# Patient Record
Sex: FEMALE | Race: WHITE | NOT HISPANIC OR LATINO | Employment: OTHER | URBAN - METROPOLITAN AREA
[De-identification: names, ages, dates, MRNs, and addresses within clinical notes are randomized per-mention and may not be internally consistent; named-entity substitution may affect disease eponyms.]

---

## 2023-09-28 ENCOUNTER — MEDICAL CORRESPONDENCE (OUTPATIENT)
Dept: HEALTH INFORMATION MANAGEMENT | Facility: CLINIC | Age: 78
End: 2023-09-28
Payer: COMMERCIAL

## 2023-10-02 RX ORDER — KETOCONAZOLE 20 MG/ML
SHAMPOO TOPICAL
COMMUNITY
Start: 2022-07-11

## 2023-10-02 RX ORDER — GABAPENTIN 300 MG/1
600 CAPSULE ORAL
COMMUNITY
Start: 2023-08-24 | End: 2023-10-12

## 2023-10-02 RX ORDER — LOSARTAN POTASSIUM 50 MG/1
1 TABLET ORAL DAILY
Status: ON HOLD | COMMUNITY
Start: 2023-08-24 | End: 2023-10-17

## 2023-10-02 RX ORDER — ATORVASTATIN CALCIUM 40 MG/1
1 TABLET, FILM COATED ORAL DAILY
COMMUNITY
Start: 2023-01-31

## 2023-10-05 ENCOUNTER — TRANSFERRED RECORDS (OUTPATIENT)
Dept: MULTI SPECIALTY CLINIC | Facility: CLINIC | Age: 78
End: 2023-10-05

## 2023-10-05 LAB
CREATININE (EXTERNAL): 1.36 MG/DL (ref 0.5–0.9)
GFR ESTIMATED (EXTERNAL): 40 ML/MIN/1.73M2
GLUCOSE (EXTERNAL): 128 MG/DL (ref 70–99)
HBA1C MFR BLD: 7.8 %
INR (EXTERNAL): 1.4
POTASSIUM (EXTERNAL): 4.3 MMOL/L (ref 3.5–5.1)

## 2023-10-12 RX ORDER — TIMOLOL MALEATE 5 MG/ML
1 SOLUTION/ DROPS OPHTHALMIC 2 TIMES DAILY
COMMUNITY

## 2023-10-12 RX ORDER — METOPROLOL SUCCINATE 100 MG/1
100 TABLET, EXTENDED RELEASE ORAL 2 TIMES DAILY
Status: ON HOLD | COMMUNITY
End: 2023-10-17

## 2023-10-12 RX ORDER — GABAPENTIN 300 MG/1
600 CAPSULE ORAL 2 TIMES DAILY PRN
COMMUNITY
End: 2023-10-18

## 2023-10-12 NOTE — PHARMACY-ADMISSION MEDICATION HISTORY
Med rec completed by pre-admitting RNBEBETO.    This writer phoned pt at home to confirm lantus dosing, novolog dosing, gabapentin dosing, apixaban frequency and metoprolol.  Called [facility] [place] to confirm timolol eye drop formulation and metoprolol formulation/strength.    Removed norco prn entry from 2015, docusate 100mg daily and flonase-pt reports not taking these.     Prior to Admission medications    Medication Sig Last Dose Taking? Auth Provider Long Term End Date   apixaban ANTICOAGULANT (ELIQUIS) 5 MG tablet Take 5 mg by mouth 2 times daily 10/8/2023 Yes Reported, Patient     atorvastatin (LIPITOR) 40 MG tablet Take 1 tablet by mouth daily  Yes Reported, Patient Yes    gabapentin (NEURONTIN) 300 MG capsule Take 600 mg by mouth 2 times daily as needed  Yes Unknown, Entered By History No    HYDROCHLOROTHIAZIDE PO Take 25 mg by mouth daily  Yes Reported, Patient Yes    insulin aspart (NOVOLOG PEN) 100 UNIT/ML pen Inject 3 Units Subcutaneous daily (with breakfast)  Yes Unknown, Entered By History No    insulin aspart (NOVOLOG PEN) 100 UNIT/ML pen Inject 3 Units Subcutaneous daily (with lunch)  Yes Unknown, Entered By History No    insulin aspart (NOVOLOG PEN) 100 UNIT/ML pen Inject 5 Units Subcutaneous daily (with dinner)  Yes Unknown, Entered By History No    insulin glargine (LANTUS) 100 UNIT/ML vial Inject 17-20 Units Subcutaneous at bedtime  Yes Reported, Patient Yes    ketoconazole (NIZORAL) 2 % external shampoo Shampoo the hair thoroughly each as need.  Yes Reported, Patient     LEVOTHYROXINE SODIUM PO Take 88 mcg by mouth daily  Yes Reported, Patient Yes    losartan (COZAAR) 50 MG tablet Take 1 tablet by mouth daily  Yes Reported, Patient Yes    metoprolol succinate ER (TOPROL XL) 100 MG 24 hr tablet Take 100 mg by mouth 2 times daily  Yes Unknown, Entered By History Yes    Multiple Vitamins-Calcium (ONE-A-DAY WOMENS FORMULA PO) Take 1 tablet by mouth daily  Yes Reported, Patient      OMEPRAZOLE PO Take 40 mg by mouth daily  Yes Reported, Patient     timolol maleate (TIMOPTIC) 0.5 % ophthalmic solution Place 1 drop into both eyes 2 times daily  Yes Unknown, Entered By History     brimonidine (ALPHAGAN-P) 0.15 % ophthalmic solution Place 1 drop Into the left eye 2 times daily   Unknown, Entered By History     latanoprost (XALATAN) 0.005 % ophthalmic solution Place 1 drop into the right eye At Bedtime   Reported, Patient Yes    prednisoLONE acetate (PRED FORTE) 1 % ophthalmic suspension Place 1 drop Into the left eye daily   Reported, Patient

## 2023-10-13 ENCOUNTER — APPOINTMENT (OUTPATIENT)
Dept: GENERAL RADIOLOGY | Facility: CLINIC | Age: 78
DRG: 460 | End: 2023-10-13
Attending: ORTHOPAEDIC SURGERY
Payer: COMMERCIAL

## 2023-10-13 ENCOUNTER — ANESTHESIA (OUTPATIENT)
Dept: SURGERY | Facility: CLINIC | Age: 78
DRG: 460 | End: 2023-10-13
Payer: COMMERCIAL

## 2023-10-13 ENCOUNTER — ANESTHESIA EVENT (OUTPATIENT)
Dept: SURGERY | Facility: CLINIC | Age: 78
DRG: 460 | End: 2023-10-13
Payer: COMMERCIAL

## 2023-10-13 ENCOUNTER — HOSPITAL ENCOUNTER (INPATIENT)
Facility: CLINIC | Age: 78
LOS: 4 days | Discharge: NURSING, REHAB OR LONG-TERM CARE | DRG: 460 | End: 2023-10-17
Attending: ORTHOPAEDIC SURGERY | Admitting: ORTHOPAEDIC SURGERY
Payer: COMMERCIAL

## 2023-10-13 DIAGNOSIS — Z98.890 H/O SPINAL SURGERY: Primary | ICD-10-CM

## 2023-10-13 DIAGNOSIS — I10 BENIGN ESSENTIAL HYPERTENSION: ICD-10-CM

## 2023-10-13 LAB
ABO/RH(D): NORMAL
ANTIBODY SCREEN: NEGATIVE
GLUCOSE BLDC GLUCOMTR-MCNC: 110 MG/DL (ref 70–99)
GLUCOSE BLDC GLUCOMTR-MCNC: 161 MG/DL (ref 70–99)
GLUCOSE BLDC GLUCOMTR-MCNC: 194 MG/DL (ref 70–99)
GLUCOSE BLDC GLUCOMTR-MCNC: 236 MG/DL (ref 70–99)
HGB BLD-MCNC: 12.5 G/DL (ref 11.7–15.7)
SPECIMEN EXPIRATION DATE: NORMAL

## 2023-10-13 PROCEDURE — 120N000001 HC R&B MED SURG/OB

## 2023-10-13 PROCEDURE — 250N000011 HC RX IP 250 OP 636: Performed by: ANESTHESIOLOGY

## 2023-10-13 PROCEDURE — 999N000141 HC STATISTIC PRE-PROCEDURE NURSING ASSESSMENT: Performed by: ORTHOPAEDIC SURGERY

## 2023-10-13 PROCEDURE — 00NW0ZZ RELEASE CERVICAL SPINAL CORD, OPEN APPROACH: ICD-10-PCS | Performed by: ORTHOPAEDIC SURGERY

## 2023-10-13 PROCEDURE — 710N000009 HC RECOVERY PHASE 1, LEVEL 1, PER MIN: Performed by: ORTHOPAEDIC SURGERY

## 2023-10-13 PROCEDURE — 272N000002 HC OR SUPPLY OTHER OPNP: Performed by: ORTHOPAEDIC SURGERY

## 2023-10-13 PROCEDURE — 0RG40J1 FUSION OF CERVICOTHORACIC VERTEBRAL JOINT WITH SYNTHETIC SUBSTITUTE, POSTERIOR APPROACH, POSTERIOR COLUMN, OPEN APPROACH: ICD-10-PCS | Performed by: ORTHOPAEDIC SURGERY

## 2023-10-13 PROCEDURE — 250N000009 HC RX 250: Performed by: NURSE ANESTHETIST, CERTIFIED REGISTERED

## 2023-10-13 PROCEDURE — C1713 ANCHOR/SCREW BN/BN,TIS/BN: HCPCS | Performed by: ORTHOPAEDIC SURGERY

## 2023-10-13 PROCEDURE — 250N000013 HC RX MED GY IP 250 OP 250 PS 637: Performed by: NURSE PRACTITIONER

## 2023-10-13 PROCEDURE — 250N000005 HC OR RX SURGIFLO HEMOSTATIC MATRIX 10ML 199102S OPNP: Performed by: ORTHOPAEDIC SURGERY

## 2023-10-13 PROCEDURE — 250N000012 HC RX MED GY IP 250 OP 636 PS 637: Performed by: PHYSICIAN ASSISTANT

## 2023-10-13 PROCEDURE — 370N000017 HC ANESTHESIA TECHNICAL FEE, PER MIN: Performed by: ORTHOPAEDIC SURGERY

## 2023-10-13 PROCEDURE — 0RG20J1 FUSION OF 2 OR MORE CERVICAL VERTEBRAL JOINTS WITH SYNTHETIC SUBSTITUTE, POSTERIOR APPROACH, POSTERIOR COLUMN, OPEN APPROACH: ICD-10-PCS | Performed by: ORTHOPAEDIC SURGERY

## 2023-10-13 PROCEDURE — G0509 CRIT CARE TELEHEA CONSULT 50: HCPCS | Mod: VID | Performed by: NURSE PRACTITIONER

## 2023-10-13 PROCEDURE — 250N000011 HC RX IP 250 OP 636: Performed by: ORTHOPAEDIC SURGERY

## 2023-10-13 PROCEDURE — 999N000179 XR SURGERY CARM FLUORO LESS THAN 5 MIN W STILLS: Mod: TC

## 2023-10-13 PROCEDURE — 86850 RBC ANTIBODY SCREEN: CPT | Performed by: ORTHOPAEDIC SURGERY

## 2023-10-13 PROCEDURE — 00NX0ZZ RELEASE THORACIC SPINAL CORD, OPEN APPROACH: ICD-10-PCS | Performed by: ORTHOPAEDIC SURGERY

## 2023-10-13 PROCEDURE — 258N000003 HC RX IP 258 OP 636: Performed by: NURSE ANESTHETIST, CERTIFIED REGISTERED

## 2023-10-13 PROCEDURE — 258N000003 HC RX IP 258 OP 636: Performed by: ORTHOPAEDIC SURGERY

## 2023-10-13 PROCEDURE — 258N000003 HC RX IP 258 OP 636: Performed by: ANESTHESIOLOGY

## 2023-10-13 PROCEDURE — 250N000011 HC RX IP 250 OP 636: Performed by: NURSE ANESTHETIST, CERTIFIED REGISTERED

## 2023-10-13 PROCEDURE — 250N000013 HC RX MED GY IP 250 OP 250 PS 637: Performed by: PHYSICIAN ASSISTANT

## 2023-10-13 PROCEDURE — 250N000025 HC SEVOFLURANE, PER MIN: Performed by: ORTHOPAEDIC SURGERY

## 2023-10-13 PROCEDURE — 250N000011 HC RX IP 250 OP 636: Mod: JZ

## 2023-10-13 PROCEDURE — 272N000001 HC OR GENERAL SUPPLY STERILE: Performed by: ORTHOPAEDIC SURGERY

## 2023-10-13 PROCEDURE — 85018 HEMOGLOBIN: CPT | Performed by: ORTHOPAEDIC SURGERY

## 2023-10-13 PROCEDURE — 250N000009 HC RX 250: Performed by: ORTHOPAEDIC SURGERY

## 2023-10-13 PROCEDURE — 250N000013 HC RX MED GY IP 250 OP 250 PS 637

## 2023-10-13 PROCEDURE — 0RG60J1 FUSION OF THORACIC VERTEBRAL JOINT WITH SYNTHETIC SUBSTITUTE, POSTERIOR APPROACH, POSTERIOR COLUMN, OPEN APPROACH: ICD-10-PCS | Performed by: ORTHOPAEDIC SURGERY

## 2023-10-13 PROCEDURE — 36415 COLL VENOUS BLD VENIPUNCTURE: CPT | Performed by: ORTHOPAEDIC SURGERY

## 2023-10-13 PROCEDURE — 86901 BLOOD TYPING SEROLOGIC RH(D): CPT | Performed by: ORTHOPAEDIC SURGERY

## 2023-10-13 PROCEDURE — 250N000011 HC RX IP 250 OP 636: Mod: JZ | Performed by: NURSE ANESTHETIST, CERTIFIED REGISTERED

## 2023-10-13 PROCEDURE — 360N000086 HC SURGERY LEVEL 6 W/ FLUORO, PER MIN: Performed by: ORTHOPAEDIC SURGERY

## 2023-10-13 PROCEDURE — 272N000282 HC OR IOM SUPPLIES OPNP: Performed by: ORTHOPAEDIC SURGERY

## 2023-10-13 PROCEDURE — 8E0WXBZ COMPUTER ASSISTED PROCEDURE OF TRUNK REGION: ICD-10-PCS | Performed by: ORTHOPAEDIC SURGERY

## 2023-10-13 PROCEDURE — 258N000003 HC RX IP 258 OP 636

## 2023-10-13 DEVICE — IMPLANTABLE DEVICE: Type: IMPLANTABLE DEVICE | Site: POSTERIOR CERVICAL | Status: FUNCTIONAL

## 2023-10-13 RX ORDER — OXYCODONE HYDROCHLORIDE 5 MG/1
5 TABLET ORAL
Status: DISCONTINUED | OUTPATIENT
Start: 2023-10-13 | End: 2023-10-17 | Stop reason: HOSPADM

## 2023-10-13 RX ORDER — DEXAMETHASONE SODIUM PHOSPHATE 4 MG/ML
INJECTION, SOLUTION INTRA-ARTICULAR; INTRALESIONAL; INTRAMUSCULAR; INTRAVENOUS; SOFT TISSUE PRN
Status: DISCONTINUED | OUTPATIENT
Start: 2023-10-13 | End: 2023-10-13

## 2023-10-13 RX ORDER — BUPIVACAINE HYDROCHLORIDE AND EPINEPHRINE 2.5; 5 MG/ML; UG/ML
INJECTION, SOLUTION EPIDURAL; INFILTRATION; INTRACAUDAL; PERINEURAL PRN
Status: DISCONTINUED | OUTPATIENT
Start: 2023-10-13 | End: 2023-10-13 | Stop reason: HOSPADM

## 2023-10-13 RX ORDER — HYDROCHLOROTHIAZIDE 25 MG/1
25 TABLET ORAL DAILY
Status: DISCONTINUED | OUTPATIENT
Start: 2023-10-14 | End: 2023-10-17 | Stop reason: HOSPADM

## 2023-10-13 RX ORDER — PANTOPRAZOLE SODIUM 40 MG/1
40 TABLET, DELAYED RELEASE ORAL DAILY
Status: DISCONTINUED | OUTPATIENT
Start: 2023-10-14 | End: 2023-10-17 | Stop reason: HOSPADM

## 2023-10-13 RX ORDER — LIDOCAINE 40 MG/G
CREAM TOPICAL
Status: DISCONTINUED | OUTPATIENT
Start: 2023-10-13 | End: 2023-10-13 | Stop reason: HOSPADM

## 2023-10-13 RX ORDER — METOPROLOL SUCCINATE 50 MG/1
100 TABLET, EXTENDED RELEASE ORAL 2 TIMES DAILY
Status: DISCONTINUED | OUTPATIENT
Start: 2023-10-13 | End: 2023-10-17 | Stop reason: HOSPADM

## 2023-10-13 RX ORDER — LEVOTHYROXINE SODIUM 88 UG/1
88 TABLET ORAL DAILY
Status: DISCONTINUED | OUTPATIENT
Start: 2023-10-14 | End: 2023-10-17 | Stop reason: HOSPADM

## 2023-10-13 RX ORDER — GABAPENTIN 300 MG/1
300 CAPSULE ORAL 3 TIMES DAILY
Status: DISCONTINUED | OUTPATIENT
Start: 2023-10-13 | End: 2023-10-17 | Stop reason: HOSPADM

## 2023-10-13 RX ORDER — VANCOMYCIN HYDROCHLORIDE 1 G/20ML
INJECTION, POWDER, LYOPHILIZED, FOR SOLUTION INTRAVENOUS PRN
Status: DISCONTINUED | OUTPATIENT
Start: 2023-10-13 | End: 2023-10-13 | Stop reason: HOSPADM

## 2023-10-13 RX ORDER — KETOROLAC TROMETHAMINE 15 MG/ML
15 INJECTION, SOLUTION INTRAMUSCULAR; INTRAVENOUS
Status: DISCONTINUED | OUTPATIENT
Start: 2023-10-13 | End: 2023-10-13 | Stop reason: HOSPADM

## 2023-10-13 RX ORDER — ONDANSETRON 2 MG/ML
4 INJECTION INTRAMUSCULAR; INTRAVENOUS EVERY 30 MIN PRN
Status: DISCONTINUED | OUTPATIENT
Start: 2023-10-13 | End: 2023-10-13 | Stop reason: HOSPADM

## 2023-10-13 RX ORDER — HYDROMORPHONE HCL IN WATER/PF 6 MG/30 ML
0.4 PATIENT CONTROLLED ANALGESIA SYRINGE INTRAVENOUS
Status: DISCONTINUED | OUTPATIENT
Start: 2023-10-13 | End: 2023-10-17 | Stop reason: HOSPADM

## 2023-10-13 RX ORDER — LIDOCAINE 40 MG/G
CREAM TOPICAL
Status: DISCONTINUED | OUTPATIENT
Start: 2023-10-13 | End: 2023-10-17 | Stop reason: HOSPADM

## 2023-10-13 RX ORDER — ACETAMINOPHEN 325 MG/1
975 TABLET ORAL ONCE
Status: DISCONTINUED | OUTPATIENT
Start: 2023-10-13 | End: 2023-10-13 | Stop reason: HOSPADM

## 2023-10-13 RX ORDER — NALOXONE HYDROCHLORIDE 0.4 MG/ML
0.2 INJECTION, SOLUTION INTRAMUSCULAR; INTRAVENOUS; SUBCUTANEOUS
Status: DISCONTINUED | OUTPATIENT
Start: 2023-10-13 | End: 2023-10-17 | Stop reason: HOSPADM

## 2023-10-13 RX ORDER — NALOXONE HYDROCHLORIDE 0.4 MG/ML
0.4 INJECTION, SOLUTION INTRAMUSCULAR; INTRAVENOUS; SUBCUTANEOUS
Status: DISCONTINUED | OUTPATIENT
Start: 2023-10-13 | End: 2023-10-17 | Stop reason: HOSPADM

## 2023-10-13 RX ORDER — HYDROMORPHONE HCL IN WATER/PF 6 MG/30 ML
0.2 PATIENT CONTROLLED ANALGESIA SYRINGE INTRAVENOUS EVERY 5 MIN PRN
Status: DISCONTINUED | OUTPATIENT
Start: 2023-10-13 | End: 2023-10-13 | Stop reason: HOSPADM

## 2023-10-13 RX ORDER — OXYCODONE HYDROCHLORIDE 10 MG/1
10 TABLET ORAL EVERY 4 HOURS PRN
Status: DISCONTINUED | OUTPATIENT
Start: 2023-10-13 | End: 2023-10-13

## 2023-10-13 RX ORDER — GINSENG 100 MG
CAPSULE ORAL PRN
Status: DISCONTINUED | OUTPATIENT
Start: 2023-10-13 | End: 2023-10-13 | Stop reason: HOSPADM

## 2023-10-13 RX ORDER — PROPOFOL 10 MG/ML
INJECTION, EMULSION INTRAVENOUS PRN
Status: DISCONTINUED | OUTPATIENT
Start: 2023-10-13 | End: 2023-10-13

## 2023-10-13 RX ORDER — SODIUM CHLORIDE, SODIUM LACTATE, POTASSIUM CHLORIDE, CALCIUM CHLORIDE 600; 310; 30; 20 MG/100ML; MG/100ML; MG/100ML; MG/100ML
INJECTION, SOLUTION INTRAVENOUS CONTINUOUS
Status: DISCONTINUED | OUTPATIENT
Start: 2023-10-13 | End: 2023-10-13 | Stop reason: HOSPADM

## 2023-10-13 RX ORDER — SODIUM CHLORIDE 9 MG/ML
INJECTION, SOLUTION INTRAVENOUS CONTINUOUS
Status: DISCONTINUED | OUTPATIENT
Start: 2023-10-13 | End: 2023-10-17 | Stop reason: HOSPADM

## 2023-10-13 RX ORDER — TIMOLOL MALEATE 5 MG/ML
1 SOLUTION/ DROPS OPHTHALMIC 2 TIMES DAILY
Status: DISCONTINUED | OUTPATIENT
Start: 2023-10-13 | End: 2023-10-13

## 2023-10-13 RX ORDER — BISACODYL 10 MG
10 SUPPOSITORY, RECTAL RECTAL DAILY PRN
Status: DISCONTINUED | OUTPATIENT
Start: 2023-10-13 | End: 2023-10-17 | Stop reason: HOSPADM

## 2023-10-13 RX ORDER — CEFAZOLIN SODIUM/WATER 2 G/20 ML
2 SYRINGE (ML) INTRAVENOUS SEE ADMIN INSTRUCTIONS
Status: DISCONTINUED | OUTPATIENT
Start: 2023-10-13 | End: 2023-10-13 | Stop reason: HOSPADM

## 2023-10-13 RX ORDER — ATORVASTATIN CALCIUM 40 MG/1
40 TABLET, FILM COATED ORAL EVERY EVENING
Status: DISCONTINUED | OUTPATIENT
Start: 2023-10-13 | End: 2023-10-17 | Stop reason: HOSPADM

## 2023-10-13 RX ORDER — CEFAZOLIN SODIUM 1 G/3ML
1 INJECTION, POWDER, FOR SOLUTION INTRAMUSCULAR; INTRAVENOUS EVERY 8 HOURS
Qty: 15 ML | Refills: 0 | Status: COMPLETED | OUTPATIENT
Start: 2023-10-13 | End: 2023-10-14

## 2023-10-13 RX ORDER — TRANEXAMIC ACID 10 MG/ML
5 INJECTION, SOLUTION INTRAVENOUS CONTINUOUS
Status: DISCONTINUED | OUTPATIENT
Start: 2023-10-13 | End: 2023-10-13 | Stop reason: HOSPADM

## 2023-10-13 RX ORDER — ACETAMINOPHEN 325 MG/1
975 TABLET ORAL EVERY 8 HOURS
Status: COMPLETED | OUTPATIENT
Start: 2023-10-13 | End: 2023-10-16

## 2023-10-13 RX ORDER — PROCHLORPERAZINE MALEATE 5 MG
5 TABLET ORAL EVERY 6 HOURS PRN
Status: DISCONTINUED | OUTPATIENT
Start: 2023-10-13 | End: 2023-10-17 | Stop reason: HOSPADM

## 2023-10-13 RX ORDER — ACETAMINOPHEN 325 MG/1
650 TABLET ORAL EVERY 4 HOURS PRN
Status: DISCONTINUED | OUTPATIENT
Start: 2023-10-16 | End: 2023-10-17 | Stop reason: HOSPADM

## 2023-10-13 RX ORDER — BRIMONIDINE TARTRATE 2 MG/ML
1 SOLUTION/ DROPS OPHTHALMIC 2 TIMES DAILY
Status: DISCONTINUED | OUTPATIENT
Start: 2023-10-13 | End: 2023-10-17 | Stop reason: HOSPADM

## 2023-10-13 RX ORDER — PROPOFOL 10 MG/ML
INJECTION, EMULSION INTRAVENOUS CONTINUOUS PRN
Status: DISCONTINUED | OUTPATIENT
Start: 2023-10-13 | End: 2023-10-13

## 2023-10-13 RX ORDER — FENTANYL CITRATE 50 UG/ML
25 INJECTION, SOLUTION INTRAMUSCULAR; INTRAVENOUS EVERY 5 MIN PRN
Status: DISCONTINUED | OUTPATIENT
Start: 2023-10-13 | End: 2023-10-13 | Stop reason: HOSPADM

## 2023-10-13 RX ORDER — PREDNISOLONE ACETATE 10 MG/ML
1 SUSPENSION/ DROPS OPHTHALMIC DAILY
Status: DISCONTINUED | OUTPATIENT
Start: 2023-10-14 | End: 2023-10-17 | Stop reason: HOSPADM

## 2023-10-13 RX ORDER — PREDNISOLONE ACETATE 10 MG/ML
1 SUSPENSION/ DROPS OPHTHALMIC DAILY
Status: DISCONTINUED | OUTPATIENT
Start: 2023-10-14 | End: 2023-10-13

## 2023-10-13 RX ORDER — ONDANSETRON 4 MG/1
4 TABLET, ORALLY DISINTEGRATING ORAL EVERY 6 HOURS PRN
Status: DISCONTINUED | OUTPATIENT
Start: 2023-10-13 | End: 2023-10-17 | Stop reason: HOSPADM

## 2023-10-13 RX ORDER — ONDANSETRON 4 MG/1
4 TABLET, ORALLY DISINTEGRATING ORAL EVERY 30 MIN PRN
Status: DISCONTINUED | OUTPATIENT
Start: 2023-10-13 | End: 2023-10-13 | Stop reason: HOSPADM

## 2023-10-13 RX ORDER — ONDANSETRON 2 MG/ML
4 INJECTION INTRAMUSCULAR; INTRAVENOUS EVERY 6 HOURS PRN
Status: DISCONTINUED | OUTPATIENT
Start: 2023-10-13 | End: 2023-10-17 | Stop reason: HOSPADM

## 2023-10-13 RX ORDER — HYDROXYZINE HYDROCHLORIDE 50 MG/1
50 TABLET, FILM COATED ORAL EVERY 6 HOURS
Status: DISCONTINUED | OUTPATIENT
Start: 2023-10-13 | End: 2023-10-17 | Stop reason: HOSPADM

## 2023-10-13 RX ORDER — HYDROXYZINE HYDROCHLORIDE 25 MG/1
25 TABLET, FILM COATED ORAL EVERY 6 HOURS
Status: DISCONTINUED | OUTPATIENT
Start: 2023-10-13 | End: 2023-10-17 | Stop reason: HOSPADM

## 2023-10-13 RX ORDER — BRIMONIDINE TARTRATE 1.5 MG/ML
1 SOLUTION/ DROPS OPHTHALMIC 2 TIMES DAILY
Status: DISCONTINUED | OUTPATIENT
Start: 2023-10-13 | End: 2023-10-13

## 2023-10-13 RX ORDER — ONDANSETRON 2 MG/ML
INJECTION INTRAMUSCULAR; INTRAVENOUS PRN
Status: DISCONTINUED | OUTPATIENT
Start: 2023-10-13 | End: 2023-10-13

## 2023-10-13 RX ORDER — POLYETHYLENE GLYCOL 3350 17 G/17G
17 POWDER, FOR SOLUTION ORAL DAILY
Status: DISCONTINUED | OUTPATIENT
Start: 2023-10-14 | End: 2023-10-17 | Stop reason: HOSPADM

## 2023-10-13 RX ORDER — LIDOCAINE HYDROCHLORIDE 20 MG/ML
INJECTION, SOLUTION INFILTRATION; PERINEURAL PRN
Status: DISCONTINUED | OUTPATIENT
Start: 2023-10-13 | End: 2023-10-13

## 2023-10-13 RX ORDER — HYDROMORPHONE HCL IN WATER/PF 6 MG/30 ML
0.4 PATIENT CONTROLLED ANALGESIA SYRINGE INTRAVENOUS EVERY 5 MIN PRN
Status: DISCONTINUED | OUTPATIENT
Start: 2023-10-13 | End: 2023-10-13 | Stop reason: HOSPADM

## 2023-10-13 RX ORDER — HYDRALAZINE HYDROCHLORIDE 20 MG/ML
2.5-5 INJECTION INTRAMUSCULAR; INTRAVENOUS EVERY 10 MIN PRN
Status: DISCONTINUED | OUTPATIENT
Start: 2023-10-13 | End: 2023-10-13 | Stop reason: HOSPADM

## 2023-10-13 RX ORDER — TIMOLOL MALEATE 5 MG/ML
1 SOLUTION/ DROPS OPHTHALMIC 2 TIMES DAILY
Status: DISCONTINUED | OUTPATIENT
Start: 2023-10-13 | End: 2023-10-17 | Stop reason: HOSPADM

## 2023-10-13 RX ORDER — METHOCARBAMOL 750 MG/1
750 TABLET, FILM COATED ORAL 4 TIMES DAILY
Status: DISCONTINUED | OUTPATIENT
Start: 2023-10-13 | End: 2023-10-17 | Stop reason: HOSPADM

## 2023-10-13 RX ORDER — METOPROLOL TARTRATE 1 MG/ML
1-2 INJECTION, SOLUTION INTRAVENOUS EVERY 5 MIN PRN
Status: DISCONTINUED | OUTPATIENT
Start: 2023-10-13 | End: 2023-10-13 | Stop reason: HOSPADM

## 2023-10-13 RX ORDER — DEXTROSE MONOHYDRATE 25 G/50ML
25-50 INJECTION, SOLUTION INTRAVENOUS
Status: DISCONTINUED | OUTPATIENT
Start: 2023-10-13 | End: 2023-10-17 | Stop reason: HOSPADM

## 2023-10-13 RX ORDER — FENTANYL CITRATE 50 UG/ML
50 INJECTION, SOLUTION INTRAMUSCULAR; INTRAVENOUS EVERY 5 MIN PRN
Status: DISCONTINUED | OUTPATIENT
Start: 2023-10-13 | End: 2023-10-13 | Stop reason: HOSPADM

## 2023-10-13 RX ORDER — METHOCARBAMOL 750 MG/1
750 TABLET, FILM COATED ORAL EVERY 6 HOURS PRN
Status: DISCONTINUED | OUTPATIENT
Start: 2023-10-13 | End: 2023-10-13

## 2023-10-13 RX ORDER — LATANOPROST 50 UG/ML
1 SOLUTION/ DROPS OPHTHALMIC AT BEDTIME
Status: DISCONTINUED | OUTPATIENT
Start: 2023-10-13 | End: 2023-10-13

## 2023-10-13 RX ORDER — HYDROMORPHONE HCL IN WATER/PF 6 MG/30 ML
0.2 PATIENT CONTROLLED ANALGESIA SYRINGE INTRAVENOUS
Status: DISCONTINUED | OUTPATIENT
Start: 2023-10-13 | End: 2023-10-17 | Stop reason: HOSPADM

## 2023-10-13 RX ORDER — NICOTINE POLACRILEX 4 MG
15-30 LOZENGE BUCCAL
Status: DISCONTINUED | OUTPATIENT
Start: 2023-10-13 | End: 2023-10-17 | Stop reason: HOSPADM

## 2023-10-13 RX ORDER — LOSARTAN POTASSIUM 50 MG/1
50 TABLET ORAL DAILY
Status: DISCONTINUED | OUTPATIENT
Start: 2023-10-14 | End: 2023-10-17 | Stop reason: HOSPADM

## 2023-10-13 RX ORDER — CEFAZOLIN SODIUM/WATER 2 G/20 ML
2 SYRINGE (ML) INTRAVENOUS
Status: COMPLETED | OUTPATIENT
Start: 2023-10-13 | End: 2023-10-13

## 2023-10-13 RX ORDER — OXYCODONE HYDROCHLORIDE 5 MG/1
5 TABLET ORAL EVERY 4 HOURS PRN
Status: DISCONTINUED | OUTPATIENT
Start: 2023-10-13 | End: 2023-10-13

## 2023-10-13 RX ORDER — FENTANYL CITRATE 50 UG/ML
INJECTION, SOLUTION INTRAMUSCULAR; INTRAVENOUS PRN
Status: DISCONTINUED | OUTPATIENT
Start: 2023-10-13 | End: 2023-10-13

## 2023-10-13 RX ORDER — OXYCODONE HYDROCHLORIDE 10 MG/1
10 TABLET ORAL
Status: DISCONTINUED | OUTPATIENT
Start: 2023-10-13 | End: 2023-10-17 | Stop reason: HOSPADM

## 2023-10-13 RX ORDER — LATANOPROST 50 UG/ML
1 SOLUTION/ DROPS OPHTHALMIC AT BEDTIME
Status: DISCONTINUED | OUTPATIENT
Start: 2023-10-13 | End: 2023-10-17 | Stop reason: HOSPADM

## 2023-10-13 RX ORDER — LIDOCAINE 4 G/G
2 PATCH TOPICAL
Status: DISCONTINUED | OUTPATIENT
Start: 2023-10-13 | End: 2023-10-17 | Stop reason: HOSPADM

## 2023-10-13 RX ORDER — METOPROLOL TARTRATE 1 MG/ML
INJECTION, SOLUTION INTRAVENOUS PRN
Status: DISCONTINUED | OUTPATIENT
Start: 2023-10-13 | End: 2023-10-13

## 2023-10-13 RX ORDER — AMOXICILLIN 250 MG
1 CAPSULE ORAL 2 TIMES DAILY
Status: DISCONTINUED | OUTPATIENT
Start: 2023-10-13 | End: 2023-10-17 | Stop reason: HOSPADM

## 2023-10-13 RX ADMIN — OXYCODONE HYDROCHLORIDE 10 MG: 10 TABLET ORAL at 18:17

## 2023-10-13 RX ADMIN — TRANEXAMIC ACID 1 G: 10 INJECTION, SOLUTION INTRAVENOUS at 11:59

## 2023-10-13 RX ADMIN — PHENYLEPHRINE HYDROCHLORIDE 100 MCG: 10 INJECTION INTRAVENOUS at 07:50

## 2023-10-13 RX ADMIN — FENTANYL CITRATE 25 MCG: 50 INJECTION, SOLUTION INTRAMUSCULAR; INTRAVENOUS at 12:57

## 2023-10-13 RX ADMIN — ROCURONIUM BROMIDE 20 MG: 50 INJECTION, SOLUTION INTRAVENOUS at 10:16

## 2023-10-13 RX ADMIN — ROCURONIUM BROMIDE 50 MG: 50 INJECTION, SOLUTION INTRAVENOUS at 08:10

## 2023-10-13 RX ADMIN — ACETAMINOPHEN 975 MG: 325 TABLET, FILM COATED ORAL at 16:50

## 2023-10-13 RX ADMIN — HYDROMORPHONE HYDROCHLORIDE 0.2 MG: 0.2 INJECTION, SOLUTION INTRAMUSCULAR; INTRAVENOUS; SUBCUTANEOUS at 16:43

## 2023-10-13 RX ADMIN — Medication 100 MG: at 07:46

## 2023-10-13 RX ADMIN — ATORVASTATIN CALCIUM 40 MG: 40 TABLET, FILM COATED ORAL at 19:56

## 2023-10-13 RX ADMIN — ROCURONIUM BROMIDE 20 MG: 50 INJECTION, SOLUTION INTRAVENOUS at 09:16

## 2023-10-13 RX ADMIN — PHENYLEPHRINE HYDROCHLORIDE 100 MCG: 10 INJECTION INTRAVENOUS at 11:26

## 2023-10-13 RX ADMIN — Medication 2 G: at 07:45

## 2023-10-13 RX ADMIN — METHOCARBAMOL 750 MG: 750 TABLET ORAL at 13:28

## 2023-10-13 RX ADMIN — LIDOCAINE HYDROCHLORIDE 50 MG: 20 INJECTION, SOLUTION INFILTRATION; PERINEURAL at 07:46

## 2023-10-13 RX ADMIN — PHENYLEPHRINE HYDROCHLORIDE 100 MCG: 10 INJECTION INTRAVENOUS at 08:29

## 2023-10-13 RX ADMIN — FENTANYL CITRATE 25 MCG: 50 INJECTION, SOLUTION INTRAMUSCULAR; INTRAVENOUS at 13:07

## 2023-10-13 RX ADMIN — INSULIN ASPART 1 UNITS: 100 INJECTION, SOLUTION INTRAVENOUS; SUBCUTANEOUS at 23:01

## 2023-10-13 RX ADMIN — PHENYLEPHRINE HYDROCHLORIDE 100 MCG: 10 INJECTION INTRAVENOUS at 12:15

## 2023-10-13 RX ADMIN — HYDROMORPHONE HYDROCHLORIDE 0.5 MG: 1 INJECTION, SOLUTION INTRAMUSCULAR; INTRAVENOUS; SUBCUTANEOUS at 11:03

## 2023-10-13 RX ADMIN — DEXAMETHASONE SODIUM PHOSPHATE 4 MG: 4 INJECTION, SOLUTION INTRA-ARTICULAR; INTRALESIONAL; INTRAMUSCULAR; INTRAVENOUS; SOFT TISSUE at 12:01

## 2023-10-13 RX ADMIN — FENTANYL CITRATE 50 MCG: 50 INJECTION INTRAMUSCULAR; INTRAVENOUS at 09:17

## 2023-10-13 RX ADMIN — TRANEXAMIC ACID 0.79 G: 1 INJECTION, SOLUTION INTRAVENOUS at 08:05

## 2023-10-13 RX ADMIN — SUGAMMADEX 200 MG: 100 INJECTION, SOLUTION INTRAVENOUS at 12:27

## 2023-10-13 RX ADMIN — PHENYLEPHRINE HYDROCHLORIDE 100 MCG: 10 INJECTION INTRAVENOUS at 07:56

## 2023-10-13 RX ADMIN — METOPROLOL TARTRATE 5 MG: 5 INJECTION INTRAVENOUS at 11:50

## 2023-10-13 RX ADMIN — LIDOCAINE PATCH 4% 2 PATCH: 40 PATCH TOPICAL at 20:00

## 2023-10-13 RX ADMIN — FENTANYL CITRATE 25 MCG: 50 INJECTION, SOLUTION INTRAMUSCULAR; INTRAVENOUS at 13:26

## 2023-10-13 RX ADMIN — GABAPENTIN 300 MG: 300 CAPSULE ORAL at 19:56

## 2023-10-13 RX ADMIN — PHENYLEPHRINE HYDROCHLORIDE 100 MCG: 10 INJECTION INTRAVENOUS at 09:08

## 2023-10-13 RX ADMIN — PHENYLEPHRINE HYDROCHLORIDE 100 MCG: 10 INJECTION INTRAVENOUS at 08:38

## 2023-10-13 RX ADMIN — ACETAMINOPHEN 975 MG: 325 TABLET, FILM COATED ORAL at 22:59

## 2023-10-13 RX ADMIN — SODIUM CHLORIDE: 9 INJECTION, SOLUTION INTRAVENOUS at 15:03

## 2023-10-13 RX ADMIN — INSULIN GLARGINE 15 UNITS: 100 INJECTION, SOLUTION SUBCUTANEOUS at 23:03

## 2023-10-13 RX ADMIN — PHENYLEPHRINE HYDROCHLORIDE 100 MCG: 10 INJECTION INTRAVENOUS at 08:22

## 2023-10-13 RX ADMIN — ROCURONIUM BROMIDE 20 MG: 50 INJECTION, SOLUTION INTRAVENOUS at 11:30

## 2023-10-13 RX ADMIN — ONDANSETRON 4 MG: 2 INJECTION INTRAMUSCULAR; INTRAVENOUS at 12:01

## 2023-10-13 RX ADMIN — LATANOPROST 1 DROP: 50 SOLUTION/ DROPS OPHTHALMIC at 22:35

## 2023-10-13 RX ADMIN — HYDROXYZINE HYDROCHLORIDE 25 MG: 25 TABLET, FILM COATED ORAL at 16:49

## 2023-10-13 RX ADMIN — METHOCARBAMOL 750 MG: 750 TABLET ORAL at 19:56

## 2023-10-13 RX ADMIN — PHENYLEPHRINE HYDROCHLORIDE 200 MCG: 10 INJECTION INTRAVENOUS at 12:18

## 2023-10-13 RX ADMIN — Medication 2 G: at 11:54

## 2023-10-13 RX ADMIN — SODIUM CHLORIDE, POTASSIUM CHLORIDE, SODIUM LACTATE AND CALCIUM CHLORIDE: 600; 310; 30; 20 INJECTION, SOLUTION INTRAVENOUS at 07:45

## 2023-10-13 RX ADMIN — BRIMONIDINE TARTRATE 1 DROP: 2 SOLUTION/ DROPS OPHTHALMIC at 20:27

## 2023-10-13 RX ADMIN — HYDROXYZINE HYDROCHLORIDE 25 MG: 25 TABLET, FILM COATED ORAL at 22:32

## 2023-10-13 RX ADMIN — CEFAZOLIN 1 G: 1 INJECTION, POWDER, FOR SOLUTION INTRAMUSCULAR; INTRAVENOUS at 20:06

## 2023-10-13 RX ADMIN — PROPOFOL 150 MG: 10 INJECTION, EMULSION INTRAVENOUS at 07:46

## 2023-10-13 RX ADMIN — SODIUM CHLORIDE, POTASSIUM CHLORIDE, SODIUM LACTATE AND CALCIUM CHLORIDE: 600; 310; 30; 20 INJECTION, SOLUTION INTRAVENOUS at 09:47

## 2023-10-13 RX ADMIN — PHENYLEPHRINE HYDROCHLORIDE 0.2 MCG/KG/MIN: 10 INJECTION INTRAVENOUS at 08:38

## 2023-10-13 RX ADMIN — TIMOLOL MALEATE 1 DROP: 5 SOLUTION/ DROPS OPHTHALMIC at 20:32

## 2023-10-13 RX ADMIN — HYDROMORPHONE HYDROCHLORIDE 0.2 MG: 0.2 INJECTION, SOLUTION INTRAMUSCULAR; INTRAVENOUS; SUBCUTANEOUS at 22:33

## 2023-10-13 RX ADMIN — SODIUM CHLORIDE, POTASSIUM CHLORIDE, SODIUM LACTATE AND CALCIUM CHLORIDE: 600; 310; 30; 20 INJECTION, SOLUTION INTRAVENOUS at 13:34

## 2023-10-13 RX ADMIN — SENNOSIDES AND DOCUSATE SODIUM 1 TABLET: 8.6; 5 TABLET ORAL at 19:56

## 2023-10-13 RX ADMIN — PHENYLEPHRINE HYDROCHLORIDE 100 MCG: 10 INJECTION INTRAVENOUS at 07:52

## 2023-10-13 RX ADMIN — PHENYLEPHRINE HYDROCHLORIDE 100 MCG: 10 INJECTION INTRAVENOUS at 08:20

## 2023-10-13 RX ADMIN — FENTANYL CITRATE 100 MCG: 50 INJECTION INTRAMUSCULAR; INTRAVENOUS at 07:45

## 2023-10-13 RX ADMIN — PHENYLEPHRINE HYDROCHLORIDE 100 MCG: 10 INJECTION INTRAVENOUS at 07:54

## 2023-10-13 RX ADMIN — PROPOFOL 75 MCG/KG/MIN: 10 INJECTION, EMULSION INTRAVENOUS at 08:00

## 2023-10-13 ASSESSMENT — ACTIVITIES OF DAILY LIVING (ADL)
ADLS_ACUITY_SCORE: 35
ADLS_ACUITY_SCORE: 35
ADLS_ACUITY_SCORE: 37
ADLS_ACUITY_SCORE: 35
ADLS_ACUITY_SCORE: 37

## 2023-10-13 NOTE — PROGRESS NOTES
Pt is A&OX4, sleeping between cares, daughter at bed side. Pt is on CAPNO monitoring. IS  done. Pt on RA. Sat 94% at RA.No SOB. No c/o N/V. Abdomen soft, BS hypoactive X4. Last BM per pt report was on 10/12/23.pt c/o pain 8-9/10.PRN dilaudid IV and PO Oxycodone given. Pt is taking scheduled atarax,Tylenol and Robaxin.no c/o swallowing difficulty, pt took medications with water, had saltine crackers.  Dysphagia screening done , pt passed. Voiding via Winston catheter. IV infusing to R/ hand, CDI. Ice applied. T/N present to LEs, B/L, which is baseline.

## 2023-10-13 NOTE — ANESTHESIA PREPROCEDURE EVALUATION
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     Gastro-oesophageal reflux disease     Glaucoma     Hyperlipemia     Hypertension     Hypothyroid     Renal failure, acute (H24) 11 to 11    hospitalized     Varicose veins       Past Surgical History:   Procedure Laterality Date    DAVINCI HYSTERECTOMY SUPRACERVICAL N/A 3/18/2015    Procedure: DAVINCI HYSTERECTOMY SUPRACERVICAL;  Surgeon: Randall Melgar MD;  Location: [facility] OR    DAVINCI SACROCOLPOPEXY, MIDURETHRAL SLING, CYSTOSCOPY N/A 3/18/2015    Procedure: DAVINCI SACROCOLPOPEXY, MIDURETHRAL SLING, CYSTOSCOPY;  Surgeon: Thalia Esquivel MD;  Location: [facility] OR    DAVINCI SALPINGO-OOPHORECTOMY INCLUDING BILATERAL Bilateral 3/18/2015    Procedure: DAVINCI SALPINGO-OOPHORECTOMY INCLUDING BILATERAL;  Surgeon: Randall Melgar MD;  Location: [facility] OR    EYE SURGERY Left     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 Type: General.        ROS/MED HX  ENT/Pulmonary:  - neg pulmonary ROS     Neurologic:  - neg neurologic ROS     Cardiovascular:     (+)  hypertension- -   -  - -                                      METS/Exercise Tolerance:     Hematologic: Comments: No lab results found.   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  not previously admitted for DM/DKA.  thyroid problem,            Psychiatric/Substance Use:  - neg psychiatric ROS     Infectious Disease:  - neg infectious disease ROS     Malignancy:       Other:  - neg other ROS          Physical Exam    Airway        Mallampati: II   TM distance: > 3 FB   Neck ROM: full   Mouth opening: > 3 cm    Respiratory Devices and Support         Dental       (+) Minor Abnormalities - some fillings, tiny chips      Cardiovascular   cardiovascular exam normal          Pulmonary   pulmonary exam normal                OUTSIDE LABS:  CBC:   Lab Results   Component Value Date    WBC 8.8 06/15/2011    WBC 8.1 06/14/2011    HGB 9.8 (L) 03/20/2015    HGB 10.2 (L) 03/19/2015    HCT 30.4 (L) 06/15/2011    HCT 29.2 (L) 06/14/2011     06/15/2011     06/14/2011     BMP:   Lab Results   Component Value Date     03/20/2015     03/19/2015    POTASSIUM 4.1 03/20/2015    POTASSIUM 4.6 03/19/2015    CHLORIDE 110 (H) 03/20/2015    CHLORIDE 107 03/19/2015    CO2 27 03/20/2015    CO2 27 03/19/2015    BUN 13 03/20/2015    BUN 17 03/19/2015    CR 0.96 03/20/2015    CR 1.02 03/19/2015     (H) 10/13/2023     (H) 03/20/2015     COAGS:   Lab Results   Component Value Date    PTT 37 06/10/2011    INR 1.29 (H) 06/13/2011    FIBR 637 (H) 06/10/2011     POC:   Lab Results   Component Value Date     (H) 03/20/2015     HEPATIC:   Lab Results   Component Value Date    ALBUMIN 3.1 (L)  06/15/2011    PROTTOTAL 6.7 (L) 06/15/2011    ALT 33 06/15/2011    AST 37 06/15/2011    ALKPHOS 94 06/15/2011    BILITOTAL 0.2 06/15/2011    ALONDRA 8 (L) 09/03/2005     OTHER:   Lab Results   Component Value Date    A1C 6.7 (H) 03/19/2015    INDY 8.2 (L) 03/20/2015    PHOS 3.1 06/15/2011    MAG 1.8 03/20/2015    TSH 2.05 03/19/2015     (H) 06/09/2011       Anesthesia Plan    ASA Status:  2       Anesthesia Type: General.     - Airway: ETT   Induction: Intravenous, Propofol.   Maintenance: Balanced.        Consents    Anesthesia Plan(s) and associated risks, benefits, and realistic alternatives discussed. Questions answered and patient/representative(s) expressed understanding.     - Discussed:     - Discussed with:  Patient      - Extended Intubation/Ventilatory Support Discussed: No.      - Patient is DNR/DNI Status: No     Use of blood products discussed: Yes.     - Discussed with: Patient.     - Consented: consented to blood products            Reason for refusal: other.     Postoperative Care    Pain management: IV analgesics.   PONV prophylaxis: Ondansetron (or other 5HT-3), Dexamethasone or Solumedrol     Comments:                Marlo Feliciano MD

## 2023-10-13 NOTE — OP NOTE
Preop Dx.  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  Given this situation aforementioned procedure was indicated.  The entire procedure carried out under neuro monitoring SSEP and the signals remained stable throughout the case.    Procedure:    After informed consent and routine prep and drape patient was positioned into prone position onto Maricarmen frame.  GW tongs applied in routine fashion and extension mode stablizing traction applied with 15 pounds of weight.  Midline posterior cervical incision made from C2 to T3.  Posterior elements exposed.  [employer] Navigation registration wand applied at T2 spinous process and O arm was brought in and images acquired.   Using the navigation Right C3 C4 C6 T1 T2 and Left C3 C5 T1 T2 screws were inserted.   The lateral mass screws from C3 to C6 were 4mm by 16mm [employer] polyaxial screws with good purchase at the purchase site.  T1 T2 pedicle screws were 4mm by 28mm with excellent purchase.  Ap and lateral C arm images after the navigated screw positioning showed good positioning of the hardware.      Laminectomy and removal of the lamina of C4 C5 C6 C7 T1 was carried out with high speed miranda and Kerosene punch.  Good decompression of the cord from C3 to T2 was achieved.      Rods were cut and contoured into lordosis and engaged to the polyaxial screws.  The tongs lifted up the head into more lordosis and the nuts were tightened  securing the fixation.       Decortication of the lateral mass and facets from C3 to T2 was carried out with miranda and bone marrow soaked calcium TPP was packed thus achieving posterior fusion from C3 to T2.    Wound was irrigated with saline and one gram of vanco powder was spread and the deep fascial tissue was closed with interupted #1 vicryl sutures and sub Q tissue with 2 0 vicryl and skin was stapled.    Sterile dressing was applied and patient was turned back into supine position and an Aspen neck collar was applied.    Sagar Cadena MD

## 2023-10-13 NOTE — ANESTHESIA PROCEDURE NOTES
Airway       Patient location during procedure: OR       Procedure Start/Stop Times: 10/13/2023 7:47 AM  Staff -        CRNA: Kris Agudelo APRN CRNA       Performed By: CRNAIndications and Patient Condition       Indications for airway management: fitz-procedural and airway protection       Induction type:intravenous       Mask difficulty assessment: 1 - vent by mask    Final Airway Details       Final airway type: endotracheal airway       Successful airway: ETT - single  Endotracheal Airway Details        ETT size (mm): 7.0       Cuffed: yes       Successful intubation technique: video laryngoscopy       VL Blade Size: Glidescope 3       Grade View of Cords: 1       Adjucts: stylet       Position: Right       Measured from: gums/teeth       Secured at (cm): 21       Bite block used: None    Post intubation assessment        Placement verified by: capnometry, equal breath sounds and chest rise        Number of attempts at approach: 1       Secured with: plastic tape       Ease of procedure: easy       Dentition: Intact    Medication(s) Administered   Medication Administration Time: 10/13/2023 7:47 AM

## 2023-10-13 NOTE — ANESTHESIA CARE TRANSFER NOTE
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    Anesthesia Type:   General     Note:    Oropharynx: oral airway in place  Level of Consciousness: drowsy  Oxygen Supplementation: face mask  Level of Supplemental Oxygen (L/min / FiO2): 10  Independent Airway: airway patency satisfactory and stable  Dentition: dentition unchanged  Vital Signs Stable: post-procedure vital signs reviewed and stable  Report to RN Given: handoff report given  Patient transferred to: PACU    Handoff Report: Identifed the Patient, Identified the Reponsible Provider, Reviewed the pertinent medical history, Discussed the surgical course, Reviewed Intra-OP anesthesia mangement and issues during anesthesia, Set expectations for post-procedure period and Allowed opportunity for questions and acknowledgement of understanding      Vitals:  Vitals Value Taken Time   BP     Temp     Pulse 81 10/13/23 1240   Resp 18 10/13/23 1240   SpO2 98 % 10/13/23 1240   Vitals shown include unfiled device data.    Electronically Signed By: CARMEL Sanford CRNA  October 13, 2023  12:41 PM

## 2023-10-13 NOTE — ANESTHESIA POSTPROCEDURE EVALUATION
Patient: Lj Castro    Procedure: Procedure(s):  Cervical three to thoracic two posterior cervical decompression and fusion with cervical three to cervical seven laminectomy       Anesthesia Type:  General    Note:  Disposition: Outpatient   Postop Pain Control: Uneventful            Sign Out: Well controlled pain   PONV: No   Neuro/Psych: Uneventful            Sign Out: Acceptable/Baseline neuro status   Airway/Respiratory: Uneventful            Sign Out: Acceptable/Baseline resp. status   CV/Hemodynamics: Uneventful            Sign Out: Acceptable CV status; No obvious hypovolemia; No obvious fluid overload   Other NRE: NONE   DID A NON-ROUTINE EVENT OCCUR? No           Last vitals:  Vitals Value Taken Time   /57 10/13/23 1320   Temp 96.8  F (36  C) 10/13/23 1300   Pulse 81 10/13/23 1329   Resp 20 10/13/23 1329   SpO2 100 % 10/13/23 1329   Vitals shown include unfiled device data.    Electronically Signed By: Marlo Feliciano MD  October 13, 2023  1:30 PM

## 2023-10-13 NOTE — BRIEF OP NOTE
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

## 2023-10-13 NOTE — PLAN OF CARE
Goal Outcome Evaluation:      Plan of Care Reviewed With: patient, spouse, family    Overall Patient Progress: improvingOverall Patient Progress: improving     Pt arrived on the unit @1445, VSS on RA. Settled in room, oriented to the unit. Winston in place and patent. Dressing CDI, ice in place. CMS intact with baseline n/t in BLEs. Infusing NS @100mL/hr. Pain consult ordered. Awaiting pain med approval from pharmacy.

## 2023-10-13 NOTE — CONSULTS
Pain Service Consultation Note  Telemedicine Consult  [facility]       Acute Inpatient Pain Service [facility]  Coverage Monday-Friday 8:00-4:30  No weekend coverage contact house officer  [facility]COM Paging/Directory Pain  Securely message with the Vocera Web Console (learn more here)    Patient Name: Lj Castro  MRN: [id number]   Age: 78 year old  Sex: female  Date: October 13, 2023                                      Reviewed: Yes    Referring Provider:  Camelia Stern PA-c  Referring Service:   Inspired Spine Sagar Cadena MD   Reason for Consultation:  acute post operative pain management     Lj Castro is a 78 year old female who was admitted on 10/13/2023. 0  Day of Surgery of planned C3-T2 posterior decompression laminectomy and fusion C3-7 for spinal stenosis and myelopathy. I was asked to see the patient for  pain management. Past medical history of  diabetes, renal failure, hyperlipidemia, HTN ,GERD. Pain is of long standing with RUE weakness.   Describes pain severe, sharp intolerable  and rates as 9/10 and is consistent with pain type  musculoskeletal pain.The patient denies excess sedation, shortness of breath. The patient does not smoke and no chemical dependency history. Opioid tolerance is naive.   Assessment/Recommendations: 78 year old with acute on chronic postoperative pain in setting of DM2, renal failure and elder status     Opioid Induced Respiratory Depression Risk Assessment: high  (Low 0-1; Moderate 2-4; or High >4 or >/= 3 if two of the risk factors are age > 60 and opioid naive) due to the following risk factors: RAND, COPD/Asthma/pulmonary disease, CHF, renal dysfunction, hepatic dysfunction, Obesity, Smoker, Age>60, >2 opioid therapies, concomitant CNS depressants, opioid naive status, or post surgical ?   Chronic opioid use = 0 mg MME, opioid used this past 24 hours 0.5 mg dilaudid IV, fentanyl 75 mcg = 10 mg MME + fentanyl .     PLAN:   1)  Pain us consistent with  "musculoskeletal  pain, secondary to acute postoperative state, in the setting of DM 2, renal failure and elder status. Treatment plan includes multimodal pain approach, medications as listed below, reviewed.  Discharge medications, advised keeping track of doses, educated on tapering off as pain improves, watch for constipation and stool softeners, no driving or alcohol with opioids, store medications in a safe place, advised to take no more than the prescribed dose as increased doses may cause respiratory depression or death. Patient is understanding of the plan. All questions and concerns addressed to patient's satisfaction.     2)Multimodal Medication Therapy  Topical: Lidocaine patch 4% and Diclofenac ointment to upper back   Adjuvants: CrCl is 50.8mL/min  and [id number] mg every 8 hrs\", Hydroxyzine 25-50 mg every 6 hrs \" Gabapentin 300 mg tid ( home dose)\" muscle relaxer'smethocarbamol ( Robaxin) 750 mg qid .  Antidepressants/anxiolytics: none   Opioids: Oxycodone5-10 mg every 3 hrs prn   IV Pain medication: Schckkoq24.-0.4 mg every 2 hrs prn and Try to minimize & give po firstaftere 1st 24 hrs     3)Non-medication interventions- Ice, physical therapy, distraction aroma therapy     4)Constipation Prophylaxis- senna and miralax   continue to monitor.   5) Follow up   -Opioid prescriber has been  none . PCP is Cory Soliz  -Discharge Recommendations - We recommend prescribing the following at the time of discharge:   Oxycodone 5-10 mg every 4 hrs prn  Continue scheduled tylenol  Resume home gabapentin   Methocarbamol ( Robaxin) 750 mg qid prn      Disposition:   Home   Prescribing at discharge      Thank you for the opportunity to participate in the care of [facility]  Pain Service will sign off.    Primary Service Contacted with Recommendations? Yes bedside nurse     Chandrika Rivera, APRN CNP  10/13/2023      Chief Complaint:  Acute post operative pain on chronic pain with RUE weakness " "      History of Present Illness:  Lj Castro is a 78 year old female with past medical history noted above.  The pain is reported to be acute on chronic pain, located in the neck and upper back , and radiates to  RUE.  Current pain is rated at 9/10 and goal is 4/10. Reports and intolerable, severe sharp is constant. The patient is with chronic pain history in setting of DM2 renal failure.  The patient has a  naive opioid tolerance. Opioid induced side effects are not noted, including sedation , nausea , and constipation .  There is no history sleep dysfunction, sleep apnea.  The patient  has  no  substance use disorder.      Past pain treatments have include no pain clinic, TENS no, Physical Therapy  yes, and no surgery. Pharmacological treatments (in past) have included  gabapentin, acetaminophen (Tylenol)..     Per [place]  review pulled from system on 10/13/23. No controlled substances.    Past Medical History:  Past Medical History:   Diagnosis Date    Arrhythmia     Bursitis, olecranon     Diabetes (H)     Displacement of cervical intervertebral disc without myelopathy     Gastro-oesophageal reflux disease     Glaucoma     Hyperlipemia     Hypertension     Hypothyroid     Renal failure, acute (H24) 6-7-11 to 6-17-11    hospitalized     Varicose veins          Family History:    History reviewed. No pertinent family history.    Social History:  Social History     Tobacco Use    Smoking status: Never    Smokeless tobacco: Never   Substance Use Topics    Alcohol use: Yes     Comment: occ         Tobacco:    no  ETOH:  no  H/O Substance Abuse:  no      Review of Systems:  Complete ROS reviewed. Unless otherwise noted, all other systems found to be negative.        Laboratory Results:  No results for input(s): \"PROTIME\", \"INR\", \"PLT\", \"PTT\", \"BUN\", \"CREATININE\" in the last 10357 hours.      Allergies:  No Known Allergies      Pain Medications:  Pain Medications/Prescriber: none     Current Pain Related " "Medications:  Medications related to Pain Management (From now, onward)      Start     Dose/Rate Route Frequency Ordered Stop    10/16/23 0000  acetaminophen (TYLENOL) tablet 650 mg         650 mg Oral EVERY 4 HOURS PRN 10/13/23 1500      10/14/23 0800  polyethylene glycol (MIRALAX) Packet 17 g         17 g Oral DAILY 10/13/23 1500      10/13/23 2000  senna-docusate (SENOKOT-S/PERICOLACE) 8.6-50 MG per tablet 1 tablet         1 tablet Oral 2 TIMES DAILY 10/13/23 1500      10/13/23 1530  acetaminophen (TYLENOL) tablet 975 mg         975 mg Oral EVERY 8 HOURS 10/13/23 1500 10/16/23 1529    10/13/23 1500  magnesium hydroxide (MILK OF MAGNESIA) suspension 30 mL         30 mL Oral DAILY PRN 10/13/23 1500      10/13/23 1500  bisacodyl (DULCOLAX) suppository 10 mg         10 mg Rectal DAILY PRN 10/13/23 1500      10/13/23 1500  HYDROmorphone (DILAUDID) injection 0.2 mg        See Hyperspace for full Linked Orders Report.    0.2 mg Intravenous EVERY 2 HOURS PRN 10/13/23 1500      10/13/23 1500  HYDROmorphone (DILAUDID) injection 0.4 mg        See Hyperspace for full Linked Orders Report.    0.4 mg Intravenous EVERY 2 HOURS PRN 10/13/23 1500      10/13/23 1500  oxyCODONE (ROXICODONE) tablet 5 mg        See Hyperspace for full Linked Orders Report.    5 mg Oral EVERY 4 HOURS PRN 10/13/23 1500      10/13/23 1500  oxyCODONE IR (ROXICODONE) tablet 10 mg        See Hyperspace for full Linked Orders Report.    10 mg Oral EVERY 4 HOURS PRN 10/13/23 1500      10/13/23 1327  methocarbamol (ROBAXIN) tablet 750 mg         750 mg Oral EVERY 6 HOURS PRN 10/13/23 1327      10/13/23 1236  lidocaine 1 % 0.1-1 mL         0.1-1 mL Other EVERY 1 HOUR PRN 10/13/23 1240      10/13/23 1236  lidocaine (LMX4) cream          Topical EVERY 1 HOUR PRN 10/13/23 1240                Physical Exam:  Vitals: /61   Pulse 77   Temp 98.7  F (37.1  C) (Temporal)   Resp 16   Ht 1.607 m (5' 3.27\")   Wt 81.6 kg (179 lb 14.3 oz)   SpO2 97%   BMI 31.60 " kg/m      Physical Exam:      CONSTITUTIONAL/GENERAL APPEARANCE:  moderate distress due to pain, unable to move in bed due to pain. Attends to name and answers questions appropriately   RESPIRATORY:  NAD even chest rise   NEURO: Alert and Oriented x3. Numbness in feet   PSYCHE: affect  normal, oriented,appropriate, pain behaviors No    Tele-Visit Details    Type of service:  Video Visit    Video Start Time (time video started): 16: 10     Video End Time (time video stopped): 16:25    Originating Location (pt. Location): Patient Hospital Room     Distant Location (provider location):  [facility]     Reason for Televisit: Pain Consult     Mode of Communication:  Video Conference via [employer]    Physician has received verbal consent for a video visit from the patient? Yes      CARMEL Perez CNP     Please see A&P for additional details of medical decision making.  MANAGEMENT DISCUSSED with the following over the past 24 hours: Estrella Patel RN    NOTE(S)/MEDICAL RECORDS REVIEWED over the past 24 hours: yes   Tests ORDERED & REVIEWED in the past 24 hours:  - BMP  - CBC  SUPPLEMENTAL HISTORY, in addition to the patient's history, over the past 24 hours obtained from:   - Spouse or significant other  - daughter   Medical complexity over the past 24 hours:  - Decision regarding ESCALATION OF LEVEL OF CARE  - Prescription DRUG MANAGEMENT performed  16:00- 16:40 PM MINUTES SPENT BY ME on the date of service doing chart review, history, exam, documentation & further activities per the note.    Acute Inpatient Pain Service [facility]  Coverage Monday-Friday 8:00-4:30  No weekend coverage contact house officer  AMCOM Paging/Directory Pain  Securely message with the Vocera Web Console (learn more here)

## 2023-10-14 ENCOUNTER — APPOINTMENT (OUTPATIENT)
Dept: PHYSICAL THERAPY | Facility: CLINIC | Age: 78
DRG: 460 | End: 2023-10-14
Attending: ORTHOPAEDIC SURGERY
Payer: COMMERCIAL

## 2023-10-14 LAB
ANION GAP SERPL CALCULATED.3IONS-SCNC: 9 MMOL/L (ref 7–15)
BASO+EOS+MONOS # BLD AUTO: ABNORMAL 10*3/UL
BASO+EOS+MONOS NFR BLD AUTO: ABNORMAL %
BASOPHILS # BLD AUTO: 0 10E3/UL (ref 0–0.2)
BASOPHILS NFR BLD AUTO: 0 %
BUN SERPL-MCNC: 22.5 MG/DL (ref 8–23)
CALCIUM SERPL-MCNC: 7.9 MG/DL (ref 8.8–10.2)
CHLORIDE SERPL-SCNC: 102 MMOL/L (ref 98–107)
CREAT SERPL-MCNC: 1.11 MG/DL (ref 0.51–0.95)
DEPRECATED HCO3 PLAS-SCNC: 23 MMOL/L (ref 22–29)
EGFRCR SERPLBLD CKD-EPI 2021: 50 ML/MIN/1.73M2
EOSINOPHIL # BLD AUTO: 0 10E3/UL (ref 0–0.7)
EOSINOPHIL NFR BLD AUTO: 0 %
ERYTHROCYTE [DISTWIDTH] IN BLOOD BY AUTOMATED COUNT: 13.4 % (ref 10–15)
GLUCOSE BLDC GLUCOMTR-MCNC: 172 MG/DL (ref 70–99)
GLUCOSE BLDC GLUCOMTR-MCNC: 193 MG/DL (ref 70–99)
GLUCOSE BLDC GLUCOMTR-MCNC: 244 MG/DL (ref 70–99)
GLUCOSE BLDC GLUCOMTR-MCNC: 253 MG/DL (ref 70–99)
GLUCOSE BLDC GLUCOMTR-MCNC: 321 MG/DL (ref 70–99)
GLUCOSE SERPL-MCNC: 286 MG/DL (ref 70–99)
HCT VFR BLD AUTO: 34.3 % (ref 35–47)
HGB BLD-MCNC: 10 G/DL (ref 11.7–15.7)
HGB BLD-MCNC: 11 G/DL (ref 11.7–15.7)
HGB BLD-MCNC: 9.6 G/DL (ref 11.7–15.7)
IMM GRANULOCYTES # BLD: 0.1 10E3/UL
IMM GRANULOCYTES NFR BLD: 1 %
LACTATE SERPL-SCNC: 1.9 MMOL/L (ref 0.7–2)
LACTATE SERPL-SCNC: 2.3 MMOL/L (ref 0.7–2)
LYMPHOCYTES # BLD AUTO: 0.7 10E3/UL (ref 0.8–5.3)
LYMPHOCYTES NFR BLD AUTO: 8 %
MCH RBC QN AUTO: 31.7 PG (ref 26.5–33)
MCHC RBC AUTO-ENTMCNC: 32.1 G/DL (ref 31.5–36.5)
MCV RBC AUTO: 99 FL (ref 78–100)
MONOCYTES # BLD AUTO: 0.6 10E3/UL (ref 0–1.3)
MONOCYTES NFR BLD AUTO: 6 %
NEUTROPHILS # BLD AUTO: 7.5 10E3/UL (ref 1.6–8.3)
NEUTROPHILS NFR BLD AUTO: 85 %
NRBC # BLD AUTO: 0 10E3/UL
NRBC BLD AUTO-RTO: 0 /100
PLATELET # BLD AUTO: 160 10E3/UL (ref 150–450)
POTASSIUM SERPL-SCNC: 4.2 MMOL/L (ref 3.4–5.3)
RBC # BLD AUTO: 3.47 10E6/UL (ref 3.8–5.2)
SODIUM SERPL-SCNC: 134 MMOL/L (ref 135–145)
WBC # BLD AUTO: 8.9 10E3/UL (ref 4–11)

## 2023-10-14 PROCEDURE — P9045 ALBUMIN (HUMAN), 5%, 250 ML: HCPCS | Mod: JZ | Performed by: STUDENT IN AN ORGANIZED HEALTH CARE EDUCATION/TRAINING PROGRAM

## 2023-10-14 PROCEDURE — 258N000003 HC RX IP 258 OP 636

## 2023-10-14 PROCEDURE — 36415 COLL VENOUS BLD VENIPUNCTURE: CPT

## 2023-10-14 PROCEDURE — 250N000011 HC RX IP 250 OP 636: Mod: JZ | Performed by: ORTHOPAEDIC SURGERY

## 2023-10-14 PROCEDURE — 99222 1ST HOSP IP/OBS MODERATE 55: CPT | Mod: FS | Performed by: STUDENT IN AN ORGANIZED HEALTH CARE EDUCATION/TRAINING PROGRAM

## 2023-10-14 PROCEDURE — 120N000001 HC R&B MED SURG/OB

## 2023-10-14 PROCEDURE — 250N000013 HC RX MED GY IP 250 OP 250 PS 637: Performed by: NURSE PRACTITIONER

## 2023-10-14 PROCEDURE — 99207 PR APP CREDIT; MD BILLING SHARED VISIT: CPT | Performed by: PHYSICIAN ASSISTANT

## 2023-10-14 PROCEDURE — 85025 COMPLETE CBC W/AUTO DIFF WBC: CPT

## 2023-10-14 PROCEDURE — 82310 ASSAY OF CALCIUM: CPT

## 2023-10-14 PROCEDURE — 97530 THERAPEUTIC ACTIVITIES: CPT | Mod: GP

## 2023-10-14 PROCEDURE — 36415 COLL VENOUS BLD VENIPUNCTURE: CPT | Performed by: STUDENT IN AN ORGANIZED HEALTH CARE EDUCATION/TRAINING PROGRAM

## 2023-10-14 PROCEDURE — 85018 HEMOGLOBIN: CPT | Performed by: STUDENT IN AN ORGANIZED HEALTH CARE EDUCATION/TRAINING PROGRAM

## 2023-10-14 PROCEDURE — 250N000013 HC RX MED GY IP 250 OP 250 PS 637: Performed by: PHYSICIAN ASSISTANT

## 2023-10-14 PROCEDURE — 83605 ASSAY OF LACTIC ACID: CPT | Performed by: PHYSICIAN ASSISTANT

## 2023-10-14 PROCEDURE — 36415 COLL VENOUS BLD VENIPUNCTURE: CPT | Performed by: PHYSICIAN ASSISTANT

## 2023-10-14 PROCEDURE — 85018 HEMOGLOBIN: CPT | Performed by: PHYSICIAN ASSISTANT

## 2023-10-14 PROCEDURE — 97162 PT EVAL MOD COMPLEX 30 MIN: CPT | Mod: GP

## 2023-10-14 PROCEDURE — 258N000003 HC RX IP 258 OP 636: Performed by: STUDENT IN AN ORGANIZED HEALTH CARE EDUCATION/TRAINING PROGRAM

## 2023-10-14 PROCEDURE — 83605 ASSAY OF LACTIC ACID: CPT | Performed by: STUDENT IN AN ORGANIZED HEALTH CARE EDUCATION/TRAINING PROGRAM

## 2023-10-14 PROCEDURE — 250N000011 HC RX IP 250 OP 636

## 2023-10-14 PROCEDURE — 250N000013 HC RX MED GY IP 250 OP 250 PS 637

## 2023-10-14 PROCEDURE — 250N000011 HC RX IP 250 OP 636: Mod: JZ | Performed by: STUDENT IN AN ORGANIZED HEALTH CARE EDUCATION/TRAINING PROGRAM

## 2023-10-14 PROCEDURE — 97116 GAIT TRAINING THERAPY: CPT | Mod: GP

## 2023-10-14 RX ORDER — KETOROLAC TROMETHAMINE 15 MG/ML
15 INJECTION, SOLUTION INTRAMUSCULAR; INTRAVENOUS EVERY 6 HOURS
Status: DISCONTINUED | OUTPATIENT
Start: 2023-10-14 | End: 2023-10-16

## 2023-10-14 RX ADMIN — SENNOSIDES AND DOCUSATE SODIUM 1 TABLET: 8.6; 5 TABLET ORAL at 20:13

## 2023-10-14 RX ADMIN — PANTOPRAZOLE SODIUM 40 MG: 40 TABLET, DELAYED RELEASE ORAL at 08:01

## 2023-10-14 RX ADMIN — POLYETHYLENE GLYCOL 3350 17 G: 17 POWDER, FOR SOLUTION ORAL at 08:01

## 2023-10-14 RX ADMIN — KETOROLAC TROMETHAMINE 15 MG: 15 INJECTION INTRAMUSCULAR; INTRAVENOUS at 14:14

## 2023-10-14 RX ADMIN — METHOCARBAMOL 750 MG: 750 TABLET ORAL at 08:00

## 2023-10-14 RX ADMIN — SENNOSIDES AND DOCUSATE SODIUM 1 TABLET: 8.6; 5 TABLET ORAL at 08:00

## 2023-10-14 RX ADMIN — LEVOTHYROXINE SODIUM 88 MCG: 0.09 TABLET ORAL at 08:00

## 2023-10-14 RX ADMIN — BRIMONIDINE TARTRATE 1 DROP: 2 SOLUTION/ DROPS OPHTHALMIC at 08:14

## 2023-10-14 RX ADMIN — DICLOFENAC SODIUM TOPICAL GEL, 1% 4 G: 10 GEL TOPICAL at 11:52

## 2023-10-14 RX ADMIN — HYDROXYZINE HYDROCHLORIDE 25 MG: 25 TABLET, FILM COATED ORAL at 07:59

## 2023-10-14 RX ADMIN — SODIUM CHLORIDE: 9 INJECTION, SOLUTION INTRAVENOUS at 11:47

## 2023-10-14 RX ADMIN — METHOCARBAMOL 750 MG: 750 TABLET ORAL at 20:13

## 2023-10-14 RX ADMIN — GABAPENTIN 300 MG: 300 CAPSULE ORAL at 08:00

## 2023-10-14 RX ADMIN — GABAPENTIN 300 MG: 300 CAPSULE ORAL at 14:21

## 2023-10-14 RX ADMIN — SODIUM CHLORIDE, POTASSIUM CHLORIDE, SODIUM LACTATE AND CALCIUM CHLORIDE 1000 ML: 600; 310; 30; 20 INJECTION, SOLUTION INTRAVENOUS at 14:00

## 2023-10-14 RX ADMIN — DICLOFENAC SODIUM TOPICAL GEL, 1% 4 G: 10 GEL TOPICAL at 16:59

## 2023-10-14 RX ADMIN — GABAPENTIN 300 MG: 300 CAPSULE ORAL at 20:13

## 2023-10-14 RX ADMIN — ATORVASTATIN CALCIUM 40 MG: 40 TABLET, FILM COATED ORAL at 20:13

## 2023-10-14 RX ADMIN — METHOCARBAMOL 750 MG: 750 TABLET ORAL at 16:08

## 2023-10-14 RX ADMIN — TIMOLOL MALEATE 1 DROP: 5 SOLUTION/ DROPS OPHTHALMIC at 08:12

## 2023-10-14 RX ADMIN — INSULIN GLARGINE 15 UNITS: 100 INJECTION, SOLUTION SUBCUTANEOUS at 22:37

## 2023-10-14 RX ADMIN — INSULIN ASPART 1 UNITS: 100 INJECTION, SOLUTION INTRAVENOUS; SUBCUTANEOUS at 22:35

## 2023-10-14 RX ADMIN — PREDNISOLONE ACETATE 1 DROP: 10 SUSPENSION/ DROPS OPHTHALMIC at 08:14

## 2023-10-14 RX ADMIN — ACETAMINOPHEN 975 MG: 325 TABLET, FILM COATED ORAL at 16:08

## 2023-10-14 RX ADMIN — CEFAZOLIN 1 G: 1 INJECTION, POWDER, FOR SOLUTION INTRAMUSCULAR; INTRAVENOUS at 12:04

## 2023-10-14 RX ADMIN — ALBUMIN HUMAN 25 G: 0.05 INJECTION, SOLUTION INTRAVENOUS at 18:13

## 2023-10-14 RX ADMIN — KETOROLAC TROMETHAMINE 15 MG: 15 INJECTION INTRAMUSCULAR; INTRAVENOUS at 08:01

## 2023-10-14 RX ADMIN — HYDROMORPHONE HYDROCHLORIDE 0.2 MG: 0.2 INJECTION, SOLUTION INTRAMUSCULAR; INTRAVENOUS; SUBCUTANEOUS at 01:20

## 2023-10-14 RX ADMIN — KETOROLAC TROMETHAMINE 15 MG: 15 INJECTION INTRAMUSCULAR; INTRAVENOUS at 20:13

## 2023-10-14 RX ADMIN — CEFAZOLIN 1 G: 1 INJECTION, POWDER, FOR SOLUTION INTRAMUSCULAR; INTRAVENOUS at 03:58

## 2023-10-14 RX ADMIN — OXYCODONE HYDROCHLORIDE 5 MG: 5 TABLET ORAL at 11:10

## 2023-10-14 RX ADMIN — METOPROLOL SUCCINATE 100 MG: 50 TABLET, EXTENDED RELEASE ORAL at 08:00

## 2023-10-14 RX ADMIN — ACETAMINOPHEN 975 MG: 325 TABLET, FILM COATED ORAL at 23:05

## 2023-10-14 RX ADMIN — LIDOCAINE PATCH 4% 2 PATCH: 40 PATCH TOPICAL at 20:35

## 2023-10-14 RX ADMIN — SODIUM CHLORIDE: 9 INJECTION, SOLUTION INTRAVENOUS at 01:18

## 2023-10-14 RX ADMIN — ACETAMINOPHEN 975 MG: 325 TABLET, FILM COATED ORAL at 08:00

## 2023-10-14 RX ADMIN — BRIMONIDINE TARTRATE 1 DROP: 2 SOLUTION/ DROPS OPHTHALMIC at 20:24

## 2023-10-14 ASSESSMENT — ACTIVITIES OF DAILY LIVING (ADL)
ADLS_ACUITY_SCORE: 33
DEPENDENT_IADLS:: INDEPENDENT;LAUNDRY
ADLS_ACUITY_SCORE: 44
ADLS_ACUITY_SCORE: 33
ADLS_ACUITY_SCORE: 37
ADLS_ACUITY_SCORE: 33
ADLS_ACUITY_SCORE: 33
ADLS_ACUITY_SCORE: 44
ADLS_ACUITY_SCORE: 37
ADLS_ACUITY_SCORE: 44
ADLS_ACUITY_SCORE: 44

## 2023-10-14 NOTE — PROGRESS NOTES
Severe neck pain.  No arm pan.  Vitals stable.   Glucose 312.  Neuro intact.   Will adjust pain meds.  Hospitalist consult for diabetic management.

## 2023-10-14 NOTE — CONSULTS
Hospitalist Consultation      Lj Castro MRN# [id number]   YOB: 1945 Age: 78 year old   Date of Admission: 10/13/2023     Requesting Physician:  Sam   Reason for consult: Medical management            Assessment and Plan:   This patient is a 78 year old female with a PMH significant for HTN, HLD, hypothyroid, DM2, PN, CKD3 (baseline cr about 1.15-1.35), AFib-on DOAC, chronic neck and back pain, neurogenic claudication, PAD, and GERD who is POD #1 s/p C3 to T2 cervical lamimectomy and spinal cord decompression, C3 to T2 posterior fusion, C3 to T2 segemental instrumentation for progressive myelopathy due to stenosis and kyphosis from C3-T3. We have been consulted to assist in medical management of this patient.     Problem List:  #.  S/p C3 to T2 cervical lamimectomy and spinal cord decompression, C3 to T2 posterior fusion, C3 to T2 segemental instrumentation: doing well, cont PT/OT and pain control as per primary. Pain team has already been consulted and multimodal pain control approach initiated.     #. Type 2 Diabetes  Overall well controlled at baseline. Last A1c was 7.8 on 10/5. She normally takes Novolog 3 units at breakfast and lunch and 5 units at bedtime as well as Lantus 17-20 units at bedtime.   - Lantus 15 units at bedtime started  - sliding scale insulin ordered as well  - change to diabetic diet     #. HTN  #. HLD  PTA has been taking hydrochlorothiazide, losartan and metoprolol succ for HTN. BPs at home tend to be in the 120s SBP. PTA meds resumed with parameters. PTA Lipitor also resumed.     #. Atrial Fibrillation  Currently in afib, rate controlled. Takes metoprolol for rate control which has been continued  - normally takes Eliquis, which was held 5 days prior to surgery.   - ** Defer to surgical team as to when Eliquis should be resumed **    #. GERD  Protonix subbed for her PTA omeprazole. Continue     #. Hypothyroid  PTA Levo 88mcg resumes    #. Glaucoma   PTA Pred Forte,  Xalatan, Alphagan and Timoptic resumed      DVT Prophylaxis: on PCDs as per primary  Code Status: Full Code              History of Present Illness:   This patient is a 78 year old female with a PMH significant for HTN, HLD, hypothyroid, DM2, PN, CKD3 (baseline cr about 1.15-1.35), AFib-on DOAC, chronic neck and back pain, neurogenic claudication, PAD, and GERD who is POD #1 s/p C3 to T2 cervical lamimectomy and spinal cord decompression, C3 to T2 posterior fusion, C3 to T2 segemental instrumentation for progressive myelopathy due to stenosis and kyphosis from C3-T3. We have been consulted to assist in medical management of this patient.  She is feeling ok this morning. Pain control is her main concern. Denies any shortness of breath or chest pains. Has AF but cannot tell when she is in it. No palpitations.                 Past Medical History:     Past Medical History:   Diagnosis Date    Arrhythmia     Bursitis, olecranon     Diabetes (H)     Displacement of cervical intervertebral disc without myelopathy     Gastro-oesophageal reflux disease     Glaucoma     Hyperlipemia     Hypertension     Hypothyroid     Renal failure, acute (H24) 6-7-11 to 6-17-11    hospitalized     Varicose veins                Past Surgical History:     Past Surgical History:   Procedure Laterality Date    DAVINCI HYSTERECTOMY SUPRACERVICAL N/A 3/18/2015    Procedure: DAVINCI HYSTERECTOMY SUPRACERVICAL;  Surgeon: Randall Melgar MD;  Location:  OR    DAVINCI SACROCOLPOPEXY, MIDURETHRAL SLING, CYSTOSCOPY N/A 3/18/2015    Procedure: DAVINCI SACROCOLPOPEXY, MIDURETHRAL SLING, CYSTOSCOPY;  Surgeon: Thalia Esquivel MD;  Location:  OR    DAVINCI SALPINGO-OOPHORECTOMY INCLUDING BILATERAL Bilateral 3/18/2015    Procedure: DAVINCI SALPINGO-OOPHORECTOMY INCLUDING BILATERAL;  Surgeon: Randall Melgar MD;  Location:  OR    EYE SURGERY Left 2009    MA revision eye sunt with graft left eye     IR LOWER EXTREMITY ANGIOGRAM LEFT  7/8/2020                  Social History:     Social History     Tobacco Use    Smoking status: Never    Smokeless tobacco: Never   Substance Use Topics    Alcohol use: Yes     Comment: occ    Drug use: No                 Family History:   Family Hx fully reviewed and is non contributory to this admission.           Allergies:   No Known Allergies          Medications:     Prior to Admission medications    Medication Sig Last Dose Taking? Auth Provider Long Term End Date   apixaban ANTICOAGULANT (ELIQUIS) 5 MG tablet Take 5 mg by mouth 2 times daily 10/8/2023 Yes Reported, Patient     atorvastatin (LIPITOR) 40 MG tablet Take 1 tablet by mouth daily Unknown Yes Reported, Patient Yes    brimonidine (ALPHAGAN-P) 0.15 % ophthalmic solution Place 1 drop Into the left eye 2 times daily 10/13/2023 Yes Unknown, Entered By History     gabapentin (NEURONTIN) 300 MG capsule Take 600 mg by mouth 2 times daily as needed More than a month Yes Unknown, Entered By History No    HYDROCHLOROTHIAZIDE PO Take 25 mg by mouth daily 10/11/2023 Yes Reported, Patient Yes    insulin aspart (NOVOLOG PEN) 100 UNIT/ML pen Inject 3 Units Subcutaneous daily (with breakfast) 10/12/2023 Yes Unknown, Entered By History No    insulin aspart (NOVOLOG PEN) 100 UNIT/ML pen Inject 3 Units Subcutaneous daily (with lunch) 10/12/2023 Yes Unknown, Entered By History No    insulin aspart (NOVOLOG PEN) 100 UNIT/ML pen Inject 5 Units Subcutaneous daily (with dinner) 10/12/2023 Yes Unknown, Entered By History No    insulin glargine (LANTUS) 100 UNIT/ML vial Inject 17-20 Units Subcutaneous at bedtime 10/12/2023 Yes Reported, Patient Yes    ketoconazole (NIZORAL) 2 % external shampoo Shampoo the hair thoroughly each as need. Past Month Yes Reported, Patient     latanoprost (XALATAN) 0.005 % ophthalmic solution Place 1 drop into the right eye At Bedtime 10/12/2023 Yes Reported, Patient Yes    LEVOTHYROXINE SODIUM PO Take 88 mcg by mouth daily 10/12/2023 Yes Reported, Patient  "Yes    losartan (COZAAR) 50 MG tablet Take 1 tablet by mouth daily 10/12/2023 Yes Reported, Patient Yes    metoprolol succinate ER (TOPROL XL) 100 MG 24 hr tablet Take 100 mg by mouth 2 times daily 10/13/2023 Yes Unknown, Entered By History Yes    Multiple Vitamins-Calcium (ONE-A-DAY WOMENS FORMULA PO) Take 1 tablet by mouth daily 10/13/2023 Yes Reported, Patient     OMEPRAZOLE PO Take 40 mg by mouth daily 10/13/2023 Yes Reported, Patient     prednisoLONE acetate (PRED FORTE) 1 % ophthalmic suspension Place 1 drop Into the left eye daily 10/13/2023 Yes Reported, Patient     timolol maleate (TIMOPTIC) 0.5 % ophthalmic solution Place 1 drop into both eyes 2 times daily 10/13/2023 Yes Unknown, Entered By History                 Review of Systems:     A comprehensive greater than 10 system review of systems was carried out.  Pertinent positives and negatives are noted above.  Otherwise negative for contributory info.            Physical Exam:   Vitals were reviewed  Blood pressure (P) 120/62, pulse (P) 64, temperature (P) 97.2  F (36.2  C), temperature source (P) Temporal, resp. rate (P) 15, height 1.607 m (5' 3.27\"), weight 81.6 kg (179 lb 14.3 oz), SpO2 (P) 97%.  Exam:    GENERAL:  Comfortable.  PSYCH: pleasant, oriented, No acute distress.  HEENT:  PERRLA. Normal conjunctiva, normal hearing, nasal mucosa and Oropharynx are normal.  NECK:  Supple, no neck vein distention, adenopathy or bruits, normal thyroid.  HEART:  IRR with no murmur, no pericardial rub, S3 or S4.  LUNGS:  Clear to auscultation, normal Respiratory effort.  ABDOMEN:  Soft, no hepatosplenomegaly, normal bowel sounds.  EXTREMITIES:  No pedal edema, +2 pulses bilateral and equal.  SKIN:  Dry to touch, No rash, wound or ulcerations.  NEUROLOGIC:  Nonfocal with normal cranial nerve and motor power and sensation.            Data:   Past 24 hours labs, studies, and imaging were reviewed.  Recent Labs   Lab 10/14/23  0753 10/13/23  0639   WBC 8.9  --    HGB " 11.0* 12.5   HCT 34.3*  --    MCV 99  --      --      Recent Labs   Lab 10/14/23  0820 10/14/23  0753 10/14/23  0142 10/13/23  2153 10/13/23  1703   * 286* 321* 236* 194*       Annie Dupont PA-C

## 2023-10-14 NOTE — CONSULTS
Care Management Initial Consult    General Information  Assessment completed with: Patient, Family, Spouse or significant other, Julio C Staton  Type of CM/SW Visit: Initial Assessment    Primary Care Provider verified and updated as needed: Yes   Readmission within the last 30 days: no previous admission in last 30 days      Reason for Consult: discharge planning, care coordination/care conference  Advance Care Planning:            Communication Assessment  Patient's communication style: spoken language (English or Bilingual)    Hearing Difficulty or Deaf: no   Wear Glasses or Blind: yes    Cognitive  Cognitive/Neuro/Behavioral: WDL  Level of Consciousness: alert  Arousal Level: arouses to voice  Orientation: person, place, time, situation  Mood/Behavior: calm, cooperative  Best Language: 0 - No aphasia  Speech: clear    Living Environment:   People in home: child(lopez), adult, spouse     Current living Arrangements:        Able to return to prior arrangements:         Family/Social Support:  Care provided by:    Provides care for:    Marital Status:   , Children  Julio C       Description of Support System: Supportive, Involved    Support Assessment: Adequate family and caregiver support, Adequate social supports    Current Resources:   Patient receiving home care services: No     Community Resources: None  Equipment currently used at home: walker, rolling  Supplies currently used at home: None           Does the patient's insurance plan have a 3 day qualifying hospital stay waiver?  Yes     Which insurance plan 3 day waiver is available? Alternative insurance waiver    Will the waiver be used for post-acute placement? Yes    Lifestyle & Psychosocial Needs:  Social Determinants of Health     Food Insecurity: Not on file   Depression: Not on file   Housing Stability: Not on file   Tobacco Use: Low Risk  (10/13/2023)    Patient History     Smoking Tobacco Use: Never     Smokeless Tobacco Use: Never      Passive Exposure: Not on file   Financial Resource Strain: Not on file   Alcohol Use: Not on file   Transportation Needs: Not on file   Physical Activity: Not on file   Interpersonal Safety: Not on file   Stress: Not on file   Social Connections: Not on file       Functional Status:  Prior to admission patient needed assistance:   Dependent ADLs:: Independent, Ambulation-walker  Dependent IADLs:: Independent, Laundry  Assesssment of Functional Status: Not at  functional baseline, Not at baseline with mobility, Not at baseline with ADL Functioning                Additional Information:  Patient admitted for C3 to T2 cervical laminectomy and spinal cord decompression with fusion. She is followed by Spinal surgery, Pain service and the Hospitalist.  CM met with patient and her family at the bedside. She was independent in ADL's and IADL's. Her son helps with the laundry.She lives in an all one level living rambler house with her  and son. She ambulates with a 4 wheeled walker with seat.Transportation via her family.  PT recommends TCU. CM provided TCU booklet and discussed coverage, average stay and the Medicare .gov website. They will reviw and make their choices. CM will return to obtain their choices.    Addendum 1600  Family has chosen #1 [facility], [facility], [facility] and the [facility]. Referrals sent    Scarlett Dewey RN, BSN, CM  Inpatient Care Coordination  [facility]  [phone]

## 2023-10-14 NOTE — PROGRESS NOTES
Notified by RN of BP of 72/43. Vitals otherwise stable.   BP meds - losartan and hydrochlorothiazide held.   Hgb this AM was 11.0, was 12.5 yesterday.  Rec 1 L NS bolus. Continue to hold BP meds for now.   Repeat Hgb and lactic acid ordered.   On exam she is asymptomatic. Has full rom and intact strength to BUE and BLEs.   C collar removed with RN at bedside. Dressing is c/d/I.   Will reassess following bolus.

## 2023-10-14 NOTE — PLAN OF CARE
Patient A/Ox4, VSS, on room air.  Patient with complaints of neck pain, given dilaudid x1, falling asleep between cares.  Patient stood at bedside and took a few steps.  Winston cath in place with adequate output.  0200 blood sugar 312, hospitalist to see today to manage.  Neck brace in place, dressing C/D/I.

## 2023-10-14 NOTE — PROGRESS NOTES
10/14/23 1101   Appointment Info   Signing Clinician's Name / Credentials (PT) Ce Nichols DPT   Living Environment   People in Home spouse;child(lopez), adult   Current Living Arrangements house   Home Accessibility stairs to enter home   Number of Stairs, Main Entrance 1   Transportation Anticipated family or friend will provide   Living Environment Comments Patient lives with  and adult son.  Per spouse, family can provide 24/7 care.   Self-Care   Usual Activity Tolerance moderate   Current Activity Tolerance poor   Fall history within last six months yes   Number of times patient has fallen within last six months 1   Activity/Exercise/Self-Care Comment Patient reports that she has a walker available at home (however was not using 100% of the time prior to surgery).  Spouse reports that he has been assisting patient up the step to enter home and remained close by when patient was walking prior to surgery.  Patient was independent with toileting, bathing and dressing prior to surgery.   General Information   Onset of Illness/Injury or Date of Surgery 10/13/23   Referring Physician Camelia Stern PA-C   Patient/Family Therapy Goals Statement (PT) Patient and family are agreeable to TCU if needed, would prefer to discharge to home.   Pertinent History of Current Problem (include personal factors and/or comorbidities that impact the POC) Per medical chart: patient is a 78 year old female with a PMH significant for HTN, HLD, hypothyroid, DM2, PN, CKD3 (baseline cr about 1.15-1.35), AFib-on DOAC, chronic neck and back pain, neurogenic claudication, PAD, and GERD who is POD #1 s/p C3 to T2 cervical lamimectomy and spinal cord decompression, C3 to T2 posterior fusion, C3 to T2 segemental instrumentation for progressive myelopathy due to stenosis and kyphosis from C3-T3.   Existing Precautions/Restrictions fall;spinal  (Aspen collar at all times)   Cognition   Orientation Status (Cognition) oriented x 4    Pain Assessment   Patient Currently in Pain Yes, see Vital Sign flowsheet  (reports pain from surgical site)   Integumentary/Edema   Integumentary/Edema Comments Large bandage over incision   Posture    Posture Forward head position;Protracted shoulders   Range of Motion (ROM)   Range of Motion ROM deficits secondary to surgical procedure   ROM Comment Aspen collar in place   Strength (Manual Muscle Testing)   Strength (Manual Muscle Testing) Deficits observed during functional mobility   Bed Mobility   Comment, (Bed Mobility) min A x1 for transfer to sitting at EOB; min A x2 to return to supine from sitting while maintaining spinal precautions.   Transfers   Transfers sit-stand transfer   Sit-Stand Transfer   Sit-Stand [place] (Transfers) 1 person assist;1 person to manage equipment   Assistive Device (Sit-Stand Transfers) walker, front-wheeled   Gait/Stairs (Locomotion)   [place] Level (Gait) contact guard;1 person assist;1 person to manage equipment   Assistive Device (Gait) walker, front-wheeled   Distance in Feet (Gait) 25   Balance   Balance Comments Patient requires UE support secondary to sensation deficits at left LE.  History of fall.   Sensory Examination   Sensory Perception Comments Patient reports numbness at left LE (this was present prior to surgery secondary to   Clinical Impression   Criteria for Skilled Therapeutic Intervention Yes, treatment indicated   PT Diagnosis (PT) Impaired functional mobility   Influenced by the following impairments Pain, BP, spinal precautions, decreased strength, decreased activity tolerance   Functional limitations due to impairments Difficulties with transfers, gait, ADLs   Clinical Presentation (PT Evaluation Complexity) evolving   Clinical Presentation Rationale clinical judgement   Clinical Decision Making (Complexity) low complexity   Planned Therapy Interventions (PT) balance training;bed mobility training;gait training;home exercise  program;patient/family education;stair training;transfer training;progressive activity/exercise;home program guidelines   Risk & Benefits of therapy have been explained evaluation/treatment results reviewed;care plan/treatment goals reviewed;risks/benefits reviewed;current/potential barriers reviewed;participants voiced agreement with care plan;participants included;patient;spouse/significant other;son;daughter   PT Total Evaluation Time   PT Eval, Moderate Complexity Minutes (81143) 10   Physical Therapy Goals   PT Frequency Daily   PT Predicted Duration/Target Date for Goal Attainment 10/17/23   PT Goals Bed Mobility;Transfers;Gait;Stairs   PT: Bed Mobility Supervision/stand-by assist;Supine to/from sit;Rolling;Within precautions   PT: Transfers Supervision/stand-by assist;Sit to/from stand;Bed to/from chair;Assistive device;Within precautions   PT: Gait Supervision/stand-by assist;Assistive device;Within precautions;50 feet   PT: Stairs Minimal assist;Assistive device;Within precautions;1 stair   Interventions   Interventions Quick Adds Therapeutic Activity;Gait Training   Therapeutic Activity   Therapeutic Activities: dynamic activities to improve functional performance Minutes (15380) 15   Symptoms Noted During/After Treatment Fatigue;Increased pain   Treatment Detail/Skilled Intervention Patient supine upon arrival, had Aspen neck collar in place.   Patient educated in spinal precautions.  Facilitated bed mobility including transfers between supine and sitting through log roll with verbal cueing for technique.  Neck brace noted to be loose; therapist tightened.  Facilitated transfers between sitting and standing with 2WW and CGA x2 and verbal cueing. Patient reported history of lightheadedness following surgery and presented with marked paleness (denies dizziness), RN present during all mobility.  Following ambulation, returned to supine via log roll with mod A x2, verbal cueing and environmental set up.   Education provided regarding recommendations for TCU at discharge given level of assist currently required and limited functional mobility.  Patient in bed at end of session with all needs within reach, bed alarm on.   Gait Training   Gait Training Minutes (31028) 10   Symptoms Noted During/After Treatment (Gait Training) fatigue;increased pain   Treatment Detail/Skilled Intervention Facilitated ambulation 25 feet with 2WW and CGA.  Patient with decreased gait speed, fair foot clearance, increased reports of pain during gait; cueing provided for upright posture and to decrease reliance on UE support at walker.  Patient reported continued left LE numbness (no change since before surgery).   PT Discharge Planning   PT Plan bed mobility, transfers, progress gait, assess BP   PT Discharge Recommendation (DC Rec) Transitional Care Facility   PT Rationale for DC Rec Patient presents significantly below baseline for functional mobility.  Currently requires 1-2 person assist with bed mobility, transfers, gait.  Unable to demonstrate functional household distance ambulation.  Required max A for ADLs.  Recommend discharge to TCU to increase activity tolerance, increase independence with mobility and ADLs/IADLs.  If patient returns to home, would require 2 person assist for car transfers/gait, use of 2WW at all times, 1 person assist with toileting/dressing, Home PT/OT, HHA.   PT Brief overview of current status Ax2 with 2WW   PT Equipment Needed at Discharge   (Has walker for home use)   Total Session Time   Timed Code Treatment Minutes 25   Total Session Time (sum of timed and untimed services) 35

## 2023-10-14 NOTE — PROGRESS NOTES
A & Ox4, able to make need known. Tolerating reg diet, denies nausea. Winston removed around 1100. Pt reports mod-severe pain in posterior neck, bilateral shoulders this am, given toradol with adequate pain relief (4/10). BP meds held this am. Gave 5mg oxycodone, with little effect on pain. Pt became hypotensive, provider paged. 2L bolus given. Lactic/Hgb labs ordered, LA 2.3, Hgb 10.0.  redraw lactic 1.9, hgb 9.6. PT recommending TCU, family aware and involved.   Hypotensive after ambulating to restroom, provider aware. Albumin ordered and infusing

## 2023-10-15 LAB
ANION GAP SERPL CALCULATED.3IONS-SCNC: 10 MMOL/L (ref 7–15)
BASO+EOS+MONOS # BLD AUTO: ABNORMAL 10*3/UL
BASO+EOS+MONOS NFR BLD AUTO: ABNORMAL %
BASOPHILS # BLD AUTO: 0 10E3/UL (ref 0–0.2)
BASOPHILS NFR BLD AUTO: 1 %
BUN SERPL-MCNC: 26.6 MG/DL (ref 8–23)
CALCIUM SERPL-MCNC: 7.4 MG/DL (ref 8.8–10.2)
CHLORIDE SERPL-SCNC: 101 MMOL/L (ref 98–107)
CREAT SERPL-MCNC: 1.27 MG/DL (ref 0.51–0.95)
DEPRECATED HCO3 PLAS-SCNC: 22 MMOL/L (ref 22–29)
EGFRCR SERPLBLD CKD-EPI 2021: 43 ML/MIN/1.73M2
EOSINOPHIL # BLD AUTO: 0.1 10E3/UL (ref 0–0.7)
EOSINOPHIL NFR BLD AUTO: 2 %
ERYTHROCYTE [DISTWIDTH] IN BLOOD BY AUTOMATED COUNT: 13.8 % (ref 10–15)
GLUCOSE BLDC GLUCOMTR-MCNC: 154 MG/DL (ref 70–99)
GLUCOSE BLDC GLUCOMTR-MCNC: 165 MG/DL (ref 70–99)
GLUCOSE BLDC GLUCOMTR-MCNC: 208 MG/DL (ref 70–99)
GLUCOSE BLDC GLUCOMTR-MCNC: 216 MG/DL (ref 70–99)
GLUCOSE SERPL-MCNC: 209 MG/DL (ref 70–99)
HCT VFR BLD AUTO: 27.2 % (ref 35–47)
HGB BLD-MCNC: 8.7 G/DL (ref 11.7–15.7)
IMM GRANULOCYTES # BLD: 0 10E3/UL
IMM GRANULOCYTES NFR BLD: 1 %
LYMPHOCYTES # BLD AUTO: 1 10E3/UL (ref 0.8–5.3)
LYMPHOCYTES NFR BLD AUTO: 17 %
MCH RBC QN AUTO: 31.5 PG (ref 26.5–33)
MCHC RBC AUTO-ENTMCNC: 32 G/DL (ref 31.5–36.5)
MCV RBC AUTO: 99 FL (ref 78–100)
MONOCYTES # BLD AUTO: 0.5 10E3/UL (ref 0–1.3)
MONOCYTES NFR BLD AUTO: 8 %
NEUTROPHILS # BLD AUTO: 4.4 10E3/UL (ref 1.6–8.3)
NEUTROPHILS NFR BLD AUTO: 71 %
NRBC # BLD AUTO: 0 10E3/UL
NRBC BLD AUTO-RTO: 0 /100
PLATELET # BLD AUTO: 118 10E3/UL (ref 150–450)
POTASSIUM SERPL-SCNC: 3.6 MMOL/L (ref 3.4–5.3)
RBC # BLD AUTO: 2.76 10E6/UL (ref 3.8–5.2)
SODIUM SERPL-SCNC: 133 MMOL/L (ref 135–145)
WBC # BLD AUTO: 6.1 10E3/UL (ref 4–11)

## 2023-10-15 PROCEDURE — 250N000013 HC RX MED GY IP 250 OP 250 PS 637: Performed by: PHYSICIAN ASSISTANT

## 2023-10-15 PROCEDURE — 120N000001 HC R&B MED SURG/OB

## 2023-10-15 PROCEDURE — 258N000003 HC RX IP 258 OP 636

## 2023-10-15 PROCEDURE — 258N000003 HC RX IP 258 OP 636: Performed by: INTERNAL MEDICINE

## 2023-10-15 PROCEDURE — 250N000011 HC RX IP 250 OP 636: Performed by: ORTHOPAEDIC SURGERY

## 2023-10-15 PROCEDURE — 250N000013 HC RX MED GY IP 250 OP 250 PS 637: Performed by: NURSE PRACTITIONER

## 2023-10-15 PROCEDURE — 36415 COLL VENOUS BLD VENIPUNCTURE: CPT

## 2023-10-15 PROCEDURE — 250N000013 HC RX MED GY IP 250 OP 250 PS 637

## 2023-10-15 PROCEDURE — 82310 ASSAY OF CALCIUM: CPT

## 2023-10-15 PROCEDURE — 99232 SBSQ HOSP IP/OBS MODERATE 35: CPT | Performed by: STUDENT IN AN ORGANIZED HEALTH CARE EDUCATION/TRAINING PROGRAM

## 2023-10-15 PROCEDURE — 85004 AUTOMATED DIFF WBC COUNT: CPT

## 2023-10-15 RX ADMIN — METHOCARBAMOL 750 MG: 750 TABLET ORAL at 20:04

## 2023-10-15 RX ADMIN — SODIUM CHLORIDE: 9 INJECTION, SOLUTION INTRAVENOUS at 11:19

## 2023-10-15 RX ADMIN — INSULIN ASPART 1 UNITS: 100 INJECTION, SOLUTION INTRAVENOUS; SUBCUTANEOUS at 22:37

## 2023-10-15 RX ADMIN — KETOROLAC TROMETHAMINE 15 MG: 15 INJECTION INTRAMUSCULAR; INTRAVENOUS at 20:03

## 2023-10-15 RX ADMIN — SODIUM CHLORIDE: 9 INJECTION, SOLUTION INTRAVENOUS at 02:28

## 2023-10-15 RX ADMIN — DICLOFENAC SODIUM TOPICAL GEL, 1% 4 G: 10 GEL TOPICAL at 17:33

## 2023-10-15 RX ADMIN — ATORVASTATIN CALCIUM 40 MG: 40 TABLET, FILM COATED ORAL at 20:04

## 2023-10-15 RX ADMIN — TIMOLOL MALEATE 1 DROP: 5 SOLUTION/ DROPS OPHTHALMIC at 07:50

## 2023-10-15 RX ADMIN — LIDOCAINE PATCH 4% 2 PATCH: 40 PATCH TOPICAL at 20:03

## 2023-10-15 RX ADMIN — ACETAMINOPHEN 975 MG: 325 TABLET, FILM COATED ORAL at 22:52

## 2023-10-15 RX ADMIN — KETOROLAC TROMETHAMINE 15 MG: 15 INJECTION INTRAMUSCULAR; INTRAVENOUS at 07:45

## 2023-10-15 RX ADMIN — INSULIN GLARGINE 15 UNITS: 100 INJECTION, SOLUTION SUBCUTANEOUS at 22:37

## 2023-10-15 RX ADMIN — TIMOLOL MALEATE 1 DROP: 5 SOLUTION/ DROPS OPHTHALMIC at 20:07

## 2023-10-15 RX ADMIN — DICLOFENAC SODIUM TOPICAL GEL, 1% 4 G: 10 GEL TOPICAL at 13:57

## 2023-10-15 RX ADMIN — DICLOFENAC SODIUM TOPICAL GEL, 1% 4 G: 10 GEL TOPICAL at 20:08

## 2023-10-15 RX ADMIN — GABAPENTIN 300 MG: 300 CAPSULE ORAL at 20:04

## 2023-10-15 RX ADMIN — BRIMONIDINE TARTRATE 1 DROP: 2 SOLUTION/ DROPS OPHTHALMIC at 20:07

## 2023-10-15 RX ADMIN — DICLOFENAC SODIUM TOPICAL GEL, 1% 4 G: 10 GEL TOPICAL at 07:51

## 2023-10-15 RX ADMIN — PANTOPRAZOLE SODIUM 40 MG: 40 TABLET, DELAYED RELEASE ORAL at 07:45

## 2023-10-15 RX ADMIN — GABAPENTIN 300 MG: 300 CAPSULE ORAL at 13:53

## 2023-10-15 RX ADMIN — BRIMONIDINE TARTRATE 1 DROP: 2 SOLUTION/ DROPS OPHTHALMIC at 07:50

## 2023-10-15 RX ADMIN — POLYETHYLENE GLYCOL 3350 17 G: 17 POWDER, FOR SOLUTION ORAL at 07:45

## 2023-10-15 RX ADMIN — LEVOTHYROXINE SODIUM 88 MCG: 0.09 TABLET ORAL at 07:45

## 2023-10-15 RX ADMIN — SENNOSIDES AND DOCUSATE SODIUM 1 TABLET: 8.6; 5 TABLET ORAL at 07:45

## 2023-10-15 RX ADMIN — METHOCARBAMOL 750 MG: 750 TABLET ORAL at 13:53

## 2023-10-15 RX ADMIN — KETOROLAC TROMETHAMINE 15 MG: 15 INJECTION INTRAMUSCULAR; INTRAVENOUS at 13:53

## 2023-10-15 RX ADMIN — ACETAMINOPHEN 975 MG: 325 TABLET, FILM COATED ORAL at 07:45

## 2023-10-15 RX ADMIN — SODIUM CHLORIDE 500 ML: 9 INJECTION, SOLUTION INTRAVENOUS at 01:59

## 2023-10-15 RX ADMIN — KETOROLAC TROMETHAMINE 15 MG: 15 INJECTION INTRAMUSCULAR; INTRAVENOUS at 02:55

## 2023-10-15 RX ADMIN — ACETAMINOPHEN 975 MG: 325 TABLET, FILM COATED ORAL at 16:48

## 2023-10-15 RX ADMIN — SENNOSIDES AND DOCUSATE SODIUM 1 TABLET: 8.6; 5 TABLET ORAL at 20:04

## 2023-10-15 RX ADMIN — GABAPENTIN 300 MG: 300 CAPSULE ORAL at 07:45

## 2023-10-15 ASSESSMENT — ACTIVITIES OF DAILY LIVING (ADL)
ADLS_ACUITY_SCORE: 33

## 2023-10-15 NOTE — PROGRESS NOTES
Some hypotensive episodes and saline bolus and albumin given.  Neck pain better with toradol.  Alert and awake and not dizzy   Plan TCU transfer near [place].  Neuro intact.  Hgb 8.6.  Winston out and voiding well.

## 2023-10-15 NOTE — CARE PLAN
Shift from [id number]     Inpatient Progress Note:  For complete assessment see flow sheet documentation.      Orientation: A/O x 4   Neuro: intact   Pain status: gave oliver tyln & toldo  Activity: A x 2   Resp: Wnl   Cardiac: Wnl   GI: No BM during time of care   : No voiding during time of care   LDA: PIV   Infusions: NS at 100ml/hr  Diet: Reg   Consults: Ortho   Discharge Plan: TBD     Pt. Albumin infusing completed.

## 2023-10-15 NOTE — PROGRESS NOTES
[facility]    Medicine Progress Note - Hospitalist Service    Date of Admission:  10/13/2023    Assessment & Plan      78 year old female with a PMH significant for HTN, HLD, hypothyroid, DM2, PN, CKD3 (baseline cr about 1.15-1.35), AFib-on DOAC, chronic neck and back pain, neurogenic claudication, PAD, and GERD who is POD #1 s/p C3 to T2 cervical lamimectomy and spinal cord decompression, C3 to T2 posterior fusion, C3 to T2 segemental instrumentation for progressive myelopathy due to stenosis and kyphosis from C3-T3.  Hospital medicine consulted to assist in medical management of this patient.     Patient was started on home dose of antihypertensives after surgery but became hypotensive with blood pressure as low as 60s on 10/14.      S/p C3-T2 cervical laminectomy and cervical cord decompression.  Pain management as per spine surgery.    Hypotension.  History of essential hypertension.  Patient hypertensive since surgery likely due to acute blood loss associated with surgery.  Hold all home antihypertensives including hydrochlorothiazide, losartan and metoprolol.  Got 2 L of IV fluid bolus on 10/14 and 500 mL of albumin 5%.  Blood pressure is now in acceptable range with systolic in low 100s.    Diabetes mellitus type 2.  Prior to admission dose of Lantus 15 units at night was started but patient is still hyperglycemic.  Started back on NovoLog 3 units before each meal plus sliding scale.  Last A1c was 7.8 on 10/5.    Hyperlipidemia.  Continue Lipitor.    Atrial fibrillation.  Atrial fibrillation is rate controlled even though metoprolol is on hold.  Low threshold to resume metoprolol as heart rate starts going up again.  Prior to admission was on Eliquis which was held for 5 days prior to surgery and will defer timing of resuming anticoagulation to spine surgery.    GERD.  Continue Meprazole.    Hypothyroidism.  Continue on levothyroxine 88 mcg daily    Acute kidney injury.  Secondary to  "volume loss with the surgery.  Creatinine bumped up to 1.27, continued IV fluid and recheck tomorrow.  Glaucoma.  PTA Pred Forte, Xalatan, Alphagan and Timoptic resumed             Diet: Advance Diet as Tolerated: Fully Advanced to diet(s) per Provider order; Moderate Consistent Carb (60 g CHO per Meal) Diet    DVT Prophylaxis: Pneumatic Compression Devices  Winston Catheter: Not present  Lines: None     Cardiac Monitoring: None  Code Status: Full Code      Clinically Significant Risk Factors                # Thrombocytopenia: Lowest platelets = 118 in last 2 days, will monitor for bleeding          # Obesity: Estimated body mass index is 31.6 kg/m  as calculated from the following:    Height as of this encounter: 1.607 m (5' 3.27\").    Weight as of this encounter: 81.6 kg (179 lb 14.3 oz)., PRESENT ON ADMISSION            Disposition Plan      Expected Discharge Date: 10/16/2023      Destination: inpatient rehabilitation facility              Brodie Stout MD  Hospitalist Service  [facility]  Securely message with [employer] (more info)  Text page via [employer] Paging/Directory   ______________________________________________________________________    Interval History   Urinary retention needing straight cath.  Moving all extremities.  Blood pressure is improving.    Physical Exam   Vital Signs: Temp: 97.1  F (36.2  C) Temp src: Temporal BP: 117/61 Pulse: 75   Resp: 14 SpO2: 96 % O2 Device: None (Room air)    Weight: 179 lbs 14.33 oz    General Appearance: Appears comfortable.  Wearing cervical collar.  Respiratory: Clear to auscultation  Cardiovascular: S1-S2 normal  GI: Soft and nontender  Skin: No rash  Other: No edema.  Moving all extremities.    Medical Decision Making       MANAGEMENT DISCUSSED with the following over the past 24 hours: Patient and RN       Data     I have personally reviewed the following data over the past 24 hrs:    6.1  \   8.7 (L)   / 118 (L)     133 (L) 101 26.6 (H) /  209 " (H)   3.6 22 1.27 (H) \     Procal: N/A CRP: N/A Lactic Acid: 1.9         Imaging results reviewed over the past 24 hrs:   No results found for this or any previous visit (from the past 24 hour(s)).

## 2023-10-15 NOTE — PLAN OF CARE
Provider notification:    Pt hypotensive and symptomatic for BP of 89/51 on right side, 78/46 on left arm     Paged crosscover and Bolus 500 ml normal saline order obtained & completed    BP after bolus:  94/50

## 2023-10-15 NOTE — PLAN OF CARE
Goal Outcome Evaluation:    Pt alert and oriented x4.VSS except hypotensive. Last B/P 102/50. On Ra. Pain, with relief        scheduled Toradol.  Not OOB during night. Unable to to void, bladder scan 335 ml, straight cath 600     urine output. 02:00 . CMS intact except baseline numbness on lower extremities.per pt.       Dressing CDI. Plan of care ongoing

## 2023-10-16 ENCOUNTER — APPOINTMENT (OUTPATIENT)
Dept: PHYSICAL THERAPY | Facility: CLINIC | Age: 78
DRG: 460 | End: 2023-10-16
Attending: ORTHOPAEDIC SURGERY
Payer: COMMERCIAL

## 2023-10-16 LAB
ANION GAP SERPL CALCULATED.3IONS-SCNC: 8 MMOL/L (ref 7–15)
BASO+EOS+MONOS # BLD AUTO: ABNORMAL 10*3/UL
BASO+EOS+MONOS NFR BLD AUTO: ABNORMAL %
BASOPHILS # BLD AUTO: 0 10E3/UL (ref 0–0.2)
BASOPHILS NFR BLD AUTO: 1 %
BUN SERPL-MCNC: 23.1 MG/DL (ref 8–23)
CALCIUM SERPL-MCNC: 7.5 MG/DL (ref 8.8–10.2)
CHLORIDE SERPL-SCNC: 105 MMOL/L (ref 98–107)
CREAT SERPL-MCNC: 1.1 MG/DL (ref 0.51–0.95)
DEPRECATED HCO3 PLAS-SCNC: 21 MMOL/L (ref 22–29)
EGFRCR SERPLBLD CKD-EPI 2021: 51 ML/MIN/1.73M2
EOSINOPHIL # BLD AUTO: 0.1 10E3/UL (ref 0–0.7)
EOSINOPHIL NFR BLD AUTO: 3 %
ERYTHROCYTE [DISTWIDTH] IN BLOOD BY AUTOMATED COUNT: 13.8 % (ref 10–15)
GLUCOSE BLDC GLUCOMTR-MCNC: 117 MG/DL (ref 70–99)
GLUCOSE BLDC GLUCOMTR-MCNC: 122 MG/DL (ref 70–99)
GLUCOSE BLDC GLUCOMTR-MCNC: 124 MG/DL (ref 70–99)
GLUCOSE BLDC GLUCOMTR-MCNC: 133 MG/DL (ref 70–99)
GLUCOSE BLDC GLUCOMTR-MCNC: 169 MG/DL (ref 70–99)
GLUCOSE SERPL-MCNC: 153 MG/DL (ref 70–99)
HCT VFR BLD AUTO: 27.7 % (ref 35–47)
HGB BLD-MCNC: 8.9 G/DL (ref 11.7–15.7)
IMM GRANULOCYTES # BLD: 0 10E3/UL
IMM GRANULOCYTES NFR BLD: 1 %
LYMPHOCYTES # BLD AUTO: 0.8 10E3/UL (ref 0.8–5.3)
LYMPHOCYTES NFR BLD AUTO: 17 %
MCH RBC QN AUTO: 31.4 PG (ref 26.5–33)
MCHC RBC AUTO-ENTMCNC: 32.1 G/DL (ref 31.5–36.5)
MCV RBC AUTO: 98 FL (ref 78–100)
MONOCYTES # BLD AUTO: 0.3 10E3/UL (ref 0–1.3)
MONOCYTES NFR BLD AUTO: 6 %
NEUTROPHILS # BLD AUTO: 3.3 10E3/UL (ref 1.6–8.3)
NEUTROPHILS NFR BLD AUTO: 72 %
NRBC # BLD AUTO: 0 10E3/UL
NRBC BLD AUTO-RTO: 0 /100
PLATELET # BLD AUTO: 128 10E3/UL (ref 150–450)
POTASSIUM SERPL-SCNC: 3.8 MMOL/L (ref 3.4–5.3)
RBC # BLD AUTO: 2.83 10E6/UL (ref 3.8–5.2)
SODIUM SERPL-SCNC: 134 MMOL/L (ref 135–145)
WBC # BLD AUTO: 4.5 10E3/UL (ref 4–11)

## 2023-10-16 PROCEDURE — 250N000013 HC RX MED GY IP 250 OP 250 PS 637: Performed by: NURSE PRACTITIONER

## 2023-10-16 PROCEDURE — 36415 COLL VENOUS BLD VENIPUNCTURE: CPT

## 2023-10-16 PROCEDURE — 250N000013 HC RX MED GY IP 250 OP 250 PS 637: Performed by: PHYSICIAN ASSISTANT

## 2023-10-16 PROCEDURE — 250N000013 HC RX MED GY IP 250 OP 250 PS 637: Performed by: STUDENT IN AN ORGANIZED HEALTH CARE EDUCATION/TRAINING PROGRAM

## 2023-10-16 PROCEDURE — 250N000013 HC RX MED GY IP 250 OP 250 PS 637

## 2023-10-16 PROCEDURE — 85025 COMPLETE CBC W/AUTO DIFF WBC: CPT

## 2023-10-16 PROCEDURE — 250N000011 HC RX IP 250 OP 636: Mod: JZ

## 2023-10-16 PROCEDURE — 97530 THERAPEUTIC ACTIVITIES: CPT | Mod: GP | Performed by: PHYSICAL THERAPIST

## 2023-10-16 PROCEDURE — 97116 GAIT TRAINING THERAPY: CPT | Mod: GP | Performed by: PHYSICAL THERAPIST

## 2023-10-16 PROCEDURE — 250N000011 HC RX IP 250 OP 636: Performed by: ORTHOPAEDIC SURGERY

## 2023-10-16 PROCEDURE — 80048 BASIC METABOLIC PNL TOTAL CA: CPT

## 2023-10-16 PROCEDURE — 120N000001 HC R&B MED SURG/OB

## 2023-10-16 PROCEDURE — 99233 SBSQ HOSP IP/OBS HIGH 50: CPT | Performed by: STUDENT IN AN ORGANIZED HEALTH CARE EDUCATION/TRAINING PROGRAM

## 2023-10-16 RX ORDER — KETOROLAC TROMETHAMINE 15 MG/ML
15 INJECTION, SOLUTION INTRAMUSCULAR; INTRAVENOUS EVERY 6 HOURS
Qty: 11 ML | Refills: 0 | Status: DISCONTINUED | OUTPATIENT
Start: 2023-10-16 | End: 2023-10-17 | Stop reason: HOSPADM

## 2023-10-16 RX ADMIN — OXYCODONE HYDROCHLORIDE 5 MG: 5 TABLET ORAL at 11:15

## 2023-10-16 RX ADMIN — BRIMONIDINE TARTRATE 1 DROP: 2 SOLUTION/ DROPS OPHTHALMIC at 20:36

## 2023-10-16 RX ADMIN — GABAPENTIN 300 MG: 300 CAPSULE ORAL at 14:34

## 2023-10-16 RX ADMIN — HYDROXYZINE HYDROCHLORIDE 25 MG: 25 TABLET, FILM COATED ORAL at 01:54

## 2023-10-16 RX ADMIN — METHOCARBAMOL 750 MG: 750 TABLET ORAL at 16:09

## 2023-10-16 RX ADMIN — PREDNISOLONE ACETATE 1 DROP: 10 SUSPENSION/ DROPS OPHTHALMIC at 08:16

## 2023-10-16 RX ADMIN — DICLOFENAC SODIUM TOPICAL GEL, 1% 4 G: 10 GEL TOPICAL at 08:21

## 2023-10-16 RX ADMIN — PANTOPRAZOLE SODIUM 40 MG: 40 TABLET, DELAYED RELEASE ORAL at 08:09

## 2023-10-16 RX ADMIN — SENNOSIDES AND DOCUSATE SODIUM 1 TABLET: 8.6; 5 TABLET ORAL at 20:33

## 2023-10-16 RX ADMIN — LATANOPROST 1 DROP: 50 SOLUTION/ DROPS OPHTHALMIC at 20:43

## 2023-10-16 RX ADMIN — METHOCARBAMOL 750 MG: 750 TABLET ORAL at 08:08

## 2023-10-16 RX ADMIN — METOPROLOL SUCCINATE 100 MG: 50 TABLET, EXTENDED RELEASE ORAL at 20:28

## 2023-10-16 RX ADMIN — TIMOLOL MALEATE 1 DROP: 5 SOLUTION/ DROPS OPHTHALMIC at 08:20

## 2023-10-16 RX ADMIN — GABAPENTIN 300 MG: 300 CAPSULE ORAL at 20:28

## 2023-10-16 RX ADMIN — TIMOLOL MALEATE 1 DROP: 5 SOLUTION/ DROPS OPHTHALMIC at 20:41

## 2023-10-16 RX ADMIN — HYDROMORPHONE HYDROCHLORIDE 0.2 MG: 0.2 INJECTION, SOLUTION INTRAMUSCULAR; INTRAVENOUS; SUBCUTANEOUS at 22:19

## 2023-10-16 RX ADMIN — HYDROXYZINE HYDROCHLORIDE 25 MG: 25 TABLET, FILM COATED ORAL at 08:08

## 2023-10-16 RX ADMIN — HYDROXYZINE HYDROCHLORIDE 25 MG: 25 TABLET, FILM COATED ORAL at 14:34

## 2023-10-16 RX ADMIN — METHOCARBAMOL 750 MG: 750 TABLET ORAL at 11:15

## 2023-10-16 RX ADMIN — METHOCARBAMOL 750 MG: 750 TABLET ORAL at 20:28

## 2023-10-16 RX ADMIN — DICLOFENAC SODIUM TOPICAL GEL, 1% 4 G: 10 GEL TOPICAL at 20:36

## 2023-10-16 RX ADMIN — KETOROLAC TROMETHAMINE 15 MG: 15 INJECTION INTRAMUSCULAR; INTRAVENOUS at 08:10

## 2023-10-16 RX ADMIN — LEVOTHYROXINE SODIUM 88 MCG: 0.09 TABLET ORAL at 08:09

## 2023-10-16 RX ADMIN — GABAPENTIN 300 MG: 300 CAPSULE ORAL at 08:08

## 2023-10-16 RX ADMIN — ACETAMINOPHEN 975 MG: 325 TABLET, FILM COATED ORAL at 08:08

## 2023-10-16 RX ADMIN — BRIMONIDINE TARTRATE 1 DROP: 2 SOLUTION/ DROPS OPHTHALMIC at 08:21

## 2023-10-16 RX ADMIN — KETOROLAC TROMETHAMINE 15 MG: 15 INJECTION INTRAMUSCULAR; INTRAVENOUS at 20:34

## 2023-10-16 RX ADMIN — KETOROLAC TROMETHAMINE 15 MG: 15 INJECTION INTRAMUSCULAR; INTRAVENOUS at 01:53

## 2023-10-16 RX ADMIN — OXYCODONE HYDROCHLORIDE 5 MG: 5 TABLET ORAL at 08:09

## 2023-10-16 RX ADMIN — METOPROLOL SUCCINATE 100 MG: 50 TABLET, EXTENDED RELEASE ORAL at 09:23

## 2023-10-16 RX ADMIN — OXYCODONE HYDROCHLORIDE 5 MG: 5 TABLET ORAL at 02:55

## 2023-10-16 RX ADMIN — DICLOFENAC SODIUM TOPICAL GEL, 1% 4 G: 10 GEL TOPICAL at 16:10

## 2023-10-16 RX ADMIN — KETOROLAC TROMETHAMINE 15 MG: 15 INJECTION INTRAMUSCULAR; INTRAVENOUS at 14:34

## 2023-10-16 RX ADMIN — ATORVASTATIN CALCIUM 40 MG: 40 TABLET, FILM COATED ORAL at 20:28

## 2023-10-16 RX ADMIN — OXYCODONE HYDROCHLORIDE 5 MG: 5 TABLET ORAL at 18:21

## 2023-10-16 RX ADMIN — INSULIN GLARGINE 15 UNITS: 100 INJECTION, SOLUTION SUBCUTANEOUS at 22:13

## 2023-10-16 RX ADMIN — HYDROXYZINE HYDROCHLORIDE 50 MG: 50 TABLET, FILM COATED ORAL at 20:28

## 2023-10-16 RX ADMIN — LIDOCAINE PATCH 4% 2 PATCH: 40 PATCH TOPICAL at 20:30

## 2023-10-16 RX ADMIN — DICLOFENAC SODIUM TOPICAL GEL, 1% 4 G: 10 GEL TOPICAL at 11:16

## 2023-10-16 ASSESSMENT — ACTIVITIES OF DAILY LIVING (ADL)
ADLS_ACUITY_SCORE: 31
ADLS_ACUITY_SCORE: 33
ADLS_ACUITY_SCORE: 33
ADLS_ACUITY_SCORE: 31
ADLS_ACUITY_SCORE: 33
ADLS_ACUITY_SCORE: 31
ADLS_ACUITY_SCORE: 31
ADLS_ACUITY_SCORE: 33
ADLS_ACUITY_SCORE: 31
ADLS_ACUITY_SCORE: 31
ADLS_ACUITY_SCORE: 33
ADLS_ACUITY_SCORE: 31

## 2023-10-16 NOTE — CARE PLAN
Shift from [id number]     Inpatient Progress Note:  For complete assessment see flow sheet documentation.      Orientation: A/O x 4   Neuro: intact   Pain status: rates pain at 7/10 gave [facility] meds   Activity: A x 2 w & GB   Peripheral edema: None   Resp: WNL  Cardiac: WNL  GI: Due, no BM yet   : Due to void.   Skin: WNL   LDA: PIV   Infusions: NS at 100ml/hr   Diet: Reg   Consults: Ortho   Discharge Plan: TBD

## 2023-10-16 NOTE — PROGRESS NOTES
Care Management Follow Up    Length of Stay (days): 3    Expected Discharge Date: 10/17/2023     Concerns to be Addressed: discharge planning, care coordination/care conferences     Patient plan of care discussed at interdisciplinary rounds: Yes    Anticipated Discharge Disposition: Transitional Care     Anticipated Discharge Services: None  Anticipated Discharge DME:      Patient/family educated on Medicare website which has current facility and service quality ratings: yes  Education Provided on the Discharge Plan:    Patient/Family in Agreement with the Plan: yes    Referrals Placed by CM/SW: Post Acute Facilities  Private pay costs discussed: private room/amenity fees and transportation costs    Additional Information:  [facility] for [facility] bed. Updated patient and family at bedside and they are agreeable to go.   Updated [facility] that patient accepts the bed. Anticipate discharge tomorrow. They can accept patient tomorrow anytime after 1300.     Ofelia Singh RN  Care Coordinator  [facility]

## 2023-10-16 NOTE — DISCHARGE INSTRUCTIONS
PCF  Post-Op Instructions  The following guidelines are recommended after surgery to ensure a good recovery. You may be given additional instructions by your surgeon when discharged.     After your surgery:   You may resume your regular diet as tolerated; avoiding spicy or greasy foods at first.   Resume all previous medications. Pain medication and antibiotics may cause constipation. Increase your water and fiber intake. Also, over the counter stool softeners may be helpful (Senna, Colace, Milk of Magnesia).   NO ALCOHOL for the next 24 hours or while taking pain medication(s).  It is normal to feel sleepy, dizzy and/or slightly nauseous for 24 hours after anesthesia.  NO driving or operating heavy machinery or make any important or legal decisions or sign legal documents while taking narcotics.    Restrictions:  Following surgery, you will be asked to wear your brace with activity and in the car for the first 3 months. During this period, remember the acronym  BLT.  That is, you should not bend, lift over 8 pounds, or twist. You should try to keep your neck in a neutral position.    Incision Care:  You may have staples, sutures, steri-strips, or likely a combination of these to help keep your incision closed post-operatively. Steri-strips are small stickers placed over the wound; please allow these to remain in place until they fall off on their own, typically about 7 days. Staples should be taken out within 10-14 days after surgery, either by your primary care provider or at our facility. Often a bandage is placed over the incision site. Please keep this bandage on for 48 hours after surgery, as it is a sterile dressing. After 48 hours, the dressing should be changed daily until there is no longer drainage around 5-7 days post-op. After this, gauze or bandages are usually not necessary. We ask that you leave the incision site open to air. Please remember that dark and moist areas promote bacterial growth.      Signs of Infection:  You or a significant other should monitor your incision for signs of infection. Signs of infection include: redness, swelling, increased warmth to touch, discharge from the wound, and a body temperature over 100.5 F. If any signs of infection do appear, please contact our office.     Can I bathe?  It is safe for the incision to get wet while taking a shower, but avoid any direct pressure from the shower head and do not scrub the area. Lightly pat the site dry with a clean towel when finished. Avoid submerging your wound by soaking in the bathtub, as this will increase your risk of infection at the incision site. Please wait at least 3 weeks for the incision wound to heal before submerging the area while taking baths.     When can I go back to work?  Most patients go back to work with restrictions 4-8 weeks after surgery. If your job is more physically demanding, this may need to be extended. In general, your capability to return to work will be discussed at your one-month visit.    When can I drive?  Once you are off all narcotic pain medications, and you have regained full control of your extremities you should be able to safely operate your vehicle.     Appointments:  Please see your primary care provider 10-14 days after surgery to have the incision examined. We would like to see you at [facility] one month after surgery. This is the first of a series of post-op appointments. You will have a CT scan prior to your appointment to confirm the position of the hardware. You can reach [facility] to set up an appointment, if it is not yet scheduled.     Things to Remember:  Fluctuation in the amount of pain you may experience is normal. This is especially common 1-2 weeks after surgery related to increased swelling at the surgical site.     Headaches, neck pain, pain near and around your shoulder blades and into your arms are common post-operative pain/symptoms after a neck surgery.  These symptoms are temporary almost always, but can take quite some time to resolve.    It is not uncommon to experience a temporary increase or the same level of intensity of pain down one or both of your arms or shoulders. This can be due to us  cleaning off  your nerves, swelling, or just remnants of nerve irritation from prior surgery. Please inform us however, if your pain gradually worsens.     Certain things can delay your healing, hinder your surgery from being successful, and/or increase your risk of requiring another surgery. These include smoking and/or tobacco use, diabetes, poor general health, advanced age, and/or obesity. Although some risks cannot be avoided, it is important to control what you can for the best possible outcome.    In Emergency Situations, Call 911:  Sudden onset of leg weakness and spasms  Loss of bladder control and/or bowel functions  Signs or symptoms of a pulmonary embolism (PE) - sudden coughing, sharp chest pain, rapid breathing, or shortness of breath, and/or severe lightheadedness.   Signs or symptoms of a deep vein thrombosis (DVT) - Swelling in the leg(s), pain and/or tenderness in the leg(s), red or discolored skin on the leg(s), and/or warm skin to the touch.

## 2023-10-16 NOTE — PLAN OF CARE
Goal Outcome Evaluation:      Plan of Care Reviewed With: patient, spouse    Overall Patient Progress: improving    Pt A&O x4. VS stable; afebrile. PO oxycodone and scheduled tylenol, robaxin, and atarax managing pain. 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 Dressing CDI-incision iced. Up w/ Ax1, using gait belt, and walker. Aspen collar on @ all times. Pt voiding in small amts, but retaining greater than 500 ml. Winston catheter placed per surgery team, order placed. Tolerating CHO diet. Plan is TCU @ discharge.

## 2023-10-16 NOTE — PROGRESS NOTES
Vitals stable.  Hgb 8.9   Gluose 150's  neuro intact  neck pain.  Passing gas.  Abdomen soft.   Some shoulder pain.   Rx with a few doses of toradol.  TCU placement.

## 2023-10-16 NOTE — DISCHARGE SUMMARY
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]  H/O Spinal surgery [Z98.890]    Discharge Diagnoses: H/O spinal surgery    Admission Condition: poor    Discharged Condition: fair    Indication for Admission: Spinal stenosis in cervical region      Discharge Exam:  GENERAL APPEARANCE: healthy, alert and no distress  EYES: Eyes grossly normal to inspection, PERRL and conjunctivae and sclerae normal  HENT: ear canals and TM's normal, nose and mouth without ulcers or lesions, oropharynx clear and oral mucous membranes moist  NECK: no adenopathy, no asymmetry, masses, or scars and thyroid normal to palpation  RESP: lungs clear to auscultation - no rales, rhonchi or wheezes  BREAST: normal without masses, tenderness or nipple discharge and no palpable axillary masses or adenopathy  CV: regular rates and rhythm, normal S1 S2, no S3 or S4, no murmur, click or rub, no peripheral edema and peripheral pulses strong  ABDOMEN: soft, nontender, no hepatosplenomegaly, no masses and bowel sounds normal   (female): normal female external genitalia, vaginal mucosa pink, moist, well rugated and normal cervix, adnexae, and uterus without masses. Normal vaginal discharge  MS: no musculoskeletal defects are noted and gait is age appropriate without ataxia  SKIN: no suspicious lesions or rashes  NEURO: Normal strength and tone, sensory exam grossly normal, mentation intact and speech normal  PSYCH: mentation appears normal and affect normal/bright    Disposition: [facility]    Patient Instructions:   Current  Discharge Medication List        CONTINUE these medications which have NOT CHANGED    Details   apixaban ANTICOAGULANT (ELIQUIS) 5 MG tablet Take 5 mg by mouth 2 times daily      atorvastatin (LIPITOR) 40 MG tablet Take 1 tablet by mouth daily      brimonidine (ALPHAGAN-P) 0.15 % ophthalmic solution Place 1 drop Into the left eye 2 times daily      gabapentin (NEURONTIN) 300 MG capsule Take 600 mg by mouth 2 times daily as needed      HYDROCHLOROTHIAZIDE PO Take 25 mg by mouth daily      !! insulin aspart (NOVOLOG PEN) 100 UNIT/ML pen Inject 3 Units Subcutaneous daily (with breakfast)      !! insulin aspart (NOVOLOG PEN) 100 UNIT/ML pen Inject 3 Units Subcutaneous daily (with lunch)      !! insulin aspart (NOVOLOG PEN) 100 UNIT/ML pen Inject 5 Units Subcutaneous daily (with dinner)      insulin glargine (LANTUS) 100 UNIT/ML vial Inject 17-20 Units Subcutaneous at bedtime      ketoconazole (NIZORAL) 2 % external shampoo Shampoo the hair thoroughly each as need.      latanoprost (XALATAN) 0.005 % ophthalmic solution Place 1 drop into the right eye At Bedtime      LEVOTHYROXINE SODIUM PO Take 88 mcg by mouth daily      losartan (COZAAR) 50 MG tablet Take 1 tablet by mouth daily      metoprolol succinate ER (TOPROL XL) 100 MG 24 hr tablet Take 100 mg by mouth 2 times daily      Multiple Vitamins-Calcium (ONE-A-DAY WOMENS FORMULA PO) Take 1 tablet by mouth daily      OMEPRAZOLE PO Take 40 mg by mouth daily      prednisoLONE acetate (PRED FORTE) 1 % ophthalmic suspension Place 1 drop Into the left eye daily      timolol maleate (TIMOPTIC) 0.5 % ophthalmic solution Place 1 drop into both eyes 2 times daily       !! - Potential duplicate medications found. Please discuss with provider.        Activity: activity as tolerated  Diet: regular diet  Wound Care: keep wound clean and dry, can shower after 48hrs    Follow-up with PCP in 2 weeks.    Signed:  Camelia Stern PA-C  10/16/2023  10:55 AM

## 2023-10-16 NOTE — PLAN OF CARE
Goal Outcome Evaluation:      Plan of Care Reviewed With: patient, spouse, child    Overall Patient Progress: improving    A & Ox4, able to make needs known. A2 w/walker when ambulating, A1 pivot to bedside commode.QID blood sugar checks. NS running @ 100ml/hr. Scheduled toradol/tylenol/robaxin for pain. Pt reports good pain relief with these interventions. Have not given oxycodone due to hypotension previous shift. BP meds still held.  DTV, straight cath @ 1630 for 400ml. Dressing CDI, aspen collar removed for skin check. Pt wearing a brief per her request, she is anxious about having a BM accident.

## 2023-10-16 NOTE — PROGRESS NOTES
[facility]    Medicine Progress Note - Hospitalist Service    Date of Admission:  10/13/2023    Assessment & Plan      78 year old female with a PMH significant for HTN, HLD, hypothyroid, DM2, PN, CKD3 (baseline cr about 1.15-1.35), AFib-on DOAC, chronic neck and back pain, neurogenic claudication, PAD, and GERD who is POD #1 s/p C3 to T2 cervical lamimectomy and spinal cord decompression, C3 to T2 posterior fusion, C3 to T2 segemental instrumentation for progressive myelopathy due to stenosis and kyphosis from C3-T3.  Hospital medicine consulted to assist in medical management of this patient.     Patient was started on home dose of antihypertensives after surgery but became hypotensive with blood pressure as low as 60s on 10/14.      S/p C3-T2 cervical laminectomy and cervical cord decompression.  Pain management as per spine surgery.    Hypotension.  History of essential hypertension.  Patient was hypotensive after surgery likely due to acute blood loss associated with surgery.  All home antihypertensives including hydrochlorothiazide, losartan and metoprolol were held and was given 2 L of IV fluid bolus on 10/14 and 500 mL of albumin 5%.    Systolic blood pressure is now more than 130 and will resume metoprolol XL at a lower dose of 100 mg p.o. daily (at home he was on 100 mg twice daily).  Reintroduce losartan and metoprolol as the blood pressures keeps rising.    Diabetes mellitus type 2.  Prior to admission dose of Lantus 15 units at night was started but patient was hyperglycemic.  Started back on NovoLog 3 units before each meal plus sliding scale and blood glucose is now better controlled.    Last A1c was 7.8 on 10/5.    Urinary retention.   Patient had Winston inserted on 10/16 due to urinary retention with PVR of more than 500 mL.  Defer management to spine surgery.    Hyperlipidemia.  Continue Lipitor.    Atrial fibrillation.  Atrial fibrillation is rate controlled even though  "metoprolol is on hold.  Low threshold to resume metoprolol as heart rate starts going up again.  Prior to admission was on Eliquis which was held for 5 days prior to surgery and will defer timing of resuming anticoagulation to spine surgery.    GERD.  Continue Meprazole.    Hypothyroidism.  Continue on levothyroxine 88 mcg daily    Acute kidney injury.  Secondary to volume loss with the surgery.  Creatinine bumped up to 1.27, continued IV fluid and improved to 1.1 .  Glaucoma.  PTA Pred Forte, Xalatan, Alphagan and Timoptic resumed             Diet: Advance Diet as Tolerated: Fully Advanced to diet(s) per Provider order; Moderate Consistent Carb (60 g CHO per Meal) Diet  Diet    DVT Prophylaxis: Pneumatic Compression Devices  Winston Catheter: PRESENT, indication: Other (Comment) (surgery yesterday, cathetar to come out later today)  Lines: None     Cardiac Monitoring: None  Code Status: Full Code      Clinically Significant Risk Factors                # Thrombocytopenia: Lowest platelets = 118 in last 2 days, will monitor for bleeding          # Obesity: Estimated body mass index is 31.6 kg/m  as calculated from the following:    Height as of this encounter: 1.607 m (5' 3.27\").    Weight as of this encounter: 81.6 kg (179 lb 14.3 oz)., PRESENT ON ADMISSION            Disposition Plan      Expected Discharge Date: 10/17/2023      Destination: inpatient rehabilitation facility              Brodie Stout MD  Hospitalist Service  [facility]  Securely message with [employer] (more info)  Text page via [employer] Paging/Directory   ______________________________________________________________________    Interval History   Urinary retention (Winston inserted    Physical Exam   Vital Signs: Temp: 97.6  F (36.4  C) Temp src: Temporal BP: 139/71 Pulse: 82   Resp: 16 SpO2: 96 % O2 Device: None (Room air)    Weight: 179 lbs 14.33 oz    General Appearance: Appears comfortable.  Wearing cervical collar.  Respiratory: " Clear to auscultation  Cardiovascular: S1-S2 normal  GI: Soft and nontender  Skin: No rash  Other: No edema.  Moving all extremities.    Medical Decision Making       MANAGEMENT DISCUSSED with the following over the past 24 hours: Patient and RN       Data     I have personally reviewed the following data over the past 24 hrs:    4.5  \   8.9 (L)   / 128 (L)     134 (L) 105 23.1 (H) /  122 (H)   3.8 21 (L) 1.10 (H) \       Imaging results reviewed over the past 24 hrs:   No results found for this or any previous visit (from the past 24 hour(s)).

## 2023-10-16 NOTE — PLAN OF CARE
"Care from [id number]  Inpatient Progress Note    /61 (BP Location: Left arm)   Pulse 82   Temp 97.7  F (36.5  C) (Temporal)   Resp 16   Ht 1.607 m (5' 3.27\")   Wt 81.6 kg (179 lb 14.3 oz)   SpO2 97%   BMI 31.60 kg/m      A&O x4. VSS, RA. Up w/ A1 gbw to bedside commode. Neuros/CMS intact/at baseline. Dressing to posterior of neck CDI. C-collar in place. Pain managed w/ sched toradol, atarax, and PRN oxy x1, interventions effective per pt report. Bladder scanned for 521 ml towards beginning of shift, pt refused straight cath, but was able to void 150 ml shortly after and then another 150 ml this AM. R hand PIV SL. Tolerating diet. Plan for TCU once cleared.                         "

## 2023-10-16 NOTE — PLAN OF CARE
Occupational Therapy: Orders received. Chart reviewed and discussed with care team.? Occupational Therapy not indicated as pt plans to go to TCU; PT to address mobility need during IP stay.? Defer discharge recommendations to next level of care (TCU).? Will complete orders.

## 2023-10-17 ENCOUNTER — APPOINTMENT (OUTPATIENT)
Dept: PHYSICAL THERAPY | Facility: CLINIC | Age: 78
DRG: 460 | End: 2023-10-17
Attending: ORTHOPAEDIC SURGERY
Payer: COMMERCIAL

## 2023-10-17 VITALS
BODY MASS INDEX: 31.88 KG/M2 | DIASTOLIC BLOOD PRESSURE: 69 MMHG | OXYGEN SATURATION: 93 % | HEIGHT: 63 IN | RESPIRATION RATE: 16 BRPM | HEART RATE: 75 BPM | SYSTOLIC BLOOD PRESSURE: 124 MMHG | WEIGHT: 179.9 LBS | TEMPERATURE: 97.5 F

## 2023-10-17 LAB
GLUCOSE BLDC GLUCOMTR-MCNC: 73 MG/DL (ref 70–99)
GLUCOSE BLDC GLUCOMTR-MCNC: 96 MG/DL (ref 70–99)
GLUCOSE BLDC GLUCOMTR-MCNC: 97 MG/DL (ref 70–99)

## 2023-10-17 PROCEDURE — 250N000011 HC RX IP 250 OP 636: Performed by: ORTHOPAEDIC SURGERY

## 2023-10-17 PROCEDURE — 99233 SBSQ HOSP IP/OBS HIGH 50: CPT | Performed by: STUDENT IN AN ORGANIZED HEALTH CARE EDUCATION/TRAINING PROGRAM

## 2023-10-17 PROCEDURE — 250N000013 HC RX MED GY IP 250 OP 250 PS 637: Performed by: STUDENT IN AN ORGANIZED HEALTH CARE EDUCATION/TRAINING PROGRAM

## 2023-10-17 PROCEDURE — 250N000013 HC RX MED GY IP 250 OP 250 PS 637: Performed by: PHYSICIAN ASSISTANT

## 2023-10-17 PROCEDURE — 97116 GAIT TRAINING THERAPY: CPT | Mod: GP | Performed by: PHYSICAL THERAPIST

## 2023-10-17 PROCEDURE — 250N000013 HC RX MED GY IP 250 OP 250 PS 637

## 2023-10-17 PROCEDURE — 250N000013 HC RX MED GY IP 250 OP 250 PS 637: Performed by: NURSE PRACTITIONER

## 2023-10-17 PROCEDURE — 97530 THERAPEUTIC ACTIVITIES: CPT | Mod: GP | Performed by: PHYSICAL THERAPIST

## 2023-10-17 RX ORDER — LOSARTAN POTASSIUM 50 MG/1
TABLET ORAL
DISCHARGE
Start: 2023-10-17 | End: 2023-10-18

## 2023-10-17 RX ORDER — HYDROCHLOROTHIAZIDE 25 MG/1
TABLET ORAL
DISCHARGE
Start: 2023-10-17 | End: 2023-10-18

## 2023-10-17 RX ORDER — METOPROLOL SUCCINATE 100 MG/1
100 TABLET, EXTENDED RELEASE ORAL DAILY
DISCHARGE
Start: 2023-10-17 | End: 2023-10-23

## 2023-10-17 RX ORDER — OXYCODONE HYDROCHLORIDE 5 MG/1
5 TABLET ORAL EVERY 4 HOURS PRN
Qty: 42 TABLET | Refills: 0 | Status: SHIPPED | OUTPATIENT
Start: 2023-10-17 | End: 2023-10-24

## 2023-10-17 RX ADMIN — OXYCODONE HYDROCHLORIDE 5 MG: 5 TABLET ORAL at 09:45

## 2023-10-17 RX ADMIN — KETOROLAC TROMETHAMINE 15 MG: 15 INJECTION INTRAMUSCULAR; INTRAVENOUS at 08:31

## 2023-10-17 RX ADMIN — METHOCARBAMOL 750 MG: 750 TABLET ORAL at 08:30

## 2023-10-17 RX ADMIN — ACETAMINOPHEN 650 MG: 325 TABLET, FILM COATED ORAL at 08:30

## 2023-10-17 RX ADMIN — ACETAMINOPHEN 650 MG: 325 TABLET, FILM COATED ORAL at 12:12

## 2023-10-17 RX ADMIN — DICLOFENAC SODIUM TOPICAL GEL, 1% 4 G: 10 GEL TOPICAL at 08:42

## 2023-10-17 RX ADMIN — TIMOLOL MALEATE 1 DROP: 5 SOLUTION/ DROPS OPHTHALMIC at 08:38

## 2023-10-17 RX ADMIN — METOPROLOL SUCCINATE 100 MG: 50 TABLET, EXTENDED RELEASE ORAL at 08:29

## 2023-10-17 RX ADMIN — GABAPENTIN 300 MG: 300 CAPSULE ORAL at 13:47

## 2023-10-17 RX ADMIN — KETOROLAC TROMETHAMINE 15 MG: 15 INJECTION INTRAMUSCULAR; INTRAVENOUS at 02:08

## 2023-10-17 RX ADMIN — OXYCODONE HYDROCHLORIDE 10 MG: 10 TABLET ORAL at 12:12

## 2023-10-17 RX ADMIN — OXYCODONE HYDROCHLORIDE 5 MG: 5 TABLET ORAL at 06:28

## 2023-10-17 RX ADMIN — LEVOTHYROXINE SODIUM 88 MCG: 0.09 TABLET ORAL at 08:30

## 2023-10-17 RX ADMIN — HYDROXYZINE HYDROCHLORIDE 50 MG: 50 TABLET, FILM COATED ORAL at 08:30

## 2023-10-17 RX ADMIN — PREDNISOLONE ACETATE 1 DROP: 10 SUSPENSION/ DROPS OPHTHALMIC at 08:40

## 2023-10-17 RX ADMIN — HYDROXYZINE HYDROCHLORIDE 50 MG: 50 TABLET, FILM COATED ORAL at 13:47

## 2023-10-17 RX ADMIN — GABAPENTIN 300 MG: 300 CAPSULE ORAL at 08:30

## 2023-10-17 RX ADMIN — PANTOPRAZOLE SODIUM 40 MG: 40 TABLET, DELAYED RELEASE ORAL at 08:29

## 2023-10-17 RX ADMIN — BRIMONIDINE TARTRATE 1 DROP: 2 SOLUTION/ DROPS OPHTHALMIC at 08:41

## 2023-10-17 RX ADMIN — METHOCARBAMOL 750 MG: 750 TABLET ORAL at 12:12

## 2023-10-17 RX ADMIN — SENNOSIDES AND DOCUSATE SODIUM 1 TABLET: 8.6; 5 TABLET ORAL at 08:30

## 2023-10-17 RX ADMIN — DICLOFENAC SODIUM TOPICAL GEL, 1% 4 G: 10 GEL TOPICAL at 12:12

## 2023-10-17 ASSESSMENT — ACTIVITIES OF DAILY LIVING (ADL)
ADLS_ACUITY_SCORE: 32
ADLS_ACUITY_SCORE: 31
ADLS_ACUITY_SCORE: 32
ADLS_ACUITY_SCORE: 31

## 2023-10-17 NOTE — PROGRESS NOTES
Care Management Discharge Note    Discharge Date: 10/17/2023       Discharge Disposition: Transitional Care    Discharge Services: None    Discharge DME:      Discharge Transportation: family or friend will provide    Private pay costs discussed: transportation costs    Does the patient's insurance plan have a 3 day qualifying hospital stay waiver?  Yes     Which insurance plan 3 day waiver is available? Alternative insurance waiver    Will the waiver be used for post-acute placement? No    PAS Confirmation Code: [id number]  Patient/family educated on Medicare website which has current facility and service quality ratings: yes    Education Provided on the Discharge Plan:    Persons Notified of Discharge Plans: Patient, spouse  Patient/Family in Agreement with the Plan: yes    Handoff Referral Completed: No    Additional Information:  [facility] discharging to [facility] today. They can accept patient after 1300. Updated patient and spouse at bedside. Spouse decided on wheelchair transport versus him trying to get her in/out of car. Reviewed out of pocket cost for [facility] transport, about $89.98 base and $5.79 per mile to the destination. Spoke with patient and spouse, they expressed understanding and are agreeable to this.    Transportation arranged for this afternoon between [id number].  Updated [facility] of transport time. Orders faxed.     Ofelia Singh RN  Care Coordinator  [facility]

## 2023-10-17 NOTE — PROGRESS NOTES
[facility]    Medicine Progress Note - Hospitalist Service    Date of Admission:  10/13/2023    Assessment & Plan      78 year old female with a PMH significant for HTN, HLD, hypothyroid, DM2, PN, CKD3 (baseline cr about 1.15-1.35), AFib-on DOAC, chronic neck and back pain, neurogenic claudication, PAD, and GERD who is POD #1 s/p C3 to T2 cervical lamimectomy and spinal cord decompression, C3 to T2 posterior fusion, C3 to T2 segemental instrumentation for progressive myelopathy due to stenosis and kyphosis from C3-T3.  Hospital medicine consulted to assist in medical management of this patient.     Patient was started on home dose of antihypertensives after surgery but became hypotensive with blood pressure as low as 60s on 10/14.      S/p C3-T2 cervical laminectomy and cervical cord decompression.  Pain management as per spine surgery.    Hypotension.  History of essential hypertension.  Acute blood loss anemia.  Patient was hypotensive after surgery likely due to acute blood loss associated with surgery.  All home antihypertensives including hydrochlorothiazide, losartan and metoprolol were held and was given 2 L of IV fluid bolus on 10/14 and 500 mL of albumin 5%.    Systolic blood pressure is now more than 130 and will resume metoprolol XL at a lower dose of 100 mg p.o. daily (at home he was on 100 mg twice daily).  Reintroduce losartan and metoprolol as the blood pressures keeps rising.    Diabetes mellitus type 2.  Prior to admission dose of Lantus 15 units at night was started but patient was hyperglycemic.  Started back on NovoLog 3 units before each meal plus sliding scale and blood glucose is now better controlled.    Last A1c was 7.8 on 10/5.    Urinary retention.   Patient had Winston inserted on 10/16 due to urinary retention with PVR of more than 500 mL.  Will discharge with Winston.    Hyperlipidemia.  Continue Lipitor.    Permanent atrial fibrillation.  Atrial fibrillation is rate  "controlled even though metoprolol is on hold.  Low threshold to resume metoprolol as heart rate starts going up again.  Prior to admission was on Eliquis which was held for 5 days prior to surgery and restarted by spine surgery.    GERD.  Continue omeprazole.    Hypothyroidism.  Continue on levothyroxine 88 mcg daily    Acute kidney injury on CKD stage III.  Secondary to volume loss with the surgery.  Creatinine bumped up to 1.27, continued IV fluid and improved to 1.1.    Glaucoma.  PTA Pred Forte, Xalatan, Alphagan and Timoptic resumed             Diet: Advance Diet as Tolerated: Fully Advanced to diet(s) per Provider order; Moderate Consistent Carb (60 g CHO per Meal) Diet  Diet    DVT Prophylaxis: Pneumatic Compression Devices  Winston Catheter: PRESENT, indication: Retention, Other (Comment) (surgery yesterday, cathetar to come out later today)  Lines: None     Cardiac Monitoring: None  Code Status: Full Code      Clinically Significant Risk Factors                # Thrombocytopenia: Lowest platelets = 128 in last 2 days, will monitor for bleeding          # Obesity: Estimated body mass index is 31.6 kg/m  as calculated from the following:    Height as of this encounter: 1.607 m (5' 3.27\").    Weight as of this encounter: 81.6 kg (179 lb 14.3 oz).             Disposition Plan      Expected Discharge Date: 10/17/2023      Destination: inpatient rehabilitation facility  Discharge Comments: TCU with Winston  [facility] can accept after 1300            Brodie Stout MD  Hospitalist Service  [facility]  Securely message with [employer] (more info)  Text page via [employer] Paging/Directory   ______________________________________________________________________    Interval History   Urinary retention (Winston inserted    Physical Exam   Vital Signs: Temp: 97.5  F (36.4  C) Temp src: Temporal BP: 124/69 Pulse: 75   Resp: 16 SpO2: 93 % O2 Device: None (Room air)    Weight: 179 lbs 14.33 oz    General " Appearance: Appears comfortable.  Wearing cervical collar.  Respiratory: Clear to auscultation  Cardiovascular: S1-S2 normal  GI: Soft and nontender  Skin: No rash  Other: No edema.  Moving all extremities.    Medical Decision Making       MANAGEMENT DISCUSSED with the following over the past 24 hours: Patient and RN       Data         Imaging results reviewed over the past 24 hrs:   No results found for this or any previous visit (from the past 24 hour(s)).

## 2023-10-17 NOTE — PROGRESS NOTES
DAILY PROGRESS NOTE    Lj Castro is a 78 year old old female admitted on 10/13/2023  5:35 AM.    Subjective  Patient is doing well and is planning for TCU placement today 10/17.    Objective    AAOx3, BOLTON , shin still placed  Ambulating,     Hemoglobin   Date Value Ref Range Status   10/16/2023 8.9 (L) 11.7 - 15.7 g/dL Final   03/20/2015 9.8 (L) 11.7 - 15.7 g/dL Final   ]      Impression / Plan  Patient is ready for discharge to TCU. Pt will be going to Tcu with shin unless cleared via protocol prior to discharge.     Plan for today:    Patient doing well  Ambulate with help  PT OT, possibly clearance   Full diet  Today  Wean and d/c PCA and transition to PO meds today

## 2023-10-17 NOTE — PROGRESS NOTES
[facility]  PRIMARY CARE PROVIDER AND CLINIC:  Cory Soliz DO, [address] [facility] [address]  Chief Complaint   Patient presents with    [facility]     [place] Medical Record Number:  [id number]  Place of Service where encounter took place:  [facility] (TCU) [[id number]]    Lj Castro  is a 78 year old  (1945), admitted to the above facility from  [facility]. Hospital stay 10/13/23 through 10/17/23..  Admitted to this facility for  rehab, medical management, and nursing care.    HPI:    HPI information obtained from: facility chart records, facility staff, and patient report.   Brief Summary of Hospital Course:   Updates on Status Since Skilled nursing Admission:     Patient Lj Castro is a 78 yr old female admitted to [facility] for rehabilitation s/post hospitalization [facility] 10/13-10/17/23 C3 to T2 cervical lamimectomy and spinal cord decompression, C3 to T2 posterior fusion, C3 to T2 segemental instrumentation for progressive myelopathy due to stenosis and kyphosis from C3-T3.   Complications hypotension after surgery     PMHx HTN, HLD, hypothyroid, DM2, pneumonia, CKD3 (baseline cr about 1.15-1.35), AFib-on DOAC, chronic neck and back pain, neurogenic claudication, PAD, and GERD       TODAY  Reports pain not managed, reports as achy and sharp  Pain in neck and spine  Has as needed oxycodone and Neurontin.  She is agreeable to schedule tylenol and Neurontin and have oxycodone as needed (educated on side effect and goal to wean off)  Denies increased neuropathy and reports has control bowel & bladder   Has been using oxycodone as needed with some relief    She is eager to return home, however, states due to pain she has limited function  Reports she was unable to do therapies in hospital due to pain    C/o no bowel movement since last Wednesday or Thursday. States she typically goes x1  week. Does not want scheduled stool softener due to reports has IBS with explosions at times. She reports she will ask for stool softener or supp if need    Denies chest pain, palpitations, or shortness of breath  Reports eating and denies trouble chewing or swallowing. Reports neck collar fit appropriately and does not limit her eating.    Vitals and blood sugar stable      CODE STATUS/ADVANCE DIRECTIVES DISCUSSION:   CPR/Full code   Patient's living condition: lives with spouse, and adult son  ALLERGIES: Patient has no known allergies.  PAST MEDICAL HISTORY:  has a past medical history of Arrhythmia, Bursitis, olecranon, Diabetes (H), Displacement of cervical intervertebral disc without myelopathy, Gastro-oesophageal reflux disease, Glaucoma, Hyperlipemia, Hypertension, Hypothyroid, Renal failure, acute (H24) (6-7-11 to 6-17-11), and Varicose veins.    She has no past medical history of Complication of anesthesia.  PAST SURGICAL HISTORY:   has a past surgical history that includes Eye surgery (Left, 2009); daVINCI hysterectomy supracervical (N/A, 3/18/2015); Davinci Salpingo-Oophorectomy Including Bilateral (Bilateral, 3/18/2015); DAVINCI SACROCOLPOPEXY, MIDURETHRAL SLING, CYSTOSCOPY (N/A, 3/18/2015); IR Lower Extremity Angiogram Left (7/8/2020); and Optical Tracking System Fusion Posterior Cervical Three + Levels (N/A, 10/13/2023).  FAMILY HISTORY: family history is not on file.  SOCIAL HISTORY:   reports that she has never smoked. She has never used smokeless tobacco. She reports current alcohol use. She reports that she does not use drugs.    Post Discharge Medication Reconciliation Status: discharge medications reconciled and changed, per note/orders    Current Outpatient Medications   Medication Sig Dispense Refill    apixaban ANTICOAGULANT (ELIQUIS) 5 MG tablet Take 5 mg by mouth 2 times daily      atorvastatin (LIPITOR) 40 MG tablet Take 1 tablet by mouth daily      brimonidine (ALPHAGAN-P) 0.15 %  ophthalmic solution Place 1 drop Into the left eye 2 times daily      gabapentin (NEURONTIN) 100 MG capsule Take 200 mg by mouth 3 times daily And 2 x daily as needed      insulin aspart (NOVOLOG PEN) 100 UNIT/ML pen Inject 3 Units Subcutaneous daily (with breakfast)      insulin aspart (NOVOLOG PEN) 100 UNIT/ML pen Inject 3 Units Subcutaneous daily (with lunch)      insulin aspart (NOVOLOG PEN) 100 UNIT/ML pen Inject 5 Units Subcutaneous daily (with dinner)      ketoconazole (NIZORAL) 2 % external shampoo Shampoo the hair thoroughly each as need.      latanoprost (XALATAN) 0.005 % ophthalmic solution Place 1 drop into the right eye At Bedtime      LEVOTHYROXINE SODIUM PO Take 88 mcg by mouth daily      losartan (COZAAR) 50 MG tablet Take 50 mg by mouth daily HOLD if blood pressure <100      metoprolol succinate ER (TOPROL XL) 100 MG 24 hr tablet Take 1 tablet (100 mg) by mouth daily Patient was on metoprolol succinate  mg p.o. twice daily before hospitalization.  Her blood pressure has been running low during the hospitalization and it was reduced to 100 mg p.o. daily.  Dose can be increased back to 100 mg p.o. twice daily if the blood pressure starts going up after discussion with the attending physician.      Multiple Vitamins-Calcium (ONE-A-DAY WOMENS FORMULA PO) Take 1 tablet by mouth daily      OMEPRAZOLE PO Take 40 mg by mouth daily      oxyCODONE (ROXICODONE) 5 MG tablet Take 1 tablet (5 mg) by mouth every 4 hours as needed for pain 42 tablet 0    prednisoLONE acetate (PRED FORTE) 1 % ophthalmic suspension Place 1 drop Into the left eye daily      timolol maleate (TIMOPTIC) 0.5 % ophthalmic solution Place 1 drop into both eyes 2 times daily      tiZANidine (ZANAFLEX) 4 MG tablet Take 1 tablet (4 mg) by mouth 3 times daily 90 tablet 1       ROS:  10 point ROS of systems including Constitutional, Eyes, Respiratory, Cardiovascular, Gastroenterology, Genitourinary, Integumentary, Musculoskeletal,  "Psychiatric were all negative except for pertinent positives noted in my HPI.    Vitals:  /67   Pulse 89   Temp 97.6  F (36.4  C)   Resp 18   Ht 1.6 m (5' 3\")   Wt 81.2 kg (179 lb)   SpO2 95%   BMI 31.71 kg/m    BG  10/18/2023 08:41 139 mg/dL  10/17/2023 20:13 180 mg/dL  10/17/2023 16:35 145 mg/d  Hr  10/18/2023 10:08 78 bpm (Regular)  10/17/2023 23:48 89 bpm (Not Applicable)  10/17/2023 21:20 92 bpm (Regular)  10/17/2023 15:37 92 bpm (Regular)  Exam:  GENERAL APPEARANCE:  Alert, in no distress  ENT:  Mouth and posterior oropharynx normal, moist mucous membranes  EYES:  EOM, conjunctivae, lids, pupils and irises normal  NECK:  No adenopathy,masses or thyromegaly  RESP:  respiratory effort and palpation of chest normal, lungs clear to auscultation , no respiratory distress  CV:  Palpation and auscultation of heart done , regular rate and rhythm  ABDOMEN:  normal bowel sounds, soft, nontender  M/S:   lying in bed, neck brace on  SKIN:  Inspection of skin and subcutaneous tissue baseline  NEURO:   Cranial nerves 2-12 are normal tested and grossly at patient's baseline  PSYCH:  oriented X 3    Lab/Diagnostic data:  Labs done in SNF are in [facility]. Please refer to them using [employer] Everywhere.    Last Comprehensive Metabolic Panel:  Lab Results   Component Value Date     (L) 10/16/2023    POTASSIUM 3.8 10/16/2023    CHLORIDE 105 10/16/2023    CO2 21 (L) 10/16/2023    ANIONGAP 8 10/16/2023    GLC 96 10/17/2023    BUN 23.1 (H) 10/16/2023    CR 1.10 (H) 10/16/2023    GFRESTIMATED 51 (L) 10/16/2023    INDY 7.5 (L) 10/16/2023       Lab Results   Component Value Date    WBC 4.5 10/16/2023    WBC 8.8 06/15/2011     Lab Results   Component Value Date    RBC 2.83 10/16/2023    RBC 3.39 06/15/2011     Lab Results   Component Value Date    HGB 8.9 10/16/2023    HGB 9.8 03/20/2015     Lab Results   Component Value Date    HCT 27.7 10/16/2023    HCT 30.4 06/15/2011     No components found for: \"MCT\"  Lab " Results   Component Value Date    MCV 98 10/16/2023    MCV 90 06/15/2011     Lab Results   Component Value Date    MCH 31.4 10/16/2023    MCH 28.3 06/15/2011     Lab Results   Component Value Date    MCHC 32.1 10/16/2023    MCHC 31.6 06/15/2011     Lab Results   Component Value Date    RDW 13.8 10/16/2023    RDW 14.9 06/15/2011     Lab Results   Component Value Date     10/16/2023     06/15/2011         ASSESSMENT/PLAN:  s/p C3-T2 cervical laminectomy and cervical cord decompression  Schedule tylenol and Neurontin  Continue as needed oxycodone for pain management   Mobility without mobility aid prior,  Incision keep clean dry, can shower after 48 hrs per orders and activity as tolerates  Follow up surgeon Sagar Cadena MD - Primary   Need clarification with surgeon regarding follow up appointment, suture/staple removal, restrictions, parameters and duration of neck brace    Constipation  IBS  Add Senna S 2 tabs daily as needed     Hypotension.    History of essential hypertension  hydrochlorothiazide, losartan and on hold until blood pressure improve per hospital order and Metoprolol restarted lower dose  given 2 L of IV fluid bolus on 10/14 and 500 mL of albumin 5%.  Blood pressure is now in acceptable range with systolic in low 100s per hospital note  SBP elevate today   Will order restart Losartan prior to hospital admission (hold if SBP <100), continue to hold hydrochlorothiazide and continue lower dose Metoprolol at this time    Hyperlipidemia  Continue Lipitor  Monitor     atrial fibrillation  Hr controlled  continue Metoprolol   Continue Eliquis     Diabetes mellitus type 2  Continue Lantus and NovoLog set at meals  Came with Lantus range 17-20  Blood sugar managed, order Lantus 18 units, hold if blood sugar <100  Order blood sugar checks 4 xday    GERD  Continue Meprazole    Hypothyroidism  TSH   Date Value Ref Range Status   03/19/2015 2.05 0.40 - 4.00 mU/L Final     Comment:     Effective  7/30/2014, the reference range for this assay has changed to reflect   new instrumentation/methodology.       Continue on levothyroxine 88 mcg daily    Acute kidney injury.  Secondary to volume loss with the surgery.  Creatinine bumped up to 1.27,   Creatinine   Date Value Ref Range Status   10/16/2023 1.10 (H) 0.51 - 0.95 mg/dL Final   03/20/2015 0.96 0.52 - 1.04 mg/dL Final     Glaucoma  Continue Pred Forte, Xalatan, Alphagan and Timoptic resumed     Deconditioned  Comment: d/t recent hospitalization & comorbidity, expect delay rehabilitation d/t age & comorbidity  Plan: PT/OT eval & treat, monitor    Advanced care planning  elects full code    BMP,CBC 10/20/23    Total time spent with patient visit at the skilled nursing facility was 45 min including patient visit and review of past records. Greater than 50% of total time spent with counseling and coordinating care due to discussion on functional goals, pain management, bowel movement management, hypertension management and discharge planning.     Electronically signed by:  CARMEL Marrero CNP

## 2023-10-17 NOTE — PLAN OF CARE
Goal Outcome Evaluation:    Patient vital signs are at baseline: Yes  Patient able to ambulate as they were prior to admission or with assist devices provided by therapies during their stay:  Yes  Patient MUST void prior to discharge:  No,  Reason:  Shin on.  Patient able to tolerate oral intake:  Yes  Pain has adequate pain control using Oral analgesics:  Yes  Does patient have an identified :  Yes  Has goal D/C date and time been discussed with patient:  Yes    Patient alert, oriented and up with assist of 1 using GB/walker. On room air. Diet well tolerated. PIV saline locked. Pain managed by scheduled tylenol, atarax, robaxin and toradol as well as PRN dilaudid and oxycodone. Shin patent. Surgery team to reassess patient in the morning if discharging with shin. Dressing C/D/I. CMS intact. Aspen collar on at all times reinforced. Glucose QID: 133/97. Plan for TCU discharge today.

## 2023-10-17 NOTE — PROGRESS NOTES
Admitting Diagnosis: H/O spinal surgery w/ Dr. Cadena Date 10/13/23  PMH: HTN, Hypothyroid, A-Fib, DM 2  Orientation: A/Ox4    Vitals/Tele: Stable  Resp: LS Clear Ra   Pain: 6-8/10 Scheduled Robaxin, Atarax, Toradol PRN oxycodone   IV Access/drains: PIV x 1 SL  Diet:Regular-mod carb  Labs/ Glucose: Blood sugar QID  Mobility:  Assist of 1 walker and gait belt   GI/: Denies Nausea, vomiting, or abd pain. LBM 10/12/23 this is normal per patient had IBS Voiding Winston in place with Discharge to TCU with it  Wound/Skin: Upper/ Lower back covered- Aspen collar in place    Consults:PT SW   Summary/Discharge Plan:Discharge to [facility] by  today.         See Flow sheets for assessment        Transport by [facility] wheelchair [id number]

## 2023-10-17 NOTE — PLAN OF CARE
Care from [id number]    Vitals are Temp: 97.6  F (36.4  C) Temp src: Temporal BP: 139/71 Pulse: 82   Resp: 16 SpO2: 96 %.      Pt A&Ox4, VSS on RA. Reporting pain 7/10. Given oxy 5mg. Plan to continue with prn and scheduled pain meds. Accepted to TCU. Has kip for retention, surgical team to reassess tomorrow.

## 2023-10-18 ENCOUNTER — TRANSITIONAL CARE UNIT VISIT (OUTPATIENT)
Dept: GERIATRICS | Facility: CLINIC | Age: 78
End: 2023-10-18
Payer: COMMERCIAL

## 2023-10-18 VITALS
DIASTOLIC BLOOD PRESSURE: 67 MMHG | RESPIRATION RATE: 18 BRPM | BODY MASS INDEX: 31.71 KG/M2 | SYSTOLIC BLOOD PRESSURE: 108 MMHG | TEMPERATURE: 97.6 F | WEIGHT: 179 LBS | HEART RATE: 89 BPM | OXYGEN SATURATION: 95 % | HEIGHT: 63 IN

## 2023-10-18 DIAGNOSIS — Z98.890 S/P LAMINECTOMY: Primary | ICD-10-CM

## 2023-10-18 DIAGNOSIS — E78.49 OTHER HYPERLIPIDEMIA: ICD-10-CM

## 2023-10-18 DIAGNOSIS — H40.003 GLAUCOMA SUSPECT, BILATERAL: ICD-10-CM

## 2023-10-18 DIAGNOSIS — M62.81 GENERALIZED MUSCLE WEAKNESS: ICD-10-CM

## 2023-10-18 DIAGNOSIS — K59.09 OTHER CONSTIPATION: ICD-10-CM

## 2023-10-18 DIAGNOSIS — I10 HYPERTENSION, UNSPECIFIED TYPE: ICD-10-CM

## 2023-10-18 DIAGNOSIS — I95.89 OTHER SPECIFIED HYPOTENSION: ICD-10-CM

## 2023-10-18 DIAGNOSIS — E11.8 TYPE 2 DIABETES MELLITUS WITH UNSPECIFIED COMPLICATIONS (H): ICD-10-CM

## 2023-10-18 DIAGNOSIS — G62.9 NEUROPATHY: ICD-10-CM

## 2023-10-18 PROCEDURE — 99310 SBSQ NF CARE HIGH MDM 45: CPT | Performed by: NURSE PRACTITIONER

## 2023-10-18 RX ORDER — LOSARTAN POTASSIUM 50 MG/1
50 TABLET ORAL DAILY
Qty: 30 TABLET | Refills: 0 | Status: ON HOLD | OUTPATIENT
Start: 2023-10-18 | End: 2024-04-24

## 2023-10-18 RX ORDER — SENNA AND DOCUSATE SODIUM 50; 8.6 MG/1; MG/1
2 TABLET, FILM COATED ORAL DAILY PRN
Qty: 30 TABLET | Refills: 0 | Status: SHIPPED | OUTPATIENT
Start: 2023-10-18 | End: 2024-03-12

## 2023-10-18 RX ORDER — ACETAMINOPHEN 325 MG/1
TABLET ORAL
Qty: 30 TABLET | Refills: 0 | Status: ON HOLD | OUTPATIENT
Start: 2023-10-18 | End: 2024-04-24

## 2023-10-18 RX ORDER — LOSARTAN POTASSIUM 50 MG/1
50 TABLET ORAL DAILY
COMMUNITY
End: 2023-10-18

## 2023-10-18 RX ORDER — GABAPENTIN 100 MG/1
200 CAPSULE ORAL 3 TIMES DAILY
COMMUNITY
End: 2023-10-18

## 2023-10-18 RX ORDER — GABAPENTIN 100 MG/1
200 CAPSULE ORAL 3 TIMES DAILY
Qty: 60 CAPSULE | Refills: 0 | Status: SHIPPED | OUTPATIENT
Start: 2023-10-18 | End: 2023-10-20

## 2023-10-18 NOTE — LETTER
10/18/2023        RE: Lj Castro  [address] [id number]        [facility]  PRIMARY CARE PROVIDER AND CLINIC:  Cory Soliz DO, [address] [facility] [address]  Chief Complaint   Patient presents with     [facility] Medical Record Number:  [id number]  Place of Service where encounter took place:  [facility] ([facility]) [[id number]]    Lj Castro  is a 78 year old  (1945), admitted to the above facility from  [facility]. Hospital stay 10/13/23 through 10/17/23..  Admitted to this facility for  rehab, medical management, and nursing care.    HPI:    HPI information obtained from: facility chart records, facility staff, and patient report.   Brief Summary of Hospital Course:   Updates on Status Since Skilled nursing Admission:     Patient Lj Castro is a 78 yr old female admitted to [facility] for rehabilitation s/post hospitalization [facility] 10/13-10/17/23 C3 to T2 cervical lamimectomy and spinal cord decompression, C3 to T2 posterior fusion, C3 to T2 segemental instrumentation for progressive myelopathy due to stenosis and kyphosis from C3-T3.   Complications hypotension after surgery     PMHx HTN, HLD, hypothyroid, DM2, pneumonia, CKD3 (baseline cr about 1.15-1.35), AFib-on DOAC, chronic neck and back pain, neurogenic claudication, PAD, and GERD       TODAY  Reports pain not managed, reports as achy and sharp  Pain in neck and spine  Has as needed oxycodone and Neurontin.  She is agreeable to schedule tylenol and Neurontin and have oxycodone as needed (educated on side effect and goal to wean off)  Denies increased neuropathy and reports has control bowel & bladder   Has been using oxycodone as needed with some relief    She is eager to return home, however, states due to pain she has limited function  Reports she was unable to do therapies in hospital  due to pain    C/o no bowel movement since last Wednesday or Thursday. States she typically goes x1 week. Does not want scheduled stool softener due to reports has IBS with explosions at times. She reports she will ask for stool softener or supp if need    Denies chest pain, palpitations, or shortness of breath  Reports eating and denies trouble chewing or swallowing. Reports neck collar fit appropriately and does not limit her eating.    Vitals and blood sugar stable      CODE STATUS/ADVANCE DIRECTIVES DISCUSSION:   CPR/Full code   Patient's living condition: lives with spouse, and adult son  ALLERGIES: Patient has no known allergies.  PAST MEDICAL HISTORY:  has a past medical history of Arrhythmia, Bursitis, olecranon, Diabetes (H), Displacement of cervical intervertebral disc without myelopathy, Gastro-oesophageal reflux disease, Glaucoma, Hyperlipemia, Hypertension, Hypothyroid, Renal failure, acute (H24) (6-7-11 to 6-17-11), and Varicose veins.    She has no past medical history of Complication of anesthesia.  PAST SURGICAL HISTORY:   has a past surgical history that includes Eye surgery (Left, 2009); daVINCI hysterectomy supracervical (N/A, 3/18/2015); Davinci Salpingo-Oophorectomy Including Bilateral (Bilateral, 3/18/2015); DAVINCI SACROCOLPOPEXY, MIDURETHRAL SLING, CYSTOSCOPY (N/A, 3/18/2015); IR Lower Extremity Angiogram Left (7/8/2020); and Optical Tracking System Fusion Posterior Cervical Three + Levels (N/A, 10/13/2023).  FAMILY HISTORY: family history is not on file.  SOCIAL HISTORY:   reports that she has never smoked. She has never used smokeless tobacco. She reports current alcohol use. She reports that she does not use drugs.    Post Discharge Medication Reconciliation Status: discharge medications reconciled and changed, per note/orders    Current Outpatient Medications   Medication Sig Dispense Refill     apixaban ANTICOAGULANT (ELIQUIS) 5 MG tablet Take 5 mg by mouth 2 times daily        atorvastatin (LIPITOR) 40 MG tablet Take 1 tablet by mouth daily       brimonidine (ALPHAGAN-P) 0.15 % ophthalmic solution Place 1 drop Into the left eye 2 times daily       gabapentin (NEURONTIN) 100 MG capsule Take 200 mg by mouth 3 times daily And 2 x daily as needed       insulin aspart (NOVOLOG PEN) 100 UNIT/ML pen Inject 3 Units Subcutaneous daily (with breakfast)       insulin aspart (NOVOLOG PEN) 100 UNIT/ML pen Inject 3 Units Subcutaneous daily (with lunch)       insulin aspart (NOVOLOG PEN) 100 UNIT/ML pen Inject 5 Units Subcutaneous daily (with dinner)       ketoconazole (NIZORAL) 2 % external shampoo Shampoo the hair thoroughly each as need.       latanoprost (XALATAN) 0.005 % ophthalmic solution Place 1 drop into the right eye At Bedtime       LEVOTHYROXINE SODIUM PO Take 88 mcg by mouth daily       losartan (COZAAR) 50 MG tablet Take 50 mg by mouth daily HOLD if blood pressure <100       metoprolol succinate ER (TOPROL XL) 100 MG 24 hr tablet Take 1 tablet (100 mg) by mouth daily Patient was on metoprolol succinate  mg p.o. twice daily before hospitalization.  Her blood pressure has been running low during the hospitalization and it was reduced to 100 mg p.o. daily.  Dose can be increased back to 100 mg p.o. twice daily if the blood pressure starts going up after discussion with the attending physician.       Multiple Vitamins-Calcium (ONE-A-DAY WOMENS FORMULA PO) Take 1 tablet by mouth daily       OMEPRAZOLE PO Take 40 mg by mouth daily       oxyCODONE (ROXICODONE) 5 MG tablet Take 1 tablet (5 mg) by mouth every 4 hours as needed for pain 42 tablet 0     prednisoLONE acetate (PRED FORTE) 1 % ophthalmic suspension Place 1 drop Into the left eye daily       timolol maleate (TIMOPTIC) 0.5 % ophthalmic solution Place 1 drop into both eyes 2 times daily       tiZANidine (ZANAFLEX) 4 MG tablet Take 1 tablet (4 mg) by mouth 3 times daily 90 tablet 1       ROS:  10 point ROS of systems including  "Constitutional, Eyes, Respiratory, Cardiovascular, Gastroenterology, Genitourinary, Integumentary, Musculoskeletal, Psychiatric were all negative except for pertinent positives noted in my HPI.    Vitals:  /67   Pulse 89   Temp 97.6  F (36.4  C)   Resp 18   Ht 1.6 m (5' 3\")   Wt 81.2 kg (179 lb)   SpO2 95%   BMI 31.71 kg/m    BG  10/18/2023 08:41 139 mg/dL  10/17/2023 20:13 180 mg/dL  10/17/2023 16:35 145 mg/d  Hr  10/18/2023 10:08 78 bpm (Regular)  10/17/2023 23:48 89 bpm (Not Applicable)  10/17/2023 21:20 92 bpm (Regular)  10/17/2023 15:37 92 bpm (Regular)  Exam:  GENERAL APPEARANCE:  Alert, in no distress  ENT:  Mouth and posterior oropharynx normal, moist mucous membranes  EYES:  EOM, conjunctivae, lids, pupils and irises normal  NECK:  No adenopathy,masses or thyromegaly  RESP:  respiratory effort and palpation of chest normal, lungs clear to auscultation , no respiratory distress  CV:  Palpation and auscultation of heart done , regular rate and rhythm  ABDOMEN:  normal bowel sounds, soft, nontender  M/S:   lying in bed, neck brace on  SKIN:  Inspection of skin and subcutaneous tissue baseline  NEURO:   Cranial nerves 2-12 are normal tested and grossly at patient's baseline  PSYCH:  oriented X 3    Lab/Diagnostic data:  [facility]. Please refer to them using [employer] Everywhere.    Last Comprehensive Metabolic Panel:  Lab Results   Component Value Date     (L) 10/16/2023    POTASSIUM 3.8 10/16/2023    CHLORIDE 105 10/16/2023    CO2 21 (L) 10/16/2023    ANIONGAP 8 10/16/2023    GLC 96 10/17/2023    BUN 23.1 (H) 10/16/2023    CR 1.10 (H) 10/16/2023    GFRESTIMATED 51 (L) 10/16/2023    INDY 7.5 (L) 10/16/2023       Lab Results   Component Value Date    WBC 4.5 10/16/2023    WBC 8.8 06/15/2011     Lab Results   Component Value Date    RBC 2.83 10/16/2023    RBC 3.39 06/15/2011     Lab Results   Component Value Date    HGB 8.9 10/16/2023    HGB 9.8 03/20/2015     Lab " "Results   Component Value Date    HCT 27.7 10/16/2023    HCT 30.4 06/15/2011     No components found for: \"MCT\"  Lab Results   Component Value Date    MCV 98 10/16/2023    MCV 90 06/15/2011     Lab Results   Component Value Date    MCH 31.4 10/16/2023    MCH 28.3 06/15/2011     Lab Results   Component Value Date    MCHC 32.1 10/16/2023    MCHC 31.6 06/15/2011     Lab Results   Component Value Date    RDW 13.8 10/16/2023    RDW 14.9 06/15/2011     Lab Results   Component Value Date     10/16/2023     06/15/2011         ASSESSMENT/PLAN:  s/p C3-T2 cervical laminectomy and cervical cord decompression  Schedule tylenol and Neurontin  Continue as needed oxycodone for pain management   Mobility without mobility aid prior,  Incision keep clean dry, can shower after 48 hrs per orders and activity as tolerates  Follow up surgeon Sagar Cadena MD - Primary   Need clarification with surgeon regarding follow up appointment, suture/staple removal, restrictions, parameters and duration of neck brace    Constipation  IBS  Add Senna S 2 tabs daily as needed     Hypotension.    History of essential hypertension  hydrochlorothiazide, losartan and on hold until blood pressure improve per hospital order and Metoprolol restarted lower dose  given 2 L of IV fluid bolus on 10/14 and 500 mL of albumin 5%.  Blood pressure is now in acceptable range with systolic in low 100s per hospital note  SBP elevate today   Will order restart Losartan prior to hospital admission (hold if SBP <100), continue to hold hydrochlorothiazide and continue lower dose Metoprolol at this time    Hyperlipidemia  Continue Lipitor  Monitor     atrial fibrillation  Hr controlled  continue Metoprolol   Continue Eliquis     Diabetes mellitus type 2  Continue Lantus and NovoLog set at meals  Came with Lantus range 17-20  Blood sugar managed, order Lantus 18 units, hold if blood sugar <100  Order blood sugar checks 4 xday    GERD  Continue " Meprazole    Hypothyroidism  TSH   Date Value Ref Range Status   03/19/2015 2.05 0.40 - 4.00 mU/L Final     Comment:     Effective 7/30/2014, the reference range for this assay has changed to reflect   new instrumentation/methodology.       Continue on levothyroxine 88 mcg daily    Acute kidney injury.  Secondary to volume loss with the surgery.  Creatinine bumped up to 1.27,   Creatinine   Date Value Ref Range Status   10/16/2023 1.10 (H) 0.51 - 0.95 mg/dL Final   03/20/2015 0.96 0.52 - 1.04 mg/dL Final     Glaucoma  Continue Pred Forte, Xalatan, Alphagan and Timoptic resumed     Deconditioned  Comment: d/t recent hospitalization & comorbidity, expect delay rehabilitation d/t age & comorbidity  Plan: PT/OT eval & treat, monitor    Advanced care planning  elects full code    BMP,CBC 10/20/23    Total time spent with patient visit at the skilled nursing facility was 45 min including patient visit and review of past records. Greater than 50% of total time spent with counseling and coordinating care due to discussion on functional goals, pain management, bowel movement management, hypertension management and discharge planning.     Electronically signed by:  CARMEL Marrero CNP                       Sincerely,        CARMEL Marrero CNP

## 2023-10-18 NOTE — PLAN OF CARE
Physical Therapy Discharge Summary    Reason for therapy discharge:    Discharged to transitional care facility.    Progress towards therapy goal(s). See goals on Care Plan in [facility] electronic health record for goal details.  Goals not met.  Barriers to achieving goals:   discharge from facility.  Pt continues to require Min A with mobility with limited tolerance for gait due to UE and LE weakness and neck pain  Therapy recommendation(s):    Continued therapy is recommended.  Rationale/Recommendations:   .  Continued PT to increase strength and balance and progress independence with all functional mobility while reinforcing spinal precautions.  Goal Outcome Evaluation:

## 2023-10-19 ENCOUNTER — LAB REQUISITION (OUTPATIENT)
Dept: LAB | Facility: CLINIC | Age: 78
End: 2023-10-19
Payer: COMMERCIAL

## 2023-10-19 ENCOUNTER — DOCUMENTATION ONLY (OUTPATIENT)
Dept: OTHER | Facility: CLINIC | Age: 78
End: 2023-10-19
Payer: COMMERCIAL

## 2023-10-19 DIAGNOSIS — D64.9 ANEMIA, UNSPECIFIED: ICD-10-CM

## 2023-10-19 DIAGNOSIS — Z11.1 ENCOUNTER FOR SCREENING FOR RESPIRATORY TUBERCULOSIS: ICD-10-CM

## 2023-10-20 ENCOUNTER — TRANSITIONAL CARE UNIT VISIT (OUTPATIENT)
Dept: GERIATRICS | Facility: CLINIC | Age: 78
End: 2023-10-20
Payer: COMMERCIAL

## 2023-10-20 ENCOUNTER — LAB REQUISITION (OUTPATIENT)
Dept: LAB | Facility: CLINIC | Age: 78
End: 2023-10-20
Payer: COMMERCIAL

## 2023-10-20 VITALS
BODY MASS INDEX: 31.71 KG/M2 | DIASTOLIC BLOOD PRESSURE: 83 MMHG | HEIGHT: 63 IN | RESPIRATION RATE: 18 BRPM | OXYGEN SATURATION: 97 % | SYSTOLIC BLOOD PRESSURE: 144 MMHG | HEART RATE: 94 BPM | TEMPERATURE: 97.6 F | WEIGHT: 179 LBS

## 2023-10-20 DIAGNOSIS — I10 ESSENTIAL (PRIMARY) HYPERTENSION: ICD-10-CM

## 2023-10-20 DIAGNOSIS — G47.00 INSOMNIA, UNSPECIFIED TYPE: ICD-10-CM

## 2023-10-20 DIAGNOSIS — K59.09 OTHER CONSTIPATION: ICD-10-CM

## 2023-10-20 DIAGNOSIS — N17.9 AKI (ACUTE KIDNEY INJURY) (H): ICD-10-CM

## 2023-10-20 DIAGNOSIS — H40.003 GLAUCOMA SUSPECT, BILATERAL: ICD-10-CM

## 2023-10-20 DIAGNOSIS — E11.8 TYPE 2 DIABETES MELLITUS WITH UNSPECIFIED COMPLICATIONS (H): ICD-10-CM

## 2023-10-20 DIAGNOSIS — E03.9 HYPOTHYROIDISM, UNSPECIFIED TYPE: ICD-10-CM

## 2023-10-20 DIAGNOSIS — I95.89 OTHER SPECIFIED HYPOTENSION: ICD-10-CM

## 2023-10-20 DIAGNOSIS — M62.81 GENERALIZED MUSCLE WEAKNESS: ICD-10-CM

## 2023-10-20 DIAGNOSIS — I10 HYPERTENSION, UNSPECIFIED TYPE: ICD-10-CM

## 2023-10-20 DIAGNOSIS — K21.9 GASTROESOPHAGEAL REFLUX DISEASE, UNSPECIFIED WHETHER ESOPHAGITIS PRESENT: ICD-10-CM

## 2023-10-20 DIAGNOSIS — E78.49 OTHER HYPERLIPIDEMIA: ICD-10-CM

## 2023-10-20 DIAGNOSIS — Z98.890 S/P LAMINECTOMY: Primary | ICD-10-CM

## 2023-10-20 DIAGNOSIS — I48.91 ATRIAL FIBRILLATION, UNSPECIFIED TYPE (H): ICD-10-CM

## 2023-10-20 DIAGNOSIS — G62.9 NEUROPATHY: ICD-10-CM

## 2023-10-20 LAB
ANION GAP SERPL CALCULATED.3IONS-SCNC: 11 MMOL/L (ref 7–15)
BUN SERPL-MCNC: 13.9 MG/DL (ref 8–23)
CALCIUM SERPL-MCNC: 8.7 MG/DL (ref 8.8–10.2)
CHLORIDE SERPL-SCNC: 106 MMOL/L (ref 98–107)
CREAT SERPL-MCNC: 0.84 MG/DL (ref 0.51–0.95)
DEPRECATED HCO3 PLAS-SCNC: 24 MMOL/L (ref 22–29)
EGFRCR SERPLBLD CKD-EPI 2021: 70 ML/MIN/1.73M2
ERYTHROCYTE [DISTWIDTH] IN BLOOD BY AUTOMATED COUNT: 14 % (ref 10–15)
GLUCOSE SERPL-MCNC: 146 MG/DL (ref 70–99)
HCT VFR BLD AUTO: 32 % (ref 35–47)
HGB BLD-MCNC: 9.7 G/DL (ref 11.7–15.7)
MCH RBC QN AUTO: 30.2 PG (ref 26.5–33)
MCHC RBC AUTO-ENTMCNC: 30.3 G/DL (ref 31.5–36.5)
MCV RBC AUTO: 100 FL (ref 78–100)
PLATELET # BLD AUTO: 194 10E3/UL (ref 150–450)
POTASSIUM SERPL-SCNC: 3.9 MMOL/L (ref 3.4–5.3)
RBC # BLD AUTO: 3.21 10E6/UL (ref 3.8–5.2)
SODIUM SERPL-SCNC: 141 MMOL/L (ref 135–145)
WBC # BLD AUTO: 4.4 10E3/UL (ref 4–11)

## 2023-10-20 PROCEDURE — 86481 TB AG RESPONSE T-CELL SUSP: CPT | Performed by: NURSE PRACTITIONER

## 2023-10-20 PROCEDURE — 85027 COMPLETE CBC AUTOMATED: CPT | Performed by: NURSE PRACTITIONER

## 2023-10-20 PROCEDURE — 99310 SBSQ NF CARE HIGH MDM 45: CPT | Performed by: NURSE PRACTITIONER

## 2023-10-20 PROCEDURE — P9604 ONE-WAY ALLOW PRORATED TRIP: HCPCS | Performed by: NURSE PRACTITIONER

## 2023-10-20 PROCEDURE — 80048 BASIC METABOLIC PNL TOTAL CA: CPT | Performed by: NURSE PRACTITIONER

## 2023-10-20 PROCEDURE — 36415 COLL VENOUS BLD VENIPUNCTURE: CPT | Performed by: NURSE PRACTITIONER

## 2023-10-20 RX ORDER — LANOLIN ALCOHOL/MO/W.PET/CERES
3 CREAM (GRAM) TOPICAL
Qty: 30 TABLET | Refills: 0 | Status: SHIPPED | OUTPATIENT
Start: 2023-10-20

## 2023-10-20 RX ORDER — HYDROCHLOROTHIAZIDE 25 MG/1
25 TABLET ORAL DAILY
Qty: 30 TABLET | Refills: 0 | Status: SHIPPED | OUTPATIENT
Start: 2023-10-20

## 2023-10-20 RX ORDER — GABAPENTIN 100 MG/1
400 CAPSULE ORAL 3 TIMES DAILY
Qty: 60 CAPSULE | Refills: 0 | Status: SHIPPED | OUTPATIENT
Start: 2023-10-20 | End: 2024-03-12

## 2023-10-20 NOTE — PROGRESS NOTES
[facility] SERVICES  [place] Medical Record Number:  [id number]  Place of Service where encounter took place:  [facility] (TCU) [[id number]]  Chief Complaint   Patient presents with    Nursing Home Acute       HPI:    Lj Castro  is a 78 year old (1945), who is being seen today for an episodic care visit.  HPI information obtained from: facility chart records, facility staff, and patient report. Today's concern is:    Patient Lj Castro is a 78 yr old female admitted to [facility] for rehabilitation s/post hospitalization [facility] 10/13-10/17/23 C3 to T2 cervical lamimectomy and spinal cord decompression, C3 to T2 posterior fusion, C3 to T2 segemental instrumentation for progressive myelopathy due to stenosis and kyphosis from C3-T3.   Complications hypotension after surgery     PMHx HTN, HLD, hypothyroid, DM2, pneumonia, CKD3 (baseline cr about 1.15-1.35), AFib-on DOAC, chronic neck and back pain, neurogenic claudication, PAD, and GERD      S/P laminectomy  Other constipation  Other specified hypotension  Hypertension, unspecified type  Generalized muscle weakness  Neuropathy  Insomnia, unspecified type    Reports pain not managed, interested in increase in Neurontin  Reminder patient has as needed oxycodone, she wants to limit use of this  Had bowel movement today, does not want changes in bowel movement medications   Has catheter and more mobile and agreeable to void trial on Monday  Denies chest pain or shortness of breath   Eating well, does find drinking from straw or cup difficulty, will update occupational therapy regarding adaptable cup or straw  C/o increase swelling in lower extremity bilateral since off hydrochlorothiazide  Blood pressure and hr stable, blood pressure elevated  BMP stable   Patient interested in medication to help with insomnia, agreeable to trial of Melatonin    Past Medical and Surgical History reviewed in Epic  today.    MEDICATIONS:    Current Outpatient Medications   Medication Sig Dispense Refill    gabapentin (NEURONTIN) 100 MG capsule Take 4 capsules (400 mg) by mouth 3 times daily And 2 x daily as needed 60 capsule 0    melatonin 3 MG tablet Take 1 tablet (3 mg) by mouth nightly as needed for sleep 30 tablet 0    acetaminophen (TYLENOL) 325 MG tablet Take 2 tablets (650 mg) by mouth 3 times daily. May also take 2 tablets (650 mg) daily as needed for mild pain. 30 tablet 0    apixaban ANTICOAGULANT (ELIQUIS) 5 MG tablet Take 5 mg by mouth 2 times daily      atorvastatin (LIPITOR) 40 MG tablet Take 1 tablet by mouth daily      brimonidine (ALPHAGAN-P) 0.15 % ophthalmic solution Place 1 drop Into the left eye 2 times daily      insulin aspart (NOVOLOG PEN) 100 UNIT/ML pen Inject 3 Units Subcutaneous daily (with breakfast)      insulin aspart (NOVOLOG PEN) 100 UNIT/ML pen Inject 3 Units Subcutaneous daily (with lunch)      insulin aspart (NOVOLOG PEN) 100 UNIT/ML pen Inject 5 Units Subcutaneous daily (with dinner)      insulin glargine (LANTUS PEN) 100 UNIT/ML pen Inject 18 Units Subcutaneous at bedtime 15 mL 0    ketoconazole (NIZORAL) 2 % external shampoo Shampoo the hair thoroughly each as need.      latanoprost (XALATAN) 0.005 % ophthalmic solution Place 1 drop into the right eye At Bedtime      LEVOTHYROXINE SODIUM PO Take 88 mcg by mouth daily      losartan (COZAAR) 50 MG tablet Take 1 tablet (50 mg) by mouth daily HOLD if blood pressure <100 30 tablet 0    metoprolol succinate ER (TOPROL XL) 100 MG 24 hr tablet Take 1 tablet (100 mg) by mouth daily Patient was on metoprolol succinate  mg p.o. twice daily before hospitalization.  Her blood pressure has been running low during the hospitalization and it was reduced to 100 mg p.o. daily.  Dose can be increased back to 100 mg p.o. twice daily if the blood pressure starts going up after discussion with the attending physician.      Multiple Vitamins-Calcium  "(ONE-A-DAY WOMENS FORMULA PO) Take 1 tablet by mouth daily      OMEPRAZOLE PO Take 40 mg by mouth daily      oxyCODONE (ROXICODONE) 5 MG tablet Take 1 tablet (5 mg) by mouth every 4 hours as needed for pain 42 tablet 0    prednisoLONE acetate (PRED FORTE) 1 % ophthalmic suspension Place 1 drop Into the left eye daily      SENNA-docusate sodium (SENNA S) 8.6-50 MG tablet Take 2 tablets by mouth daily as needed (CONSTIPATION) 30 tablet 0    timolol maleate (TIMOPTIC) 0.5 % ophthalmic solution Place 1 drop into both eyes 2 times daily      tiZANidine (ZANAFLEX) 4 MG tablet Take 1 tablet (4 mg) by mouth 3 times daily 90 tablet 1         REVIEW OF SYSTEMS:  4 point ROS including Respiratory, CV, GI and , other than that noted in the HPI,  is negative    Objective:  BP (!) 144/83   Pulse 94   Temp 97.6  F (36.4  C)   Resp 18   Ht 1.6 m (5' 3\")   Wt 81.2 kg (179 lb)   SpO2 97%   BMI 31.71 kg/m    Exam:  GENERAL APPEARANCE:  Alert, in no distress  RESP:  respiratory effort and palpation of chest normal, lungs clear to auscultation , no respiratory distress  CV:  Palpation and auscultation of heart done , regular rate and rhythm, no murmur, rub, or gallop  ABDOMEN:  normal bowel sounds, soft, nontender, no hepatosplenomegaly or other masses  M/S:   Lying in bed, neck brace on  SKIN:  Inspection of skin and subcutaneous tissue baseline  NEURO:   Cranial nerves 2-12 are normal tested and grossly at patient's baseline  PSYCH:  oriented X 3    Labs:   Labs done in SNF are in [facility] EPIC. Please refer to them using [facility]/Care Everywhere.    Last Comprehensive Metabolic Panel:  Lab Results   Component Value Date     10/20/2023    POTASSIUM 3.9 10/20/2023    CHLORIDE 106 10/20/2023    CO2 24 10/20/2023    ANIONGAP 11 10/20/2023     (H) 10/20/2023    BUN 13.9 10/20/2023    CR 0.84 10/20/2023    GFRESTIMATED 70 10/20/2023    INDY 8.7 (L) 10/20/2023       Lab Results   Component Value Date    WBC 4.4 10/20/2023 " "   WBC 8.8 06/15/2011     Lab Results   Component Value Date    RBC 3.21 10/20/2023    RBC 3.39 06/15/2011     Lab Results   Component Value Date    HGB 9.7 10/20/2023    HGB 9.8 03/20/2015     Lab Results   Component Value Date    HCT 32.0 10/20/2023    HCT 30.4 06/15/2011     No components found for: \"MCT\"  Lab Results   Component Value Date     10/20/2023    MCV 90 06/15/2011     Lab Results   Component Value Date    MCH 30.2 10/20/2023    MCH 28.3 06/15/2011     Lab Results   Component Value Date    MCHC 30.3 10/20/2023    MCHC 31.6 06/15/2011     Lab Results   Component Value Date    RDW 14.0 10/20/2023    RDW 14.9 06/15/2011     Lab Results   Component Value Date     10/20/2023     06/15/2011         ASSESSMENT/PLAN:  S/P laminectomy  Other constipation  Other specified hypotension  Hypertension, unspecified type  Generalized muscle weakness  Neuropathy  Insomnia, unspecified type    Reports pain not managed, interested in increase in Neurontin  Will increase Neurontin 400mg 3 x daily, hold for lethargy  Reminder patient has as needed oxycodone, she wants to limit use of this  Continue therapies, follow up surgeon as advised    Had bowel movement today, does not want changes in bowel movement medications     Has catheter and more mobile and agreeable to void trial on Monday  Order for void trial Monday    Denies chest pain or shortness of breath     Eating well, does find drinking from straw or cup difficulty, will update occupational therapy regarding adaptable cup or straw  Updated therapies regarding     C/o increase swelling in lower extremity bilateral since off hydrochlorothiazide  Blood pressure elevated   BMP stable   Start Hydrochlorothiazide  BMP 10/23/23  TG shapes     Patient interested in medication to help with insomnia, agreeable to trial of Melatonin  Order Melatonin 3mg daily     Electronically signed by:  CARMEL Marrero CNP           "

## 2023-10-20 NOTE — PROGRESS NOTES
[facility] GERIATRIC SERVICES    Chief Complaint   Patient presents with     Nursing Home Acute       [facility] Medical Record Number:  [id number]  Place of Service where encounter took place:  [facility] (U) [[id number]]    HPI:    Lj Castro is a 78 year old  (1945), who is being seen today for an episodic care visit.  HPI information obtained from: facility chart records, patient report, [facility] chart review and Care Everywhere [facility] chart review.    Brief Summary of Hospital Course:     Patient Lj Castro is a 78 yr old female admitted to [facility] for rehabilitation s/post hospitalization [facility] 10/13-10/17/23 C3 to T2 cervical lamimectomy and spinal cord decompression, C3 to T2 posterior fusion, C3 to T2 segemental instrumentation for progressive myelopathy due to stenosis and kyphosis from C3-T3.   Complications hypotension after surgery      PMHx HTN, HLD, hypothyroid, DM2, pneumonia, CKD3 (baseline cr about 1.15-1.35), AFib-on DOAC, chronic neck and back pain, neurogenic claudication, PAD, and GERD     Today's concerns are:      Diagnosis Comments   1. S/P laminectomy  S/p surgery 10/13 with cervical laminectomy, spinal cord decompression, and posterior fusion C3 to T2 in the setting of cervical spinal stenosis, cervical spondylosis with myelopathy, and cervical and thoracic disc degeneration.     Cervical collar at all times. Scheduled tylenol 650 mg TID,  gabapentin 200 mg TID, tizanidine 4 mg TID, and oxycodone 5 mg q4hrs PRN for pain management. Rating pain 2-9/10. Taking oxycodone sparingly out of concern for side effects and addiction.      2. Other constipation  BM x1 per week at baseline. Reports history of IBS with exaggerated response to stool softeners so would like to avoid. LBM  10/19.      3. Other specified hypotension  Hypotensive while hospitalized, likely related to procedural blood loss.  Antihypertensives held with losartan and a lower dose of metoprolol since added back. Hydrochlorothiazide remains on hold. BPs now normal to high.      4. Hypertension, unspecified type  BPs 90s-150s/60s-80s on daily losartan and metoprolol. Overall trending high with hydrochlorothiazide still on hold.   Legs notably edematous and uncomfortable per patient.      5. Other hyperlipidemia  Dates back to before 2011 per chart review. Takes lipitor daily. Last lipid panel in 8/2023 ([facility]) shows good control.      6. Atrial fibrillation, unspecified type (H)  Rate controlled. Takes metoprolol and eliquis. Metoprolol currently at half dose due to hypotension while hospitalized. Asymptomatic - denies palpitations, dizziness, and fatigue.      7. Type 2 diabetes mellitus with unspecified complications (H)  Blood sugars reasonably well-controlled between  since TCU admission. Last A1C 7.8 on 10/5.  Home regimen: 15 units lantus daily in evening + 3 units novolog + sliding scale TID WM   Current regimen: 18 units lantus daily, 5 units novolog with dinner, 3 units breakfast and lunch (adapted from hospital regimen)      8. Gastroesophageal reflux disease, unspecified whether esophagitis present  No symptoms currently. Takes omeprazole daily.      9. Hypothyroidism, unspecified type  Longstanding history. Takes levothyroxine 88 mcg daily.      10. MADISON (acute kidney injury) (H24)  Creatinine baseline 1.15-1.35. Bumped up to 1.27 secondary to volume loss with the surgery, but improved with IVF. Recheck on 10/16 down to 1.10.       11. Glaucoma suspect, bilateral  Hx of bilateral glaucoma with vision impairment in left eye. Does Pred Forte, Xalatan, Alphagan, and Timoptic drops to manage.      12. Generalized muscle weakness  Mobility significantly limited by nerve pain and weakness in the setting of cervical spondylosis and myelopathy prior to surgery. Was only able to move around one floor in her home. Now s/p  hospitalization for spinal surgery and beginning work with therapies. Aspen neck collar on at all times, no bending, lifting over 8 lbs, or twisting, keep neck in neutral position.        ALLERGIES: Patient has no known allergies.     Past Medical, Surgical, Family and Social History reviewed and updated in [facility].    Current Outpatient Medications   Medication Sig Dispense Refill     gabapentin (NEURONTIN) 100 MG capsule Take 4 capsules (400 mg) by mouth 3 times daily And 2 x daily as needed 60 capsule 0     hydrochlorothiazide (HYDRODIURIL) 25 MG tablet Take 1 tablet (25 mg) by mouth daily 30 tablet 0     melatonin 3 MG tablet Take 1 tablet (3 mg) by mouth nightly as needed for sleep 30 tablet 0     acetaminophen (TYLENOL) 325 MG tablet Take 2 tablets (650 mg) by mouth 3 times daily. May also take 2 tablets (650 mg) daily as needed for mild pain. 30 tablet 0     apixaban ANTICOAGULANT (ELIQUIS) 5 MG tablet Take 5 mg by mouth 2 times daily       atorvastatin (LIPITOR) 40 MG tablet Take 1 tablet by mouth daily       brimonidine (ALPHAGAN-P) 0.15 % ophthalmic solution Place 1 drop Into the left eye 2 times daily       insulin aspart (NOVOLOG PEN) 100 UNIT/ML pen Inject 3 Units Subcutaneous daily (with breakfast)       insulin aspart (NOVOLOG PEN) 100 UNIT/ML pen Inject 3 Units Subcutaneous daily (with lunch)       insulin aspart (NOVOLOG PEN) 100 UNIT/ML pen Inject 5 Units Subcutaneous daily (with dinner)       insulin glargine (LANTUS PEN) 100 UNIT/ML pen Inject 18 Units Subcutaneous at bedtime 15 mL 0     ketoconazole (NIZORAL) 2 % external shampoo Shampoo the hair thoroughly each as need.       latanoprost (XALATAN) 0.005 % ophthalmic solution Place 1 drop into the right eye At Bedtime       LEVOTHYROXINE SODIUM PO Take 88 mcg by mouth daily       losartan (COZAAR) 50 MG tablet Take 1 tablet (50 mg) by mouth daily HOLD if blood pressure <100 30 tablet 0     metoprolol succinate ER (TOPROL XL) 100 MG 24 hr tablet  "Take 1 tablet (100 mg) by mouth daily Patient was on metoprolol succinate  mg p.o. twice daily before hospitalization.  Her blood pressure has been running low during the hospitalization and it was reduced to 100 mg p.o. daily.  Dose can be increased back to 100 mg p.o. twice daily if the blood pressure starts going up after discussion with the attending physician.       Multiple Vitamins-Calcium (ONE-A-DAY WOMENS FORMULA PO) Take 1 tablet by mouth daily       OMEPRAZOLE PO Take 40 mg by mouth daily       oxyCODONE (ROXICODONE) 5 MG tablet Take 1 tablet (5 mg) by mouth every 4 hours as needed for pain 42 tablet 0     prednisoLONE acetate (PRED FORTE) 1 % ophthalmic suspension Place 1 drop Into the left eye daily       SENNA-docusate sodium (SENNA S) 8.6-50 MG tablet Take 2 tablets by mouth daily as needed (CONSTIPATION) 30 tablet 0     timolol maleate (TIMOPTIC) 0.5 % ophthalmic solution Place 1 drop into both eyes 2 times daily       tiZANidine (ZANAFLEX) 4 MG tablet Take 1 tablet (4 mg) by mouth 3 times daily 90 tablet 1     Medications reviewed:  Medications reconciled to facility chart and changes were made to reflect current medications as identified as above med list. Below are the changes that were made:   Medications stopped since last EPIC medication reconciliation:   There are no discontinued medications.    Medications started since last [facility] medication reconciliation:  No orders of the defined types were placed in this encounter.    REVIEW OF SYSTEMS:  10 point ROS of systems including Constitutional, Eyes, Respiratory, Cardiovascular, Gastroenterology, Genitourinary, Integumentary, Musculoskeletal, Psychiatric were all negative except for pertinent positives noted in my HPI.    Physical Exam:  BP (!) 144/83   Pulse 94   Temp 97.6  F (36.4  C)   Resp 18   Ht 1.6 m (5' 3\")   Wt 81.2 kg (179 lb)   SpO2 97%   BMI 31.71 kg/m       GENERAL APPEARANCE:  Alert, oriented, cooperative  NECK:  " "Cervical collar in place  RESP:  lungs clear to auscultation , no respiratory distress  CV:  S1 and S2 present, irregularly irregular, +2 pedal pulses, peripheral edema +1 in BLEs    ABDOMEN:  normal bowel sounds, soft, nontender, no hepatosplenomegaly or other masses  M/S:   ROM grossly intact in bilateral upper and lower extremities, C-spine immobilized s/p laminectomy  SKIN:  Inspection of skin and subcutaneous tissue baseline  NEURO:   Cranial nerves 2-12 are normal tested and grossly at patient's baseline, Examination of sensation by touch is abnormal - diminished sensation in BL feet (baseline)  PSYCH:  oriented X 3, normal insight, judgement and memory, affect and mood normal    Recent Labs:     CBC RESULTS:   Recent Labs   Lab Test 10/20/23  0528 10/16/23  0647   WBC 4.4 4.5   RBC 3.21* 2.83*   HGB 9.7* 8.9*   HCT 32.0* 27.7*    98   MCH 30.2 31.4   MCHC 30.3* 32.1   RDW 14.0 13.8    128*       Last Basic Metabolic Panel:  Recent Labs   Lab Test 10/20/23  0528 10/17/23  1254 10/16/23  0921 10/16/23  0647     --   --  134*   POTASSIUM 3.9  --   --  3.8   CHLORIDE 106  --   --  105   INDY 8.7*  --   --  7.5*   CO2 24  --   --  21*   BUN 13.9  --   --  23.1*   CR 0.84  --   --  1.10*   * 96   < > 153*    < > = values in this interval not displayed.       Liver Function Studies - No results for input(s): \"PROTTOTAL\", \"ALBUMIN\", \"BILITOTAL\", \"ALKPHOS\", \"AST\", \"ALT\", \"BILIDIRECT\" in the last 76362 hours.    TSH   Date Value Ref Range Status   03/19/2015 2.05 0.40 - 4.00 mU/L Final     Comment:     Effective 7/30/2014, the reference range for this assay has changed to reflect   new instrumentation/methodology.     05/29/2011 1.21 0.4 - 5.0 mU/L Final   ]    Lab Results   Component Value Date    A1C 6.7 03/19/2015       Assessment/Plan:    (Z98.890) S/P laminectomy (primary encounter diagnosis)  Comment: Cervical collar in place. Pain management needs to be optimized as is awakening in pain " and unable to fall back asleep  Plan:  - Continue low-dose oxycodone 5 mg q4hrs PRN; education provided on premedicating therapy and sleep  - Increase gabapentin to 400 mg BID    (K59.09) Other constipation  Comment: 1 BM per week normal and does not cause discomfort per pt. History of diarrhea with bowel meds in the past in the setting of IBS. States will request bowel meds if needed. LBM last evening.  Plan:   - Continue to monitor    (I95.89) Other specified hypotension  Comment: Resolved. Now bordering on hypertension.  Plan:  - See plan below    (I10) Hypertension, unspecified type  Comment: BPs now normal to high. +1 pitting edema in BLEs on exam. Impacting ability to move and reposition per patient; concerned about walking with therapies.  Plan:  - Continue losartan  - Continue metoprolol at half dose (daily vs. BID PTA dosing)  - Restart hydrochlorothiazide to address elevated BPs and swelling and improve rehab disposition    (E78.49) Other hyperlipidemia  Comment: Well-controlled on atorvastatin.  Plan:  - Continue daily atorvastatin    (I48.91) Atrial fibrillation, unspecified type (H)  Comment: Irregular rhythm on exam, rate WNL. Denies symptoms.  Plan:  - Continue eliquis  - Continue metoprolol; consider increasing back to home dosing if rate increases of develops symptoms    (E11.8) Type 2 diabetes mellitus with unspecified complications (H)  Comment: Blood sugars well-controlled on current regimen.   Plan:  - Continue to monitor blood sugars QID AC/HS  - Continue lantus 18 units daily and novolog 3 units/3 units/5 units at breakfast, lunch, and dinner, respectively    (K21.9) Gastroesophageal reflux disease, unspecified whether esophagitis present  Comment: Stable.  Plan:  - Continue daily omeprazole    (E03.9) Hypothyroidism, unspecified type  Comment: Stable.  Plan:   - Continue daily levothyroxine    (N17.9) MADISON (acute kidney injury) (H24)  Comment: Creatinine trending down, now within baseline  range of 1.15-1.35.  Plan:  - Monitor periodically, BMP 10/23    (H40.003) Glaucoma suspect, bilateral  Comment: Stable on current drop regimen.  Plan:  - Continue Pred Forte, Xalatan, Alphagan, and Timoptic drops    (M62.81) Generalized muscle weakness  Comment: Previously with weakness related to myelopathy and deconditioning as a result of minimizing movement due to pain. Hopeful for improvement following recent surgery.  Plan:  - PT/OT      Orders:  - Increase gabapentin to 400 mg BID  - Restart hydrochlorothiazide to address elevated BPs and swelling and improve rehab disposition  - [facility] 10/23    Electronically signed by  Sonia Dawson

## 2023-10-21 LAB
GAMMA INTERFERON BACKGROUND BLD IA-ACNC: 0.02 IU/ML
M TB IFN-G BLD-IMP: NEGATIVE
M TB IFN-G CD4+ BCKGRND COR BLD-ACNC: 1.86 IU/ML
MITOGEN IGNF BCKGRD COR BLD-ACNC: 0.01 IU/ML
MITOGEN IGNF BCKGRD COR BLD-ACNC: 0.01 IU/ML
QUANTIFERON MITOGEN: 1.88 IU/ML
QUANTIFERON NIL TUBE: 0.02 IU/ML
QUANTIFERON TB1 TUBE: 0.03 IU/ML
QUANTIFERON TB2 TUBE: 0.03

## 2023-10-23 ENCOUNTER — TRANSITIONAL CARE UNIT VISIT (OUTPATIENT)
Dept: GERIATRICS | Facility: CLINIC | Age: 78
End: 2023-10-23
Payer: COMMERCIAL

## 2023-10-23 VITALS
BODY MASS INDEX: 31.71 KG/M2 | DIASTOLIC BLOOD PRESSURE: 82 MMHG | TEMPERATURE: 97.5 F | SYSTOLIC BLOOD PRESSURE: 144 MMHG | OXYGEN SATURATION: 94 % | RESPIRATION RATE: 18 BRPM | HEIGHT: 63 IN | WEIGHT: 179 LBS | HEART RATE: 84 BPM

## 2023-10-23 DIAGNOSIS — R60.9 EDEMA, UNSPECIFIED TYPE: ICD-10-CM

## 2023-10-23 DIAGNOSIS — I10 HYPERTENSION, UNSPECIFIED TYPE: ICD-10-CM

## 2023-10-23 DIAGNOSIS — K59.09 OTHER CONSTIPATION: ICD-10-CM

## 2023-10-23 DIAGNOSIS — Z98.890 S/P LAMINECTOMY: Primary | ICD-10-CM

## 2023-10-23 LAB
ANION GAP SERPL CALCULATED.3IONS-SCNC: 10 MMOL/L (ref 7–15)
BUN SERPL-MCNC: 10.6 MG/DL (ref 8–23)
CALCIUM SERPL-MCNC: 8.4 MG/DL (ref 8.8–10.2)
CHLORIDE SERPL-SCNC: 105 MMOL/L (ref 98–107)
CREAT SERPL-MCNC: 0.87 MG/DL (ref 0.51–0.95)
DEPRECATED HCO3 PLAS-SCNC: 28 MMOL/L (ref 22–29)
EGFRCR SERPLBLD CKD-EPI 2021: 67 ML/MIN/1.73M2
GLUCOSE SERPL-MCNC: 59 MG/DL (ref 70–99)
POTASSIUM SERPL-SCNC: 4.2 MMOL/L (ref 3.4–5.3)
SODIUM SERPL-SCNC: 143 MMOL/L (ref 135–145)

## 2023-10-23 PROCEDURE — 80048 BASIC METABOLIC PNL TOTAL CA: CPT | Performed by: NURSE PRACTITIONER

## 2023-10-23 PROCEDURE — P9604 ONE-WAY ALLOW PRORATED TRIP: HCPCS | Performed by: NURSE PRACTITIONER

## 2023-10-23 PROCEDURE — 99309 SBSQ NF CARE MODERATE MDM 30: CPT | Performed by: NURSE PRACTITIONER

## 2023-10-23 PROCEDURE — 36415 COLL VENOUS BLD VENIPUNCTURE: CPT | Performed by: NURSE PRACTITIONER

## 2023-10-23 RX ORDER — METOPROLOL SUCCINATE 100 MG/1
100 TABLET, EXTENDED RELEASE ORAL 2 TIMES DAILY
Qty: 30 TABLET | Refills: 0 | Status: SHIPPED | OUTPATIENT
Start: 2023-10-23

## 2023-10-23 NOTE — PROGRESS NOTES
[facility] SERVICES  [place] Medical Record Number:  [id number]  Place of Service where encounter took place:  [facility] - [facility] (TCU) [[id number]]  Chief Complaint   Patient presents with    Nursing Home Acute       HPI:    Lj Castro  is a 78 year old (1945), who is being seen today for an episodic care visit.  HPI information obtained from: facility chart records, facility staff, and patient report. Today's concern is:    Patient Lj Castro is a 78 yr old female admitted to [facility] for rehabilitation s/post hospitalization [facility] 10/13-10/17/23 C3 to T2 cervical lamimectomy and spinal cord decompression, C3 to T2 posterior fusion, C3 to T2 segemental instrumentation for progressive myelopathy due to stenosis and kyphosis from C3-T3.   Complications hypotension after surgery     PMHx HTN, HLD, hypothyroid, DM2, pneumonia, CKD3 (baseline cr about 1.15-1.35), AFib-on DOAC, chronic neck and back pain, neurogenic claudication, PAD, and GERD        S/P laminectomy  Other constipation  Hypertension, unspecified type  Edema, unspecified type    Improve pain management, not using oxycodone. On higher dose of Neurontin  Mood good. Likes watching the turkeys out the window  Does report feeling slight pulling sensation in neck; no signs and symptoms infection or concerns on incision  Tolerating neck brace  Progressing in therapies, still needing help with toileting and ADLs  Reports regular bowel movement  Catheter draining clear yellow urine, plan for void trial today  Eating   Denies chest pain or shortness of breath  Has edema in lower extremity bilateral, recently started back on diuretic hydrochlorothiazide, no pain or tenderness bilateral  Blood pressure still up, will increase Metoprolol back to prior to hospital admission dose 100mg 2 x daily     Wt Readings from Last 2 Encounters:   10/23/23 81.2 kg (179 lb)   10/20/23 81.2 kg (179 lb)          Past Medical and Surgical History reviewed in Epic today.    MEDICATIONS:    Current Outpatient Medications   Medication Sig Dispense Refill    acetaminophen (TYLENOL) 325 MG tablet Take 2 tablets (650 mg) by mouth 3 times daily. May also take 2 tablets (650 mg) daily as needed for mild pain. 30 tablet 0    apixaban ANTICOAGULANT (ELIQUIS) 5 MG tablet Take 5 mg by mouth 2 times daily      atorvastatin (LIPITOR) 40 MG tablet Take 1 tablet by mouth daily      brimonidine (ALPHAGAN-P) 0.15 % ophthalmic solution Place 1 drop Into the left eye 2 times daily      gabapentin (NEURONTIN) 100 MG capsule Take 4 capsules (400 mg) by mouth 3 times daily And 2 x daily as needed 60 capsule 0    hydrochlorothiazide (HYDRODIURIL) 25 MG tablet Take 1 tablet (25 mg) by mouth daily 30 tablet 0    insulin aspart (NOVOLOG PEN) 100 UNIT/ML pen Inject 3 Units Subcutaneous daily (with breakfast)      insulin aspart (NOVOLOG PEN) 100 UNIT/ML pen Inject 3 Units Subcutaneous daily (with lunch)      insulin aspart (NOVOLOG PEN) 100 UNIT/ML pen Inject 5 Units Subcutaneous daily (with dinner)      insulin glargine (LANTUS PEN) 100 UNIT/ML pen Inject 18 Units Subcutaneous at bedtime 15 mL 0    ketoconazole (NIZORAL) 2 % external shampoo Shampoo the hair thoroughly each as need.      latanoprost (XALATAN) 0.005 % ophthalmic solution Place 1 drop into the right eye At Bedtime      LEVOTHYROXINE SODIUM PO Take 88 mcg by mouth daily      losartan (COZAAR) 50 MG tablet Take 1 tablet (50 mg) by mouth daily HOLD if blood pressure <100 30 tablet 0    melatonin 3 MG tablet Take 1 tablet (3 mg) by mouth nightly as needed for sleep 30 tablet 0    metoprolol succinate ER (TOPROL XL) 100 MG 24 hr tablet Take 1 tablet (100 mg) by mouth daily Patient was on metoprolol succinate  mg p.o. twice daily before hospitalization.  Her blood pressure has been running low during the hospitalization and it was reduced to 100 mg p.o. daily.  Dose can be  "increased back to 100 mg p.o. twice daily if the blood pressure starts going up after discussion with the attending physician.      Multiple Vitamins-Calcium (ONE-A-DAY WOMENS FORMULA PO) Take 1 tablet by mouth daily      OMEPRAZOLE PO Take 40 mg by mouth daily      oxyCODONE (ROXICODONE) 5 MG tablet Take 1 tablet (5 mg) by mouth every 4 hours as needed for pain 42 tablet 0    prednisoLONE acetate (PRED FORTE) 1 % ophthalmic suspension Place 1 drop Into the left eye daily      SENNA-docusate sodium (SENNA S) 8.6-50 MG tablet Take 2 tablets by mouth daily as needed (CONSTIPATION) 30 tablet 0    timolol maleate (TIMOPTIC) 0.5 % ophthalmic solution Place 1 drop into both eyes 2 times daily      tiZANidine (ZANAFLEX) 4 MG tablet Take 1 tablet (4 mg) by mouth 3 times daily 90 tablet 1         REVIEW OF SYSTEMS:  4 point ROS including Respiratory, CV, GI and , other than that noted in the HPI,  is negative    Objective:  BP (!) 144/82   Pulse 84   Temp 97.5  F (36.4  C)   Resp 18   Ht 1.6 m (5' 3\")   Wt 81.2 kg (179 lb)   SpO2 94%   BMI 31.71 kg/m    Exam:  GENERAL APPEARANCE:  Alert, in no distress  RESP:  respiratory effort and palpation of chest normal, lungs clear to auscultation , no respiratory distress  CV:  Palpation and auscultation of heart done , regular rate and rhythm, no murmur,  ABDOMEN:  normal bowel sounds, soft, nontender  M/S:   sitting in wheelchair , wearing neck brace  SKIN:  Inspection of skin and subcutaneous tissue baseline, staples neck intact, no signs and symptoms drainage, no redness  NEURO:   Cranial nerves 2-12 are normal tested and grossly at patient's baseline  PSYCH:  oriented X 3    Labs:   Labs done in SNF are in [place] EPIC. Please refer to them using [employer] Everywhere.    Last Comprehensive Metabolic Panel:  Lab Results   Component Value Date     10/23/2023    POTASSIUM 4.2 10/23/2023    CHLORIDE 105 10/23/2023    CO2 28 10/23/2023    ANIONGAP 10 10/23/2023    GLC " "59 (L) 10/23/2023    BUN 10.6 10/23/2023    CR 0.87 10/23/2023    GFRESTIMATED 67 10/23/2023    INDY 8.4 (L) 10/23/2023       Lab Results   Component Value Date    WBC 4.4 10/20/2023    WBC 8.8 06/15/2011     Lab Results   Component Value Date    RBC 3.21 10/20/2023    RBC 3.39 06/15/2011     Lab Results   Component Value Date    HGB 9.7 10/20/2023    HGB 9.8 03/20/2015     Lab Results   Component Value Date    HCT 32.0 10/20/2023    HCT 30.4 06/15/2011     No components found for: \"MCT\"  Lab Results   Component Value Date     10/20/2023    MCV 90 06/15/2011     Lab Results   Component Value Date    MCH 30.2 10/20/2023    MCH 28.3 06/15/2011     Lab Results   Component Value Date    MCHC 30.3 10/20/2023    MCHC 31.6 06/15/2011     Lab Results   Component Value Date    RDW 14.0 10/20/2023    RDW 14.9 06/15/2011     Lab Results   Component Value Date     10/20/2023     06/15/2011         ASSESSMENT/PLAN:     S/P laminectomy  Other constipation  Hypertension, unspecified type  Edema, unspecified type    Improve pain management, not using oxycodone. On higher dose of Neurontin  Continue on current medications to treat for pain    Mood good. Likes watching the turkeys out the window  Monitor     Does report feeling slight pulling sensation in neck; no signs and symptoms infection or concerns on incision  Tolerating neck brace  Follow up surgeon as advised  Monitor     Progressing in therapies, still needing help with toileting and ADLs  Continue therapies    involved in safe plan home     Reports regular bowel movement  Continue as needed Senna S   Monitor     Catheter draining clear yellow urine, plan for void trial today  Has parameters to straight cath with post void residual checks    Eating     Denies chest pain or shortness of breath  Has edema in lower extremity bilateral, recently started back on diuretic hydrochlorothiazide, no pain or tenderness bilateral  Continue current " diuretic, consulted with therapies again regarding still needing TG shapes  Reminder to patient to elevate legs; reports she was sitting up in wheelchair often this weekend    Blood pressure still up, will increase Metoprolol back to prior to hospital admission dose 100mg 2 x daily   Follow-up BMP 10/25/23    Wt Readings from Last 2 Encounters:   10/23/23 81.2 kg (179 lb)   10/20/23 81.2 kg (179 lb)         Electronically signed by:  CARMEL Marrero CNP

## 2023-10-24 ENCOUNTER — LAB REQUISITION (OUTPATIENT)
Dept: LAB | Facility: CLINIC | Age: 78
End: 2023-10-24
Payer: COMMERCIAL

## 2023-10-24 DIAGNOSIS — I10 ESSENTIAL (PRIMARY) HYPERTENSION: ICD-10-CM

## 2023-10-25 LAB
ANION GAP SERPL CALCULATED.3IONS-SCNC: 11 MMOL/L (ref 7–15)
BUN SERPL-MCNC: 11.7 MG/DL (ref 8–23)
CALCIUM SERPL-MCNC: 9 MG/DL (ref 8.8–10.2)
CHLORIDE SERPL-SCNC: 102 MMOL/L (ref 98–107)
CREAT SERPL-MCNC: 1.06 MG/DL (ref 0.51–0.95)
DEPRECATED HCO3 PLAS-SCNC: 29 MMOL/L (ref 22–29)
EGFRCR SERPLBLD CKD-EPI 2021: 53 ML/MIN/1.73M2
GLUCOSE SERPL-MCNC: 74 MG/DL (ref 70–99)
POTASSIUM SERPL-SCNC: 3.8 MMOL/L (ref 3.4–5.3)
SODIUM SERPL-SCNC: 142 MMOL/L (ref 135–145)

## 2023-10-25 PROCEDURE — 80048 BASIC METABOLIC PNL TOTAL CA: CPT | Performed by: NURSE PRACTITIONER

## 2023-10-25 PROCEDURE — P9604 ONE-WAY ALLOW PRORATED TRIP: HCPCS | Performed by: NURSE PRACTITIONER

## 2023-10-25 PROCEDURE — 36415 COLL VENOUS BLD VENIPUNCTURE: CPT | Performed by: NURSE PRACTITIONER

## 2023-10-30 ENCOUNTER — TRANSITIONAL CARE UNIT VISIT (OUTPATIENT)
Dept: GERIATRICS | Facility: CLINIC | Age: 78
End: 2023-10-30
Payer: COMMERCIAL

## 2023-10-30 VITALS
RESPIRATION RATE: 18 BRPM | SYSTOLIC BLOOD PRESSURE: 139 MMHG | DIASTOLIC BLOOD PRESSURE: 80 MMHG | HEART RATE: 71 BPM | TEMPERATURE: 97.5 F | OXYGEN SATURATION: 98 % | HEIGHT: 63 IN | BODY MASS INDEX: 31.73 KG/M2 | WEIGHT: 179.1 LBS

## 2023-10-30 DIAGNOSIS — R60.9 EDEMA, UNSPECIFIED TYPE: ICD-10-CM

## 2023-10-30 DIAGNOSIS — I10 HYPERTENSION, UNSPECIFIED TYPE: ICD-10-CM

## 2023-10-30 DIAGNOSIS — Z98.890 S/P LAMINECTOMY: Primary | ICD-10-CM

## 2023-10-30 DIAGNOSIS — K59.09 OTHER CONSTIPATION: ICD-10-CM

## 2023-10-30 PROCEDURE — 99309 SBSQ NF CARE MODERATE MDM 30: CPT | Performed by: NURSE PRACTITIONER

## 2023-10-30 NOTE — PROGRESS NOTES
[facility] SERVICES  [place] Medical Record Number:  [id number]  Place of Service where encounter took place:  [facility] (TCU) [[id number]]  Chief Complaint   Patient presents with    Nursing Home Acute       HPI:    Lj Castro  is a 78 year old (1945), who is being seen today for an episodic care visit.  HPI information obtained from: facility chart records, facility staff, and patient report. Today's concern is:    Patient Lj Castro is a 78 yr old female admitted to [facility] for rehabilitation s/post hospitalization [facility] 10/13-10/17/23 C3 to T2 cervical lamimectomy and spinal cord decompression, C3 to T2 posterior fusion, C3 to T2 segemental instrumentation for progressive myelopathy due to stenosis and kyphosis from C3-T3.   Complications hypotension after surgery     PMHx HTN, HLD, hypothyroid, DM2, pneumonia, CKD3 (baseline cr about 1.15-1.35), AFib-on DOAC, chronic neck and back pain, neurogenic claudication, PAD, and GERD      S/P laminectomy  Other constipation  Edema, unspecified type  Hypertension, unspecified type    Passed voiding trial, reports regular bowel & bladder   Participating in therapies, still needing help with transfers, mobility and toileting  Reports pain managed   Compliant with neck brace, eager to return home   Blood pressure managed, no report dizziness. Less edema lower extremities reported     Past Medical and Surgical History reviewed in Epic today.    MEDICATIONS:    Current Outpatient Medications   Medication Sig Dispense Refill    acetaminophen (TYLENOL) 325 MG tablet Take 2 tablets (650 mg) by mouth 3 times daily. May also take 2 tablets (650 mg) daily as needed for mild pain. 30 tablet 0    apixaban ANTICOAGULANT (ELIQUIS) 5 MG tablet Take 5 mg by mouth 2 times daily      atorvastatin (LIPITOR) 40 MG tablet Take 1 tablet by mouth daily      brimonidine (ALPHAGAN-P) 0.15 % ophthalmic solution  Place 1 drop Into the left eye 2 times daily      gabapentin (NEURONTIN) 100 MG capsule Take 4 capsules (400 mg) by mouth 3 times daily And 2 x daily as needed 60 capsule 0    hydrochlorothiazide (HYDRODIURIL) 25 MG tablet Take 1 tablet (25 mg) by mouth daily 30 tablet 0    insulin aspart (NOVOLOG PEN) 100 UNIT/ML pen Inject 3 Units Subcutaneous daily (with breakfast)      insulin aspart (NOVOLOG PEN) 100 UNIT/ML pen Inject 3 Units Subcutaneous daily (with lunch)      insulin aspart (NOVOLOG PEN) 100 UNIT/ML pen Inject 5 Units Subcutaneous daily (with dinner)      insulin glargine (LANTUS PEN) 100 UNIT/ML pen Inject 18 Units Subcutaneous at bedtime 15 mL 0    ketoconazole (NIZORAL) 2 % external shampoo Shampoo the hair thoroughly each as need.      latanoprost (XALATAN) 0.005 % ophthalmic solution Place 1 drop into the right eye At Bedtime      LEVOTHYROXINE SODIUM PO Take 88 mcg by mouth daily      losartan (COZAAR) 50 MG tablet Take 1 tablet (50 mg) by mouth daily HOLD if blood pressure <100 30 tablet 0    melatonin 3 MG tablet Take 1 tablet (3 mg) by mouth nightly as needed for sleep 30 tablet 0    metoprolol succinate ER (TOPROL XL) 100 MG 24 hr tablet Take 1 tablet (100 mg) by mouth 2 times daily 30 tablet 0    Multiple Vitamins-Calcium (ONE-A-DAY WOMENS FORMULA PO) Take 1 tablet by mouth daily      OMEPRAZOLE PO Take 40 mg by mouth daily      prednisoLONE acetate (PRED FORTE) 1 % ophthalmic suspension Place 1 drop Into the left eye daily      SENNA-docusate sodium (SENNA S) 8.6-50 MG tablet Take 2 tablets by mouth daily as needed (CONSTIPATION) 30 tablet 0    timolol maleate (TIMOPTIC) 0.5 % ophthalmic solution Place 1 drop into both eyes 2 times daily      tiZANidine (ZANAFLEX) 4 MG tablet Take 1 tablet (4 mg) by mouth 3 times daily 90 tablet 1         REVIEW OF SYSTEMS:  4 point ROS including Respiratory, CV, GI and , other than that noted in the HPI,  is negative    Objective:  /80   Pulse 71    "Temp 97.5  F (36.4  C)   Resp 18   Ht 1.6 m (5' 3\")   Wt 81.2 kg (179 lb 1.6 oz)   SpO2 98%   BMI 31.73 kg/m    Exam:  GENERAL APPEARANCE:  Alert, in no distress  RESP:  respiratory effort and palpation of chest normal, lungs clear to auscultation , no respiratory distress  CV:  Palpation and auscultation of heart done , regular rate and rhythm  ABDOMEN:  normal bowel sounds, soft, nontender  M/S:   lying in bed,monitor wearing neck brace  SKIN:  Inspection of skin and subcutaneous tissue baseline  NEURO:   Cranial nerves 2-12 are normal tested and grossly at patient's baseline  PSYCH:  oriented X 3    Labs:   Labs done in SNF are in [place] EPIC. Please refer to them using [employer] Everywhere.    Last Comprehensive Metabolic Panel:  Lab Results   Component Value Date     10/25/2023    POTASSIUM 3.8 10/25/2023    CHLORIDE 102 10/25/2023    CO2 29 10/25/2023    ANIONGAP 11 10/25/2023    GLC 74 10/25/2023    BUN 11.7 10/25/2023    CR 1.06 (H) 10/25/2023    GFRESTIMATED 53 (L) 10/25/2023    INDY 9.0 10/25/2023       Lab Results   Component Value Date    WBC 4.4 10/20/2023    WBC 8.8 06/15/2011     Lab Results   Component Value Date    RBC 3.21 10/20/2023    RBC 3.39 06/15/2011     Lab Results   Component Value Date    HGB 9.7 10/20/2023    HGB 9.8 03/20/2015     Lab Results   Component Value Date    HCT 32.0 10/20/2023    HCT 30.4 06/15/2011     No components found for: \"MCT\"  Lab Results   Component Value Date     10/20/2023    MCV 90 06/15/2011     Lab Results   Component Value Date    MCH 30.2 10/20/2023    MCH 28.3 06/15/2011     Lab Results   Component Value Date    MCHC 30.3 10/20/2023    MCHC 31.6 06/15/2011     Lab Results   Component Value Date    RDW 14.0 10/20/2023    RDW 14.9 06/15/2011     Lab Results   Component Value Date     10/20/2023     06/15/2011         ASSESSMENT/PLAN:     S/P laminectomy  Other constipation  Edema, unspecified type  Hypertension, unspecified " type    Passed voiding trial, reports regular bowel & bladder   Monitor     Participating in therapies, still needing help with transfers, mobility and toileting  Continue therapies     Reports pain managed, continue current pain medications     Compliant with neck brace, eager to return home     Blood pressure managed, no report dizziness. Less edema lower extremities reported   Continue on current medications to treat for blood pressure management     Electronically signed by:  CARMEL Marrero CNP

## 2023-11-01 NOTE — PROGRESS NOTES
[facility] GERIATRICS    PRIMARY CARE PROVIDER AND CLINIC:  Cory Soliz DO, [address] [facility] [id number]  Chief Complaint   Patient presents with    Hospital F/U      [place] Medical Record Number:  [id number]  Place of Service where encounter took place:  [facility] (U) [[id number]]    Lj Castro  is a 78 year old  (1945), admitted to the above facility from  [facility]. Hospital stay 10/13/23 through 10/17/23..     Hospital course was reviewed by me, is as per the hospital discharge summary and nurse practitioner note.    Patient is a past medical history of hypertension, chronic kidney disease stage III, atrial fibrillation on DOAC, PAD, GERD, chronic neck pain.    s/post hospitalization [facility] 10/13-10/17/23 C3 to T2 cervical lamimectomy and spinal cord decompression, C3 to T2 posterior fusion, C3 to T2 segemental instrumentation for progressive myelopathy due to stenosis and kyphosis from C3-T3.   Postoperative course complicated by hypotension.    Patient reports feeling improved.  Neck and upper back pain have improved significantly.  She is progressing in therapy and hopes to discharge in several days.  She notes mild weakness both hands though improved since surgery.  Lower extremity edema present postoperatively has improved with resumption of hydrochlorothiazide  She is now voiding without difficulty.  She denies cough, chest pain, difficulty swallowing, dizziness.  Vitals have been stable, blood glucoses generally 100s      CODE STATUS/ADVANCE DIRECTIVES DISCUSSION:  Prior  CPR/Full code   ALLERGIES: No Known Allergies   PAST MEDICAL HISTORY:   Past Medical History:   Diagnosis Date    Arrhythmia     Bursitis, olecranon     Diabetes (H)     Displacement of cervical intervertebral disc without myelopathy     Gastro-oesophageal reflux disease     Glaucoma     Hyperlipemia     Hypertension     Hypothyroid      Renal failure, acute (H24) 6-7-11 to 6-17-11    hospitalized     Varicose veins       PAST SURGICAL HISTORY:   has a past surgical history that includes Eye surgery (Left, 2009); daVINCI hysterectomy supracervical (N/A, 3/18/2015); Davinci Salpingo-Oophorectomy Including Bilateral (Bilateral, 3/18/2015); DAVINCI SACROCOLPOPEXY, MIDURETHRAL SLING, CYSTOSCOPY (N/A, 3/18/2015); IR Lower Extremity Angiogram Left (7/8/2020); and Optical Tracking System Fusion Posterior Cervical Three + Levels (N/A, 10/13/2023).  FAMILY HISTORY: family history is not on file.  SOCIAL HISTORY:   reports that she has never smoked. She has never used smokeless tobacco. She reports current alcohol use. She reports that she does not use drugs.  Patient's living condition: lives with spouse    Current medications were reviewed by me today    Current Outpatient Medications   Medication Sig    acetaminophen (TYLENOL) 325 MG tablet Take 2 tablets (650 mg) by mouth 3 times daily. May also take 2 tablets (650 mg) daily as needed for mild pain.    apixaban ANTICOAGULANT (ELIQUIS) 5 MG tablet Take 5 mg by mouth 2 times daily    atorvastatin (LIPITOR) 40 MG tablet Take 1 tablet by mouth daily    brimonidine (ALPHAGAN-P) 0.15 % ophthalmic solution Place 1 drop Into the left eye 2 times daily    gabapentin (NEURONTIN) 100 MG capsule Take 4 capsules (400 mg) by mouth 3 times daily And 2 x daily as needed    hydrochlorothiazide (HYDRODIURIL) 25 MG tablet Take 1 tablet (25 mg) by mouth daily    insulin aspart (NOVOLOG PEN) 100 UNIT/ML pen Inject 3 Units Subcutaneous daily (with breakfast)    insulin aspart (NOVOLOG PEN) 100 UNIT/ML pen Inject 3 Units Subcutaneous daily (with lunch)    insulin aspart (NOVOLOG PEN) 100 UNIT/ML pen Inject 5 Units Subcutaneous daily (with dinner)    insulin glargine (LANTUS PEN) 100 UNIT/ML pen Inject 18 Units Subcutaneous at bedtime    ketoconazole (NIZORAL) 2 % external shampoo Shampoo the hair thoroughly each as need.     "latanoprost (XALATAN) 0.005 % ophthalmic solution Place 1 drop into the right eye At Bedtime    LEVOTHYROXINE SODIUM PO Take 88 mcg by mouth daily    losartan (COZAAR) 50 MG tablet Take 1 tablet (50 mg) by mouth daily HOLD if blood pressure <100    melatonin 3 MG tablet Take 1 tablet (3 mg) by mouth nightly as needed for sleep    metoprolol succinate ER (TOPROL XL) 100 MG 24 hr tablet Take 1 tablet (100 mg) by mouth 2 times daily    Multiple Vitamins-Calcium (ONE-A-DAY WOMENS FORMULA PO) Take 1 tablet by mouth daily    OMEPRAZOLE PO Take 40 mg by mouth daily    prednisoLONE acetate (PRED FORTE) 1 % ophthalmic suspension Place 1 drop Into the left eye daily    SENNA-docusate sodium (SENNA S) 8.6-50 MG tablet Take 2 tablets by mouth daily as needed (CONSTIPATION)    timolol maleate (TIMOPTIC) 0.5 % ophthalmic solution Place 1 drop into both eyes 2 times daily    tiZANidine (ZANAFLEX) 4 MG tablet Take 1 tablet (4 mg) by mouth 3 times daily     No current facility-administered medications for this visit.       ROS:  10 point ROS of systems including Constitutional, Eyes, Respiratory, Cardiovascular, Gastroenterology, Genitourinary, Integumentary, Musculoskeletal, Psychiatric were all negative except for pertinent positives noted in my HPI.    Vitals:  /77   Pulse 78   Temp 97.2  F (36.2  C)   Resp 18   Ht 1.6 m (5' 3\")   Wt 81.7 kg (180 lb 1.6 oz)   SpO2 98%   BMI 31.90 kg/m    Exam:  Very pleasant well-appearing female, sitting up in bed.  Neck brace in place  She appears comfortable  She is drinking fluids, without apparent difficulty  Lungs clear  CV irregular, heart rate 70s  Abdomen soft  Extremities without edema  Neuro: Fully oriented, pleasant.  Hand grasps strong bilaterally    Lab/Diagnostic data:  Most Recent 3 CBC's:  Recent Labs   Lab Test 10/20/23  0528 10/16/23  0647 10/15/23  0649   WBC 4.4 4.5 6.1   HGB 9.7* 8.9* 8.7*    98 99    128* 118*     Most Recent 3 BMP's:  Recent Labs "   Lab Test 10/25/23  0542 10/23/23  0555 10/20/23  0528    143 141   POTASSIUM 3.8 4.2 3.9   CHLORIDE 102 105 106   CO2 29 28 24   BUN 11.7 10.6 13.9   CR 1.06* 0.87 0.84   ANIONGAP 11 10 11   INDY 9.0 8.4* 8.7*   GLC 74 59* 146*       ASSESSMENT/PLAN:    Status post cervical laminectomy with spinal cord decompression and posterior fusion C3-T2 for the treatment of cervical spine stenosis with myelopathy  Patient is doing well, notes improvement in pain.  Functional status has been improving the patient has continued to require assistance with cares.  No respiratory concerns nor difficulty swallowing  Plan: Continue therapies.  Continue Tylenol, tizanidine, short-term oxycodone, gabapentin.  Discharge when cleared by therapy    Chronic atrial fibrillation  Heart rate controlled on metoprolol  Plan: Continue current medications, monitor blood pressure and heart rate, chronic apixaban    Hypertension  History of hypotension postop  Lower extremity edema postop likely secondary to IV fluids, gabapentin and temporary holding of prior to admission diuretic  Plan: Monitor blood pressure on resumed hydrochlorothiazide, losartan, metoprolol    Diabetes mellitus  Stable  Hemoglobin A1c 7.8  1-month ago  Plan: Continue glargine and scheduled mealtime aspart    Anemia  Likely secondary to blood loss  Asymptomatic  Plan: Monitor for symptoms of worsening anemia      Celestino Reeves MD

## 2023-11-02 ENCOUNTER — TRANSITIONAL CARE UNIT VISIT (OUTPATIENT)
Dept: GERIATRICS | Facility: CLINIC | Age: 78
End: 2023-11-02
Payer: COMMERCIAL

## 2023-11-02 ENCOUNTER — TELEPHONE (OUTPATIENT)
Dept: GERIATRICS | Facility: CLINIC | Age: 78
End: 2023-11-02

## 2023-11-02 VITALS
WEIGHT: 180.1 LBS | BODY MASS INDEX: 31.91 KG/M2 | HEART RATE: 78 BPM | RESPIRATION RATE: 18 BRPM | OXYGEN SATURATION: 98 % | SYSTOLIC BLOOD PRESSURE: 110 MMHG | DIASTOLIC BLOOD PRESSURE: 77 MMHG | HEIGHT: 63 IN | TEMPERATURE: 97.2 F

## 2023-11-02 DIAGNOSIS — Z98.890 S/P LAMINECTOMY: Primary | ICD-10-CM

## 2023-11-02 DIAGNOSIS — I48.91 ATRIAL FIBRILLATION, UNSPECIFIED TYPE (H): ICD-10-CM

## 2023-11-02 DIAGNOSIS — E11.8 TYPE 2 DIABETES MELLITUS WITH UNSPECIFIED COMPLICATIONS (H): ICD-10-CM

## 2023-11-02 DIAGNOSIS — I10 HYPERTENSION, UNSPECIFIED TYPE: ICD-10-CM

## 2023-11-02 PROCEDURE — 99305 1ST NF CARE MODERATE MDM 35: CPT | Performed by: INTERNAL MEDICINE

## 2023-11-02 NOTE — LETTER
11/2/2023        RE: Lj Castro  [address] [address]        [facility] GERIATRICS    PRIMARY CARE PROVIDER AND CLINIC:  Cory Soliz DO, [address] [facility] [id number]  Chief Complaint   Patient presents with     Hospital F/U      [place] Medical Record Number:  [id number]  Place of Service where encounter took place:  [facility] ([facility]) [[id number]]    Lj Castro  is a 78 year old  (1945), admitted to the above facility from  [facility]. Hospital stay 10/13/23 through 10/17/23..     Hospital course was reviewed by me, is as per the hospital discharge summary and nurse practitioner note.    Patient is a past medical history of hypertension, chronic kidney disease stage III, atrial fibrillation on DOAC, PAD, GERD, chronic neck pain.    s/post hospitalization [facility] 10/13-10/17/23 C3 to T2 cervical lamimectomy and spinal cord decompression, C3 to T2 posterior fusion, C3 to T2 segemental instrumentation for progressive myelopathy due to stenosis and kyphosis from C3-T3.   Postoperative course complicated by hypotension.    Patient reports feeling improved.  Neck and upper back pain have improved significantly.  She is progressing in therapy and hopes to discharge in several days.  She notes mild weakness both hands though improved since surgery.  Lower extremity edema present postoperatively has improved with resumption of hydrochlorothiazide  She is now voiding without difficulty.  She denies cough, chest pain, difficulty swallowing, dizziness.  Vitals have been stable, blood glucoses generally 100s      CODE STATUS/ADVANCE DIRECTIVES DISCUSSION:  Prior  CPR/Full code   ALLERGIES: No Known Allergies   PAST MEDICAL HISTORY:   Past Medical History:   Diagnosis Date     Arrhythmia      Bursitis, olecranon      Diabetes (H)      Displacement of cervical intervertebral disc without myelopathy       Gastro-oesophageal reflux disease      Glaucoma      Hyperlipemia      Hypertension      Hypothyroid      Renal failure, acute (H24) 6-7-11 to 6-17-11    hospitalized      Varicose veins       PAST SURGICAL HISTORY:   has a past surgical history that includes Eye surgery (Left, 2009); daVINCI hysterectomy supracervical (N/A, 3/18/2015); Davinci Salpingo-Oophorectomy Including Bilateral (Bilateral, 3/18/2015); DAVINCI SACROCOLPOPEXY, MIDURETHRAL SLING, CYSTOSCOPY (N/A, 3/18/2015); IR Lower Extremity Angiogram Left (7/8/2020); and Optical Tracking System Fusion Posterior Cervical Three + Levels (N/A, 10/13/2023).  FAMILY HISTORY: family history is not on file.  SOCIAL HISTORY:   reports that she has never smoked. She has never used smokeless tobacco. She reports current alcohol use. She reports that she does not use drugs.  Patient's living condition: lives with spouse    Current medications were reviewed by me today    Current Outpatient Medications   Medication Sig     acetaminophen (TYLENOL) 325 MG tablet Take 2 tablets (650 mg) by mouth 3 times daily. May also take 2 tablets (650 mg) daily as needed for mild pain.     apixaban ANTICOAGULANT (ELIQUIS) 5 MG tablet Take 5 mg by mouth 2 times daily     atorvastatin (LIPITOR) 40 MG tablet Take 1 tablet by mouth daily     brimonidine (ALPHAGAN-P) 0.15 % ophthalmic solution Place 1 drop Into the left eye 2 times daily     gabapentin (NEURONTIN) 100 MG capsule Take 4 capsules (400 mg) by mouth 3 times daily And 2 x daily as needed     hydrochlorothiazide (HYDRODIURIL) 25 MG tablet Take 1 tablet (25 mg) by mouth daily     insulin aspart (NOVOLOG PEN) 100 UNIT/ML pen Inject 3 Units Subcutaneous daily (with breakfast)     insulin aspart (NOVOLOG PEN) 100 UNIT/ML pen Inject 3 Units Subcutaneous daily (with lunch)     insulin aspart (NOVOLOG PEN) 100 UNIT/ML pen Inject 5 Units Subcutaneous daily (with dinner)     insulin glargine (LANTUS PEN) 100 UNIT/ML pen Inject 18  "Units Subcutaneous at bedtime     ketoconazole (NIZORAL) 2 % external shampoo Shampoo the hair thoroughly each as need.     latanoprost (XALATAN) 0.005 % ophthalmic solution Place 1 drop into the right eye At Bedtime     LEVOTHYROXINE SODIUM PO Take 88 mcg by mouth daily     losartan (COZAAR) 50 MG tablet Take 1 tablet (50 mg) by mouth daily HOLD if blood pressure <100     melatonin 3 MG tablet Take 1 tablet (3 mg) by mouth nightly as needed for sleep     metoprolol succinate ER (TOPROL XL) 100 MG 24 hr tablet Take 1 tablet (100 mg) by mouth 2 times daily     Multiple Vitamins-Calcium (ONE-A-DAY WOMENS FORMULA PO) Take 1 tablet by mouth daily     OMEPRAZOLE PO Take 40 mg by mouth daily     prednisoLONE acetate (PRED FORTE) 1 % ophthalmic suspension Place 1 drop Into the left eye daily     SENNA-docusate sodium (SENNA S) 8.6-50 MG tablet Take 2 tablets by mouth daily as needed (CONSTIPATION)     timolol maleate (TIMOPTIC) 0.5 % ophthalmic solution Place 1 drop into both eyes 2 times daily     tiZANidine (ZANAFLEX) 4 MG tablet Take 1 tablet (4 mg) by mouth 3 times daily     No current facility-administered medications for this visit.       ROS:  10 point ROS of systems including Constitutional, Eyes, Respiratory, Cardiovascular, Gastroenterology, Genitourinary, Integumentary, Musculoskeletal, Psychiatric were all negative except for pertinent positives noted in my HPI.    Vitals:  /77   Pulse 78   Temp 97.2  F (36.2  C)   Resp 18   Ht 1.6 m (5' 3\")   Wt 81.7 kg (180 lb 1.6 oz)   SpO2 98%   BMI 31.90 kg/m    Exam:  Very pleasant well-appearing female, sitting up in bed.  Neck brace in place  She appears comfortable  She is drinking fluids, without apparent difficulty  Lungs clear  CV irregular, heart rate 70s  Abdomen soft  Extremities without edema  Neuro: Fully oriented, pleasant.  Hand grasps strong bilaterally    Lab/Diagnostic data:  Most Recent 3 CBC's:  Recent Labs   Lab Test 10/20/23  0528 " 10/16/23  0647 10/15/23  0649   WBC 4.4 4.5 6.1   HGB 9.7* 8.9* 8.7*    98 99    128* 118*     Most Recent 3 BMP's:  Recent Labs   Lab Test 10/25/23  0542 10/23/23  0555 10/20/23  0528    143 141   POTASSIUM 3.8 4.2 3.9   CHLORIDE 102 105 106   CO2 29 28 24   BUN 11.7 10.6 13.9   CR 1.06* 0.87 0.84   ANIONGAP 11 10 11   INDY 9.0 8.4* 8.7*   GLC 74 59* 146*       ASSESSMENT/PLAN:    Status post cervical laminectomy with spinal cord decompression and posterior fusion C3-T2 for the treatment of cervical spine stenosis with myelopathy  Patient is doing well, notes improvement in pain.  Functional status has been improving the patient has continued to require assistance with cares.  No respiratory concerns nor difficulty swallowing  Plan: Continue therapies.  Continue Tylenol, tizanidine, short-term oxycodone, gabapentin.  Discharge when cleared by therapy    Chronic atrial fibrillation  Heart rate controlled on metoprolol  Plan: Continue current medications, monitor blood pressure and heart rate, chronic apixaban    Hypertension  History of hypotension postop  Lower extremity edema postop likely secondary to IV fluids, gabapentin and temporary holding of prior to admission diuretic  Plan: Monitor blood pressure on resumed hydrochlorothiazide, losartan, metoprolol    Diabetes mellitus  Stable  Hemoglobin A1c 7.8  1-month ago  Plan: Continue glargine and scheduled mealtime aspart    Anemia  Likely secondary to blood loss  Asymptomatic  Plan: Monitor for symptoms of worsening anemia      Celestino Reeves MD        Sincerely,        Celestino Reeves MD

## 2023-11-02 NOTE — TELEPHONE ENCOUNTER
"[place] GERIATRIC SERVICES NON-EMERGENT  ENCOUNTER    Lj Castro is a 78 year old  (1945)    Disposition  Pt requesting to have Oxycodone PRN discontinued as she does not need it and it makes her feel \"funny\".    DEVORA given to TCU:   Okay to discontinue Oxycodone.      Electronically signed by:   Hemalatha Encarnacion RN  "

## 2023-11-06 ENCOUNTER — DISCHARGE SUMMARY NURSING HOME (OUTPATIENT)
Dept: GERIATRICS | Facility: CLINIC | Age: 78
End: 2023-11-06
Payer: COMMERCIAL

## 2023-11-06 VITALS
SYSTOLIC BLOOD PRESSURE: 104 MMHG | HEART RATE: 74 BPM | HEIGHT: 63 IN | TEMPERATURE: 97 F | OXYGEN SATURATION: 97 % | RESPIRATION RATE: 18 BRPM | WEIGHT: 180.1 LBS | BODY MASS INDEX: 31.91 KG/M2 | DIASTOLIC BLOOD PRESSURE: 65 MMHG

## 2023-11-06 DIAGNOSIS — K59.09 OTHER CONSTIPATION: ICD-10-CM

## 2023-11-06 DIAGNOSIS — E11.8 TYPE 2 DIABETES MELLITUS WITH UNSPECIFIED COMPLICATIONS (H): ICD-10-CM

## 2023-11-06 DIAGNOSIS — I48.91 ATRIAL FIBRILLATION, UNSPECIFIED TYPE (H): ICD-10-CM

## 2023-11-06 DIAGNOSIS — Z98.890 S/P LAMINECTOMY: Primary | ICD-10-CM

## 2023-11-06 DIAGNOSIS — I10 HYPERTENSION, UNSPECIFIED TYPE: ICD-10-CM

## 2023-11-06 PROCEDURE — 99316 NF DSCHRG MGMT 30 MIN+: CPT | Performed by: NURSE PRACTITIONER

## 2023-11-06 NOTE — LETTER
11/6/2023        RE: Lj Castro  [address] [facility] [address]        [place] GERIATRIC SERVICES DISCHARGE SUMMARY  PATIENT'S NAME: Lj Castro  YOB: 1945  MEDICAL RECORD NUMBER:  [id number]  Place of Service where encounter took place:  [facility]) [[id number]]    PRIMARY CARE PROVIDER AND CLINIC RESPONSIBLE AFTER TRANSFER:   Cory Soliz DO, [address] [facility] [id number]    Non-[facility] Provider     Transferring providers: CARMEL Marrero CNP; Celestino Reeves MD  Recent Hospitalization/ED:  [facility] Hospital stay 10/13/23 to 10/17/23.  Date of SNF Admission:  10/17/23  Date of SNF (anticipated) Discharge:  11/8/23  Discharged to: previous independent home  Cognitive Scores/ Physical Function/ DME:   Transfers CGA  Ambulating 90' CGA  SLUMS 19/30  UB dressing min/mod  LB dressing mod/max  Toileting mod/max  Spouse completed care giver training    CODE STATUS/ADVANCE DIRECTIVES DISCUSSION:  Full Code   ALLERGIES: Patient has no known allergies.    DISCHARGE DIAGNOSIS/NURSING FACILITY COURSE:     Patient Lj Castro is a 78 yr old female admitted to [facility] for rehabilitation s/post hospitalization [facility] 10/13-10/17/23 C3 to T2 cervical lamimectomy and spinal cord decompression, C3 to T2 posterior fusion, C3 to T2 segemental instrumentation for progressive myelopathy due to stenosis and kyphosis from C3-T3.   Complications hypotension after surgery     PMHx HTN, HLD, hypothyroid, DM2, pneumonia, CKD3 (baseline cr about 1.15-1.35), AFib-on DOAC, chronic neck and back pain, neurogenic claudication, PAD, and GERD       s/p C3-T2 cervical laminectomy and cervical cord decompression  Pain managed on scheduled tylenol, Neurontin and muscle relaxant   oxycodone discontinue due to not using   Mobility without mobility aid prior,  Incision keep clean dry, can shower  after 48 hrs per orders and activity as tolerates  Follow up surgeon Sagar Cadena MD - Primary   Need clarification with surgeon regarding follow up appointment, suture/staple removal, restrictions, parameters and duration of neck brace-Health Unit Coordinator to clarify prior to discharge   Progressing in therapies and plans to discharge home with homecare    Constipation  IBS  Continue Senna S 2 tabs daily as needed     Hypotension (resolved)  History of essential hypertension  Blood pressure managed   Continue hydrochlorothiazide, losartan and Metoprolol  Monitor     Hyperlipidemia  Continue Lipitor  Monitor     atrial fibrillation  Hr controlled  continue Metoprolol   Continue Eliquis     Diabetes mellitus type 2  Continue Lantus and NovoLog set at meals  Blood sugar managed, on Lantus 18 units, hold if blood sugar <100  Order blood sugar checks 4 xday    GERD  Continue Meprazole    Hypothyroidism  TSH   Date Value Ref Range Status   03/19/2015 2.05 0.40 - 4.00 mU/L Final     Comment:     Effective 7/30/2014, the reference range for this assay has changed to reflect   new instrumentation/methodology.       Continue on levothyroxine 88 mcg daily    Acute kidney injury (resolved)  Secondary to volume loss with the surgery.  Creatinine bumped up to 1.27,   Creatinine   Date Value Ref Range Status   10/25/2023 1.06 (H) 0.51 - 0.95 mg/dL Final   03/20/2015 0.96 0.52 - 1.04 mg/dL Final   Monitor     Glaucoma  Continue Pred Forte, Xalatan, Alphagan and Timoptic resumed     Deconditioned  Comment: d/t recent hospitalization & comorbidity, expect delay rehabilitation d/t age & comorbidity  Plan: PT/OT eval & treat, monitor    Advanced care planning  elects full code      Past Medical History:  has a past medical history of Arrhythmia, Bursitis, olecranon, Diabetes (H), Displacement of cervical intervertebral disc without myelopathy, Gastro-oesophageal reflux disease, Glaucoma, Hyperlipemia, Hypertension, Hypothyroid,  Renal failure, acute (H24) (6-7-11 to 6-17-11), and Varicose veins.    She has no past medical history of Complication of anesthesia.    Discharge Medications:    Current Outpatient Medications   Medication Sig Dispense Refill     acetaminophen (TYLENOL) 325 MG tablet Take 2 tablets (650 mg) by mouth 3 times daily. May also take 2 tablets (650 mg) daily as needed for mild pain. 30 tablet 0     apixaban ANTICOAGULANT (ELIQUIS) 5 MG tablet Take 5 mg by mouth 2 times daily       atorvastatin (LIPITOR) 40 MG tablet Take 1 tablet by mouth daily       brimonidine (ALPHAGAN-P) 0.15 % ophthalmic solution Place 1 drop Into the left eye 2 times daily       gabapentin (NEURONTIN) 100 MG capsule Take 4 capsules (400 mg) by mouth 3 times daily And 2 x daily as needed 60 capsule 0     hydrochlorothiazide (HYDRODIURIL) 25 MG tablet Take 1 tablet (25 mg) by mouth daily 30 tablet 0     insulin aspart (NOVOLOG PEN) 100 UNIT/ML pen Inject 3 Units Subcutaneous daily (with breakfast)       insulin aspart (NOVOLOG PEN) 100 UNIT/ML pen Inject 3 Units Subcutaneous daily (with lunch)       insulin aspart (NOVOLOG PEN) 100 UNIT/ML pen Inject 5 Units Subcutaneous daily (with dinner)       insulin glargine (LANTUS PEN) 100 UNIT/ML pen Inject 18 Units Subcutaneous at bedtime 15 mL 0     ketoconazole (NIZORAL) 2 % external shampoo Shampoo the hair thoroughly each as need.       latanoprost (XALATAN) 0.005 % ophthalmic solution Place 1 drop into the right eye At Bedtime       LEVOTHYROXINE SODIUM PO Take 88 mcg by mouth daily       losartan (COZAAR) 50 MG tablet Take 1 tablet (50 mg) by mouth daily HOLD if blood pressure <100 30 tablet 0     melatonin 3 MG tablet Take 1 tablet (3 mg) by mouth nightly as needed for sleep 30 tablet 0     metoprolol succinate ER (TOPROL XL) 100 MG 24 hr tablet Take 1 tablet (100 mg) by mouth 2 times daily 30 tablet 0     Multiple Vitamins-Calcium (ONE-A-DAY WOMENS FORMULA PO) Take 1 tablet by mouth daily        "OMEPRAZOLE PO Take 40 mg by mouth daily       prednisoLONE acetate (PRED FORTE) 1 % ophthalmic suspension Place 1 drop Into the left eye daily       SENNA-docusate sodium (SENNA S) 8.6-50 MG tablet Take 2 tablets by mouth daily as needed (CONSTIPATION) 30 tablet 0     timolol maleate (TIMOPTIC) 0.5 % ophthalmic solution Place 1 drop into both eyes 2 times daily       tiZANidine (ZANAFLEX) 4 MG tablet Take 1 tablet (4 mg) by mouth 3 times daily 90 tablet 1       Medication Changes/Rationale:   See HPI    Controlled medications sent with patient:   not applicable/none     ROS:   4 point ROS including Respiratory, CV, GI and , other than that noted in the HPI,  is negative    Physical Exam:   Vitals: /65   Pulse 74   Temp 97  F (36.1  C)   Resp 18   Ht 1.6 m (5' 3\")   Wt 81.7 kg (180 lb 1.6 oz)   SpO2 97%   BMI 31.90 kg/m    BMI= Body mass index is 31.9 kg/m .  GENERAL APPEARANCE:  Alert, in no distress  NECK: wearing neck brace  RESP:  respiratory effort and palpation of chest normal, lungs clear to auscultation , no respiratory distress  CV:  Palpation and auscultation of heart done , regular rate and rhythm  ABDOMEN:  normal bowel sounds, soft, nontender  M/S:   sitting up in WC  SKIN:  Inspection of skin and subcutaneous tissue baseline  NEURO:   Cranial nerves 2-12 are normal tested and grossly at patient's baseline  PSYCH:  oriented X 3     SNF labs: Labs done in SNF are in [place] [facility]. Please refer to them using [facility]/Care Everywhere.    DISCHARGE PLAN:  Follow up labs: No labs orders/due  Medical Follow Up:      Follow up with primary care provider in 1-2 weeks and surgeon  MTM referral needed and placed by this provider: Yes:     Discharge Services: Home Care:  Occupational Therapy, Physical Therapy, Registered Nurse, and Home Health Aide        TOTAL DISCHARGE TIME:   Greater than 30 minutes  Electronically signed by:  CARMEL Marrero CNP     Home care Face to Face documentation " done in [facility] attached to Home care orders for homecare.                   [facility] Discharge Orders    Name: Lj Castro  : 1945  Planned Discharge Date: 23  Discharged to: previous independent home    MEDICAL FOLLOW UP  Follow up with PCP in 1-2 weeks   Follow up with Specialists   Surgeon    FUTURE LABS: No labs orders/due      DISCHARGE MEDICATIONS:  The patient s pharmacy is authorized to dispense a 30-day supply of medications. Refill requests should be directed to the primary provider, Cory Soliz  No narcotics are prescribed at time of discharge.   Current Outpatient Medications   Medication Sig Dispense Refill     acetaminophen (TYLENOL) 325 MG tablet Take 2 tablets (650 mg) by mouth 3 times daily. May also take 2 tablets (650 mg) daily as needed for mild pain. 30 tablet 0     apixaban ANTICOAGULANT (ELIQUIS) 5 MG tablet Take 5 mg by mouth 2 times daily       atorvastatin (LIPITOR) 40 MG tablet Take 1 tablet by mouth daily       brimonidine (ALPHAGAN-P) 0.15 % ophthalmic solution Place 1 drop Into the left eye 2 times daily       gabapentin (NEURONTIN) 100 MG capsule Take 4 capsules (400 mg) by mouth 3 times daily And 2 x daily as needed 60 capsule 0     hydrochlorothiazide (HYDRODIURIL) 25 MG tablet Take 1 tablet (25 mg) by mouth daily 30 tablet 0     insulin aspart (NOVOLOG PEN) 100 UNIT/ML pen Inject 3 Units Subcutaneous daily (with breakfast)       insulin aspart (NOVOLOG PEN) 100 UNIT/ML pen Inject 3 Units Subcutaneous daily (with lunch)       insulin aspart (NOVOLOG PEN) 100 UNIT/ML pen Inject 5 Units Subcutaneous daily (with dinner)       insulin glargine (LANTUS PEN) 100 UNIT/ML pen Inject 18 Units Subcutaneous at bedtime 15 mL 0     ketoconazole (NIZORAL) 2 % external shampoo Shampoo the hair thoroughly each as need.       latanoprost (XALATAN) 0.005 % ophthalmic solution Place 1 drop into the right eye At Bedtime       LEVOTHYROXINE SODIUM PO Take 88 mcg by  mouth daily       losartan (COZAAR) 50 MG tablet Take 1 tablet (50 mg) by mouth daily HOLD if blood pressure <100 30 tablet 0     melatonin 3 MG tablet Take 1 tablet (3 mg) by mouth nightly as needed for sleep 30 tablet 0     metoprolol succinate ER (TOPROL XL) 100 MG 24 hr tablet Take 1 tablet (100 mg) by mouth 2 times daily 30 tablet 0     Multiple Vitamins-Calcium (ONE-A-DAY WOMENS FORMULA PO) Take 1 tablet by mouth daily       OMEPRAZOLE PO Take 40 mg by mouth daily       prednisoLONE acetate (PRED FORTE) 1 % ophthalmic suspension Place 1 drop Into the left eye daily       SENNA-docusate sodium (SENNA S) 8.6-50 MG tablet Take 2 tablets by mouth daily as needed (CONSTIPATION) 30 tablet 0     timolol maleate (TIMOPTIC) 0.5 % ophthalmic solution Place 1 drop into both eyes 2 times daily       tiZANidine (ZANAFLEX) 4 MG tablet Take 1 tablet (4 mg) by mouth 3 times daily 90 tablet 1       SERVICES:  Home Care:  Occupational Therapy, Physical Therapy, Registered Nurse, and Home Health Aide      CARMEL Marrero CNP  This document was electronically signed on November 6, 2023        Sincerely,        CARMEL Marrero CNP

## 2023-11-06 NOTE — PROGRESS NOTES
[place] Discharge Orders    Name: Lj Castro  : 1945  Planned Discharge Date: 23  Discharged to: previous independent home    MEDICAL FOLLOW UP  Follow up with PCP in 1-2 weeks   Follow up with Specialists   Surgeon    FUTURE LABS: No labs orders/due      DISCHARGE MEDICATIONS:  The patient s pharmacy is authorized to dispense a 30-day supply of medications. Refill requests should be directed to the primary provider, Cory Soliz  No narcotics are prescribed at time of discharge.   Current Outpatient Medications   Medication Sig Dispense Refill    acetaminophen (TYLENOL) 325 MG tablet Take 2 tablets (650 mg) by mouth 3 times daily. May also take 2 tablets (650 mg) daily as needed for mild pain. 30 tablet 0    apixaban ANTICOAGULANT (ELIQUIS) 5 MG tablet Take 5 mg by mouth 2 times daily      atorvastatin (LIPITOR) 40 MG tablet Take 1 tablet by mouth daily      brimonidine (ALPHAGAN-P) 0.15 % ophthalmic solution Place 1 drop Into the left eye 2 times daily      gabapentin (NEURONTIN) 100 MG capsule Take 4 capsules (400 mg) by mouth 3 times daily And 2 x daily as needed 60 capsule 0    hydrochlorothiazide (HYDRODIURIL) 25 MG tablet Take 1 tablet (25 mg) by mouth daily 30 tablet 0    insulin aspart (NOVOLOG PEN) 100 UNIT/ML pen Inject 3 Units Subcutaneous daily (with breakfast)      insulin aspart (NOVOLOG PEN) 100 UNIT/ML pen Inject 3 Units Subcutaneous daily (with lunch)      insulin aspart (NOVOLOG PEN) 100 UNIT/ML pen Inject 5 Units Subcutaneous daily (with dinner)      insulin glargine (LANTUS PEN) 100 UNIT/ML pen Inject 18 Units Subcutaneous at bedtime 15 mL 0    ketoconazole (NIZORAL) 2 % external shampoo Shampoo the hair thoroughly each as need.      latanoprost (XALATAN) 0.005 % ophthalmic solution Place 1 drop into the right eye At Bedtime      LEVOTHYROXINE SODIUM PO Take 88 mcg by mouth daily      losartan (COZAAR) 50 MG tablet Take 1 tablet (50 mg) by mouth daily  HOLD if blood pressure <100 30 tablet 0    melatonin 3 MG tablet Take 1 tablet (3 mg) by mouth nightly as needed for sleep 30 tablet 0    metoprolol succinate ER (TOPROL XL) 100 MG 24 hr tablet Take 1 tablet (100 mg) by mouth 2 times daily 30 tablet 0    Multiple Vitamins-Calcium (ONE-A-DAY WOMENS FORMULA PO) Take 1 tablet by mouth daily      OMEPRAZOLE PO Take 40 mg by mouth daily      prednisoLONE acetate (PRED FORTE) 1 % ophthalmic suspension Place 1 drop Into the left eye daily      SENNA-docusate sodium (SENNA S) 8.6-50 MG tablet Take 2 tablets by mouth daily as needed (CONSTIPATION) 30 tablet 0    timolol maleate (TIMOPTIC) 0.5 % ophthalmic solution Place 1 drop into both eyes 2 times daily      tiZANidine (ZANAFLEX) 4 MG tablet Take 1 tablet (4 mg) by mouth 3 times daily 90 tablet 1       SERVICES:  Home Care:  Occupational Therapy, Physical Therapy, Registered Nurse, and Home Health Aide      CARMEL Marrero CNP  This document was electronically signed on November 6, 2023

## 2023-11-06 NOTE — PROGRESS NOTES
[facility] DISCHARGE SUMMARY  PATIENT'S NAME: Lj Castro  YOB: 1945  MEDICAL RECORD NUMBER:  [id number]  Place of Service where encounter took place:  [facility] (TCU) [[id number]]    PRIMARY CARE PROVIDER AND CLINIC RESPONSIBLE AFTER TRANSFER:   Cory Soliz DO, [address] / [facility] [address]    Non-FMG Provider     Transferring providers: CARMEL Marrero CNP; Celestino Reeves MD  Recent Hospitalization/ED:  [facility] Hospital stay 10/13/23 to 10/17/23.  Date of SNF Admission:  10/17/23  Date of SNF (anticipated) Discharge:  11/8/23  Discharged to: previous independent home  Cognitive Scores/ Physical Function/ DME:   Transfers CGA  Ambulating 90' CGA  SLUMS 19/30  UB dressing min/mod  LB dressing mod/max  Toileting mod/max  Spouse completed care giver training    CODE STATUS/ADVANCE DIRECTIVES DISCUSSION:  Full Code   ALLERGIES: Patient has no known allergies.    DISCHARGE DIAGNOSIS/NURSING FACILITY COURSE:     Patient Lj Castro is a 78 yr old female admitted to [facility] for rehabilitation s/post hospitalization [facility] 10/13-10/17/23 C3 to T2 cervical lamimectomy and spinal cord decompression, C3 to T2 posterior fusion, C3 to T2 segemental instrumentation for progressive myelopathy due to stenosis and kyphosis from C3-T3.   Complications hypotension after surgery     PMHx HTN, HLD, hypothyroid, DM2, pneumonia, CKD3 (baseline cr about 1.15-1.35), AFib-on DOAC, chronic neck and back pain, neurogenic claudication, PAD, and GERD       s/p C3-T2 cervical laminectomy and cervical cord decompression  Pain managed on scheduled tylenol, Neurontin and muscle relaxant   oxycodone discontinue due to not using   Mobility without mobility aid prior,  Incision keep clean dry, can shower after 48 hrs per orders and activity as tolerates  Follow up surgeon Sagar Cadena MD - Primary    Need clarification with surgeon regarding follow up appointment, suture/staple removal, restrictions, parameters and duration of neck brace-Health Unit Coordinator to clarify prior to discharge   Progressing in therapies and plans to discharge home with homecare    Constipation  IBS  Continue Senna S 2 tabs daily as needed     Hypotension (resolved)  History of essential hypertension  Blood pressure managed   Continue hydrochlorothiazide, losartan and Metoprolol  Monitor     Hyperlipidemia  Continue Lipitor  Monitor     atrial fibrillation  Hr controlled  continue Metoprolol   Continue Eliquis     Diabetes mellitus type 2  Continue Lantus and NovoLog set at meals  Blood sugar managed, on Lantus 18 units, hold if blood sugar <100  Order blood sugar checks 4 xday    GERD  Continue Meprazole    Hypothyroidism  TSH   Date Value Ref Range Status   03/19/2015 2.05 0.40 - 4.00 mU/L Final     Comment:     Effective 7/30/2014, the reference range for this assay has changed to reflect   new instrumentation/methodology.       Continue on levothyroxine 88 mcg daily    Acute kidney injury (resolved)  Secondary to volume loss with the surgery.  Creatinine bumped up to 1.27,   Creatinine   Date Value Ref Range Status   10/25/2023 1.06 (H) 0.51 - 0.95 mg/dL Final   03/20/2015 0.96 0.52 - 1.04 mg/dL Final   Monitor     Glaucoma  Continue Pred Forte, Xalatan, Alphagan and Timoptic resumed     Deconditioned  Comment: d/t recent hospitalization & comorbidity, expect delay rehabilitation d/t age & comorbidity  Plan: PT/OT eval & treat, monitor    Advanced care planning  elects full code      Past Medical History:  has a past medical history of Arrhythmia, Bursitis, olecranon, Diabetes (H), Displacement of cervical intervertebral disc without myelopathy, Gastro-oesophageal reflux disease, Glaucoma, Hyperlipemia, Hypertension, Hypothyroid, Renal failure, acute (H24) (6-7-11 to 6-17-11), and Varicose veins.    She has no past medical  history of Complication of anesthesia.    Discharge Medications:    Current Outpatient Medications   Medication Sig Dispense Refill    acetaminophen (TYLENOL) 325 MG tablet Take 2 tablets (650 mg) by mouth 3 times daily. May also take 2 tablets (650 mg) daily as needed for mild pain. 30 tablet 0    apixaban ANTICOAGULANT (ELIQUIS) 5 MG tablet Take 5 mg by mouth 2 times daily      atorvastatin (LIPITOR) 40 MG tablet Take 1 tablet by mouth daily      brimonidine (ALPHAGAN-P) 0.15 % ophthalmic solution Place 1 drop Into the left eye 2 times daily      gabapentin (NEURONTIN) 100 MG capsule Take 4 capsules (400 mg) by mouth 3 times daily And 2 x daily as needed 60 capsule 0    hydrochlorothiazide (HYDRODIURIL) 25 MG tablet Take 1 tablet (25 mg) by mouth daily 30 tablet 0    insulin aspart (NOVOLOG PEN) 100 UNIT/ML pen Inject 3 Units Subcutaneous daily (with breakfast)      insulin aspart (NOVOLOG PEN) 100 UNIT/ML pen Inject 3 Units Subcutaneous daily (with lunch)      insulin aspart (NOVOLOG PEN) 100 UNIT/ML pen Inject 5 Units Subcutaneous daily (with dinner)      insulin glargine (LANTUS PEN) 100 UNIT/ML pen Inject 18 Units Subcutaneous at bedtime 15 mL 0    ketoconazole (NIZORAL) 2 % external shampoo Shampoo the hair thoroughly each as need.      latanoprost (XALATAN) 0.005 % ophthalmic solution Place 1 drop into the right eye At Bedtime      LEVOTHYROXINE SODIUM PO Take 88 mcg by mouth daily      losartan (COZAAR) 50 MG tablet Take 1 tablet (50 mg) by mouth daily HOLD if blood pressure <100 30 tablet 0    melatonin 3 MG tablet Take 1 tablet (3 mg) by mouth nightly as needed for sleep 30 tablet 0    metoprolol succinate ER (TOPROL XL) 100 MG 24 hr tablet Take 1 tablet (100 mg) by mouth 2 times daily 30 tablet 0    Multiple Vitamins-Calcium (ONE-A-DAY WOMENS FORMULA PO) Take 1 tablet by mouth daily      OMEPRAZOLE PO Take 40 mg by mouth daily      prednisoLONE acetate (PRED FORTE) 1 % ophthalmic suspension Place 1 drop  "Into the left eye daily      SENNA-docusate sodium (SENNA S) 8.6-50 MG tablet Take 2 tablets by mouth daily as needed (CONSTIPATION) 30 tablet 0    timolol maleate (TIMOPTIC) 0.5 % ophthalmic solution Place 1 drop into both eyes 2 times daily      tiZANidine (ZANAFLEX) 4 MG tablet Take 1 tablet (4 mg) by mouth 3 times daily 90 tablet 1       Medication Changes/Rationale:   See HPI    Controlled medications sent with patient:   not applicable/none     ROS:   4 point ROS including Respiratory, CV, GI and , other than that noted in the HPI,  is negative    Physical Exam:   Vitals: /65   Pulse 74   Temp 97  F (36.1  C)   Resp 18   Ht 1.6 m (5' 3\")   Wt 81.7 kg (180 lb 1.6 oz)   SpO2 97%   BMI 31.90 kg/m    BMI= Body mass index is 31.9 kg/m .  GENERAL APPEARANCE:  Alert, in no distress  NECK: wearing neck brace  RESP:  respiratory effort and palpation of chest normal, lungs clear to auscultation , no respiratory distress  CV:  Palpation and auscultation of heart done , regular rate and rhythm  ABDOMEN:  normal bowel sounds, soft, nontender  M/S:   sitting up in WC  SKIN:  Inspection of skin and subcutaneous tissue baseline  NEURO:   Cranial nerves 2-12 are normal tested and grossly at patient's baseline  PSYCH:  oriented X 3     SNF labs: Labs done in SNF are in [facility]. Please refer to them using [employer]/Care Everywhere.    DISCHARGE PLAN:  Follow up labs: No labs orders/due  Medical Follow Up:      Follow up with primary care provider in 1-2 weeks and surgeon  MTM referral needed and placed by this provider: Yes:     Discharge Services: Home Care:  Occupational Therapy, Physical Therapy, Registered Nurse, and Home Health Aide        TOTAL DISCHARGE TIME:   Greater than 30 minutes  Electronically signed by:  CARMEL Marrero CNP     Home care Face to Face documentation done in [facility] attached to Home care orders for homecare.                 "

## 2024-03-12 ENCOUNTER — ANESTHESIA (OUTPATIENT)
Dept: SURGERY | Facility: CLINIC | Age: 79
DRG: 500 | End: 2024-03-12
Payer: COMMERCIAL

## 2024-03-12 ENCOUNTER — APPOINTMENT (OUTPATIENT)
Dept: CT IMAGING | Facility: CLINIC | Age: 79
DRG: 500 | End: 2024-03-12
Attending: EMERGENCY MEDICINE
Payer: COMMERCIAL

## 2024-03-12 ENCOUNTER — ANESTHESIA EVENT (OUTPATIENT)
Dept: SURGERY | Facility: CLINIC | Age: 79
DRG: 500 | End: 2024-03-12
Payer: COMMERCIAL

## 2024-03-12 ENCOUNTER — HOSPITAL ENCOUNTER (INPATIENT)
Facility: CLINIC | Age: 79
LOS: 8 days | Discharge: NURSING, REHAB OR LONG-TERM CARE | DRG: 500 | End: 2024-03-20
Attending: EMERGENCY MEDICINE | Admitting: INTERNAL MEDICINE
Payer: COMMERCIAL

## 2024-03-12 ENCOUNTER — MEDICAL CORRESPONDENCE (OUTPATIENT)
Dept: HEALTH INFORMATION MANAGEMENT | Facility: CLINIC | Age: 79
End: 2024-03-12

## 2024-03-12 DIAGNOSIS — L08.9 NECK INFECTION: ICD-10-CM

## 2024-03-12 DIAGNOSIS — S01.90XA OPEN WOUND INVOLVING HEAD WITH NECK, COMPLICATED: Primary | ICD-10-CM

## 2024-03-12 DIAGNOSIS — E11.9 TYPE 2 DIABETES MELLITUS WITHOUT COMPLICATION, WITH LONG-TERM CURRENT USE OF INSULIN (H): ICD-10-CM

## 2024-03-12 DIAGNOSIS — Z79.4 TYPE 2 DIABETES MELLITUS WITHOUT COMPLICATION, WITH LONG-TERM CURRENT USE OF INSULIN (H): ICD-10-CM

## 2024-03-12 DIAGNOSIS — R78.81 GRAM-POSITIVE BACTEREMIA: ICD-10-CM

## 2024-03-12 DIAGNOSIS — S11.90XA OPEN WOUND INVOLVING HEAD WITH NECK, COMPLICATED: Primary | ICD-10-CM

## 2024-03-12 DIAGNOSIS — Z98.890 H/O SPINAL SURGERY: ICD-10-CM

## 2024-03-12 LAB
ABO/RH(D): NORMAL
ALBUMIN UR-MCNC: 30 MG/DL
ANION GAP SERPL CALCULATED.3IONS-SCNC: 15 MMOL/L (ref 7–15)
ANTIBODY SCREEN: NEGATIVE
APPEARANCE UR: ABNORMAL
ATRIAL RATE - MUSE: NORMAL BPM
BACTERIA #/AREA URNS HPF: ABNORMAL /HPF
BASOPHILS # BLD AUTO: 0 10E3/UL (ref 0–0.2)
BASOPHILS NFR BLD AUTO: 0 %
BILIRUB UR QL STRIP: NEGATIVE
BUN SERPL-MCNC: 28.7 MG/DL (ref 8–23)
CALCIUM SERPL-MCNC: 9.2 MG/DL (ref 8.8–10.2)
CHLORIDE SERPL-SCNC: 95 MMOL/L (ref 98–107)
COLOR UR AUTO: YELLOW
CREAT SERPL-MCNC: 1.2 MG/DL (ref 0.51–0.95)
CRP SERPL-MCNC: 278.82 MG/L
DEPRECATED HCO3 PLAS-SCNC: 24 MMOL/L (ref 22–29)
DIASTOLIC BLOOD PRESSURE - MUSE: NORMAL MMHG
EGFRCR SERPLBLD CKD-EPI 2021: 46 ML/MIN/1.73M2
EOSINOPHIL # BLD AUTO: 0 10E3/UL (ref 0–0.7)
EOSINOPHIL NFR BLD AUTO: 0 %
ERYTHROCYTE [DISTWIDTH] IN BLOOD BY AUTOMATED COUNT: 12.9 % (ref 10–15)
ERYTHROCYTE [SEDIMENTATION RATE] IN BLOOD BY WESTERGREN METHOD: 72 MM/HR (ref 0–30)
GLUCOSE BLDC GLUCOMTR-MCNC: 269 MG/DL (ref 70–99)
GLUCOSE SERPL-MCNC: 213 MG/DL (ref 70–99)
GLUCOSE UR STRIP-MCNC: NEGATIVE MG/DL
HCO3 BLDV-SCNC: 29 MMOL/L (ref 21–28)
HCT VFR BLD AUTO: 34.2 % (ref 35–47)
HGB BLD-MCNC: 10.9 G/DL (ref 11.7–15.7)
HGB UR QL STRIP: ABNORMAL
HOLD SPECIMEN: NORMAL
IMM GRANULOCYTES # BLD: 0.1 10E3/UL
IMM GRANULOCYTES NFR BLD: 1 %
INTERPRETATION ECG - MUSE: NORMAL
KETONES UR STRIP-MCNC: NEGATIVE MG/DL
LACTATE BLD-SCNC: 1.1 MMOL/L
LEUKOCYTE ESTERASE UR QL STRIP: ABNORMAL
LYMPHOCYTES # BLD AUTO: 0.5 10E3/UL (ref 0.8–5.3)
LYMPHOCYTES NFR BLD AUTO: 4 %
MCH RBC QN AUTO: 30.5 PG (ref 26.5–33)
MCHC RBC AUTO-ENTMCNC: 31.9 G/DL (ref 31.5–36.5)
MCV RBC AUTO: 96 FL (ref 78–100)
MONOCYTES # BLD AUTO: 0.6 10E3/UL (ref 0–1.3)
MONOCYTES NFR BLD AUTO: 6 %
MUCOUS THREADS #/AREA URNS LPF: PRESENT /LPF
NEUTROPHILS # BLD AUTO: 10.5 10E3/UL (ref 1.6–8.3)
NEUTROPHILS NFR BLD AUTO: 89 %
NITRATE UR QL: NEGATIVE
NRBC # BLD AUTO: 0 10E3/UL
NRBC BLD AUTO-RTO: 0 /100
P AXIS - MUSE: NORMAL DEGREES
PCO2 BLDV: 46 MM HG (ref 40–50)
PH BLDV: 7.4 [PH] (ref 7.32–7.43)
PH UR STRIP: 5 [PH] (ref 5–7)
PLATELET # BLD AUTO: 204 10E3/UL (ref 150–450)
PO2 BLDV: 13 MM HG (ref 25–47)
POTASSIUM SERPL-SCNC: 3.7 MMOL/L (ref 3.4–5.3)
PR INTERVAL - MUSE: NORMAL MS
QRS DURATION - MUSE: 80 MS
QT - MUSE: 312 MS
QTC - MUSE: 422 MS
R AXIS - MUSE: -26 DEGREES
RBC # BLD AUTO: 3.57 10E6/UL (ref 3.8–5.2)
RBC URINE: 1 /HPF
SAO2 % BLDV: 15 % (ref 70–75)
SODIUM SERPL-SCNC: 134 MMOL/L (ref 135–145)
SP GR UR STRIP: 1.02 (ref 1–1.03)
SPECIMEN EXPIRATION DATE: NORMAL
SYSTOLIC BLOOD PRESSURE - MUSE: NORMAL MMHG
T AXIS - MUSE: -81 DEGREES
TROPONIN T SERPL HS-MCNC: 18 NG/L
UROBILINOGEN UR STRIP-MCNC: NORMAL MG/DL
VENTRICULAR RATE- MUSE: 110 BPM
WBC # BLD AUTO: 11.7 10E3/UL (ref 4–11)
WBC URINE: 18 /HPF

## 2024-03-12 PROCEDURE — 250N000009 HC RX 250: Performed by: NURSE ANESTHETIST, CERTIFIED REGISTERED

## 2024-03-12 PROCEDURE — 200N000001 HC R&B ICU

## 2024-03-12 PROCEDURE — 87086 URINE CULTURE/COLONY COUNT: CPT | Performed by: EMERGENCY MEDICINE

## 2024-03-12 PROCEDURE — 96361 HYDRATE IV INFUSION ADD-ON: CPT

## 2024-03-12 PROCEDURE — 250N000011 HC RX IP 250 OP 636: Performed by: NURSE ANESTHETIST, CERTIFIED REGISTERED

## 2024-03-12 PROCEDURE — 84484 ASSAY OF TROPONIN QUANT: CPT | Performed by: EMERGENCY MEDICINE

## 2024-03-12 PROCEDURE — 250N000009 HC RX 250: Performed by: ORTHOPAEDIC SURGERY

## 2024-03-12 PROCEDURE — 250N000011 HC RX IP 250 OP 636: Mod: JZ | Performed by: EMERGENCY MEDICINE

## 2024-03-12 PROCEDURE — 86900 BLOOD TYPING SEROLOGIC ABO: CPT | Performed by: EMERGENCY MEDICINE

## 2024-03-12 PROCEDURE — 87149 DNA/RNA DIRECT PROBE: CPT | Performed by: EMERGENCY MEDICINE

## 2024-03-12 PROCEDURE — 70450 CT HEAD/BRAIN W/O DYE: CPT

## 2024-03-12 PROCEDURE — 85049 AUTOMATED PLATELET COUNT: CPT | Performed by: EMERGENCY MEDICINE

## 2024-03-12 PROCEDURE — 93005 ELECTROCARDIOGRAM TRACING: CPT

## 2024-03-12 PROCEDURE — 87205 SMEAR GRAM STAIN: CPT | Performed by: ORTHOPAEDIC SURGERY

## 2024-03-12 PROCEDURE — 36415 COLL VENOUS BLD VENIPUNCTURE: CPT | Performed by: EMERGENCY MEDICINE

## 2024-03-12 PROCEDURE — 87070 CULTURE OTHR SPECIMN AEROBIC: CPT | Performed by: ORTHOPAEDIC SURGERY

## 2024-03-12 PROCEDURE — 258N000003 HC RX IP 258 OP 636: Performed by: EMERGENCY MEDICINE

## 2024-03-12 PROCEDURE — 82803 BLOOD GASES ANY COMBINATION: CPT

## 2024-03-12 PROCEDURE — 80048 BASIC METABOLIC PNL TOTAL CA: CPT | Performed by: EMERGENCY MEDICINE

## 2024-03-12 PROCEDURE — 87186 SC STD MICRODIL/AGAR DIL: CPT | Performed by: EMERGENCY MEDICINE

## 2024-03-12 PROCEDURE — 81001 URINALYSIS AUTO W/SCOPE: CPT | Performed by: EMERGENCY MEDICINE

## 2024-03-12 PROCEDURE — 96360 HYDRATION IV INFUSION INIT: CPT | Mod: 59

## 2024-03-12 PROCEDURE — 3E043XZ INTRODUCTION OF VASOPRESSOR INTO CENTRAL VEIN, PERCUTANEOUS APPROACH: ICD-10-PCS | Performed by: ORTHOPAEDIC SURGERY

## 2024-03-12 PROCEDURE — 85652 RBC SED RATE AUTOMATED: CPT | Performed by: EMERGENCY MEDICINE

## 2024-03-12 PROCEDURE — 72125 CT NECK SPINE W/O DYE: CPT

## 2024-03-12 PROCEDURE — 258N000003 HC RX IP 258 OP 636: Performed by: NURSE ANESTHETIST, CERTIFIED REGISTERED

## 2024-03-12 PROCEDURE — 99285 EMERGENCY DEPT VISIT HI MDM: CPT | Mod: 25

## 2024-03-12 PROCEDURE — 86140 C-REACTIVE PROTEIN: CPT | Performed by: EMERGENCY MEDICINE

## 2024-03-12 PROCEDURE — 30283B1 TRANSFUSION OF NONAUTOLOGOUS 4-FACTOR PROTHROMBIN COMPLEX CONCENTRATE INTO VEIN, PERCUTANEOUS APPROACH: ICD-10-PCS | Performed by: EMERGENCY MEDICINE

## 2024-03-12 PROCEDURE — 87075 CULTR BACTERIA EXCEPT BLOOD: CPT | Performed by: ORTHOPAEDIC SURGERY

## 2024-03-12 PROCEDURE — 250N000013 HC RX MED GY IP 250 OP 250 PS 637: Performed by: EMERGENCY MEDICINE

## 2024-03-12 RX ORDER — CEFTRIAXONE 2 G/1
2 INJECTION, POWDER, FOR SOLUTION INTRAMUSCULAR; INTRAVENOUS ONCE
Status: DISCONTINUED | OUTPATIENT
Start: 2024-03-12 | End: 2024-03-12

## 2024-03-12 RX ORDER — LIDOCAINE HYDROCHLORIDE 20 MG/ML
INJECTION, SOLUTION INFILTRATION; PERINEURAL PRN
Status: DISCONTINUED | OUTPATIENT
Start: 2024-03-12 | End: 2024-03-13

## 2024-03-12 RX ORDER — SODIUM CHLORIDE, SODIUM LACTATE, POTASSIUM CHLORIDE, CALCIUM CHLORIDE 600; 310; 30; 20 MG/100ML; MG/100ML; MG/100ML; MG/100ML
INJECTION, SOLUTION INTRAVENOUS CONTINUOUS
Status: DISCONTINUED | OUTPATIENT
Start: 2024-03-12 | End: 2024-03-13 | Stop reason: HOSPADM

## 2024-03-12 RX ORDER — CEFEPIME HYDROCHLORIDE 2 G/1
2 INJECTION, POWDER, FOR SOLUTION INTRAVENOUS ONCE
Status: DISCONTINUED | OUTPATIENT
Start: 2024-03-12 | End: 2024-03-12

## 2024-03-12 RX ORDER — METOPROLOL SUCCINATE 25 MG/1
100 TABLET, EXTENDED RELEASE ORAL ONCE
Status: COMPLETED | OUTPATIENT
Start: 2024-03-12 | End: 2024-03-12

## 2024-03-12 RX ORDER — LIDOCAINE 40 MG/G
CREAM TOPICAL
Status: DISCONTINUED | OUTPATIENT
Start: 2024-03-12 | End: 2024-03-13 | Stop reason: HOSPADM

## 2024-03-12 RX ORDER — SODIUM CHLORIDE 9 MG/ML
INJECTION, SOLUTION INTRAVENOUS CONTINUOUS
Status: DISCONTINUED | OUTPATIENT
Start: 2024-03-12 | End: 2024-03-13

## 2024-03-12 RX ORDER — CEFEPIME HYDROCHLORIDE 2 G/1
2 INJECTION, POWDER, FOR SOLUTION INTRAVENOUS ONCE
Status: COMPLETED | OUTPATIENT
Start: 2024-03-12 | End: 2024-03-12

## 2024-03-12 RX ORDER — PROPOFOL 10 MG/ML
INJECTION, EMULSION INTRAVENOUS PRN
Status: DISCONTINUED | OUTPATIENT
Start: 2024-03-12 | End: 2024-03-13

## 2024-03-12 RX ORDER — GABAPENTIN 300 MG/1
600 CAPSULE ORAL 2 TIMES DAILY
COMMUNITY
Start: 2024-03-01

## 2024-03-12 RX ORDER — DEXAMETHASONE SODIUM PHOSPHATE 4 MG/ML
INJECTION, SOLUTION INTRA-ARTICULAR; INTRALESIONAL; INTRAMUSCULAR; INTRAVENOUS; SOFT TISSUE PRN
Status: DISCONTINUED | OUTPATIENT
Start: 2024-03-12 | End: 2024-03-13

## 2024-03-12 RX ORDER — NOREPINEPHRINE BITARTRATE 0.02 MG/ML
.01-.6 INJECTION, SOLUTION INTRAVENOUS CONTINUOUS
Status: DISCONTINUED | OUTPATIENT
Start: 2024-03-12 | End: 2024-03-13

## 2024-03-12 RX ORDER — HYDROMORPHONE HYDROCHLORIDE 1 MG/ML
0.5 INJECTION, SOLUTION INTRAMUSCULAR; INTRAVENOUS; SUBCUTANEOUS ONCE
Status: COMPLETED | OUTPATIENT
Start: 2024-03-12 | End: 2024-03-12

## 2024-03-12 RX ORDER — FENTANYL CITRATE 50 UG/ML
INJECTION, SOLUTION INTRAMUSCULAR; INTRAVENOUS PRN
Status: DISCONTINUED | OUTPATIENT
Start: 2024-03-12 | End: 2024-03-13

## 2024-03-12 RX ORDER — SODIUM CHLORIDE, SODIUM LACTATE, POTASSIUM CHLORIDE, CALCIUM CHLORIDE 600; 310; 30; 20 MG/100ML; MG/100ML; MG/100ML; MG/100ML
INJECTION, SOLUTION INTRAVENOUS CONTINUOUS PRN
Status: DISCONTINUED | OUTPATIENT
Start: 2024-03-12 | End: 2024-03-13

## 2024-03-12 RX ADMIN — LIDOCAINE HYDROCHLORIDE 25 MG: 20 INJECTION, SOLUTION INFILTRATION; PERINEURAL at 23:18

## 2024-03-12 RX ADMIN — SODIUM CHLORIDE 1000 ML: 9 INJECTION, SOLUTION INTRAVENOUS at 13:27

## 2024-03-12 RX ADMIN — NOREPINEPHRINE BITARTRATE 0.03 MCG/KG/MIN: 0.02 INJECTION, SOLUTION INTRAVENOUS at 23:27

## 2024-03-12 RX ADMIN — DEXAMETHASONE SODIUM PHOSPHATE 4 MG: 4 INJECTION, SOLUTION INTRA-ARTICULAR; INTRALESIONAL; INTRAMUSCULAR; INTRAVENOUS; SOFT TISSUE at 23:18

## 2024-03-12 RX ADMIN — PHENYLEPHRINE HYDROCHLORIDE 200 MCG: 10 INJECTION INTRAVENOUS at 23:56

## 2024-03-12 RX ADMIN — VANCOMYCIN HYDROCHLORIDE 1500 MG: 10 INJECTION, POWDER, LYOPHILIZED, FOR SOLUTION INTRAVENOUS at 18:15

## 2024-03-12 RX ADMIN — ROCURONIUM BROMIDE 20 MG: 50 INJECTION, SOLUTION INTRAVENOUS at 23:38

## 2024-03-12 RX ADMIN — SODIUM CHLORIDE: 9 INJECTION, SOLUTION INTRAVENOUS at 16:34

## 2024-03-12 RX ADMIN — CEFEPIME 2 G: 2 INJECTION, POWDER, FOR SOLUTION INTRAVENOUS at 17:48

## 2024-03-12 RX ADMIN — PROPOFOL 100 MG: 10 INJECTION, EMULSION INTRAVENOUS at 23:18

## 2024-03-12 RX ADMIN — ROCURONIUM BROMIDE 30 MG: 50 INJECTION, SOLUTION INTRAVENOUS at 23:18

## 2024-03-12 RX ADMIN — SODIUM CHLORIDE, POTASSIUM CHLORIDE, SODIUM LACTATE AND CALCIUM CHLORIDE: 600; 310; 30; 20 INJECTION, SOLUTION INTRAVENOUS at 23:16

## 2024-03-12 RX ADMIN — FENTANYL CITRATE 50 MCG: 50 INJECTION INTRAMUSCULAR; INTRAVENOUS at 23:18

## 2024-03-12 RX ADMIN — METOPROLOL SUCCINATE 100 MG: 25 TABLET, EXTENDED RELEASE ORAL at 17:02

## 2024-03-12 RX ADMIN — PHENYLEPHRINE HYDROCHLORIDE 100 MCG: 10 INJECTION INTRAVENOUS at 23:53

## 2024-03-12 RX ADMIN — HYDROMORPHONE HYDROCHLORIDE 0.5 MG: 1 INJECTION, SOLUTION INTRAMUSCULAR; INTRAVENOUS; SUBCUTANEOUS at 16:42

## 2024-03-12 RX ADMIN — SODIUM CHLORIDE 1000 ML: 9 INJECTION, SOLUTION INTRAVENOUS at 19:56

## 2024-03-12 RX ADMIN — FENTANYL CITRATE 50 MCG: 50 INJECTION INTRAMUSCULAR; INTRAVENOUS at 23:38

## 2024-03-12 RX ADMIN — PHENYLEPHRINE HYDROCHLORIDE 200 MCG: 10 INJECTION INTRAVENOUS at 23:27

## 2024-03-12 RX ADMIN — SODIUM CHLORIDE 1000 ML: 9 INJECTION, SOLUTION INTRAVENOUS at 10:21

## 2024-03-12 RX ADMIN — SODIUM CHLORIDE 1000 ML: 9 INJECTION, SOLUTION INTRAVENOUS at 18:22

## 2024-03-12 RX ADMIN — PROTHROMBIN, COAGULATION FACTOR VII HUMAN, COAGULATION FACTOR IX HUMAN, COAGULATION FACTOR X HUMAN, PROTEIN C, PROTEIN S HUMAN, AND WATER 3714 UNITS: KIT at 19:49

## 2024-03-12 ASSESSMENT — ACTIVITIES OF DAILY LIVING (ADL)
ADLS_ACUITY_SCORE: 42
ADLS_ACUITY_SCORE: 38

## 2024-03-12 ASSESSMENT — COLUMBIA-SUICIDE SEVERITY RATING SCALE - C-SSRS
1. IN THE PAST MONTH, HAVE YOU WISHED YOU WERE DEAD OR WISHED YOU COULD GO TO SLEEP AND NOT WAKE UP?: NO
2. HAVE YOU ACTUALLY HAD ANY THOUGHTS OF KILLING YOURSELF IN THE PAST MONTH?: NO
6. HAVE YOU EVER DONE ANYTHING, STARTED TO DO ANYTHING, OR PREPARED TO DO ANYTHING TO END YOUR LIFE?: NO

## 2024-03-12 ASSESSMENT — ENCOUNTER SYMPTOMS: DYSRHYTHMIAS: 1

## 2024-03-12 NOTE — ED NOTES
[facility]  Expected date: 3/12/24  Expected time: 8:23 AM  Means of arrival:   Comments:  H419 to triage

## 2024-03-12 NOTE — ED TRIAGE NOTES
Triage Assessment (Adult)       Row Name 03/12/24 0855          Triage Assessment    Airway WDL WDL        Respiratory WDL    Respiratory WDL WDL        Skin Circulation/Temperature WDL    Skin Circulation/Temperature WDL WDL        Cardiac WDL    Cardiac WDL WDL        Peripheral/Neurovascular WDL    Peripheral Neurovascular WDL WDL        Cognitive/Neuro/Behavioral WDL    Cognitive/Neuro/Behavioral WDL X     Level of Consciousness alert  EMS reports intermittent episodes of confusion.     Orientation oriented x 4     Speech clear;logical;spontaneous     Mood/Behavior calm;cooperative

## 2024-03-12 NOTE — ED NOTES
Pt BP dropped to SBP 80'2 notified MD. Pt in trendelenburg. IV fluid bolus ordered. IV ABX administered. Pt BP cycling q5.

## 2024-03-12 NOTE — ED TRIAGE NOTES
Pt arrives via EMS, EMS reports that pt lives at home with  who reports that pt has been having increasing weakness right arm pain and, intermittent episodes of confusion over the last few days that have resolved per family. Pt AxOX4 upon arrival and BEFAST negative. VSS and ABC's intact

## 2024-03-12 NOTE — ED NOTES
"[facility]  ED Nurse Handoff Report    ED Chief complaint: Generalized Weakness and Arm Pain  . ED Diagnosis:   Final diagnoses:   Neck infection       Allergies: No Known Allergies    Code Status: Full Code    Activity level - Baseline/Home:  standby.  Activity Level - Current:   assist of 1.   Lift room needed: No.   Bariatric: No   Needed: No   Isolation: No.   Infection: Not Applicable.     Respiratory status: Room air    Vital Signs (within 30 minutes):   Vitals:    03/12/24 1030 03/12/24 1115 03/12/24 1341 03/12/24 1346   BP: 123/74  (!) 134/102    Pulse: 104 105 77    Resp: 21 21 27    Temp:       TempSrc:       SpO2: 100% 96% 96%    Weight:    77.1 kg (170 lb)   Height:    1.575 m (5' 2\")       Cardiac Rhythm:  ,      Pain level:    Patient confused: Yes.   Patient Falls Risk: patient and family education and assistive device/personal items within reach.   Elimination Status: Has voided     Patient Report - Initial Complaint: generalized weakness/confusion.   Focused Assessment: pt AOX3, with episodes of intermittent confusion. Pt assist of 1-2 depending on orientation status to follow commands.     Abnormal Results:   Labs Ordered and Resulted from Time of ED Arrival to Time of ED Departure   BASIC METABOLIC PANEL - Abnormal       Result Value    Sodium 134 (*)     Potassium 3.7      Chloride 95 (*)     Carbon Dioxide (CO2) 24      Anion Gap 15      Urea Nitrogen 28.7 (*)     Creatinine 1.20 (*)     GFR Estimate 46 (*)     Calcium 9.2      Glucose 213 (*)    ROUTINE UA WITH MICROSCOPIC REFLEX TO CULTURE - Abnormal    Color Urine Yellow      Appearance Urine Slightly Cloudy (*)     Glucose Urine Negative      Bilirubin Urine Negative      Ketones Urine Negative      Specific Gravity Urine 1.019      Blood Urine Small (*)     pH Urine 5.0      Protein Albumin Urine 30 (*)     Urobilinogen Urine Normal      Nitrite Urine Negative      Leukocyte Esterase Urine Moderate (*)     " Bacteria Urine Many (*)     Mucus Urine Present (*)     RBC Urine 1      WBC Urine 18 (*)    TROPONIN T, HIGH SENSITIVITY - Abnormal    Troponin T, High Sensitivity 18 (*)    CBC WITH PLATELETS AND DIFFERENTIAL - Abnormal    WBC Count 11.7 (*)     RBC Count 3.57 (*)     Hemoglobin 10.9 (*)     Hematocrit 34.2 (*)     MCV 96      MCH 30.5      MCHC 31.9      RDW 12.9      Platelet Count 204      % Neutrophils 89      % Lymphocytes 4      % Monocytes 6      % Eosinophils 0      % Basophils 0      % Immature Granulocytes 1      NRBCs per 100 WBC 0      Absolute Neutrophils 10.5 (*)     Absolute Lymphocytes 0.5 (*)     Absolute Monocytes 0.6      Absolute Eosinophils 0.0      Absolute Basophils 0.0      Absolute Immature Granulocytes 0.1      Absolute NRBCs 0.0     CRP INFLAMMATION - Abnormal    CRP Inflammation 278.82 (*)    ERYTHROCYTE SEDIMENTATION RATE AUTO - Abnormal    Erythrocyte Sedimentation Rate 72 (*)    ISTAT GASES LACTATE VENOUS POCT - Abnormal    Lactic Acid POCT 1.1      Bicarbonate Venous POCT 29 (*)     O2 Sat, Venous POCT 15 (*)     pCO2 Venous POCT 46      pH Venous POCT 7.40      pO2 Venous POCT 13 (*)    ISTAT GASES LACTATE VENOUS POCT   BLOOD CULTURE   BLOOD CULTURE   URINE CULTURE        CT Cervical Spine w/o Contrast   Final Result   IMPRESSION:   1. Postoperative and degenerative change of the cervical spine as   described above.   2. Fluid and gas collection in the posterior paraspinous soft tissues   possibly due to recent surgery. If there is no history of recent   surgery, infection must be considered.   3. No fractures. No evidence for hardware failure or loosening.         Radiation dose for this scan was reduced using automated exposure   control, adjustment of the mA and/or kV according to patient size, or   iterative reconstruction technique.      JAMAL COLEMAN MD            SYSTEM ID:  [id number]      Head CT w/o contrast   Final Result   IMPRESSION: Possible acute left maxillary  sinusitis. Diffuse cerebral   volume loss and cerebral white matter changes consistent with chronic   small vessel ischemic disease. No evidence for acute intracranial   pathology.         Radiation dose for this scan was reduced using automated exposure   control, adjustment of the mA and/or kV according to patient size, or   iterative reconstruction technique.      JAMAL COLEMAN MD            SYSTEM ID:  [id number]      MR Cervical Spine w/o & w Contrast    (Results Pending)       Treatments provided: 2L IVF  Family Comments: at bedside  OBS brochure/video discussed/provided to patient:  No  ED Medications:   Medications   sodium chloride 0.9% BOLUS 1,000 mL (0 mLs Intravenous Stopped 3/12/24 1152)   sodium chloride 0.9% BOLUS 1,000 mL (1,000 mLs Intravenous $New Bag 3/12/24 1327)       Drips infusing:  No  For the majority of the shift this patient was Green.   Interventions performed were n/a.    Sepsis treatment initiated: No    Cares/treatment/interventions/medications to be completed following ED care: IVF, neuro checks, pain control    ED Nurse Name: Christelle Sotomayor RN  3:05 PM     RECEIVING UNIT ED HANDOFF REVIEW    Above ED Nurse Handoff Report was reviewed: Yes  Reviewed by: Amirah Aldana RN on March 12, 2024 at 6:09 PM   ELIZABETH Arroyo called the ED to inform them the note was read: Yes

## 2024-03-12 NOTE — ED PROVIDER NOTES
This 78 year old female patient with atrial fibrillation on Eliquis, IDDM, and HTN was endorsed to me by Dr. Chandler pending admission for fluid collection in cervical spine paraspinal soft tissues in the setting of cervical spine surgery in October. She presented with neck pain radiating to her right upper extremity.    I have reviewed patient's vitals, EKG, imaging, labs, and notes which are remarkable for 18 WBC/hpf on UA, , and ESR 72.    1632 I was notified the patient is becoming more tachycardic despite 2 L of IV fluids.  This patient is admitted.  We will repeat EKG and initiate fluids at 150 mL/h.  We will also make sure she is taking all of her home medications as she has a history of atrial fibrillation.  We will also make sure her pain is adequately controlled. Dilaudid ordered.    1659 The patient has not taken her home medications. We will give her metoprolol which will help with rate.     1729 I spoke with Dr. Sagar Cadena, spine surgery, who recommends hospitalist admission with plan for operative intervention on Friday due to her Eliquis.  He would like empiric antibiotics.  He suggested Rocephin and vancomycin though I see a note from Dr. Alexis, infectious disease, who suggests vancomycin and cefepime.  These were ordered.    1756 I spoke with Dr. Mathis who will not admit the patient unless Sheron is evaluated by the surgeon in the ED first.     1831 I spoke with the patient. No RUE weakness. Symmetric diffuse extremity weakness. Dr. Mathis at bedside believes this to be a necrotizing infection that will require a halo post-operatively and does not feel the patient can be hospitalized safely at [facility].     1854 I spoke with Dr. Cadena again who does not feel that her hardware will need to be removed or that she will need a halo.  He would actually like to do the surgery today with the exception of the Eliquis.  We discussed reversal of Eliquis.  He would like more information on this with plan for  likely surgery today.    1900 I spoke with ED pharmacist to confirm use of Kcentra for Eliquis reversal and safety of surgery 15 to 30 minutes after this.    1918 I spoke with Dr. Cadena who will take this patient to the OR as we are able to reverse her Eliquis.    1936 I updated the patient. She has intermittent hypotensive BP readings, improved with additional boluses of fluids. She and family understand new plan.     2042 No more hypotensive readings noted. Heart rate improving as well. She has had broad spectrum antibiotics and fluids.     EKG  Indication: tachycardia  Time: 1634  Rate 120 bpm. QRS duration 82. QT/QTc 286/404.   Atrial fibrillation with rapid ventricular rate  Nonspecific ST and T wave abnormality  Abnormal ECG  No acute ST changes.  No change as compared to prior, dated 3/12/24 (earlier this visit).         Soumya Hansen MD  03/13/24 4914

## 2024-03-12 NOTE — PROGRESS NOTES
Consult noted, will see tomorrow, if going to surgery today or felt to be septic would initiate Vanco and cefepime otherwise desirable to get deep microbiology so would await surgery to start antibiotics

## 2024-03-12 NOTE — PHARMACY-ADMISSION MEDICATION HISTORY
Pharmacist Admission Medication History    Admission medication history is complete. The information provided in this note is only as accurate as the sources available at the time of the update.    Information Source(s): Patient, Family member, and CareEverywhere/SureScripts via in-person    Pertinent Information: Patient takes 14-17 units of Lantus at bedtiume depending on her Bgs. If BG < 200  she takes 14 units and if BG >/= 200 then she takes the full 17 units. Patient's family reports her mealtime Novolog dose varies depending on her BG.     Changes made to PTA medication list:  Added: None  Deleted: Senokot-S, tizanidine, brimonidine   Changed: gabapentin 400mg TID --> 600mg BID, Lantus 18 units at bedtime --> 14-17 units at bedtime, latanoprost 1 gtt to right eye whs --> both eyes at bedtime    Allergies reviewed with patient and updates made in EHR: yes    Medication History Completed By: JULIO MCKINNEY [facility] 3/12/2024 4:07 PM    PTA Med List   Medication Sig Last Dose    acetaminophen (TYLENOL) 325 MG tablet Take 2 tablets (650 mg) by mouth 3 times daily. May also take 2 tablets (650 mg) daily as needed for mild pain. 3/11/2024 at unknown time    apixaban ANTICOAGULANT (ELIQUIS) 5 MG tablet Take 5 mg by mouth 2 times daily 3/11/2024 at PM    atorvastatin (LIPITOR) 40 MG tablet Take 1 tablet by mouth daily 3/11/2024 at AM    gabapentin (NEURONTIN) 300 MG capsule Take 600 mg by mouth 2 times daily 3/11/2024 at PM    hydrochlorothiazide (HYDRODIURIL) 25 MG tablet Take 1 tablet (25 mg) by mouth daily 3/11/2024 at AM    insulin aspart (NOVOLOG PEN) 100 UNIT/ML pen Inject 3 Units Subcutaneous daily (with breakfast) 3/11/2024 at AM    insulin aspart (NOVOLOG PEN) 100 UNIT/ML pen Inject 3 Units Subcutaneous daily (with lunch) 3/11/2024 at lunch    insulin aspart (NOVOLOG PEN) 100 UNIT/ML pen Inject 5 Units Subcutaneous daily (with dinner) 3/11/2024 at PM    insulin glargine (LANTUS PEN) 100 UNIT/ML pen Inject 18  Units Subcutaneous at bedtime (Patient taking differently: Inject 14-17 Units Subcutaneous at bedtime If BG < 200 mg/dL = 14 units   If BG >/= 200 mg/dL = 17 units) 3/11/2024 at HS    ketoconazole (NIZORAL) 2 % external shampoo Shampoo the hair thoroughly each as need. Unknown at PRN    latanoprost (XALATAN) 0.005 % ophthalmic solution Place 1 drop into both eyes at bedtime 3/11/2024 at HS    levothyroxine (SYNTHROID/LEVOTHROID) 88 MCG tablet Take 88 mcg by mouth daily 3/11/2024 at AM    losartan (COZAAR) 50 MG tablet Take 1 tablet (50 mg) by mouth daily HOLD if blood pressure <100 3/11/2024 at AM    melatonin 3 MG tablet Take 1 tablet (3 mg) by mouth nightly as needed for sleep Unknown at PRN    metoprolol succinate ER (TOPROL XL) 100 MG 24 hr tablet Take 1 tablet (100 mg) by mouth 2 times daily 3/11/2024 at PM    Multiple Vitamins-Calcium (ONE-A-DAY WOMENS FORMULA PO) Take 1 tablet by mouth daily 3/11/2024 at AM    omeprazole (PRILOSEC) 40 MG DR capsule Take 40 mg by mouth daily 3/11/2024 at AM    prednisoLONE acetate (PRED FORTE) 1 % ophthalmic suspension Place 1 drop Into the left eye daily 3/11/2024 at AM    timolol maleate (TIMOPTIC) 0.5 % ophthalmic solution Place 1 drop into both eyes 2 times daily 3/11/2024 at PM

## 2024-03-12 NOTE — ED PROVIDER NOTES
"  History     Chief Complaint:  Generalized Weakness and Arm Pain       The history is provided by the patient.      Lj Castro is a 78 year old female with a history of spinal stenosis, atrial fibrillation, stag 3 chronic kidney disease, and hypertension who presents with generalized weakness and arm pain. She has been complaining of right arm pain and generalized weakness over the past 3 to 4 days. Also during this time, she has had worsening confusion. Her son was visiting her this morning, and EMS was called to bring her to the ED. At bedside, she rates her arm pain a 9/10. There has been no recent falls or trauma to the arm. Her oral intake has been good lately. She denies chest pain, shortness of breath, nausea, vomiting, dysuria, hematuria, constipation, and diarrhea.     Independent Historian:   None - Patient Only    Review of External Notes:   I read the discharge summary form 10/16/2023 where she was hospitalized for spinal surgery due to cervical spinal stenosis.      Medications:    Apixaban   Atorvastatin   Gabapentin   Hydrochlorothiazide   Novolog   Lantus   Levothyroxine Na   Losartan   Toprol XL   Omeprazole   Tizanidine      Past Medical History:    Arrhythmia   DM   GERD  Glaucoma   HLD   HTN   Hypothyroidism  Renal failure   PAF  PAD  Lumbar stenosis   Stage 3 CKD     Past Surgical History:    Supracervical hysterectomy   Midurethral sling insertion   BSO   L eye graft w/ shunt   Cervical laminectomy & spinal cord decompression   L humerus fx surgery     Physical Exam   Patient Vitals for the past 24 hrs:   BP Temp Temp src Pulse Resp SpO2 Height Weight   03/12/24 1346 -- -- -- -- -- -- 1.575 m (5' 2\") 77.1 kg (170 lb)   03/12/24 1341 (!) 134/102 -- -- 77 27 96 % -- --   03/12/24 1115 -- -- -- 105 21 96 % -- --   03/12/24 1030 123/74 -- -- 104 21 100 % -- --   03/12/24 0915 -- -- -- 98 16 91 % -- --   03/12/24 0845 129/88 99.4  F (37.4  C) -- 96 20 94 % -- --   03/12/24 0844 129/88 97.8  F " (36.6  C) Temporal 86 18 99 % -- --        Physical Exam  General: Resting on the bed.  Head: No obvious trauma to head.  Ears, Nose, Throat:  External ears normal.  Nose normal.  No pharyngeal erythema, swelling or exudate.  Midline uvula. Dry mucus membranes.  Eyes:  Conjunctivae clear.   Neck: Lower neck tenderness to palpation and upper T spine tenderness to palpation.   CV: Regular rate and rhythm.  No murmurs.  Lower extremity edema.   Respiratory: Effort normal and breath sounds normal.  No wheezing or crackles.   Gastrointestinal: Soft.  No distension. There is no tenderness.  There is no rigidity, no rebound and no guarding.   Musculoskeletal: Normal range of motion.  Non tender extremities to palpations.    Neuro: Somnolent. Moving all extremities appropriately.  Normal speech. Oriented x 3. Eyes closed at rest.   Skin: Skin is warm and dry.  No rash noted.   Psych: Normal mood and affect. Behavior is normal.     Emergency Department Course   ECG  ECG taken at 0900, ECG read at 0903  Atrial fibrillation with rapid ventricular response   Cannot rule out Anterior infract, age undetermined   ST & T wave abnormality, consider inferolateral ischemia   Abnormal ECG   As compared to prior, dated 3/20/2015.  Rate 110 bpm. RI interval * ms. QRS duration 80 ms. QT/QTc 312/422 ms. P-R-T axes * -26 -81.     Imaging:  CT Cervical Spine w/o Contrast  Final Result  IMPRESSION:  1. Postoperative and degenerative change of the cervical spine as  described above.  2. Fluid and gas collection in the posterior paraspinous soft tissues  possibly due to recent surgery. If there is no history of recent  surgery, infection must be considered.  3. No fractures. No evidence for hardware failure or loosening.  Radiation dose for this scan was reduced using automated exposure  control, adjustment of the mA and/or kV according to patient size, or  iterative reconstruction technique.  JAMAL COLEMAN MD      Head CT w/o contrast  Final  Result  IMPRESSION: Possible acute left maxillary sinusitis. Diffuse cerebral  volume loss and cerebral white matter changes consistent with chronic  small vessel ischemic disease. No evidence for acute intracranial  pathology.  Radiation dose for this scan was reduced using automated exposure  control, adjustment of the mA and/or kV according to patient size, or  iterative reconstruction technique.  JAMAL COLEMAN MD      Results per radiology     Laboratory:  Labs Ordered and Resulted from Time of ED Arrival to Time of ED Departure   BASIC METABOLIC PANEL - Abnormal       Result Value    Sodium 134 (*)     Potassium 3.7      Chloride 95 (*)     Carbon Dioxide (CO2) 24      Anion Gap 15      Urea Nitrogen 28.7 (*)     Creatinine 1.20 (*)     GFR Estimate 46 (*)     Calcium 9.2      Glucose 213 (*)    ROUTINE UA WITH MICROSCOPIC REFLEX TO CULTURE - Abnormal    Color Urine Yellow      Appearance Urine Slightly Cloudy (*)     Glucose Urine Negative      Bilirubin Urine Negative      Ketones Urine Negative      Specific Gravity Urine 1.019      Blood Urine Small (*)     pH Urine 5.0      Protein Albumin Urine 30 (*)     Urobilinogen Urine Normal      Nitrite Urine Negative      Leukocyte Esterase Urine Moderate (*)     Bacteria Urine Many (*)     Mucus Urine Present (*)     RBC Urine 1      WBC Urine 18 (*)    TROPONIN T, HIGH SENSITIVITY - Abnormal    Troponin T, High Sensitivity 18 (*)    CBC WITH PLATELETS AND DIFFERENTIAL - Abnormal    WBC Count 11.7 (*)     RBC Count 3.57 (*)     Hemoglobin 10.9 (*)     Hematocrit 34.2 (*)     MCV 96      MCH 30.5      MCHC 31.9      RDW 12.9      Platelet Count 204      % Neutrophils 89      % Lymphocytes 4      % Monocytes 6      % Eosinophils 0      % Basophils 0      % Immature Granulocytes 1      NRBCs per 100 WBC 0      Absolute Neutrophils 10.5 (*)     Absolute Lymphocytes 0.5 (*)     Absolute Monocytes 0.6      Absolute Eosinophils 0.0      Absolute Basophils 0.0       Absolute Immature Granulocytes 0.1      Absolute NRBCs 0.0     CRP INFLAMMATION - Abnormal    CRP Inflammation 278.82 (*)    ERYTHROCYTE SEDIMENTATION RATE AUTO - Abnormal    Erythrocyte Sedimentation Rate 72 (*)    ISTAT GASES LACTATE VENOUS POCT - Abnormal    Lactic Acid POCT 1.1      Bicarbonate Venous POCT 29 (*)     O2 Sat, Venous POCT 15 (*)     pCO2 Venous POCT 46      pH Venous POCT 7.40      pO2 Venous POCT 13 (*)    ISTAT GASES LACTATE VENOUS POCT   BLOOD CULTURE   BLOOD CULTURE   URINE CULTURE      Emergency Department Course & Assessments:    Interventions:  Medications   sodium chloride 0.9% BOLUS 1,000 mL (0 mLs Intravenous Stopped 3/12/24 1152)   sodium chloride 0.9% BOLUS 1,000 mL (1,000 mLs Intravenous $New Bag 3/12/24 1327)      Assessments:  0856 I obtained history and examined the patient as noted above.  1125 I rechecked the patient and updated. The son and  are now at bedside.   1244 I rechecked the patient and updated.  1440 I rechecked the patient and explained findings.   1505 I reevaluated the patient    Independent Interpretation (X-rays, CTs, rhythm strip):  Head CT is negative for acute intracranial hemorrhage.     Consultations/Discussion of Management or Tests:  1121 I spoke with Janell Patel from Neurosurgery.   1430 I spoke with Viraj Samuels PA-C, neurosurgery    Social Determinants of Health affecting care:   None    Disposition:  The patient was admitted to the hospital under the care of Dr. Cadena    Impression & Plan    CMS Diagnoses: None    Medical Decision Making:  Patient presents with weakness, arm pain.  Upon further questioning, she has worsening neck tenderness.  CT of the neck is obtained and does show a fluid collection and free air, which is concerning for infection.  She did have a previous neck procedure involving a fusion, but this was back in October, and any fluid collection or free air should be resolved by now.  I discussed the patient with neurosurgery,  and they asked for an MRI of the C-spine to be obtained to further evaluate.  Blood cultures are obtained.  ESR and CRP are elevated.  Neurosurgery contacted me again to inform them that they do want to take the patient to the operating room.  Initially they reported they were planning on doing it today, but later reports that they plan to take her to the operating room on Friday.  I asked if they wanted antibiotics started at this time, and they reported that they will take care of it.  They state that they will admit the patient to their service.  I informed the patient and her family of the plan and they are agreeable to this.  She remains in stable condition.  MRI is currently pending.      Diagnosis:    ICD-10-CM    1. Neck infection  L08.9            Discharge Medications:  New Prescriptions    No medications on file        Scribe Disclosure:  I, Michael Celestino, am serving as a scribe at 9:04 AM on 3/12/2024 to document services personally performed by Víctor Chandler MD based on my observations and the provider's statements to me.   3/12/2024   Víctor Chandler MD Peery, Stephen, MD  03/12/24 0776

## 2024-03-12 NOTE — PROGRESS NOTES
Neurosurgery note    Neurosurgery was contacted by emergency department regarding this patient who underwent a posterior cervical fusion in October 2023 with Dr. Cadena.  The ER states they reached out to Dr. Cadena's office, but that that office stated he was unable to assess the patient today.    A cervical CT scan was performed, the patient presented with symptoms of right arm pain and generalized weakness over the past 3 to 4 days.    CT scan report indicates    IMPRESSION:  1. Postoperative and degenerative change of the cervical spine as  described above.  2. Fluid and gas collection in the posterior paraspinous soft tissues  possibly due to recent surgery. If there is no history of recent  surgery, infection must be considered.  3. No fractures. No evidence for hardware failure or loosening.    Plan    Ultimately patient needs to be seen and managed by her original surgeon Dr. Cadena.  I also called their office, explained situation to their answering service, and asked that they page either Dr. Cadena or one of his partners.  I have not yet heard back.    In the interim recommend cervical MRI with and without contrast, CRP, ESR, blood cultures, hold off on antibiotics for now, hospitalist admission and infectious disease consult pending MRI results.    Reviewed with Dr. Lombardo.    Addendum 2:45 PM    I spoke with Viraj Samuels PA-C, neurosurgery from [facility], the patient's original neurosurgery office, they have evaluated the patient and are assuming care of the patient at this time.    [facility] neurosurgery will sign off at this time.

## 2024-03-13 ENCOUNTER — APPOINTMENT (OUTPATIENT)
Dept: GENERAL RADIOLOGY | Facility: CLINIC | Age: 79
DRG: 500 | End: 2024-03-13
Attending: ANESTHESIOLOGY
Payer: COMMERCIAL

## 2024-03-13 ENCOUNTER — APPOINTMENT (OUTPATIENT)
Dept: CARDIOLOGY | Facility: CLINIC | Age: 79
DRG: 500 | End: 2024-03-13
Attending: INTERNAL MEDICINE
Payer: COMMERCIAL

## 2024-03-13 ENCOUNTER — APPOINTMENT (OUTPATIENT)
Dept: PHYSICAL THERAPY | Facility: CLINIC | Age: 79
DRG: 500 | End: 2024-03-13
Attending: ORTHOPAEDIC SURGERY
Payer: COMMERCIAL

## 2024-03-13 LAB
ALBUMIN SERPL BCG-MCNC: 2.8 G/DL (ref 3.5–5.2)
ANION GAP SERPL CALCULATED.3IONS-SCNC: 12 MMOL/L (ref 7–15)
ATRIAL RATE - MUSE: NORMAL BPM
BUN SERPL-MCNC: 24.7 MG/DL (ref 8–23)
CALCIUM SERPL-MCNC: 8 MG/DL (ref 8.8–10.2)
CHLORIDE SERPL-SCNC: 102 MMOL/L (ref 98–107)
CREAT SERPL-MCNC: 0.99 MG/DL (ref 0.51–0.95)
DEPRECATED HCO3 PLAS-SCNC: 21 MMOL/L (ref 22–29)
DIASTOLIC BLOOD PRESSURE - MUSE: NORMAL MMHG
EGFRCR SERPLBLD CKD-EPI 2021: 58 ML/MIN/1.73M2
ENTEROCOCCUS FAECALIS: NOT DETECTED
ENTEROCOCCUS FAECIUM: NOT DETECTED
ERYTHROCYTE [DISTWIDTH] IN BLOOD BY AUTOMATED COUNT: 13.2 % (ref 10–15)
GLUCOSE BLDC GLUCOMTR-MCNC: 106 MG/DL (ref 70–99)
GLUCOSE BLDC GLUCOMTR-MCNC: 106 MG/DL (ref 70–99)
GLUCOSE BLDC GLUCOMTR-MCNC: 110 MG/DL (ref 70–99)
GLUCOSE BLDC GLUCOMTR-MCNC: 112 MG/DL (ref 70–99)
GLUCOSE BLDC GLUCOMTR-MCNC: 115 MG/DL (ref 70–99)
GLUCOSE BLDC GLUCOMTR-MCNC: 122 MG/DL (ref 70–99)
GLUCOSE BLDC GLUCOMTR-MCNC: 123 MG/DL (ref 70–99)
GLUCOSE BLDC GLUCOMTR-MCNC: 134 MG/DL (ref 70–99)
GLUCOSE BLDC GLUCOMTR-MCNC: 139 MG/DL (ref 70–99)
GLUCOSE BLDC GLUCOMTR-MCNC: 160 MG/DL (ref 70–99)
GLUCOSE BLDC GLUCOMTR-MCNC: 209 MG/DL (ref 70–99)
GLUCOSE BLDC GLUCOMTR-MCNC: 257 MG/DL (ref 70–99)
GLUCOSE BLDC GLUCOMTR-MCNC: 257 MG/DL (ref 70–99)
GLUCOSE BLDC GLUCOMTR-MCNC: 271 MG/DL (ref 70–99)
GLUCOSE BLDC GLUCOMTR-MCNC: 296 MG/DL (ref 70–99)
GLUCOSE BLDC GLUCOMTR-MCNC: 83 MG/DL (ref 70–99)
GLUCOSE SERPL-MCNC: 294 MG/DL (ref 70–99)
GRAM STAIN RESULT: ABNORMAL
GRAM STAIN RESULT: ABNORMAL
HBA1C MFR BLD: 8.4 %
HCT VFR BLD AUTO: 29.6 % (ref 35–47)
HGB BLD-MCNC: 9.5 G/DL (ref 11.7–15.7)
INTERPRETATION ECG - MUSE: NORMAL
LISTERIA SPECIES (DETECTED/NOT DETECTED): NOT DETECTED
LVEF ECHO: NORMAL
MCH RBC QN AUTO: 30.6 PG (ref 26.5–33)
MCHC RBC AUTO-ENTMCNC: 32.1 G/DL (ref 31.5–36.5)
MCV RBC AUTO: 96 FL (ref 78–100)
MRSA DNA SPEC QL NAA+PROBE: NEGATIVE
P AXIS - MUSE: NORMAL DEGREES
PLATELET # BLD AUTO: 227 10E3/UL (ref 150–450)
POTASSIUM SERPL-SCNC: 3.7 MMOL/L (ref 3.4–5.3)
PR INTERVAL - MUSE: NORMAL MS
QRS DURATION - MUSE: 82 MS
QT - MUSE: 286 MS
QTC - MUSE: 404 MS
R AXIS - MUSE: -22 DEGREES
RBC # BLD AUTO: 3.1 10E6/UL (ref 3.8–5.2)
SA TARGET DNA: POSITIVE
SODIUM SERPL-SCNC: 135 MMOL/L (ref 135–145)
STAPHYLOCOCCUS AUREUS: DETECTED
STAPHYLOCOCCUS EPIDERMIDIS: NOT DETECTED
STAPHYLOCOCCUS LUGDUNENSIS: NOT DETECTED
STREPTOCOCCUS AGALACTIAE: NOT DETECTED
STREPTOCOCCUS ANGINOSUS GROUP: NOT DETECTED
STREPTOCOCCUS PNEUMONIAE: NOT DETECTED
STREPTOCOCCUS PYOGENES: NOT DETECTED
STREPTOCOCCUS SPECIES: NOT DETECTED
SYSTOLIC BLOOD PRESSURE - MUSE: NORMAL MMHG
T AXIS - MUSE: -89 DEGREES
VENTRICULAR RATE- MUSE: 120 BPM
WBC # BLD AUTO: 13.4 10E3/UL (ref 4–11)

## 2024-03-13 PROCEDURE — 258N000003 HC RX IP 258 OP 636: Performed by: ANESTHESIOLOGY

## 2024-03-13 PROCEDURE — 250N000012 HC RX MED GY IP 250 OP 636 PS 637: Performed by: STUDENT IN AN ORGANIZED HEALTH CARE EDUCATION/TRAINING PROGRAM

## 2024-03-13 PROCEDURE — 83036 HEMOGLOBIN GLYCOSYLATED A1C: CPT | Performed by: INTERNAL MEDICINE

## 2024-03-13 PROCEDURE — 99222 1ST HOSP IP/OBS MODERATE 55: CPT | Performed by: INTERNAL MEDICINE

## 2024-03-13 PROCEDURE — G0463 HOSPITAL OUTPT CLINIC VISIT: HCPCS | Mod: 25

## 2024-03-13 PROCEDURE — 0JD40ZZ EXTRACTION OF RIGHT NECK SUBCUTANEOUS TISSUE AND FASCIA, OPEN APPROACH: ICD-10-PCS | Performed by: ORTHOPAEDIC SURGERY

## 2024-03-13 PROCEDURE — 80048 BASIC METABOLIC PNL TOTAL CA: CPT | Performed by: INTERNAL MEDICINE

## 2024-03-13 PROCEDURE — 250N000011 HC RX IP 250 OP 636: Performed by: NURSE ANESTHETIST, CERTIFIED REGISTERED

## 2024-03-13 PROCEDURE — 0JD50ZZ EXTRACTION OF LEFT NECK SUBCUTANEOUS TISSUE AND FASCIA, OPEN APPROACH: ICD-10-PCS | Performed by: ORTHOPAEDIC SURGERY

## 2024-03-13 PROCEDURE — 250N000013 HC RX MED GY IP 250 OP 250 PS 637: Performed by: STUDENT IN AN ORGANIZED HEALTH CARE EDUCATION/TRAINING PROGRAM

## 2024-03-13 PROCEDURE — 250N000009 HC RX 250: Performed by: INTERNAL MEDICINE

## 2024-03-13 PROCEDURE — 250N000009 HC RX 250: Performed by: NURSE ANESTHETIST, CERTIFIED REGISTERED

## 2024-03-13 PROCEDURE — 250N000011 HC RX IP 250 OP 636: Performed by: INTERNAL MEDICINE

## 2024-03-13 PROCEDURE — 87641 MR-STAPH DNA AMP PROBE: CPT | Performed by: INTERNAL MEDICINE

## 2024-03-13 PROCEDURE — 250N000011 HC RX IP 250 OP 636: Mod: JZ | Performed by: INTERNAL MEDICINE

## 2024-03-13 PROCEDURE — 82040 ASSAY OF SERUM ALBUMIN: CPT | Performed by: STUDENT IN AN ORGANIZED HEALTH CARE EDUCATION/TRAINING PROGRAM

## 2024-03-13 PROCEDURE — 93306 TTE W/DOPPLER COMPLETE: CPT

## 2024-03-13 PROCEDURE — 258N000001 HC RX 258: Performed by: ORTHOPAEDIC SURGERY

## 2024-03-13 PROCEDURE — 258N000003 HC RX IP 258 OP 636: Performed by: STUDENT IN AN ORGANIZED HEALTH CARE EDUCATION/TRAINING PROGRAM

## 2024-03-13 PROCEDURE — 97162 PT EVAL MOD COMPLEX 30 MIN: CPT | Mod: GP

## 2024-03-13 PROCEDURE — 200N000001 HC R&B ICU

## 2024-03-13 PROCEDURE — 87040 BLOOD CULTURE FOR BACTERIA: CPT | Performed by: STUDENT IN AN ORGANIZED HEALTH CARE EDUCATION/TRAINING PROGRAM

## 2024-03-13 PROCEDURE — 258N000003 HC RX IP 258 OP 636: Performed by: INTERNAL MEDICINE

## 2024-03-13 PROCEDURE — 85027 COMPLETE CBC AUTOMATED: CPT | Performed by: INTERNAL MEDICINE

## 2024-03-13 PROCEDURE — 93306 TTE W/DOPPLER COMPLETE: CPT | Mod: 26 | Performed by: INTERNAL MEDICINE

## 2024-03-13 PROCEDURE — 999N000065 XR CHEST PORT 1 VIEW

## 2024-03-13 PROCEDURE — 250N000013 HC RX MED GY IP 250 OP 250 PS 637: Performed by: ORTHOPAEDIC SURGERY

## 2024-03-13 PROCEDURE — 250N000009 HC RX 250: Performed by: ORTHOPAEDIC SURGERY

## 2024-03-13 PROCEDURE — 97530 THERAPEUTIC ACTIVITIES: CPT | Mod: GP

## 2024-03-13 PROCEDURE — 99223 1ST HOSP IP/OBS HIGH 75: CPT | Performed by: INTERNAL MEDICINE

## 2024-03-13 PROCEDURE — 36415 COLL VENOUS BLD VENIPUNCTURE: CPT | Performed by: STUDENT IN AN ORGANIZED HEALTH CARE EDUCATION/TRAINING PROGRAM

## 2024-03-13 RX ORDER — ACETAMINOPHEN 325 MG/1
975 TABLET ORAL EVERY 8 HOURS
Status: DISCONTINUED | OUTPATIENT
Start: 2024-03-13 | End: 2024-03-15

## 2024-03-13 RX ORDER — FENTANYL CITRATE 50 UG/ML
50 INJECTION, SOLUTION INTRAMUSCULAR; INTRAVENOUS EVERY 5 MIN PRN
Status: DISCONTINUED | OUTPATIENT
Start: 2024-03-13 | End: 2024-03-13 | Stop reason: HOSPADM

## 2024-03-13 RX ORDER — ACETAMINOPHEN 325 MG/1
650 TABLET ORAL EVERY 4 HOURS PRN
Status: DISCONTINUED | OUTPATIENT
Start: 2024-03-16 | End: 2024-03-15

## 2024-03-13 RX ORDER — GABAPENTIN 300 MG/1
600 CAPSULE ORAL 2 TIMES DAILY
Status: DISCONTINUED | OUTPATIENT
Start: 2024-03-13 | End: 2024-03-20 | Stop reason: HOSPADM

## 2024-03-13 RX ORDER — ONDANSETRON 4 MG/1
4 TABLET, ORALLY DISINTEGRATING ORAL EVERY 6 HOURS PRN
Status: DISCONTINUED | OUTPATIENT
Start: 2024-03-13 | End: 2024-03-20 | Stop reason: HOSPADM

## 2024-03-13 RX ORDER — METOPROLOL SUCCINATE 100 MG/1
100 TABLET, EXTENDED RELEASE ORAL 2 TIMES DAILY
Status: DISCONTINUED | OUTPATIENT
Start: 2024-03-13 | End: 2024-03-20 | Stop reason: HOSPADM

## 2024-03-13 RX ORDER — ONDANSETRON 2 MG/ML
4 INJECTION INTRAMUSCULAR; INTRAVENOUS EVERY 30 MIN PRN
Status: DISCONTINUED | OUTPATIENT
Start: 2024-03-13 | End: 2024-03-13 | Stop reason: HOSPADM

## 2024-03-13 RX ORDER — PREDNISOLONE ACETATE 10 MG/ML
1 SUSPENSION/ DROPS OPHTHALMIC DAILY
Status: DISCONTINUED | OUTPATIENT
Start: 2024-03-13 | End: 2024-03-20 | Stop reason: HOSPADM

## 2024-03-13 RX ORDER — NALOXONE HYDROCHLORIDE 0.4 MG/ML
0.1 INJECTION, SOLUTION INTRAMUSCULAR; INTRAVENOUS; SUBCUTANEOUS
Status: DISCONTINUED | OUTPATIENT
Start: 2024-03-13 | End: 2024-03-13

## 2024-03-13 RX ORDER — ATORVASTATIN CALCIUM 40 MG/1
40 TABLET, FILM COATED ORAL DAILY
Status: DISCONTINUED | OUTPATIENT
Start: 2024-03-13 | End: 2024-03-20 | Stop reason: HOSPADM

## 2024-03-13 RX ORDER — DIPHENHYDRAMINE HCL 12.5MG/5ML
12.5 LIQUID (ML) ORAL EVERY 6 HOURS PRN
Status: DISCONTINUED | OUTPATIENT
Start: 2024-03-13 | End: 2024-03-20 | Stop reason: HOSPADM

## 2024-03-13 RX ORDER — NALOXONE HYDROCHLORIDE 0.4 MG/ML
0.2 INJECTION, SOLUTION INTRAMUSCULAR; INTRAVENOUS; SUBCUTANEOUS
Status: DISCONTINUED | OUTPATIENT
Start: 2024-03-13 | End: 2024-03-20 | Stop reason: HOSPADM

## 2024-03-13 RX ORDER — LIDOCAINE 40 MG/G
CREAM TOPICAL
Status: DISCONTINUED | OUTPATIENT
Start: 2024-03-13 | End: 2024-03-20 | Stop reason: HOSPADM

## 2024-03-13 RX ORDER — CEFEPIME HYDROCHLORIDE 2 G/1
2 INJECTION, POWDER, FOR SOLUTION INTRAVENOUS EVERY 12 HOURS
Status: DISCONTINUED | OUTPATIENT
Start: 2024-03-13 | End: 2024-03-13

## 2024-03-13 RX ORDER — NICOTINE POLACRILEX 4 MG
15-30 LOZENGE BUCCAL
Status: DISCONTINUED | OUTPATIENT
Start: 2024-03-13 | End: 2024-03-13

## 2024-03-13 RX ORDER — CEFAZOLIN SODIUM 2 G/100ML
2 INJECTION, SOLUTION INTRAVENOUS EVERY 8 HOURS
Status: DISCONTINUED | OUTPATIENT
Start: 2024-03-13 | End: 2024-03-20 | Stop reason: HOSPADM

## 2024-03-13 RX ORDER — DEXTROSE MONOHYDRATE 25 G/50ML
25-50 INJECTION, SOLUTION INTRAVENOUS
Status: DISCONTINUED | OUTPATIENT
Start: 2024-03-13 | End: 2024-03-13

## 2024-03-13 RX ORDER — KETOCONAZOLE 20 MG/ML
SHAMPOO TOPICAL DAILY PRN
Status: DISCONTINUED | OUTPATIENT
Start: 2024-03-13 | End: 2024-03-20 | Stop reason: HOSPADM

## 2024-03-13 RX ORDER — NALOXONE HYDROCHLORIDE 0.4 MG/ML
0.4 INJECTION, SOLUTION INTRAMUSCULAR; INTRAVENOUS; SUBCUTANEOUS
Status: DISCONTINUED | OUTPATIENT
Start: 2024-03-13 | End: 2024-03-20 | Stop reason: HOSPADM

## 2024-03-13 RX ORDER — DEXTROSE MONOHYDRATE 100 MG/ML
INJECTION, SOLUTION INTRAVENOUS CONTINUOUS PRN
Status: DISCONTINUED | OUTPATIENT
Start: 2024-03-13 | End: 2024-03-13

## 2024-03-13 RX ORDER — ONDANSETRON 4 MG/1
4 TABLET, ORALLY DISINTEGRATING ORAL EVERY 30 MIN PRN
Status: DISCONTINUED | OUTPATIENT
Start: 2024-03-13 | End: 2024-03-13 | Stop reason: HOSPADM

## 2024-03-13 RX ORDER — HYDROMORPHONE HYDROCHLORIDE 1 MG/ML
0.3 INJECTION, SOLUTION INTRAMUSCULAR; INTRAVENOUS; SUBCUTANEOUS
Status: DISCONTINUED | OUTPATIENT
Start: 2024-03-13 | End: 2024-03-13

## 2024-03-13 RX ORDER — PROCHLORPERAZINE MALEATE 5 MG
5 TABLET ORAL EVERY 6 HOURS PRN
Status: DISCONTINUED | OUTPATIENT
Start: 2024-03-13 | End: 2024-03-20 | Stop reason: HOSPADM

## 2024-03-13 RX ORDER — FENTANYL CITRATE 50 UG/ML
25 INJECTION, SOLUTION INTRAMUSCULAR; INTRAVENOUS EVERY 5 MIN PRN
Status: DISCONTINUED | OUTPATIENT
Start: 2024-03-13 | End: 2024-03-13 | Stop reason: HOSPADM

## 2024-03-13 RX ORDER — ACETAMINOPHEN 325 MG/1
650 TABLET ORAL EVERY 4 HOURS PRN
Status: DISCONTINUED | OUTPATIENT
Start: 2024-03-13 | End: 2024-03-13

## 2024-03-13 RX ORDER — DIPHENHYDRAMINE HYDROCHLORIDE 50 MG/ML
12.5 INJECTION INTRAMUSCULAR; INTRAVENOUS EVERY 6 HOURS PRN
Status: DISCONTINUED | OUTPATIENT
Start: 2024-03-13 | End: 2024-03-20 | Stop reason: HOSPADM

## 2024-03-13 RX ORDER — LOSARTAN POTASSIUM 50 MG/1
50 TABLET ORAL DAILY
Status: DISCONTINUED | OUTPATIENT
Start: 2024-03-13 | End: 2024-03-20 | Stop reason: HOSPADM

## 2024-03-13 RX ORDER — NICOTINE POLACRILEX 4 MG
15-30 LOZENGE BUCCAL
Status: DISCONTINUED | OUTPATIENT
Start: 2024-03-13 | End: 2024-03-20 | Stop reason: HOSPADM

## 2024-03-13 RX ORDER — OXYCODONE HYDROCHLORIDE 5 MG/1
5 TABLET ORAL EVERY 4 HOURS PRN
Status: DISCONTINUED | OUTPATIENT
Start: 2024-03-13 | End: 2024-03-13

## 2024-03-13 RX ORDER — AMOXICILLIN 250 MG
1 CAPSULE ORAL 2 TIMES DAILY
Status: DISCONTINUED | OUTPATIENT
Start: 2024-03-13 | End: 2024-03-20 | Stop reason: HOSPADM

## 2024-03-13 RX ORDER — HYDROMORPHONE HCL IN WATER/PF 6 MG/30 ML
0.2 PATIENT CONTROLLED ANALGESIA SYRINGE INTRAVENOUS
Status: DISCONTINUED | OUTPATIENT
Start: 2024-03-13 | End: 2024-03-20 | Stop reason: HOSPADM

## 2024-03-13 RX ORDER — NOREPINEPHRINE BITARTRATE 0.02 MG/ML
.01-.6 INJECTION, SOLUTION INTRAVENOUS CONTINUOUS
Status: DISCONTINUED | OUTPATIENT
Start: 2024-03-13 | End: 2024-03-14

## 2024-03-13 RX ORDER — ACETAMINOPHEN 650 MG/1
650 SUPPOSITORY RECTAL EVERY 4 HOURS PRN
Status: DISCONTINUED | OUTPATIENT
Start: 2024-03-13 | End: 2024-03-13

## 2024-03-13 RX ORDER — HYDROMORPHONE HCL IN WATER/PF 6 MG/30 ML
0.2 PATIENT CONTROLLED ANALGESIA SYRINGE INTRAVENOUS EVERY 5 MIN PRN
Status: DISCONTINUED | OUTPATIENT
Start: 2024-03-13 | End: 2024-03-13 | Stop reason: HOSPADM

## 2024-03-13 RX ORDER — SODIUM CHLORIDE, SODIUM LACTATE, POTASSIUM CHLORIDE, CALCIUM CHLORIDE 600; 310; 30; 20 MG/100ML; MG/100ML; MG/100ML; MG/100ML
INJECTION, SOLUTION INTRAVENOUS CONTINUOUS
Status: DISCONTINUED | OUTPATIENT
Start: 2024-03-13 | End: 2024-03-15

## 2024-03-13 RX ORDER — HYDROMORPHONE HCL IN WATER/PF 6 MG/30 ML
0.4 PATIENT CONTROLLED ANALGESIA SYRINGE INTRAVENOUS EVERY 5 MIN PRN
Status: DISCONTINUED | OUTPATIENT
Start: 2024-03-13 | End: 2024-03-13 | Stop reason: HOSPADM

## 2024-03-13 RX ORDER — POLYETHYLENE GLYCOL 3350 17 G/17G
17 POWDER, FOR SOLUTION ORAL DAILY
Status: DISCONTINUED | OUTPATIENT
Start: 2024-03-14 | End: 2024-03-20 | Stop reason: HOSPADM

## 2024-03-13 RX ORDER — LATANOPROST 50 UG/ML
1 SOLUTION/ DROPS OPHTHALMIC AT BEDTIME
Status: DISCONTINUED | OUTPATIENT
Start: 2024-03-13 | End: 2024-03-20 | Stop reason: HOSPADM

## 2024-03-13 RX ORDER — HYDROMORPHONE HCL IN WATER/PF 6 MG/30 ML
0.1 PATIENT CONTROLLED ANALGESIA SYRINGE INTRAVENOUS
Status: DISCONTINUED | OUTPATIENT
Start: 2024-03-13 | End: 2024-03-20 | Stop reason: HOSPADM

## 2024-03-13 RX ORDER — OXYCODONE HYDROCHLORIDE 5 MG/1
5 TABLET ORAL EVERY 4 HOURS PRN
Status: DISCONTINUED | OUTPATIENT
Start: 2024-03-13 | End: 2024-03-15

## 2024-03-13 RX ORDER — DEXTROSE MONOHYDRATE 25 G/50ML
25-50 INJECTION, SOLUTION INTRAVENOUS
Status: DISCONTINUED | OUTPATIENT
Start: 2024-03-13 | End: 2024-03-20 | Stop reason: HOSPADM

## 2024-03-13 RX ORDER — ONDANSETRON 2 MG/ML
INJECTION INTRAMUSCULAR; INTRAVENOUS PRN
Status: DISCONTINUED | OUTPATIENT
Start: 2024-03-13 | End: 2024-03-13

## 2024-03-13 RX ORDER — HYDROCHLOROTHIAZIDE 25 MG/1
25 TABLET ORAL DAILY
Status: DISCONTINUED | OUTPATIENT
Start: 2024-03-13 | End: 2024-03-20 | Stop reason: HOSPADM

## 2024-03-13 RX ORDER — BISACODYL 10 MG
10 SUPPOSITORY, RECTAL RECTAL DAILY PRN
Status: DISCONTINUED | OUTPATIENT
Start: 2024-03-16 | End: 2024-03-20 | Stop reason: HOSPADM

## 2024-03-13 RX ORDER — SODIUM CHLORIDE, SODIUM LACTATE, POTASSIUM CHLORIDE, CALCIUM CHLORIDE 600; 310; 30; 20 MG/100ML; MG/100ML; MG/100ML; MG/100ML
INJECTION, SOLUTION INTRAVENOUS CONTINUOUS
Status: DISCONTINUED | OUTPATIENT
Start: 2024-03-13 | End: 2024-03-13

## 2024-03-13 RX ORDER — TIMOLOL MALEATE 5 MG/ML
1 SOLUTION/ DROPS OPHTHALMIC 2 TIMES DAILY
Status: DISCONTINUED | OUTPATIENT
Start: 2024-03-13 | End: 2024-03-20 | Stop reason: HOSPADM

## 2024-03-13 RX ORDER — ONDANSETRON 2 MG/ML
4 INJECTION INTRAMUSCULAR; INTRAVENOUS EVERY 6 HOURS PRN
Status: DISCONTINUED | OUTPATIENT
Start: 2024-03-13 | End: 2024-03-20 | Stop reason: HOSPADM

## 2024-03-13 RX ORDER — SODIUM CHLORIDE 9 MG/ML
INJECTION, SOLUTION INTRAVENOUS CONTINUOUS
Status: DISCONTINUED | OUTPATIENT
Start: 2024-03-13 | End: 2024-03-13

## 2024-03-13 RX ORDER — LEVOTHYROXINE SODIUM 88 UG/1
88 TABLET ORAL
Status: DISCONTINUED | OUTPATIENT
Start: 2024-03-13 | End: 2024-03-20 | Stop reason: HOSPADM

## 2024-03-13 RX ORDER — PANTOPRAZOLE SODIUM 40 MG/1
40 TABLET, DELAYED RELEASE ORAL 2 TIMES DAILY
Status: DISCONTINUED | OUTPATIENT
Start: 2024-03-13 | End: 2024-03-20 | Stop reason: HOSPADM

## 2024-03-13 RX ADMIN — SODIUM CHLORIDE, POTASSIUM CHLORIDE, SODIUM LACTATE AND CALCIUM CHLORIDE: 600; 310; 30; 20 INJECTION, SOLUTION INTRAVENOUS at 19:07

## 2024-03-13 RX ADMIN — SUGAMMADEX 200 MG: 100 INJECTION, SOLUTION INTRAVENOUS at 00:26

## 2024-03-13 RX ADMIN — SODIUM CHLORIDE, POTASSIUM CHLORIDE, SODIUM LACTATE AND CALCIUM CHLORIDE: 600; 310; 30; 20 INJECTION, SOLUTION INTRAVENOUS at 02:51

## 2024-03-13 RX ADMIN — ATORVASTATIN CALCIUM 40 MG: 40 TABLET, FILM COATED ORAL at 08:16

## 2024-03-13 RX ADMIN — ONDANSETRON 4 MG: 2 INJECTION INTRAMUSCULAR; INTRAVENOUS at 00:14

## 2024-03-13 RX ADMIN — CEFAZOLIN SODIUM 2 G: 2 INJECTION, SOLUTION INTRAVENOUS at 14:09

## 2024-03-13 RX ADMIN — LATANOPROST 1 DROP: 50 SOLUTION/ DROPS OPHTHALMIC at 03:19

## 2024-03-13 RX ADMIN — CEFEPIME 2 G: 2 INJECTION, POWDER, FOR SOLUTION INTRAVENOUS at 05:00

## 2024-03-13 RX ADMIN — ACETAMINOPHEN 975 MG: 325 TABLET, FILM COATED ORAL at 02:58

## 2024-03-13 RX ADMIN — GABAPENTIN 600 MG: 300 CAPSULE ORAL at 08:15

## 2024-03-13 RX ADMIN — TIMOLOL MALEATE 1 DROP: 5 SOLUTION/ DROPS OPHTHALMIC at 20:11

## 2024-03-13 RX ADMIN — ACETAMINOPHEN 975 MG: 325 TABLET, FILM COATED ORAL at 18:54

## 2024-03-13 RX ADMIN — CEFAZOLIN SODIUM 2 G: 2 INJECTION, SOLUTION INTRAVENOUS at 20:10

## 2024-03-13 RX ADMIN — METOPROLOL SUCCINATE 100 MG: 100 TABLET, EXTENDED RELEASE ORAL at 20:09

## 2024-03-13 RX ADMIN — GABAPENTIN 600 MG: 300 CAPSULE ORAL at 20:07

## 2024-03-13 RX ADMIN — PHENYLEPHRINE HYDROCHLORIDE 100 MCG: 10 INJECTION INTRAVENOUS at 00:00

## 2024-03-13 RX ADMIN — NOREPINEPHRINE BITARTRATE 0.06 MCG/KG/MIN: 0.02 INJECTION, SOLUTION INTRAVENOUS at 02:29

## 2024-03-13 RX ADMIN — PREDNISOLONE ACETATE 1 DROP: 10 SUSPENSION/ DROPS OPHTHALMIC at 08:09

## 2024-03-13 RX ADMIN — LATANOPROST 1 DROP: 50 SOLUTION/ DROPS OPHTHALMIC at 22:18

## 2024-03-13 RX ADMIN — SENNOSIDES AND DOCUSATE SODIUM 1 TABLET: 50; 8.6 TABLET ORAL at 20:09

## 2024-03-13 RX ADMIN — SODIUM CHLORIDE, POTASSIUM CHLORIDE, SODIUM LACTATE AND CALCIUM CHLORIDE: 600; 310; 30; 20 INJECTION, SOLUTION INTRAVENOUS at 14:31

## 2024-03-13 RX ADMIN — SENNOSIDES AND DOCUSATE SODIUM 1 TABLET: 50; 8.6 TABLET ORAL at 08:15

## 2024-03-13 RX ADMIN — LEVOTHYROXINE SODIUM 88 MCG: 0.09 TABLET ORAL at 08:16

## 2024-03-13 RX ADMIN — PANTOPRAZOLE SODIUM 40 MG: 40 TABLET, DELAYED RELEASE ORAL at 08:15

## 2024-03-13 RX ADMIN — ACETAMINOPHEN 975 MG: 325 TABLET, FILM COATED ORAL at 10:03

## 2024-03-13 RX ADMIN — SODIUM CHLORIDE, POTASSIUM CHLORIDE, SODIUM LACTATE AND CALCIUM CHLORIDE: 600; 310; 30; 20 INJECTION, SOLUTION INTRAVENOUS at 06:26

## 2024-03-13 RX ADMIN — INSULIN HUMAN 3.5 UNITS/HR: 1 INJECTION, SOLUTION INTRAVENOUS at 02:44

## 2024-03-13 RX ADMIN — INSULIN GLARGINE 10 UNITS: 100 INJECTION, SOLUTION SUBCUTANEOUS at 15:56

## 2024-03-13 RX ADMIN — PANTOPRAZOLE SODIUM 40 MG: 40 TABLET, DELAYED RELEASE ORAL at 20:09

## 2024-03-13 RX ADMIN — TIMOLOL MALEATE 1 DROP: 5 SOLUTION/ DROPS OPHTHALMIC at 08:04

## 2024-03-13 RX ADMIN — SODIUM CHLORIDE, POTASSIUM CHLORIDE, SODIUM LACTATE AND CALCIUM CHLORIDE 500 ML: 600; 310; 30; 20 INJECTION, SOLUTION INTRAVENOUS at 15:56

## 2024-03-13 RX ADMIN — PHENYLEPHRINE HYDROCHLORIDE 100 MCG: 10 INJECTION INTRAVENOUS at 00:11

## 2024-03-13 ASSESSMENT — ACTIVITIES OF DAILY LIVING (ADL)
ADLS_ACUITY_SCORE: 55
ADLS_ACUITY_SCORE: 42
ADLS_ACUITY_SCORE: 42
ADLS_ACUITY_SCORE: 55
ADLS_ACUITY_SCORE: 40
ADLS_ACUITY_SCORE: 39
ADLS_ACUITY_SCORE: 55
ADLS_ACUITY_SCORE: 55
ADLS_ACUITY_SCORE: 40
ADLS_ACUITY_SCORE: 55
ADLS_ACUITY_SCORE: 55
ADLS_ACUITY_SCORE: 42
ADLS_ACUITY_SCORE: 40
ADLS_ACUITY_SCORE: 55
ADLS_ACUITY_SCORE: 44
ADLS_ACUITY_SCORE: 40
ADLS_ACUITY_SCORE: 55

## 2024-03-13 NOTE — PLAN OF CARE
Goal Outcome Evaluation:      Plan of Care Reviewed With: patient    Overall Patient Progress: improvingOverall Patient Progress: improving    Outcome Evaluation: .    ICU End of Shift Summary.  For vital signs and complete assessments, please see documentation flowsheets.     Pertinent assessments: VSS on 1.5 L NC. LS clear. Tele: AFIB CVR, PVCs. Disoriented to date, with intermittent confusion. Neck abscess I/D 3/13, wound vac placed. Bruising and scattered scabbing, right hip skin tear. Winston. R internal jugular.   Major Shift Events:   - Admitted.   - Titrated off levo @ 0401  - Neuro checks, left pupil is mis-shaped  - Q 1h insulin checks, insulin gtt titrated as appropriate.   - Positive blood cultures, see results  Plan (Upcoming Events): TBD  Discharge/Transfer Needs:  TBD    Bedside Shift Report Completed : Yes  Bedside Safety Check Completed: Yes

## 2024-03-13 NOTE — ANESTHESIA PROCEDURE NOTES
Central Line/PA Catheter Placement    Pre-Procedure   Staff -        Anesthesiologist:  Jair Shankar DO       Performed By: anesthesiologist       Referred By: Sagar Cadena MD       Pre-Anesthestic Checklist: patient identified, IV checked, site marked, risks and benefits discussed, informed consent, monitors and equipment checked, pre-op evaluation and at physician/surgeon's request  Timeout:       Correct Patient: Yes        Correct Procedure: Yes        Correct Site: Yes        Correct Position: Yes        Correct Laterality: Yes   Line Placement:   This line was placed Post Induction starting at 3/12/2024 11:27 PM and ending at 3/12/2024 11:34 PM    Procedure   Procedure: central line       Laterality: right       Insertion Site: internal jugular.       Patient Position: Trendelenburg and supine  Sterile Prep        All elements of maximal sterile barrier technique followed       Patient Prep/Sterile Barriers: draped, hand hygiene, gloves , hat , mask , draped, gown, sterile gel and probe cover       Skin prep: Chloraprep  Insertion/Injection        Technique: ultrasound guided and Seldinger Technique        1. Ultrasound was used to evaluate the access site.       2. Vein evaluated via ultrasound for patency/adequacy.       3. Using real-time ultrasound the needle/catheter was observed entering the artery/vein.       Type: CVC       Catheter Size: 7.5 Fr       Number of Lumens: triple lumen  Narrative         Secured by: suture       Tegaderm and Biopatch dressing used.       Complications: None apparent,        blood aspirated from all lumens,        All lumens flushed: Yes       Verification method: X-ray

## 2024-03-13 NOTE — CONSULTS
[facility]    Infectious Disease Consultation     Date of Admission:  3/12/2024  Date of Consult (When I saw the patient): 03/13/24    Assessment & Plan   Lj Castro is a 78 year old who was admitted on 3/12/2024.     Impression: 1 78-year-old female, acute sepsis and neck pain where she has recent hardware surgical repair, has an obvious deep infection to the hardware and also sepsis with Staph aureus bacteremia  2 Staph aureus bacteremia, sensitive by gene testing, no other obvious secondary involved sites does have left total shoulder  3 diabetes mellitus  4 infectious encephalopathy  5 atrial fibrillation    REC 1 blood cultures are define the infection, simplify to Ancef  2 will need prolonged IV antibiotics but hold on long line, has a neck line and temporarily should be removed when overall picture allows  3 serial daily blood cultures and follow closely for any other secondary involved sites  4 hardware in place probably rifampin addition at some point later        Sean Alexis MD    Reason for Consult   Reason for consult: I was asked to evaluate this patient for spine infection.    Primary Care Physician   Cory Soliz    Chief Complaint   Neck pain    History is obtained from the patient and medical records    History of Present Illness   Lj Castro is a 78 year old female who presents with acute onset of severe neck pain, confusion, fever and sepsis.  Found to have Staph aureus bacteremia and a deep hardware spine infection.  Has already went major surgical intervention, debrided with hardware still in place with obvious pus deep.  Cultures from that are pending but blood cultures rapidly growing Staph aureus.  She has no other obvious involved site and or other primary sites previously.  The original surgery is back to October so this is seemingly a late infection rather than operative complication.    Past Medical History   I have reviewed this patient's medical  history and updated it with pertinent information if needed.   Past Medical History:   Diagnosis Date    Arrhythmia     Bursitis, olecranon     Diabetes (H)     Displacement of cervical intervertebral disc without myelopathy     Gastro-oesophageal reflux disease     Glaucoma     Hyperlipemia     Hypertension     Hypothyroid     Renal failure, acute (H24) 6-7-11 to 6-17-11    hospitalized     Varicose veins        Past Surgical History   I have reviewed this patient's surgical history and updated it with pertinent information if needed.  Past Surgical History:   Procedure Laterality Date    DAVINCI HYSTERECTOMY SUPRACERVICAL N/A 3/18/2015    Procedure: DAVINCI HYSTERECTOMY SUPRACERVICAL;  Surgeon: Ranadll Melgar MD;  Location:  OR    DAVINCI SACROCOLPOPEXY, MIDURETHRAL SLING, CYSTOSCOPY N/A 3/18/2015    Procedure: DAVINCI SACROCOLPOPEXY, MIDURETHRAL SLING, CYSTOSCOPY;  Surgeon: Thalia Esquivel MD;  Location:  OR    DAVINCI SALPINGO-OOPHORECTOMY INCLUDING BILATERAL Bilateral 3/18/2015    Procedure: DAVINCI SALPINGO-OOPHORECTOMY INCLUDING BILATERAL;  Surgeon: Randall Melgar MD;  Location:  OR    EYE SURGERY Left 2009    [place] revision eye sunt with graft left eye     IR LOWER EXTREMITY ANGIOGRAM LEFT  7/8/2020    OPTICAL TRACKING SYSTEM FUSION POSTERIOR CERVICAL THREE + LEVELS N/A 10/13/2023    Procedure: C3 to T2 cervical laminectomy and spinal cord decompression C3 to T2 posterior fusion C3 to T2 segemental instrumetation Application and detachment of Isaac dyer Navigation using Stealth System;  Surgeon: Sagar Cadena MD;  Location: [facility] OR       Prior to Admission Medications   Prior to Admission Medications   Prescriptions Last Dose Informant Patient Reported? Taking?   Multiple Vitamins-Calcium (ONE-A-DAY WOMENS FORMULA PO) 3/11/2024 at AM  Yes Yes   Sig: Take 1 tablet by mouth daily   acetaminophen (TYLENOL) 325 MG tablet 3/11/2024 at unknown time  No Yes   Sig: Take 2 tablets (650 mg) by  mouth 3 times daily. May also take 2 tablets (650 mg) daily as needed for mild pain.   apixaban ANTICOAGULANT (ELIQUIS) 5 MG tablet 3/11/2024 at PM  Yes Yes   Sig: Take 5 mg by mouth 2 times daily   atorvastatin (LIPITOR) 40 MG tablet 3/11/2024 at AM  Yes Yes   Sig: Take 1 tablet by mouth daily   gabapentin (NEURONTIN) 300 MG capsule 3/11/2024 at PM  Yes Yes   Sig: Take 600 mg by mouth 2 times daily   hydrochlorothiazide (HYDRODIURIL) 25 MG tablet 3/11/2024 at AM  No Yes   Sig: Take 1 tablet (25 mg) by mouth daily   insulin aspart (NOVOLOG PEN) 100 UNIT/ML pen 3/11/2024 at AM  Yes Yes   Sig: Inject 3 Units Subcutaneous daily (with breakfast)   insulin aspart (NOVOLOG PEN) 100 UNIT/ML pen 3/11/2024 at lunch  Yes Yes   Sig: Inject 3 Units Subcutaneous daily (with lunch)   insulin aspart (NOVOLOG PEN) 100 UNIT/ML pen 3/11/2024 at PM  Yes Yes   Sig: Inject 5 Units Subcutaneous daily (with dinner)   insulin glargine (LANTUS PEN) 100 UNIT/ML pen 3/11/2024 at HS  No Yes   Sig: Inject 18 Units Subcutaneous at bedtime   Patient taking differently: Inject 14-17 Units Subcutaneous at bedtime If BG < 200 mg/dL = 14 units   If BG >/= 200 mg/dL = 17 units   ketoconazole (NIZORAL) 2 % external shampoo Unknown at PRN  Yes Yes   Sig: Shampoo the hair thoroughly each as need.   latanoprost (XALATAN) 0.005 % ophthalmic solution 3/11/2024 at HS  Yes Yes   Sig: Place 1 drop into both eyes at bedtime   levothyroxine (SYNTHROID/LEVOTHROID) 88 MCG tablet 3/11/2024 at AM  Yes Yes   Sig: Take 88 mcg by mouth daily   losartan (COZAAR) 50 MG tablet 3/11/2024 at AM  No Yes   Sig: Take 1 tablet (50 mg) by mouth daily HOLD if blood pressure <100   melatonin 3 MG tablet Unknown at PRN  No Yes   Sig: Take 1 tablet (3 mg) by mouth nightly as needed for sleep   metoprolol succinate ER (TOPROL XL) 100 MG 24 hr tablet 3/11/2024 at PM  No Yes   Sig: Take 1 tablet (100 mg) by mouth 2 times daily   omeprazole (PRILOSEC) 40 MG DR capsule 3/11/2024 at AM   Yes Yes   Sig: Take 40 mg by mouth daily   prednisoLONE acetate (PRED FORTE) 1 % ophthalmic suspension 3/11/2024 at AM  Yes Yes   Sig: Place 1 drop Into the left eye daily   timolol maleate (TIMOPTIC) 0.5 % ophthalmic solution 3/11/2024 at PM  Yes Yes   Sig: Place 1 drop into both eyes 2 times daily      Facility-Administered Medications: None     Allergies   No Known Allergies    Immunization History   Immunization History   Administered Date(s) Administered    COVID-19 Bivalent 12+ (Pfizer) 11/10/2022    COVID-19 MONOVALENT 12+ (Pfizer) 03/02/2021, 03/23/2021, 12/27/2021    COVID-19 Monovalent 12+ (Pfizer 2022) 07/11/2022       Social History   I have reviewed this patient's social history and updated it with pertinent information if needed. Lj Castro  reports that she has never smoked. She has never used smokeless tobacco. She reports current alcohol use. She reports that she does not use drugs.    Family History   I have reviewed this patient's family history and updated it with pertinent information if needed.   History reviewed. No pertinent family history.    Review of Systems   The 10 point Review of Systems is negative for any other pain site and her confusion that was initially significant seems better    Physical Exam   Temp: 98.6  F (37  C) Temp src: Oral BP: 106/66 Pulse: 68   Resp: 14 SpO2: 100 % O2 Device: Nasal cannula Oxygen Delivery: 1.5 LPM  Vital Signs with Ranges  Temp:  [96.7  F (35.9  C)-98.6  F (37  C)] 98.6  F (37  C)  Pulse:  [] 68  Resp:  [9-29] 14  BP: ()/() 106/66  SpO2:  [85 %-100 %] 100 %  170 lbs 0 oz  Body mass index is 31.09 kg/m .    GENERAL APPEARANCE:  awake and communicative may be slightly off mentation but not as severe as earlier  EYES: Eyes grossly normal to inspection  NECK: no adenopathy  RESP: lungs clear   CV: regular rates and rhythm  LYMPHATICS: normal ant/post cervical and supraclavicular nodes  ABDOMEN: soft, nontender  MS: extremities  "normal  SKIN: no suspicious lesions or rashes  Spine area is covered      Data   All laboratory and imaging data in the past 24 hours reviewed  No results for input(s): \"CULT\" in the last 168 hours.  No lab results found.    Invalid input(s): \"UC\"       All cultures:  Recent Labs   Lab 03/12/24  2358 03/12/24  1336 03/12/24  1311 03/12/24  1258   CULTURE No growth, less than 1 day Culture in progress Positive on the 1st day of incubation*  Gram positive cocci in clusters* Positive on the 1st day of incubation*  Gram positive cocci in clusters*      Blood culture:  Results for orders placed or performed during the hospital encounter of 03/12/24   Blood Culture Arm, Right    Specimen: Arm, Right; Blood   Result Value Ref Range    Culture Positive on the 1st day of incubation (A)     Culture Gram positive cocci in clusters (AA)    Blood Culture Hand, Left    Specimen: Hand, Left; Blood   Result Value Ref Range    Culture Positive on the 1st day of incubation (A)     Culture Gram positive cocci in clusters (AA)    Results for orders placed or performed in visit on 06/12/11   Blood culture   Result Value Ref Range    Specimen Description Right Arm     Culture Micro No growth after 6 days     Micro Report Status FINAL [id number]    Blood culture   Result Value Ref Range    Specimen Description Right Hand     Culture Micro No growth after 6 days     Micro Report Status FINAL [id number]    Results for orders placed or performed in visit on 06/08/11   Blood culture   Result Value Ref Range    Specimen Description Blood Left Arm     Culture Micro No growth after 6 days     Micro Report Status FINAL [id number]    Blood culture   Result Value Ref Range    Specimen Description Blood Right Arm     Culture Micro No growth after 6 days     Micro Report Status FINAL [id number]       Urine culture:  Results for orders placed or performed during the hospital encounter of 03/12/24   Urine Culture    Specimen: Urine, Catheter   Result Value " Ref Range    Culture Culture in progress    Results for orders placed or performed in visit on 06/12/11   Urine culture   Result Value Ref Range    Specimen Description Midstream Urine     Culture Micro No growth     Micro Report Status FINAL 06/14/2011    Results for orders placed or performed in visit on 06/07/11   Urine culture   Result Value Ref Range    Specimen Description Unspecified Urine     Culture Micro No growth     Micro Report Status FINAL 06/08/2011

## 2024-03-13 NOTE — ANESTHESIA CARE TRANSFER NOTE
Patient: Lj Castro    Procedure: Procedure(s):  Irrigation and debridement spine with placement of wound vac       Diagnosis: Infection [B99.9]  Diagnosis Additional Information: No value filed.    Anesthesia Type:   General     Note:    Oropharynx: oropharynx clear of all foreign objects  Level of Consciousness: awake  Oxygen Supplementation: face mask    Independent Airway: airway patency satisfactory and stable  Dentition: dentition unchanged  Vital Signs Stable: post-procedure vital signs reviewed and stable  Report to RN Given: handoff report given  Patient transferred to: PACU    Handoff Report: Identifed the Patient, Identified the Reponsible Provider, Reviewed the pertinent medical history, Discussed the surgical course, Reviewed Intra-OP anesthesia mangement and issues during anesthesia, Set expectations for post-procedure period and Allowed opportunity for questions and acknowledgement of understanding      Vitals:  Vitals Value Taken Time   BP     Temp     Pulse     Resp     SpO2         Electronically Signed By: CARMEL Rust CRNA  March 13, 2024  12:39 AM

## 2024-03-13 NOTE — PROGRESS NOTES
Notified provider about indwelling shin catheter discussed removal or continued need.    Did provider choose to remove indwelling shin catheter? YES    Provider's shin indication for keeping indwelling shin catheter: Indication for continued use: Strict 1-2 Hour I & O if external catheters are not an option    Is there an order for indwelling shin catheter? YES    *If there is a plan to keep shin catheter in place at discharge daily notification with provider is not necessary, but please add a notation in the treatment team sticky note that the patient will be discharging with the catheter.

## 2024-03-13 NOTE — CONSULTS
[facility]  WOC Nurse Inpatient Assessment     Consulted for: Neck    Summary: Patient s/p I&D on 3/13/24 of posterior neck for deep cervical infection down to the hardware with wound VAC placement.  Currently Spinal surgery managing site, will follow peripherally in case WOC involvement is desired for VAC changes.      Patient History (according to provider note(s):      Lj Castro is a 78 year old female admitted on 3/12/2024. 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  She came to the ER complaining that for the past 10 days he had increasing pain in the neck and gradual weakness to the arms and legs to the point that she was unable to ambulate.  She also had right arm pain  over the past 3 to 4 days along with confusion.  Her arm pain was 9 out of 10.  There have been no recent falls or shortness of breath or nausea or vomiting.  She was brought in by EMS.  CT scan of the neck showed a deep space infection with gas formation and possible cord compression.  She was given Kcentra and taken to the operating room.  She underwent I&D with wound VAC application along with IntraOp culture and IV antibiotics.  She underwent general endotracheal anesthesia.  Operative course was significant for hypotension requiring central line and pressor support and so she was admitted to the ICU.       Assessment:      Areas visualized during today's visit: Focused:    VAC dressing over posterior neck is intact with no signs of leaking.   Suction at 50 mmHg.    Treatment Plan:     Negative pressure wound therapy plan:  Wound location: Posterior neck   Change  "Days: Mon/Wed/Fri by Spine surgery or WOC RN    Supplies (including all accessories) used: medium Black foam   Cleanse with Vashe prior to replacing NPWT  Suction setting: -50     Staff RN to assess integrity of dressing and ensure suction is set at appropriate level every shift.   Date canister. Chart canister output every shift. Change cannister weekly and PRN if full/occluded     Remove foam dressing and replace with BID normal saline moist gauze dressing if:   -a dressing failure which cannot be repaired within 2 hours   -patient is discharging to home without a home pump   -patient is discharging to a facility outside the local area   -if a dressing is a \"Silver Foam\", remove before Radiation Therapy or MRI     The hospital VAC pump is not to be discharged with the patient.?Ensure to disconnect patient from machine prior to discharge. Then,    - If a home KCI VAC pump has been delivered, connect home cannister to dressing tubing then connect cannister to home pump and turn on machine    - If transferring to a nearby facility with a KCI vac, can disconnect and clamp tubing then cover end with glove so can be reconnected within 2 hours        Orders: Written    RECOMMEND PRIMARY TEAM ORDER: None, at this time  Education provided: plan of care  Discussed plan of care with: Patient, Family, and Nurse  WOC nurse follow-up plan: Monday/WednesdayFriday (tentatively)  Notify WOC if wound(s) deteriorate.  Nursing to notify the Provider(s) and re-consult the WOC Nurse if new skin concern.    DATA:     Current support surface: Standard  Low air loss (LYNNETTE pump, Isolibrium, Pulsate, skin guard, etc)  BMI: Body mass index is 31.09 kg/m .   Active diet order: Orders Placed This Encounter      Advance Diet as Tolerated: Regular Diet Adult     Output: I/O last 3 completed shifts:  In: 1698.74 [P.O.:120; I.V.:1578.74]  Out: 650 [Urine:625; Drains:25]     Labs:   Recent Labs   Lab 03/13/24  0316   HGB 9.5*   WBC 13.4*   A1C 8.4* "     Pressure injury risk assessment:   Sensory Perception: 3-->slightly limited  Moisture: 3-->occasionally moist  Activity: 3-->walks occasionally  Mobility: 3-->slightly limited  Nutrition: 2-->probably inadequate  Friction and Shear: 2-->potential problem  Luke Score: 16    JEAN-PAUL Guadalupe [facility] [employer]  Dept. Office Number: [phone]

## 2024-03-13 NOTE — PROGRESS NOTES
Bcx2 +GPC in clusters. Suspicious for staph aureus.   Repeat Bcx2 ordered for 3/14 (though cultures may clear after 72hrs of appropriate treatment and clinical stability with staph)  MRSA/MSSA pending.   TTE ordered.  On cefepime and vanco at this time. Likely can narrow once cultures  Norepi weaning down, on minimal amount at this time.    Bakari Castanon MD  [facility],  of Medicine  Pulmonary/Critical Care Medicine  March 13, 2024

## 2024-03-13 NOTE — OP NOTE
Preop Dx:  Deep posterior cervical infection  Postop Dx:  Deep infection down to hardware.  Purulent material sent to lab for gram stain and aerobic and anaerobic culture    Procedure:  irrigation and debridement and wound vac application to posterior cervical sine    Surgeon:  Sagar Cadena    EBL: 25cc      Indication:  Patient had 6 months ago posterior cervical fusion.  One week ago started having neck pain and weakness and disorientation.  Preop labs showed elevated WBC and CRP more than 200 and CT showed deep space infection.  Urgent I and D and wound vac placement indicated.  Cervical MRI scan could not be obtained in time for the surgery      Procedure:    Patient had internal jugular central line placed after intubation.  Positioned into a prone position onto Jovan OSI frame.  Shoulders taped down.  Midline incision made from C2 to T2 through skin subQ tissue.  Upon incising the deep fascial tissue about 25cc of purulent material noted and was sent for gram stain and aerobic and anaerobic cultures.  The infection was down to the hardware.  Some dead tissue was sharply debrided and resected with scalpel constituting an excisional debridement  and the wound was irrigated profusely with saline.  Wound vac sponge cut and laid into the wound and 50cc low continuous suction applied with good drainage and seal.  Patient was placed back into supine position.    Sagar Cadena MD

## 2024-03-13 NOTE — ANESTHESIA PROCEDURE NOTES
Airway       Patient location during procedure: OR       Procedure Start/Stop Times: 3/12/2024 11:23 PM  Staff -        Performed By: CRNAIndications and Patient Condition       Indications for airway management: fitz-procedural         Mask difficulty assessment: 1 - vent by mask    Final Airway Details       Final airway type: endotracheal airway       Successful airway: ETT - single  Endotracheal Airway Details        ETT size (mm): 7.0       Cuffed: yes       Successful intubation technique: video laryngoscopy       VL Blade Size: Glidescope 3       Grade View of Cords: 1       Adjucts: stylet       Position: Left       Measured from: gums/teeth       Bite block used: None    Post intubation assessment        Placement verified by: capnometry and equal breath sounds        Number of attempts at approach: 1       Secured with: tape       Ease of procedure: easy       Dentition: Intact    Medication(s) Administered   Medication Administration Time: 3/12/2024 11:23 PM

## 2024-03-13 NOTE — CONSULTS
This 78 year old did well after her posterior cervical spine surgery about 6 months ago.  Then about 10 days ago started noting increasing pain in the neck with gradual weakness in the arms and legs to the point of unable to ambulate.  Had to be brought in an ambulance to ED.  CT shows deep space infection with gas formation with possible cord compression but MRI could not be obtained.  Patient has been Eliquis which she has been taking for atrial fibrillation.  After talking to ED physician this had to be reversed with Kcentra.      Emergency I and D and evacuation of the abscess is needed to prevent further neuro compromise and sepsis as she is insulin dependent diabetic.    Past medical Hx:    Insulin dependent diabetic on insulin.  See ED physician notes:    Medications:    Apixaban   Atorvastatin   Gabapentin   Hydrochlorothiazide   Novolog   Lantus   Levothyroxine Na   Losartan   Toprol XL   Omeprazole   Tizanidine       Past Medical History:    Arrhythmia   DM   GERD  Glaucoma   HLD   HTN   Hypothyroidism  Renal failure   PAF  PAD  Lumbar stenosis   Stage 3 CKD      Past Surgical History:    Supracervical hysterectomy   Midurethral sling insertion   BSO   L eye graft w/ shunt   Cervical laminectomy & spinal cord decompression   L humerus fx surgery     Physical Exam:    Complains of pain in the neck.  Alert.  Possible disorientation.  Moves all 4 extremities motor 4 out of 5 throughout.  Lungs clear  Abdomen soft  Heart atrial fib.  Irregular    Labs:  CRP greater than 200.   Hgb 10.9         Impression:  deep abscess posterior cervical spine status post posterior cervical fusion late onset    Plan:  Emergency I and D and wound vac application and intra-op culture and IV antibiotics.  Dr. Alexis already contacted.  The nature of the surgery and risks discussed with  and son and patient.  They would like to proceed with the surgical intervention.

## 2024-03-13 NOTE — ANESTHESIA PREPROCEDURE EVALUATION
Anesthesia Pre-Procedure Evaluation    Patient: Lj Castro   MRN: [id number] : 1945        Procedure : Procedure(s):  Irrigation and debridement spine with placement of wound vac          Past Medical History:   Diagnosis Date    Arrhythmia     Bursitis, olecranon     Diabetes (H)     Displacement of cervical intervertebral disc without myelopathy     Gastro-oesophageal reflux disease     Glaucoma     Hyperlipemia     Hypertension     Hypothyroid     Renal failure, acute (H24) 11 to 11    hospitalized     Varicose veins       Past Surgical History:   Procedure Laterality Date    DAVINCI HYSTERECTOMY SUPRACERVICAL N/A 3/18/2015    Procedure: DAVINCI HYSTERECTOMY SUPRACERVICAL;  Surgeon: Randall Melgar MD;  Location: [facility] OR    DAVINCI SACROCOLPOPEXY, MIDURETHRAL SLING, CYSTOSCOPY N/A 3/18/2015    Procedure: DAVINCI SACROCOLPOPEXY, MIDURETHRAL SLING, CYSTOSCOPY;  Surgeon: Thalia Esquivel MD;  Location:  OR    DAVINCI SALPINGO-OOPHORECTOMY INCLUDING BILATERAL Bilateral 3/18/2015    Procedure: DAVINCI SALPINGO-OOPHORECTOMY INCLUDING BILATERAL;  Surgeon: Randall Melgar MD;  Location:  OR    EYE SURGERY Left     NH revision eye sunt with graft left eye     IR LOWER EXTREMITY ANGIOGRAM LEFT  2020    OPTICAL TRACKING SYSTEM FUSION POSTERIOR CERVICAL THREE + LEVELS N/A 10/13/2023    Procedure: C3 to T2 cervical laminectomy and spinal cord decompression C3 to T2 posterior fusion C3 to T2 segemental instrumetation Application and detachment of Isaac dyer Navigation using Stealth System;  Surgeon: Sagar Cadena MD;  Location: [facility] OR      No Known Allergies   Social History     Tobacco Use    Smoking status: Never    Smokeless tobacco: Never   Substance Use Topics    Alcohol use: Yes     Comment: occ      Wt Readings from Last 1 Encounters:   24 77.1 kg (170 lb)        Anesthesia Evaluation            ROS/MED HX  ENT/Pulmonary:  - neg pulmonary ROS     Neurologic:  Comment: confusion      Cardiovascular:     (+)  hypertension- -   -  - -                        dysrhythmias, a-fib,             METS/Exercise Tolerance: >4 METS    Hematologic:  - neg hematologic  ROS     Musculoskeletal:  - neg musculoskeletal ROS     GI/Hepatic:     (+) GERD,                   Renal/Genitourinary:     (+) renal disease, type: CRI,            Endo:     (+)  type II DM,        thyroid problem,            Psychiatric/Substance Use:  - neg psychiatric ROS     Infectious Disease: Comment: Posterior cervical surgery site with fluid collection and gas - neg infectious disease ROS     Malignancy:  - neg malignancy ROS     Other:  - neg other ROS          Physical Exam    Airway        Mallampati: II    Neck ROM: limited     Respiratory Devices and Support         Dental           Cardiovascular   cardiovascular exam normal       Rhythm and rate: regular     Pulmonary   pulmonary exam normal                OUTSIDE LABS:  CBC:   Lab Results   Component Value Date    WBC 11.7 (H) 03/12/2024    WBC 4.4 10/20/2023    HGB 10.9 (L) 03/12/2024    HGB 9.7 (L) 10/20/2023    HCT 34.2 (L) 03/12/2024    HCT 32.0 (L) 10/20/2023     03/12/2024     10/20/2023     BMP:   Lab Results   Component Value Date     (L) 03/12/2024     10/25/2023    POTASSIUM 3.7 03/12/2024    POTASSIUM 3.8 10/25/2023    CHLORIDE 95 (L) 03/12/2024    CHLORIDE 102 10/25/2023    CO2 24 03/12/2024    CO2 29 10/25/2023    BUN 28.7 (H) 03/12/2024    BUN 11.7 10/25/2023    CR 1.20 (H) 03/12/2024    CR 1.06 (H) 10/25/2023     (H) 03/12/2024    GLC 74 10/25/2023     COAGS:   Lab Results   Component Value Date    PTT 37 06/10/2011    INR 1.4 10/05/2023    FIBR 637 (H) 06/10/2011     POC:   Lab Results   Component Value Date     (H) 03/20/2015     HEPATIC:   Lab Results   Component Value Date    ALBUMIN 3.1 (L) 06/15/2011    PROTTOTAL 6.7 (L) 06/15/2011    ALT 33 06/15/2011    AST 37 06/15/2011    ALKPHOS 94  "06/15/2011    BILITOTAL 0.2 06/15/2011    ALONDRA 8 (L) 09/03/2005     OTHER:   Lab Results   Component Value Date    LACT 1.1 03/12/2024    A1C 6.7 (H) 03/19/2015    INDY 9.2 03/12/2024    PHOS 3.1 06/15/2011    MAG 1.8 03/20/2015    TSH 2.05 03/19/2015    SED 72 (H) 03/12/2024       Anesthesia Plan    ASA Status:  3       Anesthesia Type: General.     - Airway: ETT   Induction: Intravenous, Propofol.   Maintenance: Balanced.   Techniques and Equipment:     - Airway: Video-Laryngoscope       Consents    Anesthesia Plan(s) and associated risks, benefits, and realistic alternatives discussed. Questions answered and patient/representative(s) expressed understanding.     - Discussed:     - Discussed with:  Patient            Postoperative Care    Pain management: IV analgesics, Oral pain medications, Multi-modal analgesia.   PONV prophylaxis: Ondansetron (or other 5HT-3), Dexamethasone or Solumedrol     Comments:    Other Comments: Central line discussed with family.             Jair Shankar,     I have reviewed the pertinent notes and labs in the chart from the past 30 days and (re)examined the patient.  Any updates or changes from those notes are reflected in this note.     # Hyponatremia: Lowest Na = 134 mmol/L in last 30 days, will monitor as appropriate        # Drug Induced Coagulation Defect: home medication list includes an anticoagulant medication   # Obesity: Estimated body mass index is 31.09 kg/m  as calculated from the following:    Height as of this encounter: 1.575 m (5' 2\").    Weight as of this encounter: 77.1 kg (170 lb).      "

## 2024-03-13 NOTE — BRIEF OP NOTE
[facility] Brief Operative Note    Pre-operative diagnosis: Infection [B99.9]   Post-operative diagnosis Deep wound infection posterior cervical spine down to hardware   Procedure: Procedure(s):  Irrigation and debridement spine with placement of wound vac   Surgeon(s): Surgeon(s) and Role:     * Sagar Cadena MD - Primary   Estimated blood loss: * No values recorded between 3/13/2024 12:02 AM and 3/13/2024 12:31 AM *    Specimens: ID Type Source Tests Collected by Time Destination   A : Posterior neck abscess Abscess Neck ANAEROBIC BACTERIAL CULTURE ROUTINE, GRAM STAIN, AEROBIC BACTERIAL CULTURE ROUTINE Sagar Cadena MD 3/12/2024 11:58 PM       Findings: Deep wound infection down to hardware   purulent material sent for gram stain and aerobic and anaerobic culture

## 2024-03-13 NOTE — PROGRESS NOTES
03/13/24 1151   Appointment Info   Signing Clinician's Name / Credentials (PT) Ce Nichols DPT   Living Environment   People in Home spouse;child(lopez), adult   Current Living Arrangements house   Living Environment Comments Patient reports living in a single level home   Self-Care   Usual Activity Tolerance moderate   Current Activity Tolerance poor   Equipment Currently Used at Home shower chair;walker, rolling   Activity/Exercise/Self-Care Comment Patient confused, but stated that she uses a walker for mobility (has one that stays in the car, one that stays in the home).  Per chart review, patient unable to ambulate prior to admission   General Information   Onset of Illness/Injury or Date of Surgery 03/12/24   Referring Physician Sagar Cadena MD   Patient/Family Therapy Goals Statement (PT) Patient unable to state   Pertinent History of Current Problem (include personal factors and/or comorbidities that impact the POC) Per medical chart: Lj Castro is a 78 year old female admitted on 3/12/2024. She has a PMH significant for HTN, HLD, hypothyroid, DM2, PN, CKD3 (baseline cr about 1.15-1.35), AFib-on DOAC, chronic neck and back pain, neurogenic claudication, PAD, and GERD who is  s/p C3 to T2 cervical lamimectomy and spinal cord decompression, C3 to T2 posterior fusion, C3 to T2 segemental instrumentation for progressive myelopathy due to stenosis and kyphosis in 10/2023 now seen POD#1 s/p I&D with wound vac application to posterior cervical spine.   Existing Precautions/Restrictions spinal;fall   Cognition   Orientation Status (Cognition) oriented x 4   Pain Assessment   Patient Currently in Pain   (no reports of pain during session)   Integumentary/Edema   Integumentary/Edema Comments wound vac on posterior cervical spine   Posture    Posture Forward head position   Range of Motion (ROM)   Range of Motion ROM deficits secondary to surgical procedure   Strength (Manual Muscle Testing)   Strength  (Manual Muscle Testing) Deficits observed during functional mobility   Bed Mobility   Comment, (Bed Mobility) Mod Ax 1-2   Transfers   Comment, (Transfers) Did not trial at this time secondary to low BP and confusion   Gait/Stairs (Locomotion)   Comment, (Gait/Stairs) Did not trial at this time secondary to low BP and confusion   Balance   Balance Comments Required min A for upright sitting at bedside   Clinical Impression   Criteria for Skilled Therapeutic Intervention Yes, treatment indicated   PT Diagnosis (PT) Impaired functional mobility   Influenced by the following impairments confusion, decreased strength, decreased activity tolerance, decreased balance   Functional limitations due to impairments difficulties with bed mobility, transfers and ambulation   Clinical Presentation (PT Evaluation Complexity) evolving   Clinical Presentation Rationale clinical judgement   Clinical Decision Making (Complexity) moderate complexity   Planned Therapy Interventions (PT) balance training;bed mobility training;gait training;patient/family education;stair training;strengthening;transfer training;progressive activity/exercise   Risk & Benefits of therapy have been explained evaluation/treatment results reviewed;care plan/treatment goals reviewed;risks/benefits reviewed;current/potential barriers reviewed;patient;participants included   PT Total Evaluation Time   PT Eval, Moderate Complexity Minutes (69314) 10   Physical Therapy Goals   PT Frequency 5x/week   PT Predicted Duration/Target Date for Goal Attainment 03/20/24   PT Goals Bed Mobility;Transfers;Gait;Stairs   PT: Bed Mobility Supervision/stand-by assist;Supine to/from sit   PT: Transfers Supervision/stand-by assist;Sit to/from stand;Bed to/from chair;Assistive device   PT: Gait Supervision/stand-by assist;Assistive device;100 feet   PT: Stairs Supervision/stand-by assist;Assistive device;2 stairs   Interventions   Interventions Quick Adds Therapeutic Activity  "  Therapeutic Activity   Therapeutic Activities: dynamic activities to improve functional performance Minutes (16278) 23   Symptoms Noted During/After Treatment Fatigue   Treatment Detail/Skilled Intervention Patient supine upon arrival, confused throughout (patient did not know where she was, thought she was in [facility] and asked \"is there a show room?\" In the hallway outside of her room).  BP low upon arrival (had been on levophed until this early am) at 83/46 HR 57.  Nurse at bedside.  Raised HOB, cued for ankle pumps, BP reassessed at 107/58 HR 85.  Nursing agreeable to trial of sitting at EOB.  Mod A x1 for transfer to EOB with Ax2 for line/tube management.  Patient tolerated sitting with min A for 5 min, reporting no dizziness.  BP in sitting 100/67 HR 92.  Returned to supine with mod A x2, assist for line/tube management.  BP 99/42 HR 72.  Patient pleasant throughout, easily redirected and follows all directions.  All needs within reach at end of session.   PT Discharge Planning   PT Plan Trial standing with walker, check BP, up to chair   PT Discharge Recommendation (DC Rec) Transitional Care Facility   PT Rationale for DC Rec Mobility limited this date due to low BP with patient demonstrating confusion and little ability to provide accurate and timely reports of dizziness/lightheadedness (unable to safely progress mobility).  Per medical chart, patient was having difficulty with mobility prior to admission.  Currently unable to demonstrate safe household mobility.  Recommend discharge to TCU to increase strength, activity tolerance and independence with mobility.   PT Brief overview of current status Ax2 with sitting at EOB   Total Session Time   Timed Code Treatment Minutes 23   Total Session Time (sum of timed and untimed services) 33       "

## 2024-03-13 NOTE — H&P
[facility]    History and Physical - Hospitalist Service       Date of Admission:  3/12/2024    Assessment & Plan      Lj Castro is a 78 year old female admitted on 3/12/2024. 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  She came to the ER complaining that for the past 10 days he had increasing pain in the neck and gradual weakness to the arms and legs to the point that she was unable to ambulate.  She also had right arm pain  over the past 3 to 4 days along with confusion.  Her arm pain was 9 out of 10.  There have been no recent falls or shortness of breath or nausea or vomiting.  She was brought in by EMS.  CT scan of the neck showed a deep space infection with gas formation and possible cord compression.  She was given Kcentra and taken to the operating room.  She underwent I&D with wound VAC application along with IntraOp culture and IV antibiotics.  She underwent general endotracheal anesthesia.  Operative course was significant for hypotension requiring central line and pressor support.  I am asked to admit the patient to the intensive care unit.    Sepsis due to deep space infection in the posterior cervical spine down to the hardware.  -S/p I&D with purulent material sent for cultures.  -IV cefepime and IV vancomycin for now.  -IV fluids.  -Levophed to maintain MAP of greater than 65 mmHg.    2.  Type 2 diabetes.  -insulin gtt.    3.  A-fib with rapid ventricular rate.  -Monitor on telemetry.  -Hold Eliquis.  -Most recent echocardiogram in January 2023 shows a normal EF.    4. Acute encephalopathy.  - likely due to sepsis.  -  "monitor.        Diet:  NPO.  DVT Prophylaxis: Pneumatic Compression Devices  Winston Catheter: PRESENT, indication:    Lines: PRESENT             Cardiac Monitoring: None  Code Status:  Full Code.    Clinically Significant Risk Factors Present on Admission               # Drug Induced Coagulation Defect: home medication list includes an anticoagulant medication    # Hypertension: Home medication list includes antihypertensive(s)   # Circulatory Shock: required vasopressors within past 24 hours       # Obesity: Estimated body mass index is 31.09 kg/m  as calculated from the following:    Height as of this encounter: 1.575 m (5' 2\").    Weight as of this encounter: 77.1 kg (170 lb).              Disposition Plan      Expected Discharge Date: 03/14/2024                  Rodriguez Fisher MD  Hospitalist Service  [facility]  Securely message with [employer] (more info)  Text page via [employer] Paging/Directory     ______________________________________________________________________    Chief Complaint     Weakness, neck pain.    History is obtained from the patient    History of Present Illness   Lj Castro is a 78 year old female who has a PMH significant for HTN, HLD, hypothyroid, DM2, PN, CKD3 (baseline cr about 1.15-1.35), AFib-on DOAC, chronic neck and back pain, neurogenic claudication, PAD, and GERD who is  s/p C3 to T2 cervical lamimectomy and spinal cord decompression, C3 to T2 posterior fusion, C3 to T2 segemental instrumentation for progressive myelopathy due to stenosis and kyphosis from C3-T3 on 10/13/2013.  She came to the ER complaining that for the past 10 days he had increasing pain in the neck and gradual weakness to the arms and legs to the point that she was unable to ambulate.  She also had right arm pain  over the past 3 to 4 days along with confusion.  Her arm pain was 9 out of 10.  There have been no recent falls or shortness of breath or nausea or vomiting.  She was brought in " by EMS.  CT scan of the neck showed a deep space infection with gas formation and possible cord compression.  She was given Kcentra and taken to the operating room.  She underwent I&D with wound VAC application along with IntraOp culture and IV antibiotics.  She underwent general endotracheal anesthesia.  Operative course was significant for hypotension requiring central line and pressor support.  I am asked to admit the patient to the intensive care unit.      Past Medical History    Past Medical History:   Diagnosis Date    Arrhythmia     Bursitis, olecranon     Diabetes (H)     Displacement of cervical intervertebral disc without myelopathy     Gastro-oesophageal reflux disease     Glaucoma     Hyperlipemia     Hypertension     Hypothyroid     Renal failure, acute (H24) 6-7-11 to 6-17-11    hospitalized     Varicose veins        Past Surgical History   Past Surgical History:   Procedure Laterality Date    DAVINCI HYSTERECTOMY SUPRACERVICAL N/A 3/18/2015    Procedure: DAVINCI HYSTERECTOMY SUPRACERVICAL;  Surgeon: Randall Melgar MD;  Location:  OR    DAVINCI SACROCOLPOPEXY, MIDURETHRAL SLING, CYSTOSCOPY N/A 3/18/2015    Procedure: DAVINCI SACROCOLPOPEXY, MIDURETHRAL SLING, CYSTOSCOPY;  Surgeon: Thalia Esquivel MD;  Location:  OR    DAVINCI SALPINGO-OOPHORECTOMY INCLUDING BILATERAL Bilateral 3/18/2015    Procedure: DAVINCI SALPINGO-OOPHORECTOMY INCLUDING BILATERAL;  Surgeon: Randall Melgar MD;  Location:  OR    EYE SURGERY Left 2009    [place] revision eye sunt with graft left eye     IR LOWER EXTREMITY ANGIOGRAM LEFT  7/8/2020    OPTICAL TRACKING SYSTEM FUSION POSTERIOR CERVICAL THREE + LEVELS N/A 10/13/2023    Procedure: C3 to T2 cervical laminectomy and spinal cord decompression C3 to T2 posterior fusion C3 to T2 segemental instrumetation Application and detachment of Isaac dyer Navigation using Stealth System;  Surgeon: Sagar Cadena MD;  Location:  OR       Prior to Admission Medications    Prior to Admission Medications   Prescriptions Last Dose Informant Patient Reported? Taking?   Multiple Vitamins-Calcium (ONE-A-DAY WOMENS FORMULA PO) 3/11/2024 at AM  Yes Yes   Sig: Take 1 tablet by mouth daily   acetaminophen (TYLENOL) 325 MG tablet 3/11/2024 at unknown time  No Yes   Sig: Take 2 tablets (650 mg) by mouth 3 times daily. May also take 2 tablets (650 mg) daily as needed for mild pain.   apixaban ANTICOAGULANT (ELIQUIS) 5 MG tablet 3/11/2024 at PM  Yes Yes   Sig: Take 5 mg by mouth 2 times daily   atorvastatin (LIPITOR) 40 MG tablet 3/11/2024 at AM  Yes Yes   Sig: Take 1 tablet by mouth daily   gabapentin (NEURONTIN) 300 MG capsule 3/11/2024 at PM  Yes Yes   Sig: Take 600 mg by mouth 2 times daily   hydrochlorothiazide (HYDRODIURIL) 25 MG tablet 3/11/2024 at AM  No Yes   Sig: Take 1 tablet (25 mg) by mouth daily   insulin aspart (NOVOLOG PEN) 100 UNIT/ML pen 3/11/2024 at AM  Yes Yes   Sig: Inject 3 Units Subcutaneous daily (with breakfast)   insulin aspart (NOVOLOG PEN) 100 UNIT/ML pen 3/11/2024 at lunch  Yes Yes   Sig: Inject 3 Units Subcutaneous daily (with lunch)   insulin aspart (NOVOLOG PEN) 100 UNIT/ML pen 3/11/2024 at PM  Yes Yes   Sig: Inject 5 Units Subcutaneous daily (with dinner)   insulin glargine (LANTUS PEN) 100 UNIT/ML pen 3/11/2024 at HS  No Yes   Sig: Inject 18 Units Subcutaneous at bedtime   Patient taking differently: Inject 14-17 Units Subcutaneous at bedtime If BG < 200 mg/dL = 14 units   If BG >/= 200 mg/dL = 17 units   ketoconazole (NIZORAL) 2 % external shampoo Unknown at PRN  Yes Yes   Sig: Shampoo the hair thoroughly each as need.   latanoprost (XALATAN) 0.005 % ophthalmic solution 3/11/2024 at HS  Yes Yes   Sig: Place 1 drop into both eyes at bedtime   levothyroxine (SYNTHROID/LEVOTHROID) 88 MCG tablet 3/11/2024 at AM  Yes Yes   Sig: Take 88 mcg by mouth daily   losartan (COZAAR) 50 MG tablet 3/11/2024 at AM  No Yes   Sig: Take 1 tablet (50 mg) by mouth daily HOLD if  blood pressure <100   melatonin 3 MG tablet Unknown at PRN  No Yes   Sig: Take 1 tablet (3 mg) by mouth nightly as needed for sleep   metoprolol succinate ER (TOPROL XL) 100 MG 24 hr tablet 3/11/2024 at PM  No Yes   Sig: Take 1 tablet (100 mg) by mouth 2 times daily   omeprazole (PRILOSEC) 40 MG DR capsule 3/11/2024 at AM  Yes Yes   Sig: Take 40 mg by mouth daily   prednisoLONE acetate (PRED FORTE) 1 % ophthalmic suspension 3/11/2024 at AM  Yes Yes   Sig: Place 1 drop Into the left eye daily   timolol maleate (TIMOPTIC) 0.5 % ophthalmic solution 3/11/2024 at PM  Yes Yes   Sig: Place 1 drop into both eyes 2 times daily      Facility-Administered Medications: None        Review of Systems    The 10 point Review of Systems is negative other than noted in the HPI or here. She is confused, but, denies any chest pain/SOB.    Social History   I have reviewed this patient's social history and updated it with pertinent information if needed.  Social History     Tobacco Use    Smoking status: Never    Smokeless tobacco: Never   Substance Use Topics    Alcohol use: Yes     Comment: occ    Drug use: No         Family History       Reviewed.      Allergies   No Known Allergies     Physical Exam   Vital Signs: Temp: 97.9  F (36.6  C) Temp src: Temporal BP: 113/78 Pulse: 87   Resp: 22 SpO2: 98 % O2 Device: Oxymask Oxygen Delivery: 3 LPM  Weight: 170 lbs 0 oz    Gen - Alert, awake, oriented to self, situation. Thinks its March 24th.  HEENT - + central line R neck. + wound vac posteriorly.  Lungs - CTA B.  Heart - tachycardic, S1+S2 nml, no m/g/r.  Abd - soft, NT, ND, + BS.  Ext - no edema.  Neuro - CN II-XII wnl, moves lower extremities power 3/5.    Medical Decision Making       80 MINUTES SPENT BY ME on the date of service doing chart review, history, exam, documentation & further activities per the note.      Data     I have personally reviewed the following data over the past 24 hrs:    11.7 (H)  \   10.9 (L)   / 204     134  (L) 95 (L) 28.7 (H) /  269 (H)   3.7 24 1.20 (H) \     Trop: 18 (H) BNP: N/A     Procal: N/A CRP: 278.82 (H) Lactic Acid: 1.1         Imaging results reviewed over the past 24 hrs:   Recent Results (from the past 24 hour(s))   Head CT w/o contrast    Narrative    CT OF THE HEAD WITHOUT CONTRAST  3/12/2024 10:11 AM     COMPARISON: None.    HISTORY: Altered mental status.    TECHNIQUE: 5 mm thick axial CT images of the head were acquired  without IV contrast material.    FINDINGS:  There is moderate diffuse cerebral volume loss. There are  subtle patchy areas of decreased density in the cerebral white matter  bilaterally that are consistent with sequela of chronic small vessel  ischemic disease.     The ventricles and basal cisterns are within normal limits in  configuration given the degree of cerebral volume loss.  There is no  midline shift. There are no extra-axial fluid collections.     No intracranial hemorrhage, mass or recent infarct.    Possible acute left maxillary sinusitis. There is no mastoiditis.  There are no fractures of the visualized bones.       Impression    IMPRESSION: Possible acute left maxillary sinusitis. Diffuse cerebral  volume loss and cerebral white matter changes consistent with chronic  small vessel ischemic disease. No evidence for acute intracranial  pathology.      Radiation dose for this scan was reduced using automated exposure  control, adjustment of the mA and/or kV according to patient size, or  iterative reconstruction technique.    JAMAL COLEMAN MD         SYSTEM ID:  [id number]   CT Cervical Spine w/o Contrast    Narrative    CT OF THE CERVICAL SPINE WITHOUT CONTRAST   3/12/2024 10:12 AM     COMPARISON: None available.    HISTORY: Altered mental status, lower neck and upper thoracic spine  pain (please include down to T3). Neck pain. History spine surgery. No  suspected hardware failure. No suspected cervical disc disease.     TECHNIQUE: Axial images of the cervical spine were  acquired without  intravenous contrast. Multiplanar reformations were created.      FINDINGS: There is posterior spinal fusion hardware from C3 down to  T2. Hardware appears well-positioned with no evidence for hardware  failure or loosening. There are laminectomies of C4, C5, C6, C7 and  T1. There is mixed fluid and gas in the posterior paraspinous tissues  that may be due to recent surgery. Clinical correlation recommended.  If there is no history of recent surgery, infected fluid collection  must be considered.    There is minimal degenerative retrolisthesis of C2 upon C3. There is  minimal degenerative anterolisthesis of C7 upon T1. Alignment  otherwise normal; however, there is straightening of normal cervical  lordosis. Vertebral body heights of the cervical spine are normal.  Craniocervical alignment is normal. There is no evidence for fracture  of the cervical spine. Loss of disc space height and degenerative  endplate spurring at C4-C5, C5-C6 and C6-C7.      Impression    IMPRESSION:  1. Postoperative and degenerative change of the cervical spine as  described above.  2. Fluid and gas collection in the posterior paraspinous soft tissues  possibly due to recent surgery. If there is no history of recent  surgery, infection must be considered.  3. No fractures. No evidence for hardware failure or loosening.      Radiation dose for this scan was reduced using automated exposure  control, adjustment of the mA and/or kV according to patient size, or  iterative reconstruction technique.    JAMAL COLEMAN MD         SYSTEM ID:  [id number]

## 2024-03-13 NOTE — ANESTHESIA POSTPROCEDURE EVALUATION
Patient: Lj Castro    Procedure: Procedure(s):  Irrigation and debridement spine with placement of wound vac       Anesthesia Type:  General    Note:  Disposition: ICU            ICU Sign Out: Anesthesiologist/ICU physician sign out WAS performed   Postop Pain Control: Uneventful            Sign Out: Well controlled pain   PONV: No   Neuro/Psych: Uneventful            Sign Out: Acceptable/Baseline neuro status   Airway/Respiratory:             Sign Out: Acceptable/Baseline resp. status   CV/Hemodynamics:             Sign Out: Acceptable CV status   Other NRE:    DID A NON-ROUTINE EVENT OCCUR?     Event details/Postop Comments:  Report to [facility] and hospitalist.   RO         Last vitals:  Vitals Value Taken Time   /80 03/13/24 0135   Temp 96.9  F (36.1  C) 03/13/24 0130   Pulse 102 03/13/24 0135   Resp 22 03/13/24 0135   SpO2 98 % 03/13/24 0135   Vitals shown include unfiled device data.    Electronically Signed By: Jair Shankar DO  March 13, 2024  1:57 AM

## 2024-03-13 NOTE — PHARMACY-VANCOMYCIN DOSING SERVICE
Pharmacy Vancomycin Initial Note  Date of Service 2024  Patient's  [id number]  78 year old, female    Indication: Sepsis    Current estimated CrCl = Estimated Creatinine Clearance: 44.3 mL/min (A) (based on SCr of 0.99 mg/dL (H)).    Creatinine for last 3 days  3/12/2024:  8:55 AM Creatinine 1.20 mg/dL  3/13/2024:  3:16 AM Creatinine 0.99 mg/dL    Recent Vancomycin Level(s) for last 3 days  No results found for requested labs within last 3 days.      Vancomycin IV Administrations (past 72 hours)                     vancomycin (VANCOCIN) 1,500 mg in 0.9% NaCl 250 mL intermittent infusion (mg) 1,500 mg New Bag 24 1815                    Nephrotoxins and other renal medications (From now, onward)      Start     Dose/Rate Route Frequency Ordered Stop    24 1800  vancomycin (VANCOCIN) 1,250 mg in 0.9% NaCl 250 mL intermittent infusion         1,250 mg  over 90 Minutes Intravenous EVERY 24 HOURS 24 0539      24 0230  norepinephrine (LEVOPHED) 4 mg in  mL infusion PREMIX         0.01-0.6 mcg/kg/min × 77.1 kg  2.9-173.5 mL/hr  Intravenous CONTINUOUS 24 0209              Contrast Orders - past 72 hours (72h ago, onward)      None            [employer] Prediction of Planned Initial Vancomycin Regimen  Loading dose: 1500 mg  Regimen: 1250 mg IV every 24 hours.  Regimen Start time: 18:15 on 2024  Exposure target: AUC24 (range)400-600 mg/L.hr   AUC24,ss: 504 mg/L.hr  Probability of AUC24 > 400: 75 %  Ctrough,ss: 15.2 mg/L  Probability of Ctrough,ss > 20: 27 %  Probability of nephrotoxicity (Lodise FATUMA ): 10 %        Plan:  Start vancomycin 1250 mg IV q24h after loading dose.    Vancomycin monitoring method: AUC  Vancomycin therapeutic monitoring goal: 400-600 mg*h/L  Pharmacy will check vancomycin levels as appropriate in 1-3 Days.    Serum creatinine levels will be ordered daily for the first week of therapy and at least twice weekly for subsequent weeks.      Yon CHARLES  [facility]

## 2024-03-13 NOTE — PROGRESS NOTES
[facility]    Medicine Progress Note - Hospitalist Service    Date of Admission:  3/12/2024    Assessment & Plan   Lj Castro is a 78 year old female admitted on 3/12/2024. 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  She came to the ER complaining that for the past 10 days he had increasing pain in the neck and gradual weakness to the arms and legs to the point that she was unable to ambulate.  She also had right arm pain  over the past 3 to 4 days along with confusion.  Her arm pain was 9 out of 10.  There have been no recent falls or shortness of breath or nausea or vomiting.  She was brought in by EMS.  CT scan of the neck showed a deep space infection with gas formation and possible cord compression.  She was given Kcentra and taken to the operating room.  She underwent I&D with wound VAC application along with IntraOp culture and IV antibiotics.  She underwent general endotracheal anesthesia.  Operative course was significant for hypotension requiring central line and pressor support and so she was admitted to the ICU.       Severe sepsis 2/2 staph aureus bacteremia 2/2 deep space infection in the posterior cervical spine down to the hardware:  S/p I&D with purulent material.  Positive for staph aureus. Blood cultures also positive for staph aureus x2.   -ID consultation, appreciate recs  -IV cefepime and IV vancomycin for now  -Repeat blood cultures x2  -IV fluids  -Levophed to maintain MAP of greater than 65 mmHg     Type 2 diabetes:  Initially placed on insulin gtt in the perioperative setting.  Appropriate to transition to  "subcutaneous insulin.  -Stop insulin gtt   -Lantus 10 units day (reduced from PTA 17 daily)  -MDSSI     A-fib with rapid ventricular rate:  Most recent echocardiogram in January 2023 shows a normal EF.  -Monitor on telemetry  -Hold Eliquis, will need resumption of this once appropriate per surgery     Acute encephalopathy:  Likely due to sepsis.  Seems quite appropriate in the post-operative period but is mildly disoriented to time.   -Monitor          Diet: Advance Diet as Tolerated: Regular Diet Adult    DVT Prophylaxis: Pneumatic Compression Devices  Winston Catheter: PRESENT, indication: ICU only: hourly urine output needed for patient care  Lines: PRESENT      CVC Triple Lumen Right Internal jugular-Site Assessment: WDL except;Drainage      Cardiac Monitoring: ACTIVE order. Indication: ICU  Code Status: Full Code      Clinically Significant Risk Factors Present on Admission               # Drug Induced Coagulation Defect: home medication list includes an anticoagulant medication    # Hypertension: Home medication list includes antihypertensive(s)   # Circulatory Shock: required vasopressors within past 24 hours       # Obesity: Estimated body mass index is 31.09 kg/m  as calculated from the following:    Height as of this encounter: 1.575 m (5' 2\").    Weight as of this encounter: 77.1 kg (170 lb).              Disposition Plan      Expected Discharge Date: 03/17/2024                    Eloy Dacosta DO  Hospitalist Service  [facility]  Securely message with [employer] (more info)  Text page via [employer] Paging/Directory   ______________________________________________________________________    Interval History   No significant events overnight since arrival to the ICU.  The patient has not required any pressor support.  She is awake, alert.  Denies significant symptoms apart from chronic aches and pains.  She is oriented to self, place, situation but is disoriented to time.    I called her "  over the phone but only received voicemail.    Physical Exam   Vital Signs: Temp: 98.6  F (37  C) Temp src: Oral BP: 106/66 Pulse: 68   Resp: 14 SpO2: 100 % O2 Device: Nasal cannula Oxygen Delivery: 1.5 LPM  Weight: 170 lbs 0 oz    General Appearance: Awake, alert.  Oriented to self, situation.  Disoriented to time.  No apparent distress.  Respiratory: Lungs are clear to auscultation bilaterally.  Work of breathing is normal on room air.  Cardiovascular: Regular rate and rhythm.  Obvious murmurs rubs or gallops.  No peripheral edema.  GI: Benign.  Soft.  No tenderness palpation or bowel sounds are active.    Skin: No obvious rash or lesions exposed skin.  The patienthas a wound VAC placed on her posterior neck.  Other: Appropriate     Medical Decision Making       50 MINUTES SPENT BY ME on the date of service doing chart review, history, exam, documentation & further activities per the note.      Data   ------------------------- PAST 24 HR DATA REVIEWED -----------------------------------------------    I have personally reviewed the following data over the past 24 hrs:    13.4 (H)  \   9.5 (L)   / 227     135 102 24.7 (H) /  123 (H)   3.7 21 (L) 0.99 (H) \     TSH: N/A T4: N/A A1C: 8.4 (H)       Imaging results reviewed over the past 24 hrs:   Recent Results (from the past 24 hour(s))   XR Chest Port 1 View    Narrative    EXAM: XR CHEST PORT 1 VIEW  LOCATION: [facility]  DATE: 3/13/2024    INDICATION: central line placement  COMPARISON: Chest radiograph 06/12/2011.      Impression    IMPRESSION: Right internal jugular venous catheter tip near the superior cavoatrial junction. Mild atelectasis/infiltrate of the lower left lung. Right lung clear. Normal heart size and pulmonary vascularity. Calcified plaque aortic arch. No   pneumothorax. Left glenohumeral arthroplasty. Cervicothoracic spinal fusion hardware.   Echocardiogram Complete   Result Value    LVEF  55%    Narrative     [id number]  [id number]  [id number]  [id number]^TIANNA^EMMANUEL^JULIO     [facility]  Echocardiography Laboratory  [address] [place] [address]     Name: REI GRIER  MRN: [id number]  : 1945  Study Date: 2024 07:26 AM  Age: 78 yrs  Gender: Female  Patient Location: [facility]  Reason For Study: Endocarditis  Ordering Physician: EMMANUEL WYNN  Referring Physician: Cory Soliz  Performed By: Ron Vasquez RDCS     BSA: 1.8 m2  Height: 62 in  Weight: 170 lb  HR: 77  BP: 98/62 mmHg  ______________________________________________________________________________  Procedure  Complete Echo Adult.  ______________________________________________________________________________  Interpretation Summary     1. The left ventricle is normal in structure, function and size. The visual  ejection fraction is estimated at 55%.  2. The right ventricle is normal in structure, function and size.  3. There is moderate to mod-severe (2-3+) tricuspid regurgitation. The right  ventricular systolic pressure is approximated at 38mmHg plus the right atrial  pressure.     [facility] 60%, 1+ TR.  ______________________________________________________________________________  Left Ventricle  The left ventricle is normal in structure, function and size. There is normal  left ventricular wall thickness. The visual ejection fraction is estimated at  55%. Diastolic function not assessed due to atrial fibrillation. Normal left  ventricular wall motion.     Right Ventricle  The right ventricle is normal in structure, function and size.     Atria  The left atrium is mildly dilated. The right atrium is mildly dilated. There  is no atrial shunt seen.     Mitral Valve  There is mild (1+) mitral regurgitation.     Tricuspid Valve  There is moderate to mod-severe (2-3+) tricuspid regurgitation. The right  ventricular systolic pressure is approximated at 38mmHg plus the right  atrial  pressure.     Aortic Valve  There is mild (1+) aortic regurgitation.     Pulmonic Valve  The pulmonic valve is normal in structure and function.     Vessels  Normal ascending, transverse (arch), and descending aorta. The inferior vena  cava was normal in size with preserved respiratory variability.     Pericardium  There is no pericardial effusion.     Rhythm  The rhythm was atrial fibrillation.  ______________________________________________________________________________  MMode/2D Measurements & Calculations  IVSd: 1.0 cm     LVIDd: 3.7 cm  LVIDs: 2.3 cm  LVPWd: 0.99 cm  IVC diam: 1.8 cm  FS: 36.6 %  LV mass(C)d: 112.4 grams  LV mass(C)dI: 63.0 grams/m2  Ao root diam: 2.8 cm  asc Aorta Diam: 3.3 cm  LVOT diam: 1.8 cm  LVOT area: 2.5 cm2  Ao root diam index Ht(cm/m): 1.8  Ao root diam index BSA (cm/m2): 1.6  Asc Ao diam index BSA (cm/m2): 1.8  Asc Ao diam index Ht(cm/m): 2.1  LA Volume (BP): 66.3 ml     LA Volume Index (BP): 37.2 ml/m2  RWT: 0.54  TAPSE: 1.5 cm     Doppler Measurements & Calculations  MV E max elyse: 102.2 cm/sec  MV max P.5 mmHg  MV mean P.5 mmHg  MV V2 VTI: 26.1 cm  AI P1/2t: 394.8 msec  PA acc time: 0.07 sec  TR max elyse: 305.7 cm/sec  TR max P.5 mmHg  E/E' av.7  Lateral E/e': 6.8  Medial E/e': 10.5     ______________________________________________________________________________  Report approved by: Tommy Cody 2024 02:51 PM

## 2024-03-13 NOTE — PROGRESS NOTES
DAILY PROGRESS NOTE    Lj Castro is a 78 year old old female admitted on 3/12/2024  8:39 AM.    Subjective  Patient went late last night/early this morning for surgical I&D with wound vac placement with Dr. Cadena. Procedure went well and patient presents this morning with improving mental status, though remains confused to date.      She reports some continued right upper extremity pain but it is less intense at this time. She feels that she is doing well at this time, though she was surprised at how swiftly her condition occurred. We discussed the role of close diabetic management in preventing infection, though she notes she has maintained good control lately.     Objective  Wound vac seal stable with low continuous suction, all lines clean, currently being treated with cefepime and vancomycin with suspected staph aureus (cultures pending).    Vital stable, disoriented to date, Neuro grossly intact, BOLTON, WBC 13.4 from 11.7.    Hemoglobin   Date Value Ref Range Status   03/13/2024 9.5 (L) 11.7 - 15.7 g/dL Final   03/20/2015 9.8 (L) 11.7 - 15.7 g/dL Final   ]      Impression / Plan  Patient doing well  Continue wound vac  Continue antibiotic coverage while awaiting cultures. Plan for repeat cultures tomorrow.  Ambulate with help  Continue shin with plan for removal w/ voiding trial tomorrow  Monitor routine labs  Full diet      JESUS HARDIN Contact # [phone]

## 2024-03-14 ENCOUNTER — APPOINTMENT (OUTPATIENT)
Dept: OCCUPATIONAL THERAPY | Facility: CLINIC | Age: 79
DRG: 500 | End: 2024-03-14
Attending: ORTHOPAEDIC SURGERY
Payer: COMMERCIAL

## 2024-03-14 ENCOUNTER — APPOINTMENT (OUTPATIENT)
Dept: PHYSICAL THERAPY | Facility: CLINIC | Age: 79
DRG: 500 | End: 2024-03-14
Payer: COMMERCIAL

## 2024-03-14 LAB
ANION GAP SERPL CALCULATED.3IONS-SCNC: 9 MMOL/L (ref 7–15)
BACTERIA UR CULT: ABNORMAL
BUN SERPL-MCNC: 26.2 MG/DL (ref 8–23)
CALCIUM SERPL-MCNC: 7.6 MG/DL (ref 8.8–10.2)
CHLORIDE SERPL-SCNC: 104 MMOL/L (ref 98–107)
CREAT SERPL-MCNC: 1.02 MG/DL (ref 0.51–0.95)
DEPRECATED HCO3 PLAS-SCNC: 23 MMOL/L (ref 22–29)
EGFRCR SERPLBLD CKD-EPI 2021: 56 ML/MIN/1.73M2
ERYTHROCYTE [DISTWIDTH] IN BLOOD BY AUTOMATED COUNT: 13.5 % (ref 10–15)
GLUCOSE BLDC GLUCOMTR-MCNC: 217 MG/DL (ref 70–99)
GLUCOSE BLDC GLUCOMTR-MCNC: 226 MG/DL (ref 70–99)
GLUCOSE BLDC GLUCOMTR-MCNC: 264 MG/DL (ref 70–99)
GLUCOSE BLDC GLUCOMTR-MCNC: 314 MG/DL (ref 70–99)
GLUCOSE BLDC GLUCOMTR-MCNC: 315 MG/DL (ref 70–99)
GLUCOSE SERPL-MCNC: 246 MG/DL (ref 70–99)
HCT VFR BLD AUTO: 26 % (ref 35–47)
HGB BLD-MCNC: 8.2 G/DL (ref 11.7–15.7)
MCH RBC QN AUTO: 29.9 PG (ref 26.5–33)
MCHC RBC AUTO-ENTMCNC: 31.5 G/DL (ref 31.5–36.5)
MCV RBC AUTO: 95 FL (ref 78–100)
PLATELET # BLD AUTO: 220 10E3/UL (ref 150–450)
POTASSIUM SERPL-SCNC: 3.7 MMOL/L (ref 3.4–5.3)
RBC # BLD AUTO: 2.74 10E6/UL (ref 3.8–5.2)
SODIUM SERPL-SCNC: 136 MMOL/L (ref 135–145)
WBC # BLD AUTO: 7.3 10E3/UL (ref 4–11)

## 2024-03-14 PROCEDURE — 250N000012 HC RX MED GY IP 250 OP 636 PS 637: Performed by: STUDENT IN AN ORGANIZED HEALTH CARE EDUCATION/TRAINING PROGRAM

## 2024-03-14 PROCEDURE — 120N000001 HC R&B MED SURG/OB

## 2024-03-14 PROCEDURE — 87040 BLOOD CULTURE FOR BACTERIA: CPT | Performed by: INTERNAL MEDICINE

## 2024-03-14 PROCEDURE — 99233 SBSQ HOSP IP/OBS HIGH 50: CPT | Performed by: STUDENT IN AN ORGANIZED HEALTH CARE EDUCATION/TRAINING PROGRAM

## 2024-03-14 PROCEDURE — 258N000003 HC RX IP 258 OP 636: Performed by: STUDENT IN AN ORGANIZED HEALTH CARE EDUCATION/TRAINING PROGRAM

## 2024-03-14 PROCEDURE — 97530 THERAPEUTIC ACTIVITIES: CPT | Mod: GP

## 2024-03-14 PROCEDURE — 97530 THERAPEUTIC ACTIVITIES: CPT | Mod: GO

## 2024-03-14 PROCEDURE — 258N000003 HC RX IP 258 OP 636: Performed by: INTERNAL MEDICINE

## 2024-03-14 PROCEDURE — 999N000285 HC STATISTIC VASC ACCESS LAB DRAW WITH PIV START

## 2024-03-14 PROCEDURE — 99232 SBSQ HOSP IP/OBS MODERATE 35: CPT | Performed by: INTERNAL MEDICINE

## 2024-03-14 PROCEDURE — 250N000013 HC RX MED GY IP 250 OP 250 PS 637: Performed by: STUDENT IN AN ORGANIZED HEALTH CARE EDUCATION/TRAINING PROGRAM

## 2024-03-14 PROCEDURE — 80048 BASIC METABOLIC PNL TOTAL CA: CPT | Performed by: STUDENT IN AN ORGANIZED HEALTH CARE EDUCATION/TRAINING PROGRAM

## 2024-03-14 PROCEDURE — 250N000011 HC RX IP 250 OP 636: Performed by: INTERNAL MEDICINE

## 2024-03-14 PROCEDURE — 250N000013 HC RX MED GY IP 250 OP 250 PS 637: Performed by: ORTHOPAEDIC SURGERY

## 2024-03-14 PROCEDURE — 999N000127 HC STATISTIC PERIPHERAL IV START W US GUIDANCE

## 2024-03-14 PROCEDURE — 97165 OT EVAL LOW COMPLEX 30 MIN: CPT | Mod: GO

## 2024-03-14 PROCEDURE — 85014 HEMATOCRIT: CPT | Performed by: STUDENT IN AN ORGANIZED HEALTH CARE EDUCATION/TRAINING PROGRAM

## 2024-03-14 PROCEDURE — 36415 COLL VENOUS BLD VENIPUNCTURE: CPT | Performed by: INTERNAL MEDICINE

## 2024-03-14 RX ORDER — CEFTRIAXONE 1 G/1
1 INJECTION, POWDER, FOR SOLUTION INTRAMUSCULAR; INTRAVENOUS EVERY 24 HOURS
Status: DISCONTINUED | OUTPATIENT
Start: 2024-03-14 | End: 2024-03-14

## 2024-03-14 RX ADMIN — ACETAMINOPHEN 975 MG: 325 TABLET, FILM COATED ORAL at 01:43

## 2024-03-14 RX ADMIN — SODIUM CHLORIDE, POTASSIUM CHLORIDE, SODIUM LACTATE AND CALCIUM CHLORIDE: 600; 310; 30; 20 INJECTION, SOLUTION INTRAVENOUS at 10:36

## 2024-03-14 RX ADMIN — OXYCODONE HYDROCHLORIDE 2.5 MG: 5 TABLET ORAL at 22:45

## 2024-03-14 RX ADMIN — SENNOSIDES AND DOCUSATE SODIUM 1 TABLET: 50; 8.6 TABLET ORAL at 08:53

## 2024-03-14 RX ADMIN — TIMOLOL MALEATE 1 DROP: 5 SOLUTION/ DROPS OPHTHALMIC at 09:28

## 2024-03-14 RX ADMIN — INSULIN ASPART 4 UNITS: 100 INJECTION, SOLUTION INTRAVENOUS; SUBCUTANEOUS at 13:01

## 2024-03-14 RX ADMIN — POLYETHYLENE GLYCOL 3350 17 G: 17 POWDER, FOR SOLUTION ORAL at 08:54

## 2024-03-14 RX ADMIN — METOPROLOL SUCCINATE 100 MG: 100 TABLET, EXTENDED RELEASE ORAL at 08:53

## 2024-03-14 RX ADMIN — INSULIN ASPART 4 UNITS: 100 INJECTION, SOLUTION INTRAVENOUS; SUBCUTANEOUS at 19:32

## 2024-03-14 RX ADMIN — LEVOTHYROXINE SODIUM 88 MCG: 0.09 TABLET ORAL at 08:53

## 2024-03-14 RX ADMIN — SODIUM CHLORIDE, POTASSIUM CHLORIDE, SODIUM LACTATE AND CALCIUM CHLORIDE: 600; 310; 30; 20 INJECTION, SOLUTION INTRAVENOUS at 02:58

## 2024-03-14 RX ADMIN — SODIUM CHLORIDE 1000 ML: 9 INJECTION, SOLUTION INTRAVENOUS at 09:25

## 2024-03-14 RX ADMIN — ACETAMINOPHEN 975 MG: 325 TABLET, FILM COATED ORAL at 10:36

## 2024-03-14 RX ADMIN — TIMOLOL MALEATE 1 DROP: 5 SOLUTION/ DROPS OPHTHALMIC at 22:31

## 2024-03-14 RX ADMIN — ACETAMINOPHEN 975 MG: 325 TABLET, FILM COATED ORAL at 19:49

## 2024-03-14 RX ADMIN — ATORVASTATIN CALCIUM 40 MG: 40 TABLET, FILM COATED ORAL at 08:53

## 2024-03-14 RX ADMIN — SODIUM CHLORIDE, POTASSIUM CHLORIDE, SODIUM LACTATE AND CALCIUM CHLORIDE: 600; 310; 30; 20 INJECTION, SOLUTION INTRAVENOUS at 22:34

## 2024-03-14 RX ADMIN — GABAPENTIN 600 MG: 300 CAPSULE ORAL at 08:54

## 2024-03-14 RX ADMIN — CEFAZOLIN SODIUM 2 G: 2 INJECTION, SOLUTION INTRAVENOUS at 13:47

## 2024-03-14 RX ADMIN — PANTOPRAZOLE SODIUM 40 MG: 40 TABLET, DELAYED RELEASE ORAL at 21:04

## 2024-03-14 RX ADMIN — SODIUM CHLORIDE 1000 ML: 9 INJECTION, SOLUTION INTRAVENOUS at 11:52

## 2024-03-14 RX ADMIN — LATANOPROST 1 DROP: 50 SOLUTION/ DROPS OPHTHALMIC at 22:39

## 2024-03-14 RX ADMIN — PANTOPRAZOLE SODIUM 40 MG: 40 TABLET, DELAYED RELEASE ORAL at 08:53

## 2024-03-14 RX ADMIN — INSULIN ASPART 2 UNITS: 100 INJECTION, SOLUTION INTRAVENOUS; SUBCUTANEOUS at 10:37

## 2024-03-14 RX ADMIN — CEFAZOLIN SODIUM 2 G: 2 INJECTION, SOLUTION INTRAVENOUS at 21:06

## 2024-03-14 RX ADMIN — GABAPENTIN 600 MG: 300 CAPSULE ORAL at 21:02

## 2024-03-14 RX ADMIN — CEFAZOLIN SODIUM 2 G: 2 INJECTION, SOLUTION INTRAVENOUS at 05:12

## 2024-03-14 RX ADMIN — PREDNISOLONE ACETATE 1 DROP: 10 SUSPENSION/ DROPS OPHTHALMIC at 09:27

## 2024-03-14 RX ADMIN — METOPROLOL SUCCINATE 100 MG: 100 TABLET, EXTENDED RELEASE ORAL at 21:09

## 2024-03-14 ASSESSMENT — ACTIVITIES OF DAILY LIVING (ADL)
ADLS_ACUITY_SCORE: 40
ADLS_ACUITY_SCORE: 40
ADLS_ACUITY_SCORE: 39
ADLS_ACUITY_SCORE: 40

## 2024-03-14 NOTE — PROGRESS NOTES
Care Management Initial Consult    General Information  Assessment completed with: Patient, VM-chart review, Patient, chart review  Type of CM/SW Visit: Initial Assessment    Primary Care Provider verified and updated as needed: No   Readmission within the last 30 days: no previous admission in last 30 days         Advance Care Planning: Advance Care Planning Reviewed: present on chart          Communication Assessment  Patient's communication style: spoken language (English or Bilingual)    Hearing Difficulty or Deaf: no   Wear Glasses or Blind: yes    Cognitive  Cognitive/Neuro/Behavioral: .WDL except  Level of Consciousness: intermittent confusion  Arousal Level: opens eyes spontaneously  Orientation: disoriented to, time  Mood/Behavior: calm, cooperative  Best Language: 0 - No aphasia  Speech: clear, spontaneous    Living Environment:   People in home: child(lopez), adult, spouse     Current living Arrangements: house      Able to return to prior arrangements: yes       Family/Social Support:  Care provided by: spouse/significant other, child(lopez)  Provides care for: no one  Marital Status:   , Children          Description of Support System: Supportive, Involved         Current Resources:   Patient receiving home care services: No     Community Resources:    Equipment currently used at home: shower chair, walker, rolling  Supplies currently used at home:      Employment/Financial:  Employment Status:          Financial Concerns:     Referral to Financial Worker: No       Does the patient's insurance plan have a 3 day qualifying hospital stay waiver?  Yes     Which insurance plan 3 day waiver is available? Alternative insurance waiver    Will the waiver be used for post-acute placement? Undetermined at this time    Lifestyle & Psychosocial Needs:  Social Determinants of Health     Food Insecurity: Not on file   Depression: Not on file   Housing Stability: Not on file   Tobacco Use: Low Risk   (11/6/2023)    Patient History     Smoking Tobacco Use: Never     Smokeless Tobacco Use: Never     Passive Exposure: Not on file   Financial Resource Strain: Not on file   Alcohol Use: Not on file   Transportation Needs: Not on file   Physical Activity: Not on file   Interpersonal Safety: Not on file   Stress: Not on file   Social Connections: Not on file       Functional Status:  Prior to admission patient needed assistance:         Assesssment of Functional Status: Not at baseline with ADL Functioning, Not at baseline with mobility, Not at  functional baseline, Needs placement in a SNF/TCF for rehabilitation, Has complex medical needs, requires placement in a facility    Mental Health Status:          Chemical Dependency Status:  Chemical Dependency Status: No Current Concerns               Additional Information:  SW met with patient at bedside in the ICU. Patient has a URR of 23%.     Patient is disoriented to time. She does not recall meeting with PT yesterday. She says she has not gotten much sleep.     SW discussed TCU. She states she has never been. SW note TCU would be for therapies, wound vac, and IV antibiotics. Patient does not want to go to TCU at this time. She states she likes to stay at home.     Patient lives at home with her spouse and adult son. She said they could provide all her cares at discharge as they have been doing. SW discussed home care and home IV antibiotics. She wants to think about it and discuss with spouse. SW will re-eval when spouse arrives.     BENITA Dove, SW  Emergency Room   Please contact the SW on the floor in which the patient is staying for any questions or concerns

## 2024-03-14 NOTE — PROGRESS NOTES
[facility]  Infectious Disease Progress Note          Assessment and Plan:   Date of Admission:  3/12/2024  Date of Consult (When I saw the patient): 03/13/24        Assessment & Plan  Lj Castro is a 78 year old who was admitted on 3/12/2024.      Impression: 1 78-year-old female, acute sepsis and neck pain where she has recent hardware surgical repair, has an obvious deep infection to the hardware and also sepsis with Staph aureus bacteremia  2 Staph aureus bacteremia, sensitive by gene testing, no other obvious secondary involved sites does have left total shoulder  3 diabetes mellitus  4 infectious encephalopathy  5 atrial fibrillation  Op culture growing Staph aureus as well  REC 1 blood cultures  define the infection, continue  Ancef  2 will need prolonged IV antibiotics but hold on long line, has a neck line and temporarily should be removed when overall picture allows, question now or soon  3 serial daily blood cultures and follow closely for any other secondary involved sites, clinically better already and first follow-up cultures from yesterday remain negative at 24 hours  4 hardware in place , so probably rifampin addition at some point later              Interval History:     no new complaints and doing well; no cp, sob, n/v/d, or abd pain.  Feels quite well, pain controlled, temp down, follow cultures so far negative, operative cultures same Staph aureus              Medications:      acetaminophen  975 mg Oral Q8H    atorvastatin  40 mg Oral Daily    ceFAZolin  2 g Intravenous Q8H    gabapentin  600 mg Oral BID    [Held by provider] hydrochlorothiazide  25 mg Oral Daily    insulin aspart  1-7 Units Subcutaneous TID AC    insulin aspart  1-5 Units Subcutaneous At Bedtime    insulin glargine  15 Units Subcutaneous QAM AC    latanoprost  1 drop Both Eyes At Bedtime    levothyroxine  88 mcg Oral QAM AC    [Held by provider] losartan  50 mg Oral Daily    metoprolol succinate ER  " 100 mg Oral BID    pantoprazole  40 mg Oral BID    polyethylene glycol  17 g Oral Daily    prednisoLONE acetate  1 drop Left Eye Daily    senna-docusate  1 tablet Oral BID    sodium chloride (PF)  3 mL Intracatheter Q8H    sodium chloride 0.9%  1,000 mL Intravenous Once    timolol maleate  1 drop Both Eyes BID                  Physical Exam:   Blood pressure 113/51, pulse 91, temperature 97.6  F (36.4  C), temperature source Temporal, resp. rate (!) 9, height 1.575 m (5' 2\"), weight 77.1 kg (170 lb), SpO2 100%.  Wt Readings from Last 2 Encounters:   03/12/24 77.1 kg (170 lb)   11/06/23 81.7 kg (180 lb 1.6 oz)     Vital Signs with Ranges  Temp:  [97.6  F (36.4  C)-98.6  F (37  C)] 97.6  F (36.4  C)  Pulse:  [] 91  Resp:  [8-33] 9  BP: ()/() 113/51  SpO2:  [79 %-100 %] 100 %    Constitutional: Awake, alert, cooperative, no apparent distress mentation okay, not septic looking     Lungs: Clear to auscultation bilaterally, no crackles or wheezing   Cardiovascular: Regular rate and rhythm, normal S1 and S2, and no murmur noted   Abdomen: Normal bowel sounds, soft, non-distended, non-tender   Skin: No rashes, no cyanosis, no edema   Other: Spine wound covered          Data:   All microbiology laboratory data reviewed.  Recent Labs   Lab Test 03/14/24  0510 03/13/24  0316 03/12/24  0855   WBC 7.3 13.4* 11.7*   HGB 8.2* 9.5* 10.9*   HCT 26.0* 29.6* 34.2*   MCV 95 96 96    227 204     Recent Labs   Lab Test 03/14/24  0510 03/13/24  0316 03/12/24  0855   CR 1.02* 0.99* 1.20*     Recent Labs   Lab Test 03/12/24  0855   SED 72*     No lab results found.    Invalid input(s): \"UC\"     "

## 2024-03-14 NOTE — CONSULTS
Care Management Follow Up    Length of Stay (days): 2    Expected Discharge Date: 03/17/2024     Concerns to be Addressed: discharge planning         Additional Information:  URR consult completed at 23%. See previous SW note.     BENITA Dove, LGSW  Emergency Room   Please contact the SW on the floor in which the patient is staying for any questions or concerns

## 2024-03-14 NOTE — PROGRESS NOTES
[facility]    Medicine Progress Note - Hospitalist Service    Date of Admission:  3/12/2024    Assessment & Plan   jL Castro is a 78 year old female admitted on 3/12/2024. 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     CT scan of the neck showed a deep space infection with gas formation and possible cord compression.  She was given Kcentra and taken to the operating room.  She underwent I&D with wound VAC application along with IntraOp culture and IV antibiotics.  Operative course was significant for hypotension requiring central line and pressor support and so she was admitted to the ICU.      She has improved day by day.  Has not required pressors for over 24 hours and so central line was removed.  Pain has been controlled by tylenol.  She is eating and drinking.        Severe sepsis 2/2 staph aureus bacteremia 2/2 deep space infection in the posterior cervical spine down to the hardware:  S/p I&D with purulent material -> positive for staph aureus. Blood cultures also positive for staph aureus x3 (3/12 x2 & 3/13 x1).  -ID consultation, appreciate recs  -IV ancef  -Daily blood cultures until negative x48 hours  -IV fluids given reduced UOP and reduced oral intake  -Stable to transfer out of ICU  -PT/OT  -WOC following for wound vac management     Type 2 diabetes:  Initially placed on insulin gtt in the perioperative setting.  Now on subcutaneous insulin.  -Increase to Lantus 15 units day   -Add carb coverage 1 unit per 15 gm CHO  -MDSSI     A-fib with rapid  "ventricular rate, resolved:  Most recent echocardiogram in January 2023 shows a normal EF.  -Monitor on telemetry  -Hold Eliquis, will need resumption of this once appropriate per surgery (I am trying to get a hold of them today 3/14)     Acute encephalopathy, improved:  Likely due to sepsis.  Seems quite appropriate in the post-operative period.  Now fully oriented.   -Monitor          Diet: Advance Diet as Tolerated: Regular Diet Adult    DVT Prophylaxis: Pneumatic Compression Devices  Winston Catheter: Not present  Lines: None       Cardiac Monitoring: ACTIVE order. Indication: ICU  Code Status: Full Code      Clinically Significant Risk Factors              # Hypoalbuminemia: Lowest albumin = 2.8 g/dL at 3/13/2024  3:16 AM, will monitor as appropriate            # Obesity: Estimated body mass index is 31.09 kg/m  as calculated from the following:    Height as of this encounter: 1.575 m (5' 2\").    Weight as of this encounter: 77.1 kg (170 lb)., PRESENT ON ADMISSION            Disposition Plan     Expected Discharge Date: 03/17/2024                    Eloy Dacosta DO  Hospitalist Service  [facility]  Securely message with [employer] (more info)  Text page via [employer] Paging/Directory   ______________________________________________________________________    Interval History   No significant overnight.  The patient is awake, alert fully oriented today events.  She endorses some mild pain at her neck.  Seems like this is adequately controlled with Tylenol dosing.  She denies any other new symptoms overnight.    Physical Exam   Vital Signs: Temp: 97.9  F (36.6  C) Temp src: Temporal BP: 97/57 Pulse: 84   Resp: 17 SpO2: 93 % O2 Device: None (Room air) Oxygen Delivery: 1 LPM  Weight: 170 lbs 0 oz    General Appearance: Awake, alert.  Oriented fully.  No apparent distress.  Respiratory: Lungs are clear to auscultation bilaterally.  Work of breathing is normal on room air.  Cardiovascular: Irregular " rate and rhythm, good rate control.  No obvious murmurs rubs or gallops.  No peripheral edema.  GI: Benign.  Soft.  No tenderness palpation or bowel sounds are active.    Skin: No obvious rash or lesions exposed skin.  The patient has a wound VAC placed on her posterior neck.  Other: Appropriate     Medical Decision Making       50 MINUTES SPENT BY ME on the date of service doing chart review, history, exam, documentation & further activities per the note.      Data   ------------------------- PAST 24 HR DATA REVIEWED -----------------------------------------------    I have personally reviewed the following data over the past 24 hrs:    7.3  \   8.2 (L)   / 220     136 104 26.2 (H) /  315 (H)   3.7 23 1.02 (H) \     ALT: N/A AST: N/A AP: N/A TBILI: N/A   ALB: N/A TOT PROTEIN: N/A LIPASE: N/A       Imaging results reviewed over the past 24 hrs:   No results found for this or any previous visit (from the past 24 hour(s)).

## 2024-03-14 NOTE — PROGRESS NOTES
DAILY PROGRESS NOTE    Lj Castro is a 78 year old old female admitted on 3/12/2024  8:39 AM.    Subjective  Patient reports doing well today. Good strength in all extremities and sensation intact. Reports RUE pain is down to 5/10 today. Remains mildly disoriented to date, but is acutely aware of self, place, and events. No acute events over night. Only additional complaint at this time is poor quality sleep due to frequent interventions. Was unable to work with PT yesterday due to need for vasopressors.    Objective  Wound vac seal stable with low continuous suction, JV line clean, antibiotic regimen transitioned to Ancef (culture pending growth).    AAOx3, BOLTON, WBC down to 7.3 from 13.4, blood glucose elevated over night/morning with last at 246.    Hemoglobin   Date Value Ref Range Status   03/14/2024 8.2 (L) 11.7 - 15.7 g/dL Final   03/20/2015 9.8 (L) 11.7 - 15.7 g/dL Final   ]      Impression / Plan  Patient doing well  Ambulate with help if not hypotensive  PT OT  D/c kip with voiding trial  Remove JV line and establish peripheral access  Continue IV fluids and vitals/cardiac monitoring.  Continue IV antibiotics as directed by ID.   Plan for transition from ICU to 6th floor  Maintain would vac continuous low suction.   Full diet  Today  Continue to hold Eloquis with wound vac present, plan for wound vac change 3/15/24    JESUS HARDIN Contact # [phone]

## 2024-03-14 NOTE — PROGRESS NOTES
ICU End of Shift Summary.  For vital signs and complete assessments, please see documentation flowsheets.     Pertinent assessments: Patient alert and oriented. Wound vac in place. 5/10 neck pain- on schedule tylenol for this. Tolerating regular diet but no appetite. No nausea. LBM prior to admission. Blood sugars elevated- carb count added in.  Up in chair today with therapy. CVC removed. PIV placed.   Major Shift Events: moved out of icu   Plan (Upcoming Events):   IV antibiotics, IVF, needs to void, catheter removed   Discharge/Transfer Needs:  n/a- pt transferred rooms already     Bedside Shift Report Completed :  y  Bedside Safety Check Completed: y

## 2024-03-14 NOTE — PROGRESS NOTES
03/14/24 1030   Appointment Info   Signing Clinician's Name / Credentials (OT) Concepcion Soliz, OTR/L   Rehab Comments (OT) Initial OT Eval   Living Environment   People in Home spouse;child(lopez), adult   Current Living Arrangements house   Home Accessibility stairs to enter home   Living Environment Comments pt has a tub shower combo   Self-Care   Usual Activity Tolerance moderate   Current Activity Tolerance fair   Regular Exercise No   Equipment Currently Used at Home shower chair;walker, rolling   Activity/Exercise/Self-Care Comment pt reports indp with dressing, toileting. has A for bathing from . indp with use of walker. sometimes will furniture walk   Instrumental Activities of Daily Living (IADL)   IADL Comments pt reports equal between her adult son, spouse and herself in the home. pt reports after her first surgery she was at TCU then discharged to home with home care   General Information   Onset of Illness/Injury or Date of Surgery 03/14/23   Referring Physician Sagar Cadena MD   Patient/Family Therapy Goal Statement (OT) to go home   Additional Occupational Profile Info/Pertinent History of Current Problem Lj Castro is a 78 year old female admitted on 3/12/2024. 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  She came to the ER complaining that for the past 10 days he had increasing pain in the neck and gradual weakness to the arms and legs to the point that she was unable to ambulate.  She also had right arm pain  over the past 3 to 4 days along with confusion.  Her arm pain was 9 out of 10.  There have been no recent falls or shortness of  breath or nausea or vomiting.  She was brought in by EMS.  CT scan of the neck showed a deep space infection with gas formation and possible cord compression.  She was given Kcentra and taken to the operating room.  She underwent I&D with wound VAC application along with IntraOp culture and IV antibiotics.  She underwent general endotracheal anesthesia.  Operative course was significant for hypotension requiring central line and pressor support and so she was admitted to the ICU.   Existing Precautions/Restrictions fall;spinal   General Observations and Info cervical wound vac   Cognitive Status Examination   Orientation Status orientation to person, place and time   Affect/Mental Status (Cognitive) WFL   Follows Commands WFL   Cognitive Status Comments more appropiate today but still possibly with mild confusion   Visual Perception   Visual Impairment/Limitations WFL   Sensory   Sensory Comments pt reports no sensory changes, no numbness/tingling   Pain Assessment   Patient Currently in Pain Yes, see Vital Sign flowsheet   Posture   Posture forward head position;protracted shoulders;kyphosis   Posture Comments cervical wound vac   Range of Motion Comprehensive   General Range of Motion no range of motion deficits identified   Comment, General Range of Motion B UE limited by SH flexion 2/2 kyphosis   Strength Comprehensive (MMT)   Comment, General Manual Muscle Testing (MMT) Assessment gross deconditioning. B UE equal   Muscle Tone Assessment   Muscle Tone Quick Adds No deficits were identified   Coordination   Upper Extremity Coordination Right UE impaired;Left UE impaired   Functional Limitations Reach to targets impaired;Object transport impaired;Impaired ability to perform bilateral tasks;Fine motor ADL performance impaired   Bed Mobility   Bed Mobility supine-sit   Supine-Sit [place] (Bed Mobility) minimum assist (75% patient effort)   Comment (Bed Mobility) log roll   Transfers   Transfers sit-stand  transfer   Sit-Stand Transfer   Sit-Stand [place] (Transfers) minimum assist (75% patient effort)   Assistive Device (Sit-Stand Transfers) walker, front-wheeled   Activities of Daily Living   BADL Assessment/Intervention bathing;upper body dressing;lower body dressing;toileting   Bathing Assessment/Intervention   [place] Level (Bathing) maximum assist (25% patient effort)   Comment, (Bathing) per clinical judgement   Upper Body Dressing Assessment/Training   Comment, (Upper Body Dressing) precautions   [place] Level (Upper Body Dressing) moderate assist (50% patient effort)   Lower Body Dressing Assessment/Training   Comment, (Lower Body Dressing) precautoins   [place] Level (Lower Body Dressing) maximum assist (25% patient effort)   Toileting   Comment, (Toileting) commode and FWW   [place] Level (Toileting) moderate assist (50% patient effort)   Clinical Impression   Criteria for Skilled Therapeutic Interventions Met (OT) Yes, treatment indicated   OT Diagnosis decreased function in ADL   OT Problem List-Impairments impacting ADL problems related to;activity tolerance impaired;cognition;mobility;range of motion (ROM);pain;post-surgical precautions   Assessment of Occupational Performance 5 or more Performance Deficits   Identified Performance Deficits all ADL and IADL   Planned Therapy Interventions (OT) ADL retraining;IADL retraining;progressive activity/exercise;transfer training;strengthening;cognition   Clinical Decision Making Complexity (OT) problem focused assessment/low complexity   Risk & Benefits of therapy have been explained evaluation/treatment results reviewed;care plan/treatment goals reviewed;current/potential barriers reviewed;risks/benefits reviewed;participants voiced agreement with care plan;participants included;patient   Clinical Impression Comments decreased function in ADL warrants skilled therapy   OT Total Evaluation Time   OT Eval, Low Complexity Minutes (72576)  10   OT Goals   Therapy Frequency (OT) Daily   OT Predicted Duration/Target Date for Goal Attainment 03/28/24   OT Goals Hygiene/Grooming;Upper Body Dressing;Lower Body Dressing;Toilet Transfer/Toileting;Cognition   OT: Hygiene/Grooming modified independent;within precautions   OT: Upper Body Dressing Modified independent;within precautions   OT: Lower Body Dressing Modified independent;within precautions   OT: Toilet Transfer/Toileting Modified independent;cleaning and garment management;toilet transfer;using adaptive equipment;within precautions   OT: Cognitive Patient/caregiver will verbalize understanding of cognitive assessment results/recommendations as needed for safe discharge planning   Interventions   Interventions Quick Adds Therapeutic Activity   Therapeutic Activities   Therapeutic Activity Minutes (42302) 23   Treatment Detail/Skilled Intervention PT cotx given limited activity tolerance and need for Ax2 to facilitate mobility while managing lines. OT focus on tolerance for ADL .A&Ox4 agreeble to OT. Educated on spinal precautions which patient is familiar with from previous spine surgery. Supine /78. She completes supine > sit with minAx1, intermittent cues for movement sequencing. Sitting balance maintained with SBA, denies dizziness/lightheadedness despite BP dropping to 97/54. Increased time needed to scoot to EOB, CGA for safety given elevated ICU beds. Patient performing sit > stand with minAx1, second person closely following due to hypotension and for line management. transfer to recliner chair with FWW and minAx1. Cues for forward gaze to maintain cervical neutral. Denies dizziness/lightheadedness. Patient cued to reach back for armrest of recliner chair to facilitate stand > sit, CGA. Patient able to scoot back in back with UE support on armrests, LEs elevated and pillows positioned for patient comfort and pressure relief. /57 at end of session. Patient set up for breakfast. Call  light in reach. educated pt on progressoin of therapy, ADL and commode   OT Discharge Planning   OT Plan toileting with commode, LB dressing figure 4   OT Discharge Recommendation (DC Rec) Transitional Care Facility   OT Rationale for DC Rec pt below baseline function in self care ADL and IADL. recommend discharge to TCU to progress indp within precautions   OT Brief overview of current status Ax1 for pt and Ax1 for line mgmt   Total Session Time   Timed Code Treatment Minutes 23   Total Session Time (sum of timed and untimed services) 33

## 2024-03-14 NOTE — PROVIDER NOTIFICATION
03/14/24 1300   Critical Test Results/Notification   Critical Lab Result (Lab Name and Value) Blood Cx, Gram Positive Cocci   What Time Did The Lab Notify You? 1249   Provider Notified yes   Date of Provider Notification 03/14/24   Time of Provider Notification 1300   Mechanism of Provider notification page   What Provider Did You Notify? Dr. Muñoz   Response no orders were obtained, but call back

## 2024-03-14 NOTE — PLAN OF CARE
Goal Outcome Evaluation:      Plan of Care Reviewed With: patient    Overall Patient Progress: improvingOverall Patient Progress: improving    Outcome Evaluation: .    ICU End of Shift Summary.  For vital signs and complete assessments, please see documentation flowsheets.     Pertinent assessments: VSS on RA. LS clear. Tele: AFIB CVR, PVCs. Disoriented to time, with intermittent confusion. Neck abscess I&D 3/13, wound vac placed. Bruising and scattered scabbing, right hip skin tear. Winston with minimal output, no BM. R internal jugular. 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 ACHS BG with sliding scale. IV antibiotics.   Major Shift Events:   - Stayed off levo overnight.   - Neuro checks intact, no new developments.   - Positive Gram stain, see results.   - Neck pain 5/10, scheduled tylenol effective.   - ID/PT/OT/WOC following.       Plan (Upcoming Events): TBD  Discharge/Transfer Needs: TBD    Bedside Shift Report Completed : Yes  Bedside Safety Check Completed: Yes

## 2024-03-14 NOTE — PROGRESS NOTES
ICU End of Shift Summary.  For vital signs and complete assessments, please see documentation flowsheets.     Pertinent assessments: Patient alert now. Was lethargic most of day. Awakes to voice. 5-6/10 incisional pain- scheduled tylenol given. Regular diet ordered- but no appetite. Pt now on RA- was previously on 1.5L NC.  Wound vac in place. Winston in place with low urine output. No BM- prior to admission.   Major Shift Events:  transitioned to RA, IV antibiotics switched, bolus given for low urine output, IVF continued, on and off levo    Plan (Upcoming Events): IV antibiotic, ID/PT/OT/WOC/hospitalist following   Discharge/Transfer Needs: n/a at this time- on and off levo     Bedside Shift Report Completed : y  Bedside Safety Check Completed: y

## 2024-03-14 NOTE — PLAN OF CARE
"Goal Outcome Evaluation:      Plan of Care Reviewed With: patient, family    Overall Patient Progress: improvingOverall Patient Progress: improving     Pt arrived from ICU. Up in recliner with walker and 1-2 assist to manage lines. Bolus finished and IVF continues. 2 peripheral IV's. Wound vac in place. Denies pain. Oriented to room. Family at bedside.   Problem: Adult Inpatient Plan of Care  Goal: Plan of Care Review  Description: The Plan of Care Review/Shift note should be completed every shift.  The Outcome Evaluation is a brief statement about your assessment that the patient is improving, declining, or no change.  This information will be displayed automatically on your shift  note.  Outcome: Progressing  Flowsheets (Taken 3/14/2024 5251)  Plan of Care Reviewed With:   patient   family  Overall Patient Progress: improving  Goal: Patient-Specific Goal (Individualized)  Description: You can add care plan individualizations to a care plan. Examples of Individualization might be:  \"Parent requests to be called daily at 9am for status\", \"I have a hard time hearing out of my right ear\", or \"Do not touch me to wake me up as it startles  me\".  Outcome: Progressing  Goal: Absence of Hospital-Acquired Illness or Injury  Outcome: Progressing  Goal: Optimal Comfort and Wellbeing  Outcome: Progressing  Goal: Readiness for Transition of Care  Outcome: Progressing         "

## 2024-03-15 ENCOUNTER — APPOINTMENT (OUTPATIENT)
Dept: OCCUPATIONAL THERAPY | Facility: CLINIC | Age: 79
DRG: 500 | End: 2024-03-15
Payer: COMMERCIAL

## 2024-03-15 LAB
ANION GAP SERPL CALCULATED.3IONS-SCNC: 11 MMOL/L (ref 7–15)
BACTERIA BLD CULT: ABNORMAL
BUN SERPL-MCNC: 20.6 MG/DL (ref 8–23)
CALCIUM SERPL-MCNC: 8.2 MG/DL (ref 8.8–10.2)
CHLORIDE SERPL-SCNC: 101 MMOL/L (ref 98–107)
CREAT SERPL-MCNC: 0.94 MG/DL (ref 0.51–0.95)
DEPRECATED HCO3 PLAS-SCNC: 23 MMOL/L (ref 22–29)
EGFRCR SERPLBLD CKD-EPI 2021: 61 ML/MIN/1.73M2
ERYTHROCYTE [DISTWIDTH] IN BLOOD BY AUTOMATED COUNT: 13.7 % (ref 10–15)
GLUCOSE BLDC GLUCOMTR-MCNC: 160 MG/DL (ref 70–99)
GLUCOSE BLDC GLUCOMTR-MCNC: 175 MG/DL (ref 70–99)
GLUCOSE BLDC GLUCOMTR-MCNC: 190 MG/DL (ref 70–99)
GLUCOSE BLDC GLUCOMTR-MCNC: 211 MG/DL (ref 70–99)
GLUCOSE SERPL-MCNC: 218 MG/DL (ref 70–99)
GLUCOSE SERPL-MCNC: 218 MG/DL (ref 70–99)
HCT VFR BLD AUTO: 30.7 % (ref 35–47)
HGB BLD-MCNC: 9.6 G/DL (ref 11.7–15.7)
MCH RBC QN AUTO: 30.2 PG (ref 26.5–33)
MCHC RBC AUTO-ENTMCNC: 31.3 G/DL (ref 31.5–36.5)
MCV RBC AUTO: 97 FL (ref 78–100)
PLATELET # BLD AUTO: 237 10E3/UL (ref 150–450)
POTASSIUM SERPL-SCNC: 3.9 MMOL/L (ref 3.4–5.3)
RBC # BLD AUTO: 3.18 10E6/UL (ref 3.8–5.2)
SODIUM SERPL-SCNC: 135 MMOL/L (ref 135–145)
WBC # BLD AUTO: 7.8 10E3/UL (ref 4–11)

## 2024-03-15 PROCEDURE — 250N000013 HC RX MED GY IP 250 OP 250 PS 637: Performed by: STUDENT IN AN ORGANIZED HEALTH CARE EDUCATION/TRAINING PROGRAM

## 2024-03-15 PROCEDURE — 250N000011 HC RX IP 250 OP 636: Performed by: ORTHOPAEDIC SURGERY

## 2024-03-15 PROCEDURE — 120N000001 HC R&B MED SURG/OB

## 2024-03-15 PROCEDURE — 97535 SELF CARE MNGMENT TRAINING: CPT | Mod: GO

## 2024-03-15 PROCEDURE — 250N000011 HC RX IP 250 OP 636: Performed by: INTERNAL MEDICINE

## 2024-03-15 PROCEDURE — 80048 BASIC METABOLIC PNL TOTAL CA: CPT | Performed by: STUDENT IN AN ORGANIZED HEALTH CARE EDUCATION/TRAINING PROGRAM

## 2024-03-15 PROCEDURE — 258N000003 HC RX IP 258 OP 636: Performed by: INTERNAL MEDICINE

## 2024-03-15 PROCEDURE — 36415 COLL VENOUS BLD VENIPUNCTURE: CPT | Performed by: STUDENT IN AN ORGANIZED HEALTH CARE EDUCATION/TRAINING PROGRAM

## 2024-03-15 PROCEDURE — 85027 COMPLETE CBC AUTOMATED: CPT | Performed by: STUDENT IN AN ORGANIZED HEALTH CARE EDUCATION/TRAINING PROGRAM

## 2024-03-15 PROCEDURE — 36415 COLL VENOUS BLD VENIPUNCTURE: CPT | Performed by: INTERNAL MEDICINE

## 2024-03-15 PROCEDURE — 99232 SBSQ HOSP IP/OBS MODERATE 35: CPT | Performed by: INTERNAL MEDICINE

## 2024-03-15 PROCEDURE — G0463 HOSPITAL OUTPT CLINIC VISIT: HCPCS | Mod: 25

## 2024-03-15 PROCEDURE — 250N000013 HC RX MED GY IP 250 OP 250 PS 637: Performed by: ORTHOPAEDIC SURGERY

## 2024-03-15 PROCEDURE — 97605 NEG PRS WND THER DME<=50SQCM: CPT

## 2024-03-15 PROCEDURE — 99233 SBSQ HOSP IP/OBS HIGH 50: CPT | Performed by: INTERNAL MEDICINE

## 2024-03-15 PROCEDURE — 87040 BLOOD CULTURE FOR BACTERIA: CPT | Performed by: INTERNAL MEDICINE

## 2024-03-15 RX ORDER — HYDROCODONE BITARTRATE AND ACETAMINOPHEN 7.5; 325 MG/1; MG/1
1 TABLET ORAL EVERY 4 HOURS PRN
Status: DISCONTINUED | OUTPATIENT
Start: 2024-03-15 | End: 2024-03-20 | Stop reason: HOSPADM

## 2024-03-15 RX ORDER — ACETAMINOPHEN 325 MG/1
325 TABLET ORAL EVERY 6 HOURS PRN
Status: DISCONTINUED | OUTPATIENT
Start: 2024-03-15 | End: 2024-03-20 | Stop reason: HOSPADM

## 2024-03-15 RX ADMIN — ATORVASTATIN CALCIUM 40 MG: 40 TABLET, FILM COATED ORAL at 08:32

## 2024-03-15 RX ADMIN — TIMOLOL MALEATE 1 DROP: 5 SOLUTION/ DROPS OPHTHALMIC at 08:40

## 2024-03-15 RX ADMIN — ACETAMINOPHEN 975 MG: 325 TABLET, FILM COATED ORAL at 03:05

## 2024-03-15 RX ADMIN — METOPROLOL SUCCINATE 100 MG: 100 TABLET, EXTENDED RELEASE ORAL at 08:32

## 2024-03-15 RX ADMIN — ACETAMINOPHEN 975 MG: 325 TABLET, FILM COATED ORAL at 09:56

## 2024-03-15 RX ADMIN — CEFAZOLIN SODIUM 2 G: 2 INJECTION, SOLUTION INTRAVENOUS at 21:39

## 2024-03-15 RX ADMIN — LEVOTHYROXINE SODIUM 88 MCG: 0.09 TABLET ORAL at 08:32

## 2024-03-15 RX ADMIN — CEFAZOLIN SODIUM 2 G: 2 INJECTION, SOLUTION INTRAVENOUS at 14:20

## 2024-03-15 RX ADMIN — INSULIN ASPART 1 UNITS: 100 INJECTION, SOLUTION INTRAVENOUS; SUBCUTANEOUS at 18:42

## 2024-03-15 RX ADMIN — INSULIN ASPART 2 UNITS: 100 INJECTION, SOLUTION INTRAVENOUS; SUBCUTANEOUS at 12:14

## 2024-03-15 RX ADMIN — METOPROLOL SUCCINATE 100 MG: 100 TABLET, EXTENDED RELEASE ORAL at 20:26

## 2024-03-15 RX ADMIN — HYDROMORPHONE HYDROCHLORIDE 0.2 MG: 0.2 INJECTION, SOLUTION INTRAMUSCULAR; INTRAVENOUS; SUBCUTANEOUS at 22:52

## 2024-03-15 RX ADMIN — PANTOPRAZOLE SODIUM 40 MG: 40 TABLET, DELAYED RELEASE ORAL at 20:26

## 2024-03-15 RX ADMIN — HYDROMORPHONE HYDROCHLORIDE 0.1 MG: 0.2 INJECTION, SOLUTION INTRAMUSCULAR; INTRAVENOUS; SUBCUTANEOUS at 09:59

## 2024-03-15 RX ADMIN — LATANOPROST 1 DROP: 50 SOLUTION/ DROPS OPHTHALMIC at 21:51

## 2024-03-15 RX ADMIN — HYDROMORPHONE HYDROCHLORIDE 0.2 MG: 0.2 INJECTION, SOLUTION INTRAMUSCULAR; INTRAVENOUS; SUBCUTANEOUS at 02:18

## 2024-03-15 RX ADMIN — CEFAZOLIN SODIUM 2 G: 2 INJECTION, SOLUTION INTRAVENOUS at 05:51

## 2024-03-15 RX ADMIN — SODIUM CHLORIDE, POTASSIUM CHLORIDE, SODIUM LACTATE AND CALCIUM CHLORIDE: 600; 310; 30; 20 INJECTION, SOLUTION INTRAVENOUS at 08:43

## 2024-03-15 RX ADMIN — HYDROMORPHONE HYDROCHLORIDE 0.2 MG: 0.2 INJECTION, SOLUTION INTRAMUSCULAR; INTRAVENOUS; SUBCUTANEOUS at 05:51

## 2024-03-15 RX ADMIN — GABAPENTIN 600 MG: 300 CAPSULE ORAL at 08:31

## 2024-03-15 RX ADMIN — HYDROCODONE BITARTRATE AND ACETAMINOPHEN 1 TABLET: 7.5; 325 TABLET ORAL at 12:14

## 2024-03-15 RX ADMIN — PREDNISOLONE ACETATE 1 DROP: 10 SUSPENSION/ DROPS OPHTHALMIC at 08:35

## 2024-03-15 RX ADMIN — GABAPENTIN 600 MG: 300 CAPSULE ORAL at 20:26

## 2024-03-15 RX ADMIN — HYDROCODONE BITARTRATE AND ACETAMINOPHEN 1 TABLET: 7.5; 325 TABLET ORAL at 20:26

## 2024-03-15 RX ADMIN — PANTOPRAZOLE SODIUM 40 MG: 40 TABLET, DELAYED RELEASE ORAL at 08:32

## 2024-03-15 ASSESSMENT — ACTIVITIES OF DAILY LIVING (ADL)
ADLS_ACUITY_SCORE: 39
ADLS_ACUITY_SCORE: 38
ADLS_ACUITY_SCORE: 39
ADLS_ACUITY_SCORE: 34
ADLS_ACUITY_SCORE: 39
ADLS_ACUITY_SCORE: 34
ADLS_ACUITY_SCORE: 39
ADLS_ACUITY_SCORE: 34
ADLS_ACUITY_SCORE: 39
ADLS_ACUITY_SCORE: 31
ADLS_ACUITY_SCORE: 31
ADLS_ACUITY_SCORE: 39
ADLS_ACUITY_SCORE: 31
ADLS_ACUITY_SCORE: 39
ADLS_ACUITY_SCORE: 34
ADLS_ACUITY_SCORE: 39
ADLS_ACUITY_SCORE: 34
ADLS_ACUITY_SCORE: 39

## 2024-03-15 NOTE — PLAN OF CARE
"  Problem: Adult Inpatient Plan of Care  Goal: Plan of Care Review  Description: The Plan of Care Review/Shift note should be completed every shift.  The Outcome Evaluation is a brief statement about your assessment that the patient is improving, declining, or no change.  This information will be displayed automatically on your shift  note.  Outcome: Progressing  Flowsheets (Taken 3/15/2024 0338)  Outcome Evaluation:   Continues on IV antibiotics   daily blood cultures until negative.  Plan of Care Reviewed With: patient  Overall Patient Progress: improving  Goal: Patient-Specific Goal (Individualized)  Description: You can add care plan individualizations to a care plan. Examples of Individualization might be:  \"Parent requests to be called daily at 9am for status\", \"I have a hard time hearing out of my right ear\", or \"Do not touch me to wake me up as it startles  me\".  Outcome: Progressing  Goal: Absence of Hospital-Acquired Illness or Injury  Outcome: Progressing  Intervention: Prevent Skin Injury  Recent Flowsheet Documentation  Taken 3/15/2024 0200 by Shane Pace RN  Body Position: (Pt ambulating to the bathroom.) other (see comments)  Taken 3/15/2024 0024 by Shaen Pace RN  Body Position:   position changed independently   left  Goal: Optimal Comfort and Wellbeing  Outcome: Progressing  Intervention: Monitor Pain and Promote Comfort  Recent Flowsheet Documentation  Taken 3/15/2024 0218 by Shane Pace RN  Pain Management Interventions: medication (see MAR)  Taken 3/15/2024 0024 by Shane Pace RN  Pain Management Interventions:   pillow support provided   medication offered but refused  Intervention: Provide Person-Centered Care  Recent Flowsheet Documentation  Taken 3/15/2024 0024 by Shane Pace RN  Trust Relationship/Rapport:   care explained   choices provided   questions answered   reassurance provided  Goal: Readiness for Transition of Care  Outcome: Progressing     Problem: " "Infection  Goal: Absence of Infection Signs and Symptoms  Outcome: Progressing     Problem: Glycemic Control Impaired  Goal: Blood Glucose Level Within Targeted Range  Outcome: Progressing  Goal: Minimize Risk of Hypoglycemia  Outcome: Progressing     Problem: Fall Injury Risk  Goal: Absence of Fall and Fall-Related Injury  Outcome: Progressing   Goal Outcome Evaluation:      Plan of Care Reviewed With: patient    Overall Patient Progress: improvingOverall Patient Progress: improving    Outcome Evaluation: Continues on IV antibiotics; daily blood cultures until negative.    Pt is alert and oriented x 4. PRN dilaudid X 2 and scheduled Tylenol administered to manage pain. Pt denies any shortness of breath and on room air.    Pt  ambulates 2 assist GB+W to the bathroom; voided on this shift. Dressing to back of neck is CDI; pressure dressing to front of neck is CDI. Neuro assessment completed and intact. CMS is intact. Wound vac in place.    LR infusing at 125 ml/hr; .IV Ancef administered. Tele: Afib CVR.    Ongoing monitoring.    SW/ID/spinal surgery following.    BP (!) 142/72 (BP Location: Right arm, Patient Position: Fowlers, Cuff Size: Adult Regular)   Pulse 85   Temp 97.2  F (36.2  C) (Temporal)   Resp 16   Ht 1.575 m (5' 2\")   Wt 89.4 kg (197 lb)   SpO2 98%   BMI 36.03 kg/m         "

## 2024-03-15 NOTE — PROGRESS NOTES
[facility]  Infectious Disease Progress Note          Assessment and Plan:   Date of Admission:  3/12/2024  Date of Consult (When I saw the patient): 03/13/24        Assessment & Plan  Lj Castro is a 78 year old who was admitted on 3/12/2024.      Impression: 1 78-year-old female, acute sepsis and neck pain where she has recent hardware surgical repair, has an obvious deep infection to the hardware and also sepsis with Staph aureus bacteremia  2 Staph aureus bacteremia, sensitive by gene testing, no other obvious secondary involved sites does have left total shoulder  3 diabetes mellitus  4 infectious encephalopathy  5 atrial fibrillation  Op culture growing Staph aureus as well    REC 1 blood cultures  define the infection, continue  Ancef  2 will need prolonged IV antibiotics but hold on long line, neck central line has been removed, not ready for PICC line placement yet as still positive blood culture  3 serial daily blood cultures and follow closely for any other secondary involved sites, clinically better already and first follow-up cultures March 13 1 of 2 positive, March 14 negative and March 15 just drawn  4 hardware in place , so probably rifampin addition at some point possibly before discharge  Investigate disposition options but it would not discharge until blood cultures negative for at least 3 days, i.e. at the earliest on Sunday and more likely Monday              Interval History:     no new complaints and doing well; no cp, sob, n/v/d, or abd pain.  Feels quite well, pain controlled, temp down, follow cultures March 13 positive, March 14 so far negative, operative cultures same Staph aureus              Medications:      atorvastatin  40 mg Oral Daily    ceFAZolin  2 g Intravenous Q8H    gabapentin  600 mg Oral BID    [Held by provider] hydrochlorothiazide  25 mg Oral Daily    insulin aspart   Subcutaneous TID w/meals    insulin aspart  1-7 Units Subcutaneous TID AC     "insulin aspart  1-5 Units Subcutaneous At Bedtime    insulin glargine  15 Units Subcutaneous QAM AC    latanoprost  1 drop Both Eyes At Bedtime    levothyroxine  88 mcg Oral QAM AC    [Held by provider] losartan  50 mg Oral Daily    metoprolol succinate ER  100 mg Oral BID    pantoprazole  40 mg Oral BID    polyethylene glycol  17 g Oral Daily    prednisoLONE acetate  1 drop Left Eye Daily    senna-docusate  1 tablet Oral BID    sodium chloride (PF)  3 mL Intracatheter Q8H    timolol maleate  1 drop Both Eyes BID                  Physical Exam:   Blood pressure 130/53, pulse 72, temperature 97  F (36.1  C), temperature source Temporal, resp. rate 12, height 1.575 m (5' 2\"), weight 89.4 kg (197 lb), SpO2 97%.  Wt Readings from Last 2 Encounters:   03/15/24 89.4 kg (197 lb)   11/06/23 81.7 kg (180 lb 1.6 oz)     Vital Signs with Ranges  Temp:  [96.7  F (35.9  C)-97.8  F (36.6  C)] 97  F (36.1  C)  Pulse:  [71-85] 72  Resp:  [12-16] 12  BP: (116-142)/(42-72) 130/53  SpO2:  [97 %-100 %] 97 %    Constitutional: Awake, alert, cooperative, no apparent distress mentation okay, not septic looking     Lungs: Clear to auscultation bilaterally, no crackles or wheezing   Cardiovascular: Regular rate and rhythm, normal S1 and S2, and no murmur noted   Abdomen: Normal bowel sounds, soft, non-distended, non-tender   Skin: No rashes, no cyanosis, no edema   Other: Spine wound seen and looks quite clean, she is very tender in the tissues around it but no obvious abscess          Data:   All microbiology laboratory data reviewed.  Recent Labs   Lab Test 03/15/24  0800 03/14/24  0510 03/13/24  0316   WBC 7.8 7.3 13.4*   HGB 9.6* 8.2* 9.5*   HCT 30.7* 26.0* 29.6*   MCV 97 95 96    220 227     Recent Labs   Lab Test 03/15/24  0800 03/14/24  0510 03/13/24  0316   CR 0.94 1.02* 0.99*     Recent Labs   Lab Test 03/12/24  0855   SED 72*     No lab results found.    Invalid input(s): \"UC\"     "

## 2024-03-15 NOTE — PROGRESS NOTES
Afebrile.  Alert and oriented.   Staphy Aureus on IV ancef.  Dr. Alexis consults appreciated.   Neuro intact.  Ambulating.   Wound vac dressing change as per wound care team.  Social service for TCU placement.

## 2024-03-15 NOTE — PROGRESS NOTES
[facility]    Medicine Progress Note - Hospitalist Service    Date of Admission:  3/12/2024    Assessment & Plan   Lj Castro is a 78 year old female admitted on 3/12/2024. 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     CT scan of the neck showed a deep space infection with gas formation and possible cord compression.  She was given Kcentra and taken to the operating room.  She underwent I&D with wound VAC application along with IntraOp culture and IV antibiotics.  Operative course was significant for hypotension requiring central line and pressor support and so she was admitted to the ICU.      She has improved day by day.  Has not required pressors for over 24 hours and so central line was removed.  Pain has been controlled by tylenol.  She is eating and drinking.      Severe sepsis 2/2 staph aureus bacteremia 2/2 deep space infection in the posterior cervical spine down to the hardware:  S/p I&D with purulent material -> positive for staph aureus. Blood cultures also positive for staph aureus x3 (3/12 x2 & 3/13 x1).  -ID consultation, appreciate recs  -IV ancef  -Daily blood cultures until negative x48 hours. Will not discharge until BC negative for at least 3 days   -Will discontinue IV fluid today  -PT/OT  -WOC following for wound vac management     Type 2 diabetes:  Initially placed on insulin gtt in the perioperative setting. Now on subcutaneous insulin.  -Increase to Lantus 15 units day   -Continue carb coverage 1 unit per 15 gm CHO  -MDSSI  -Will monitor  "blood sugar and adjust insulin dose as needed      A-fib with rapid ventricular rate, resolved:  Most recent echocardiogram in January 2023 shows a normal EF.  -Monitor on telemetry  -Hold Eliquis, will need resumption of this once appropriate per surgery     Acute encephalopathy, improved:  Likely due to sepsis. Seems quite appropriate in the post-operative period. Now fully oriented.   -Monitor    Diet: Advance Diet as Tolerated: Regular Diet Adult    DVT Prophylaxis: Pneumatic Compression Devices  Winston Catheter: Not present  Lines: None       Cardiac Monitoring: ACTIVE order. Indication: History of Afib  Code Status: Full Code      Clinically Significant Risk Factors              # Hypoalbuminemia: Lowest albumin = 2.8 g/dL at 3/13/2024  3:16 AM, will monitor as appropriate            # Obesity: Estimated body mass index is 36.03 kg/m  as calculated from the following:    Height as of this encounter: 1.575 m (5' 2\").    Weight as of this encounter: 89.4 kg (197 lb)., PRESENT ON ADMISSION            Disposition Plan      Expected Discharge Date: 03/17/2024                Jean Buchanan MD, MD  Hospitalist Service  [facility]  Securely message with bizsol (more info)  Text page via [employer] Paging/Directory   ______________________________________________________________________    Interval History   Patient seen and examined. Chart reviewed. She stated that she is feeling ok. She has neck pain. Denies fever. She has no nausea or vomiting. She denies fever    Physical Exam   Vital Signs: Temp: 97  F (36.1  C) Temp src: Temporal BP: 130/53 Pulse: 72   Resp: 12 SpO2: 97 % O2 Device: None (Room air)    Weight: 197 lbs 0 oz    General Appearance: Awake, alert.  Oriented fully.  No apparent distress.  Respiratory: Lungs are clear to auscultation bilaterally.  Work of breathing is normal on room air.  Cardiovascular: Irregular rate and rhythm, good rate control.  No obvious murmurs rubs or " gallops.  No peripheral edema.  GI: Benign.  Soft.  No tenderness palpation or bowel sounds are active.    Skin: No obvious rash or lesions exposed skin.  The patient has a wound VAC placed on her posterior neck.  Other: Appropriate     Medical Decision Making       50 MINUTES SPENT BY ME on the date of service doing chart review, history, exam, documentation & further activities per the note.      Data   ------------------------- PAST 24 HR DATA REVIEWED -----------------------------------------------    I have personally reviewed the following data over the past 24 hrs:    7.8  \   9.6 (L)   / 237     135 101 20.6 /  190 (H)   3.9 23 0.94 \       Imaging results reviewed over the past 24 hrs:   No results found for this or any previous visit (from the past 24 hour(s)).

## 2024-03-15 NOTE — PROGRESS NOTES
[facility] Nurse Inpatient Assessment     Consulted for: Neck    Summary: Patient s/p I&D on 3/13/24 of posterior neck for deep cervical infection down to the hardware with wound VAC placement.  First bedside VAC change today by [facility].     Patient History (according to provider note(s):      Lj Castro is a 78 year old female admitted on 3/12/2024. 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  She came to the ER complaining that for the past 10 days he had increasing pain in the neck and gradual weakness to the arms and legs to the point that she was unable to ambulate.  She also had right arm pain  over the past 3 to 4 days along with confusion.  Her arm pain was 9 out of 10.  There have been no recent falls or shortness of breath or nausea or vomiting.  She was brought in by EMS.  CT scan of the neck showed a deep space infection with gas formation and possible cord compression.  She was given Kcentra and taken to the operating room.  She underwent I&D with wound VAC application along with IntraOp culture and IV antibiotics.  She underwent general endotracheal anesthesia.  Operative course was significant for hypotension requiring central line and pressor support and so she was admitted to the ICU.       Assessment:      Areas visualized during today's visit: Focused:    Negative pressure wound therapy applied to: Posterior neck   Last photo: 3/15/24     Wound due to: Surgical Wound   Wound history/plan of care:    Surgical date: 3/13/24   Service following: Spinal surgery  Date Negative Pressure Wound Therapy initiated: 3/13/24    Interventions in place: repositioning  Is patient s nutritional status compromised? yes   If yes, what interventions are in place? Protein supplements  Reason for initiating vac therapy? Presence of co-morbidities, High risk of infections, and Need for accelerated granulation tissue  Which?of?the?following?co-morbidities?apply? Diabetes  If diabetic is patient on a diabetic management program? Yes   Is osteomyelitis present in wound? no   If yes what treatments are in place? N/A    Wound base: 10 % granulation tissue, 90 % non-granular tissue, adipose tissue and fascia.  One small area of exposed hardware.        Palpation of the wound bed: normal       Drainage:  large       Volume in cannister: 150 ml     Last cannister change date: 3/13/24     Description of drainage: serosanguinous      Measurements (length x width x depth, in cm) 11  x 4  x  3.5 cm       Tunneling N/A      Undermining up to 5.5 cm from 6-9 o'clock   Periwound skin: Intact       Color: normal and consistent with surrounding tissue       Temperature: normal    Odor: none   Pain: score 8 , sharp   Pain intervention prior to dressing change: IV Dilaudid  Treatment goal: Heal  and Increase granulation  STATUS: initial assessment   Supplies ordered: ordered medium VAC dressing    Number of foam pieces removed from a wound (excluding foam for bridge) :  1 GranuFoam Black   Verified this matched the number of foam pieces applied last dressing change: Yes   Number of foam pieces packed into wound (excluding foam for bridge) :  2 GranuFoam Black       Treatment Plan:     Negative pressure wound therapy plan:  Wound location: Posterior neck   Change Days: Mon/Wed/Fri by WOC RN    Supplies (including all accessories) used: medium Black foam   Cleanse with Vashe prior to replacing NPWT  Suction setting: -50     Staff RN to assess integrity of dressing and ensure suction is set at appropriate level every shift.   Date canister. Chart canister output every  "shift. Change cannister weekly and PRN if full/occluded     Remove foam dressing and replace with BID normal saline moist gauze dressing if:   -a dressing failure which cannot be repaired within 2 hours   -patient is discharging to home without a home pump   -patient is discharging to a facility outside the local area   -if a dressing is a \"Silver Foam\", remove before Radiation Therapy or MRI     The hospital VAC pump is not to be discharged with the patient.?Ensure to disconnect patient from machine prior to discharge. Then,    - If a home KCI VAC pump has been delivered, connect home cannister to dressing tubing then connect cannister to home pump and turn on machine    - If transferring to a nearby facility with a KCI vac, can disconnect and clamp tubing then cover end with glove so can be reconnected within 2 hours        Orders: Reviewed    RECOMMEND PRIMARY TEAM ORDER: None, at this time  Education provided: plan of care  Discussed plan of care with: Patient and Nurse  WOC nurse follow-up plan: Monday/WednesdayFriday   Notify WOC if wound(s) deteriorate.  Nursing to notify the Provider(s) and re-consult the WOC Nurse if new skin concern.    DATA:     Current support surface: Standard  Low air loss (LYNNETTE pump, Isolibrium, Pulsate, skin guard, etc)  BMI: Body mass index is 36.03 kg/m .   Active diet order: Orders Placed This Encounter      Advance Diet as Tolerated: Regular Diet Adult     Output: I/O last 3 completed shifts:  In: 1920 [P.O.:600; I.V.:1320]  Out: 450 [Urine:350; Drains:100]     Labs:   Recent Labs   Lab 03/15/24  0800 03/14/24  0510 03/13/24  0316   ALBUMIN  --   --  2.8*   HGB 9.6*   < > 9.5*   WBC 7.8   < > 13.4*   A1C  --   --  8.4*    < > = values in this interval not displayed.     Pressure injury risk assessment:   Sensory Perception: 3-->slightly limited  Moisture: 4-->rarely moist  Activity: 3-->walks occasionally  Mobility: 3-->slightly limited  Nutrition: 3-->adequate  Friction and " Shear: 3-->no apparent problem  Luke Score: 19    JEAN-PAUL [facility] [employer]  Dept. Office Number: [phone]

## 2024-03-15 NOTE — PLAN OF CARE
Goal Outcome Evaluation:         Patient vital signs are at baseline: Yes  Patient able to ambulate as they were prior to admission or with assist devices provided by therapies during their stay:  No, assist of 2 with gait belt and walker.   Patient MUST void prior to discharge:  Due to void   Patient able to tolerate oral intake:  Yes  Pain has adequate pain control using Oral analgesics:  No,  Reason:  patient takes PRN Oxycodone   Does patient have an identified :  Yes  Has goal D/C date and time been discussed with patient:  No,  Reason:  due to be determined     Patient A&O x4, wound vac on neck is on 50 mm Hg continuous suction., voided bladder scan was 325, patient due to void, has new LPIV, old RPIV was swollen during shift start but has improved, pain is controlled with prn Oxycodone.

## 2024-03-15 NOTE — PROGRESS NOTES
Care Management Follow Up    Length of Stay (days): 3    Expected Discharge Date: 03/17/2024     Concerns to be Addressed: discharge planning     Patient plan of care discussed at interdisciplinary rounds: Yes    Anticipated Discharge Disposition:  TCU     Anticipated Discharge Services:    Anticipated Discharge DME:      Patient/family educated on Medicare website which has current facility and service quality ratings:  yes  Education Provided on the Discharge Plan: no   Patient/Family in Agreement with the Plan: yes     Referrals Placed by CM/SW:    Private pay costs discussed: private room/amenity fees    Additional Information:  PT/OT recommend TCU. CM met with patient and daughter to discuss PT/OT recommendations. CM then discussed TCU insurance coverage, TCU's and Medicare .gov website. Family will review and make TCU choices. CM will return later to obtain choices.    Addendum 1445   will not make it to the hospital until later. Patient not ready for discharge until Monday. CM will obtain TCU choices tomorrow.    Scarlett Dewey, RN, BSN, CM  Inpatient Care Coordination  [facility]  [phone]

## 2024-03-16 ENCOUNTER — APPOINTMENT (OUTPATIENT)
Dept: PHYSICAL THERAPY | Facility: CLINIC | Age: 79
DRG: 500 | End: 2024-03-16
Payer: COMMERCIAL

## 2024-03-16 LAB
ANION GAP SERPL CALCULATED.3IONS-SCNC: 13 MMOL/L (ref 7–15)
BACTERIA ABSC ANAEROBE+AEROBE CULT: ABNORMAL
BACTERIA BLD CULT: ABNORMAL
BACTERIA BLD CULT: ABNORMAL
BUN SERPL-MCNC: 17.8 MG/DL (ref 8–23)
CALCIUM SERPL-MCNC: 8.3 MG/DL (ref 8.8–10.2)
CHLORIDE SERPL-SCNC: 99 MMOL/L (ref 98–107)
CREAT SERPL-MCNC: 0.91 MG/DL (ref 0.51–0.95)
DEPRECATED HCO3 PLAS-SCNC: 21 MMOL/L (ref 22–29)
EGFRCR SERPLBLD CKD-EPI 2021: 64 ML/MIN/1.73M2
ERYTHROCYTE [DISTWIDTH] IN BLOOD BY AUTOMATED COUNT: 13.8 % (ref 10–15)
GLUCOSE BLDC GLUCOMTR-MCNC: 199 MG/DL (ref 70–99)
GLUCOSE BLDC GLUCOMTR-MCNC: 204 MG/DL (ref 70–99)
GLUCOSE BLDC GLUCOMTR-MCNC: 206 MG/DL (ref 70–99)
GLUCOSE BLDC GLUCOMTR-MCNC: 230 MG/DL (ref 70–99)
GLUCOSE SERPL-MCNC: 237 MG/DL (ref 70–99)
HCT VFR BLD AUTO: 34.1 % (ref 35–47)
HGB BLD-MCNC: 10.5 G/DL (ref 11.7–15.7)
MCH RBC QN AUTO: 29.8 PG (ref 26.5–33)
MCHC RBC AUTO-ENTMCNC: 30.8 G/DL (ref 31.5–36.5)
MCV RBC AUTO: 97 FL (ref 78–100)
PLATELET # BLD AUTO: 287 10E3/UL (ref 150–450)
POTASSIUM SERPL-SCNC: 4 MMOL/L (ref 3.4–5.3)
RBC # BLD AUTO: 3.52 10E6/UL (ref 3.8–5.2)
SODIUM SERPL-SCNC: 133 MMOL/L (ref 135–145)
WBC # BLD AUTO: 9.2 10E3/UL (ref 4–11)

## 2024-03-16 PROCEDURE — 250N000013 HC RX MED GY IP 250 OP 250 PS 637: Performed by: STUDENT IN AN ORGANIZED HEALTH CARE EDUCATION/TRAINING PROGRAM

## 2024-03-16 PROCEDURE — 99233 SBSQ HOSP IP/OBS HIGH 50: CPT | Performed by: INTERNAL MEDICINE

## 2024-03-16 PROCEDURE — 97530 THERAPEUTIC ACTIVITIES: CPT | Mod: GP | Performed by: PHYSICAL THERAPIST

## 2024-03-16 PROCEDURE — 250N000011 HC RX IP 250 OP 636: Performed by: INTERNAL MEDICINE

## 2024-03-16 PROCEDURE — 97116 GAIT TRAINING THERAPY: CPT | Mod: GP | Performed by: PHYSICAL THERAPIST

## 2024-03-16 PROCEDURE — 36415 COLL VENOUS BLD VENIPUNCTURE: CPT | Performed by: INTERNAL MEDICINE

## 2024-03-16 PROCEDURE — 120N000001 HC R&B MED SURG/OB

## 2024-03-16 PROCEDURE — 80048 BASIC METABOLIC PNL TOTAL CA: CPT | Performed by: INTERNAL MEDICINE

## 2024-03-16 PROCEDURE — 250N000013 HC RX MED GY IP 250 OP 250 PS 637: Performed by: ORTHOPAEDIC SURGERY

## 2024-03-16 PROCEDURE — 85027 COMPLETE CBC AUTOMATED: CPT | Performed by: INTERNAL MEDICINE

## 2024-03-16 PROCEDURE — 99232 SBSQ HOSP IP/OBS MODERATE 35: CPT | Performed by: INTERNAL MEDICINE

## 2024-03-16 RX ADMIN — HYDROCODONE BITARTRATE AND ACETAMINOPHEN 1 TABLET: 7.5; 325 TABLET ORAL at 02:35

## 2024-03-16 RX ADMIN — PREDNISOLONE ACETATE 1 DROP: 10 SUSPENSION/ DROPS OPHTHALMIC at 09:38

## 2024-03-16 RX ADMIN — HYDROCODONE BITARTRATE AND ACETAMINOPHEN 1 TABLET: 7.5; 325 TABLET ORAL at 18:59

## 2024-03-16 RX ADMIN — INSULIN ASPART 2 UNITS: 100 INJECTION, SOLUTION INTRAVENOUS; SUBCUTANEOUS at 12:14

## 2024-03-16 RX ADMIN — GABAPENTIN 600 MG: 300 CAPSULE ORAL at 09:37

## 2024-03-16 RX ADMIN — LATANOPROST 1 DROP: 50 SOLUTION/ DROPS OPHTHALMIC at 22:00

## 2024-03-16 RX ADMIN — CEFAZOLIN SODIUM 2 G: 2 INJECTION, SOLUTION INTRAVENOUS at 13:21

## 2024-03-16 RX ADMIN — GABAPENTIN 600 MG: 300 CAPSULE ORAL at 21:05

## 2024-03-16 RX ADMIN — TIMOLOL MALEATE 1 DROP: 5 SOLUTION/ DROPS OPHTHALMIC at 21:04

## 2024-03-16 RX ADMIN — HYDROCODONE BITARTRATE AND ACETAMINOPHEN 1 TABLET: 7.5; 325 TABLET ORAL at 06:48

## 2024-03-16 RX ADMIN — ATORVASTATIN CALCIUM 40 MG: 40 TABLET, FILM COATED ORAL at 09:37

## 2024-03-16 RX ADMIN — PANTOPRAZOLE SODIUM 40 MG: 40 TABLET, DELAYED RELEASE ORAL at 09:37

## 2024-03-16 RX ADMIN — TIMOLOL MALEATE 1 DROP: 5 SOLUTION/ DROPS OPHTHALMIC at 09:38

## 2024-03-16 RX ADMIN — METOPROLOL SUCCINATE 100 MG: 100 TABLET, EXTENDED RELEASE ORAL at 09:37

## 2024-03-16 RX ADMIN — CEFAZOLIN SODIUM 2 G: 2 INJECTION, SOLUTION INTRAVENOUS at 21:06

## 2024-03-16 RX ADMIN — PANTOPRAZOLE SODIUM 40 MG: 40 TABLET, DELAYED RELEASE ORAL at 21:05

## 2024-03-16 RX ADMIN — INSULIN ASPART 2 UNITS: 100 INJECTION, SOLUTION INTRAVENOUS; SUBCUTANEOUS at 19:00

## 2024-03-16 RX ADMIN — METOPROLOL SUCCINATE 100 MG: 100 TABLET, EXTENDED RELEASE ORAL at 21:05

## 2024-03-16 RX ADMIN — CEFAZOLIN SODIUM 2 G: 2 INJECTION, SOLUTION INTRAVENOUS at 05:07

## 2024-03-16 RX ADMIN — LEVOTHYROXINE SODIUM 88 MCG: 0.09 TABLET ORAL at 06:49

## 2024-03-16 ASSESSMENT — ACTIVITIES OF DAILY LIVING (ADL)
ADLS_ACUITY_SCORE: 34

## 2024-03-16 NOTE — PLAN OF CARE
"Pt A&O x4. Wound vac in place. Up w/ Ax1, using gait belt, and walker. Voiding. Tolerating regular diet. Plan is TCU @ discharge.   Problem: Adult Inpatient Plan of Care  Goal: Plan of Care Review  Description: The Plan of Care Review/Shift note should be completed every shift.  The Outcome Evaluation is a brief statement about your assessment that the patient is improving, declining, or no change.  This information will be displayed automatically on your shift  note.  Outcome: Progressing  Flowsheets (Taken 3/16/2024 1707)  Plan of Care Reviewed With:   patient   family  Overall Patient Progress: improving  Goal: Patient-Specific Goal (Individualized)  Description: You can add care plan individualizations to a care plan. Examples of Individualization might be:  \"Parent requests to be called daily at 9am for status\", \"I have a hard time hearing out of my right ear\", or \"Do not touch me to wake me up as it startles  me\".  Outcome: Progressing  Goal: Absence of Hospital-Acquired Illness or Injury  Outcome: Progressing  Intervention: Identify and Manage Fall Risk  Recent Flowsheet Documentation  Taken 3/16/2024 0947 by Yen Oneal RN  Safety Promotion/Fall Prevention:   activity supervised   assistive device/personal items within reach   nonskid shoes/slippers when out of bed  Intervention: Prevent Skin Injury  Recent Flowsheet Documentation  Taken 3/16/2024 0947 by Yen Oneal RN  Device Skin Pressure Protection: positioning supports utilized  Intervention: Prevent and Manage VTE (Venous Thromboembolism) Risk  Recent Flowsheet Documentation  Taken 3/16/2024 0947 by Yen Oneal RN  VTE Prevention/Management: SCDs (sequential compression devices) on  Intervention: Prevent Infection  Recent Flowsheet Documentation  Taken 3/16/2024 0947 by Yen Oneal RN  Infection Prevention: rest/sleep promoted  Goal: Optimal Comfort and Wellbeing  Outcome: Progressing  Intervention: Monitor Pain and Promote " Comfort  Recent Flowsheet Documentation  Taken 3/16/2024 0946 by Yen Oneal RN  Pain Management Interventions: declines  Goal: Readiness for Transition of Care  Outcome: Progressing     Problem: Infection  Goal: Absence of Infection Signs and Symptoms  Outcome: Progressing     Problem: Glycemic Control Impaired  Goal: Blood Glucose Level Within Targeted Range  Outcome: Progressing  Intervention: Optimize Glycemic Control  Recent Flowsheet Documentation  Taken 3/16/2024 0947 by Yen Oneal RN  Hyperglycemia Management: blood glucose monitored  Goal: Minimize Risk of Hypoglycemia  Outcome: Progressing  Intervention: Management and Risk Reduction of Hypoglycemia  Recent Flowsheet Documentation  Taken 3/16/2024 0947 by Yen Oneal RN  Hypoglycemia Management: blood glucose monitored     Problem: Fall Injury Risk  Goal: Absence of Fall and Fall-Related Injury  Outcome: Progressing  Intervention: Identify and Manage Contributors  Recent Flowsheet Documentation  Taken 3/16/2024 0947 by Yen Oneal RN  Medication Review/Management: medications reviewed  Intervention: Promote Injury-Free Environment  Recent Flowsheet Documentation  Taken 3/16/2024 0947 by Yen Oneal RN  Safety Promotion/Fall Prevention:   activity supervised   assistive device/personal items within reach   nonskid shoes/slippers when out of bed   Goal Outcome Evaluation:      Plan of Care Reviewed With: patient, family    Overall Patient Progress: improvingOverall Patient Progress: improving

## 2024-03-16 NOTE — PLAN OF CARE
"  Problem: Adult Inpatient Plan of Care  Goal: Plan of Care Review  Description: The Plan of Care Review/Shift note should be completed every shift.  The Outcome Evaluation is a brief statement about your assessment that the patient is improving, declining, or no change.  This information will be displayed automatically on your shift  note.  Outcome: Progressing  Flowsheets (Taken 3/15/2024 1902)  Plan of Care Reviewed With:   patient   family  Overall Patient Progress: improving  Goal: Patient-Specific Goal (Individualized)  Description: You can add care plan individualizations to a care plan. Examples of Individualization might be:  \"Parent requests to be called daily at 9am for status\", \"I have a hard time hearing out of my right ear\", or \"Do not touch me to wake me up as it startles  me\".  Outcome: Progressing  Goal: Absence of Hospital-Acquired Illness or Injury  Outcome: Progressing  Intervention: Identify and Manage Fall Risk  Recent Flowsheet Documentation  Taken 3/15/2024 0845 by Yen Oneal RN  Safety Promotion/Fall Prevention:   assistive device/personal items within reach   activity supervised   nonskid shoes/slippers when out of bed   safety round/check completed  Intervention: Prevent Skin Injury  Recent Flowsheet Documentation  Taken 3/15/2024 0845 by Yen Oneal RN  Device Skin Pressure Protection: positioning supports utilized  Intervention: Prevent Infection  Recent Flowsheet Documentation  Taken 3/15/2024 0845 by Yen Oneal RN  Infection Prevention: rest/sleep promoted  Goal: Optimal Comfort and Wellbeing  Outcome: Progressing  Intervention: Monitor Pain and Promote Comfort  Recent Flowsheet Documentation  Taken 3/15/2024 1214 by Yen Oneal RN  Pain Management Interventions: medication (see MAR)  Taken 3/15/2024 0959 by Yen Oneal RN  Pain Management Interventions: medication (see MAR)  Taken 3/15/2024 0844 by Yen Oneal RN  Pain Management Interventions: " (paged about switching pain meds per patient request) MD notified (comment)  Goal: Readiness for Transition of Care  Outcome: Progressing     Problem: Infection  Goal: Absence of Infection Signs and Symptoms  Outcome: Progressing  Intervention: Prevent or Manage Infection  Recent Flowsheet Documentation  Taken 3/15/2024 0845 by Yen Oneal RN  Infection Management: aseptic technique maintained   Pt A&O x4. PO NORCO given for pain. Wound vac dressing changed by WOC today-CDI. Up w/ Ax1, using gait belt, and walker. Voiding. Tolerating regular diet. Plan is TCU @ discharge.     Problem: Glycemic Control Impaired  Goal: Blood Glucose Level Within Targeted Range  Outcome: Progressing  Intervention: Optimize Glycemic Control  Recent Flowsheet Documentation  Taken 3/15/2024 0845 by Yen Oneal RN  Hyperglycemia Management: blood glucose monitored  Goal: Minimize Risk of Hypoglycemia  Outcome: Progressing  Intervention: Management and Risk Reduction of Hypoglycemia  Recent Flowsheet Documentation  Taken 3/15/2024 0845 by Yen Oneal RN  Hypoglycemia Management: blood glucose monitored     Problem: Fall Injury Risk  Goal: Absence of Fall and Fall-Related Injury  Outcome: Progressing  Intervention: Identify and Manage Contributors  Recent Flowsheet Documentation  Taken 3/15/2024 0845 by Yen Oneal RN  Medication Review/Management: medications reviewed  Intervention: Promote Injury-Free Environment  Recent Flowsheet Documentation  Taken 3/15/2024 0845 by Yen Oneal RN  Safety Promotion/Fall Prevention:   assistive device/personal items within reach   activity supervised   nonskid shoes/slippers when out of bed   safety round/check completed   Goal Outcome Evaluation:      Plan of Care Reviewed With: patient, family    Overall Patient Progress: improvingOverall Patient Progress: improving

## 2024-03-16 NOTE — PROGRESS NOTES
Vitals stable.  Afebrile.  On IV ancef.   Had wound vac dressing changed.  Looks much better.  Awaiting TCU transfer.

## 2024-03-16 NOTE — PROGRESS NOTES
[facility]  Infectious Disease Progress Note          Assessment and Plan:   Date of Admission:  3/12/2024  Date of Consult (When I saw the patient): 03/13/24        Assessment & Plan  Lj Castro is a 78 year old who was admitted on 3/12/2024.      Impression: 1 78-year-old female, acute sepsis and neck pain where she has recent hardware surgical repair, has an obvious deep infection to the hardware and also sepsis with Staph aureus bacteremia  2 Staph aureus bacteremia, sensitive by gene testing, no other obvious secondary involved sites does have left total shoulder  3 diabetes mellitus  4 infectious encephalopathy  5 atrial fibrillation  Op culture growing Staph aureus as well    REC 1 blood cultures  define the infection, continue  Ancef  2 will need prolonged IV antibiotics but hold on long line, neck central line has been removed, not ready for PICC line placement yet as still positive blood culture  3 serial daily blood cultures and follow closely for any other secondary involved sites, clinically better already and first follow-up cultures March 13 1 of 2 positive, March 14 negative and March 15 pending   4 hardware in place , so probably rifampin addition at some point possibly before discharge  Investigate disposition options but it would not discharge until blood cultures negative for at least 3 days, i.e. at the earliest on  Monday      family updated bedside         Interval History:     Afebrile   No new complaints   Cultures from 3/ 13 are positive              Medications:      atorvastatin  40 mg Oral Daily    ceFAZolin  2 g Intravenous Q8H    gabapentin  600 mg Oral BID    [Held by provider] hydrochlorothiazide  25 mg Oral Daily    insulin aspart   Subcutaneous TID w/meals    insulin aspart  1-7 Units Subcutaneous TID AC    insulin aspart  1-5 Units Subcutaneous At Bedtime    insulin glargine  15 Units Subcutaneous QAM AC    latanoprost  1 drop Both Eyes At Bedtime     "levothyroxine  88 mcg Oral QAM AC    [Held by provider] losartan  50 mg Oral Daily    metoprolol succinate ER  100 mg Oral BID    pantoprazole  40 mg Oral BID    polyethylene glycol  17 g Oral Daily    prednisoLONE acetate  1 drop Left Eye Daily    senna-docusate  1 tablet Oral BID    sodium chloride (PF)  3 mL Intracatheter Q8H    timolol maleate  1 drop Both Eyes BID                  Physical Exam:   Blood pressure (!) 145/65, pulse 93, temperature 97.9  F (36.6  C), temperature source Temporal, resp. rate 15, height 1.575 m (5' 2\"), weight 92.3 kg (203 lb 7.8 oz), SpO2 97%.  Wt Readings from Last 2 Encounters:   03/16/24 92.3 kg (203 lb 7.8 oz)   11/06/23 81.7 kg (180 lb 1.6 oz)     Vital Signs with Ranges  Temp:  [97.3  F (36.3  C)-97.9  F (36.6  C)] 97.9  F (36.6  C)  Pulse:  [66-93] 93  Resp:  [14-16] 15  BP: (124-147)/(65-87) 145/65  SpO2:  [95 %-98 %] 97 %    Constitutional: Awake, alert, cooperative, no apparent distress mentation okay, not septic looking     Lungs: Clear to auscultation bilaterally, no crackles or wheezing   Cardiovascular: Regular rate and rhythm, normal S1 and S2, and no murmur noted   Abdomen: Normal bowel sounds, soft, non-distended, non-tender   Skin: No rashes, no cyanosis, no edema   Other: Spine wound seen and looks quite clean, she is very tender in the tissues around it but no obvious abscess          Data:   All microbiology laboratory data reviewed.  Recent Labs   Lab Test 03/16/24  0616 03/15/24  0800 03/14/24  0510   WBC 9.2 7.8 7.3   HGB 10.5* 9.6* 8.2*   HCT 34.1* 30.7* 26.0*   MCV 97 97 95    237 220     Recent Labs   Lab Test 03/16/24  0616 03/15/24  0800 03/14/24  0510   CR 0.91 0.94 1.02*     Recent Labs   Lab Test 03/12/24  0855   SED 72*     No lab results found.    Invalid input(s): \"UC\"     "

## 2024-03-16 NOTE — PROGRESS NOTES
Care Management Follow Up    Length of Stay (days): 4    Expected Discharge Date: 03/19/2024     Concerns to be Addressed: discharge planning     Patient plan of care discussed at interdisciplinary rounds: Yes    Anticipated Discharge Disposition: Skilled Nursing Facility, Transitional Care     Anticipated Discharge Services:    Anticipated Discharge DME:      Patient/family educated on Medicare website which has current facility and service quality ratings: yes  Education Provided on the Discharge Plan:    Patient/Family in Agreement with the Plan: yes    Referrals Placed by CM/SW: Post Acute Facilities  Private pay costs discussed: private room/amenity fees    Additional Information:  Pt requested a private room at [facility]. Pt only wants [facility] for placement. Sw sent referral. May need additional referrals.    Update: [facility] doesn't have a private room available anytime soon.  SRINI informed pt and dtr. Dtr agreed to [facility], they declined. SRINI obtained additional referrals from pt, son and spouse.  SW will send [facility] and [facility] when Ivabx medication is determined.      BENITA Washington, [facility]  Inpatient Care Coordination  [facility]  [phone]

## 2024-03-16 NOTE — PLAN OF CARE
"Goal Outcome Evaluation:      Plan of Care Reviewed With: patient    Overall Patient Progress: improvingOverall Patient Progress: improving     Pt A/O x4. VSS. PIV SL. Pain managed with PRN Norco and 1 dose of IV dilaudid. Assist x1 with walker and gait belt. Voiding well. Tolerating a regular diet well Dressing CDI. Woundvac in place. CMS at baseline. Plan is to discharge to TCU.    Problem: Adult Inpatient Plan of Care  Goal: Plan of Care Review  Description: The Plan of Care Review/Shift note should be completed every shift.  The Outcome Evaluation is a brief statement about your assessment that the patient is improving, declining, or no change.  This information will be displayed automatically on your shift  note.  Outcome: Progressing  Flowsheets (Taken 3/16/2024 0404)  Plan of Care Reviewed With: patient  Overall Patient Progress: improving  Goal: Patient-Specific Goal (Individualized)  Description: You can add care plan individualizations to a care plan. Examples of Individualization might be:  \"Parent requests to be called daily at 9am for status\", \"I have a hard time hearing out of my right ear\", or \"Do not touch me to wake me up as it startles  me\".  Outcome: Progressing  Goal: Absence of Hospital-Acquired Illness or Injury  Outcome: Progressing  Intervention: Prevent Skin Injury  Recent Flowsheet Documentation  Taken 3/15/2024 2115 by Yen Marie RN  Body Position: supine, head elevated  Goal: Optimal Comfort and Wellbeing  Outcome: Progressing  Goal: Readiness for Transition of Care  Outcome: Progressing     Problem: Infection  Goal: Absence of Infection Signs and Symptoms  Outcome: Progressing     Problem: Glycemic Control Impaired  Goal: Blood Glucose Level Within Targeted Range  Outcome: Progressing  Goal: Minimize Risk of Hypoglycemia  Outcome: Progressing     Problem: Fall Injury Risk  Goal: Absence of Fall and Fall-Related Injury  Outcome: Progressing         "

## 2024-03-16 NOTE — PROGRESS NOTES
[facility]    Medicine Progress Note - Hospitalist Service    Date of Admission:  3/12/2024    Assessment & Plan   Lj Castro is a 78 year old female admitted on 3/12/2024. 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     CT scan of the neck showed a deep space infection with gas formation and possible cord compression.  She was given Kcentra and taken to the operating room.  She underwent I&D with wound VAC application along with IntraOp culture and IV antibiotics.  Operative course was significant for hypotension requiring central line and pressor support and so she was admitted to the ICU.      She has improved day by day.  Has not required pressors for over 24 hours and so central line was removed.  Pain has been controlled by tylenol.  She is eating and drinking.      Severe sepsis 2/2 staph aureus bacteremia 2/2 deep space infection in the posterior cervical spine down to the hardware:  S/p I&D with purulent material -> positive for staph aureus. Blood cultures also positive for staph aureus x3 (3/12 x2 & 3/13 x1).  -ID consultation, appreciate recs  -Will continue IV ancef  -Daily blood cultures until negative x48 hours. Will not discharge until BC negative for at least 3 days   -Now off IVF  -PT/OT  -WOC following for wound vac management     Type 2 diabetes:  Initially placed on insulin gtt in the perioperative setting. Now on subcutaneous insulin.  -Increase to Lantus 18 units day today  -Continue carb coverage 1 unit per 15 gm CHO  -MDSSI  -Will monitor  "blood sugar and adjust insulin dose as needed      A-fib with rapid ventricular rate, resolved:  Most recent echocardiogram in January 2023 shows a normal EF.  -Monitor on telemetry  -Hold Eliquis, will need resumption of this once appropriate per surgery     Acute encephalopathy, improved:  Likely due to sepsis. Seems quite appropriate in the post-operative period. Now fully oriented.   -Monitor    Diet: Advance Diet as Tolerated: Regular Diet Adult    DVT Prophylaxis: Pneumatic Compression Devices  Winston Catheter: Not present  Lines: None       Cardiac Monitoring: ACTIVE order. Indication: History of Afib  Code Status: Full Code      Clinically Significant Risk Factors              # Hypoalbuminemia: Lowest albumin = 2.8 g/dL at 3/13/2024  3:16 AM, will monitor as appropriate            # Obesity: Estimated body mass index is 37.22 kg/m  as calculated from the following:    Height as of this encounter: 1.575 m (5' 2\").    Weight as of this encounter: 92.3 kg (203 lb 7.8 oz)., PRESENT ON ADMISSION            Disposition Plan      Expected Discharge Date: 03/19/2024      Destination: inpatient rehabilitation facility          Jean Buchanan MD, MD  Hospitalist Service  [facility]  Securely message with [employer] (more info)  Text page via [employer] Paging/Directory   ______________________________________________________________________    Interval History   Patient seen and examined. Chart reviewed. She stated that she is feeling ok. She has neck pain. Denies fever. She has no nausea or vomiting. She denies fever    Physical Exam   Vital Signs: Temp: 97.9  F (36.6  C) Temp src: Temporal BP: (!) 145/65 Pulse: 93   Resp: 15 SpO2: 97 % O2 Device: None (Room air)    Weight: 203 lbs 7.75 oz    General Appearance: Awake, alert.  Oriented fully.  No apparent distress.  Respiratory: Lungs are clear to auscultation bilaterally.  Work of breathing is normal on room air.  Cardiovascular: Irregular " rate and rhythm, good rate control.  No obvious murmurs rubs or gallops.  No peripheral edema.  GI: Benign.  Soft.  No tenderness palpation or bowel sounds are active.    Skin: No obvious rash or lesions exposed skin.  The patient has a wound VAC placed on her posterior neck.  Other: Appropriate     Medical Decision Making       50 MINUTES SPENT BY ME on the date of service doing chart review, history, exam, documentation & further activities per the note.      Data   ------------------------- PAST 24 HR DATA REVIEWED -----------------------------------------------    I have personally reviewed the following data over the past 24 hrs:    9.2  \   10.5 (L)   / 287     133 (L) 99 17.8 /  237 (H)   4.0 21 (L) 0.91 \       Imaging results reviewed over the past 24 hrs:   No results found for this or any previous visit (from the past 24 hour(s)).

## 2024-03-17 ENCOUNTER — APPOINTMENT (OUTPATIENT)
Dept: PHYSICAL THERAPY | Facility: CLINIC | Age: 79
DRG: 500 | End: 2024-03-17
Payer: COMMERCIAL

## 2024-03-17 ENCOUNTER — APPOINTMENT (OUTPATIENT)
Dept: OCCUPATIONAL THERAPY | Facility: CLINIC | Age: 79
DRG: 500 | End: 2024-03-17
Payer: COMMERCIAL

## 2024-03-17 ENCOUNTER — APPOINTMENT (OUTPATIENT)
Dept: GENERAL RADIOLOGY | Facility: CLINIC | Age: 79
DRG: 500 | End: 2024-03-17
Attending: INTERNAL MEDICINE
Payer: COMMERCIAL

## 2024-03-17 LAB
BACTERIA BLD CULT: ABNORMAL
BACTERIA BLD CULT: ABNORMAL
CREAT SERPL-MCNC: 0.98 MG/DL (ref 0.51–0.95)
EGFRCR SERPLBLD CKD-EPI 2021: 58 ML/MIN/1.73M2
GLUCOSE BLDC GLUCOMTR-MCNC: 166 MG/DL (ref 70–99)
GLUCOSE BLDC GLUCOMTR-MCNC: 170 MG/DL (ref 70–99)
GLUCOSE BLDC GLUCOMTR-MCNC: 188 MG/DL (ref 70–99)
GLUCOSE BLDC GLUCOMTR-MCNC: 215 MG/DL (ref 70–99)
GLUCOSE BLDC GLUCOMTR-MCNC: 218 MG/DL (ref 70–99)
HOLD SPECIMEN: NORMAL

## 2024-03-17 PROCEDURE — 36569 INSJ PICC 5 YR+ W/O IMAGING: CPT

## 2024-03-17 PROCEDURE — 36415 COLL VENOUS BLD VENIPUNCTURE: CPT | Performed by: STUDENT IN AN ORGANIZED HEALTH CARE EDUCATION/TRAINING PROGRAM

## 2024-03-17 PROCEDURE — 250N000013 HC RX MED GY IP 250 OP 250 PS 637: Performed by: STUDENT IN AN ORGANIZED HEALTH CARE EDUCATION/TRAINING PROGRAM

## 2024-03-17 PROCEDURE — 999N000065 XR CHEST PORT 1 VIEW

## 2024-03-17 PROCEDURE — 272N000579 HC TRAY POWER PICC SOLO 4FR SINGLE LUMEN

## 2024-03-17 PROCEDURE — 82565 ASSAY OF CREATININE: CPT | Performed by: STUDENT IN AN ORGANIZED HEALTH CARE EDUCATION/TRAINING PROGRAM

## 2024-03-17 PROCEDURE — 120N000001 HC R&B MED SURG/OB

## 2024-03-17 PROCEDURE — 97116 GAIT TRAINING THERAPY: CPT | Mod: GP | Performed by: PHYSICAL THERAPIST

## 2024-03-17 PROCEDURE — 97535 SELF CARE MNGMENT TRAINING: CPT | Mod: GO

## 2024-03-17 PROCEDURE — 250N000013 HC RX MED GY IP 250 OP 250 PS 637: Performed by: ORTHOPAEDIC SURGERY

## 2024-03-17 PROCEDURE — 99233 SBSQ HOSP IP/OBS HIGH 50: CPT | Performed by: INTERNAL MEDICINE

## 2024-03-17 PROCEDURE — 250N000011 HC RX IP 250 OP 636: Performed by: INTERNAL MEDICINE

## 2024-03-17 RX ORDER — CYCLOBENZAPRINE HCL 5 MG
5 TABLET ORAL 3 TIMES DAILY PRN
Qty: 56 TABLET | Refills: 0 | Status: ON HOLD | OUTPATIENT
Start: 2024-03-17 | End: 2024-04-24

## 2024-03-17 RX ORDER — LIDOCAINE 40 MG/G
CREAM TOPICAL
Status: ACTIVE | OUTPATIENT
Start: 2024-03-17 | End: 2024-03-20

## 2024-03-17 RX ORDER — CYCLOBENZAPRINE HCL 5 MG
5 TABLET ORAL EVERY 8 HOURS PRN
Status: DISCONTINUED | OUTPATIENT
Start: 2024-03-17 | End: 2024-03-20 | Stop reason: HOSPADM

## 2024-03-17 RX ORDER — HYDROCODONE BITARTRATE AND ACETAMINOPHEN 5; 325 MG/1; MG/1
1.5 TABLET ORAL EVERY 4 HOURS PRN
Qty: 40 TABLET | Refills: 0 | Status: ON HOLD | OUTPATIENT
Start: 2024-03-17 | End: 2024-04-24

## 2024-03-17 RX ADMIN — HYDROCODONE BITARTRATE AND ACETAMINOPHEN 1 TABLET: 7.5; 325 TABLET ORAL at 20:45

## 2024-03-17 RX ADMIN — PANTOPRAZOLE SODIUM 40 MG: 40 TABLET, DELAYED RELEASE ORAL at 08:53

## 2024-03-17 RX ADMIN — METOPROLOL SUCCINATE 100 MG: 100 TABLET, EXTENDED RELEASE ORAL at 08:53

## 2024-03-17 RX ADMIN — CEFAZOLIN SODIUM 2 G: 2 INJECTION, SOLUTION INTRAVENOUS at 20:47

## 2024-03-17 RX ADMIN — LEVOTHYROXINE SODIUM 88 MCG: 0.09 TABLET ORAL at 06:59

## 2024-03-17 RX ADMIN — GABAPENTIN 600 MG: 300 CAPSULE ORAL at 08:53

## 2024-03-17 RX ADMIN — ATORVASTATIN CALCIUM 40 MG: 40 TABLET, FILM COATED ORAL at 08:53

## 2024-03-17 RX ADMIN — METOPROLOL SUCCINATE 100 MG: 100 TABLET, EXTENDED RELEASE ORAL at 20:45

## 2024-03-17 RX ADMIN — HYDROCODONE BITARTRATE AND ACETAMINOPHEN 1 TABLET: 7.5; 325 TABLET ORAL at 13:39

## 2024-03-17 RX ADMIN — GABAPENTIN 600 MG: 300 CAPSULE ORAL at 20:45

## 2024-03-17 RX ADMIN — TIMOLOL MALEATE 1 DROP: 5 SOLUTION/ DROPS OPHTHALMIC at 20:46

## 2024-03-17 RX ADMIN — TIMOLOL MALEATE 1 DROP: 5 SOLUTION/ DROPS OPHTHALMIC at 08:54

## 2024-03-17 RX ADMIN — LATANOPROST 1 DROP: 50 SOLUTION/ DROPS OPHTHALMIC at 22:17

## 2024-03-17 RX ADMIN — CEFAZOLIN SODIUM 2 G: 2 INJECTION, SOLUTION INTRAVENOUS at 04:42

## 2024-03-17 RX ADMIN — CEFAZOLIN SODIUM 2 G: 2 INJECTION, SOLUTION INTRAVENOUS at 13:24

## 2024-03-17 RX ADMIN — HYDROCODONE BITARTRATE AND ACETAMINOPHEN 1 TABLET: 7.5; 325 TABLET ORAL at 04:34

## 2024-03-17 RX ADMIN — INSULIN ASPART 1 UNITS: 100 INJECTION, SOLUTION INTRAVENOUS; SUBCUTANEOUS at 13:33

## 2024-03-17 RX ADMIN — PANTOPRAZOLE SODIUM 40 MG: 40 TABLET, DELAYED RELEASE ORAL at 20:45

## 2024-03-17 RX ADMIN — PREDNISOLONE ACETATE 1 DROP: 10 SUSPENSION/ DROPS OPHTHALMIC at 08:54

## 2024-03-17 RX ADMIN — INSULIN ASPART 1 UNITS: 100 INJECTION, SOLUTION INTRAVENOUS; SUBCUTANEOUS at 18:35

## 2024-03-17 ASSESSMENT — ACTIVITIES OF DAILY LIVING (ADL)
ADLS_ACUITY_SCORE: 34

## 2024-03-17 NOTE — PROGRESS NOTES
[facility]  Hospitalist Progress Note  Rudy Sarmiento MD 03/17/2024    Reason for Stay (Diagnosis): sepsis due to staph aureus c-spine infection and bacteremia          Assessment and Plan:      Summary of Stay: Lj Castro is a 78 year old female admitted on 3/12/2024. 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      CT scan of the neck showed a deep space infection with gas formation and possible cord compression.  She was given Kcentra and taken to the operating room.  She underwent I&D with wound VAC application along with IntraOp culture and IV antibiotics.  Operative course was significant for hypotension requiring central line and pressor support and so she was admitted to the ICU.       She has improved day by day.  Has not required pressors for over 24 hours and so central line was removed.  Pain has been controlled by tylenol.  She is eating and drinking.     Plans today:  -Patient lost peripheral IV access today.  I discussed with infection disease and okay for PICC line placement today as follow-up blood cultures from 3/14 and 3/15 both remain no growth to date  - increase Lantus to 22 units daily (received 18 units this AM; administer another 4 units now)     Severe sepsis 2/2 MSSA bacteremia due to deep space infection in the posterior cervical spine:  - S/p I&D of c-spine in the setting of recent c-spine surgery with purulent material found; operative cx positive for MSSA.   - Blood cultures also positive for MSSA x3 (3/12 x2 & 3/13 x1).  - follow up  "blood cx 3/14 and 3/15 NGTD  -ID consultation, appreciate recs; will need several week IV antibiotic course on discharge  -WOC following for wound vac management  -I spoke with infection disease today.  Okay for PICC line placement today.     Type 2 diabetes:  Initially placed on insulin gtt in the perioperative setting. Now on subcutaneous insulin.  -increase Lantus to 22 units daily  -Continue carb coverage 1 unit per 15 gm CHO  -MDSSI  -Will monitor blood sugar and adjust insulin dose as needed      A-fib with rapid ventricular rate, resolved:  Most recent echocardiogram in January 2023 shows a normal EF.  -Monitor on telemetry  -Holding Eliquis, will need resumption of this when deemed safe per neurosurgery     Acute encephalopathy, resolved:  Likely due to sepsis.      Diet: Advance Diet as Tolerated: Regular Diet Adult    DVT Prophylaxis: Pneumatic Compression Devices  Winston Catheter: Not present  Lines: None       Cardiac Monitoring: ACTIVE order. Indication: History of Afib  Code Status: Full Code             Clinically Significant Risk Factors               # Hypoalbuminemia: Lowest albumin = 2.8 g/dL at 3/13/2024  3:16 AM, will monitor as appropriate            # Obesity: Estimated body mass index is 37.22 kg/m  as calculated from the following:    Height as of this encounter: 1.575 m (5' 2\").    Weight as of this encounter: 92.3 kg (203 lb 7.8 oz)., PRESENT ON ADMISSION                   Disposition Plan     Destination: TCU anticipated , likely within 2-3 days after PICC placed and assuming repeat blood cx remain neg            Interval History (Subjective):      No complaints or concerns today beyond anticipated pain at operative site and neck.  Notified by nursing that patient lost peripheral IV access.  I contacted infection disease consultant, and they are comfortable with the patient receiving a PICC line today.  Repeat blood cultures 3/14 and 3/15 reviewed and remain no growth to date               " "   Physical Exam:      Last Vital Signs:  BP (!) 149/68 (BP Location: Left arm)   Pulse 72   Temp 97.5  F (36.4  C) (Temporal)   Resp 15   Ht 1.575 m (5' 2\")   Wt 91.4 kg (201 lb 9.6 oz)   SpO2 99%   BMI 36.87 kg/m        Intake/Output Summary (Last 24 hours) at 3/17/2024 1150  Last data filed at 3/17/2024 0700  Gross per 24 hour   Intake 720 ml   Output 1110 ml   Net -390 ml       Constitutional: Awake, alert, cooperative, no apparent distress     Respiratory: Clear to auscultation bilaterally, no crackles or wheezing   Cardiovascular: Regular rate and rhythm, normal S1 and S2, and no murmur noted   Abdomen: Normal bowel sounds, soft, non-distended, non-tender   Skin: No rashes, no cyanosis, dry to touch   Neuro: Alert and oriented x3, no weakness, numbness, memory loss   Extremities: No edema, normal range of motion   Other(s):        All other systems: Negative          Medications:      All current medications were reviewed with changes reflected in problem list.         Data:      All new lab and imaging data was reviewed.   Labs:       Lab Results   Component Value Date     03/16/2024     03/15/2024     03/14/2024     03/20/2015     03/19/2015     03/18/2015    Lab Results   Component Value Date    CHLORIDE 99 03/16/2024    CHLORIDE 101 03/15/2024    CHLORIDE 104 03/14/2024    CHLORIDE 110 03/20/2015    CHLORIDE 107 03/19/2015    CHLORIDE 109 03/18/2015    Lab Results   Component Value Date    BUN 17.8 03/16/2024    BUN 20.6 03/15/2024    BUN 26.2 03/14/2024    BUN 13 03/20/2015    BUN 17 03/19/2015    BUN 25 03/18/2015      Lab Results   Component Value Date    POTASSIUM 4.0 03/16/2024    POTASSIUM 3.9 03/15/2024    POTASSIUM 3.7 03/14/2024    POTASSIUM 4.1 03/20/2015    POTASSIUM 4.6 03/19/2015    POTASSIUM 4.4 03/18/2015    Lab Results   Component Value Date    CO2 21 03/16/2024    CO2 23 03/15/2024    CO2 23 03/14/2024    CO2 27 03/20/2015    CO2 27 03/19/2015    " CO2 28 03/18/2015    Lab Results   Component Value Date    CR 0.98 03/17/2024    CR 0.91 03/16/2024    CR 0.94 03/15/2024    CR 0.96 03/20/2015    CR 1.02 03/19/2015    CR 0.93 03/18/2015        Recent Labs   Lab 03/16/24  0616   WBC 9.2   HGB 10.5*   HCT 34.1*   MCV 97         Imaging:   No results found for this or any previous visit (from the past 24 hour(s)).

## 2024-03-17 NOTE — PROGRESS NOTES
DAILY PROGRESS NOTE    Lj Castro is a 78 year old old female admitted on 3/12/2024  8:39 AM.    Subjective  Sheron is a 78 year old presenting POD5 from a irrigation and debridement and wound vac application to posterior cervical sine on 3/12/24 with Dr. Cadena.  Today she states she is doing very well. The majority of her pain is located at the incision site and denies any symptoms to her UE BETSY. She is in good spirits and understands her post op expectations.   We discussed her discharge to a TCU.  She states she has been ambulating with a walker and 1 assist. She denies weakness.    Overall, Sheron is going very well and her pain as been managed with PRN Norco 7.5mg.   She is able to resume her Eliquis on 3/19 per Dr. Cadena.    Objective    AAOx3, Ambulating w/ walker x1 assist, sensation intact     Hemoglobin   Date Value Ref Range Status   03/16/2024 10.5 (L) 11.7 - 15.7 g/dL Final   03/20/2015 9.8 (L) 11.7 - 15.7 g/dL Final   ]      Impression / Plan  Awaiting TCU placement    Plan for today:    Patient doing well  Ambulate with help  Full diet   Continue PRN [place]  Awaiting for TCU discharge    Camelia Stern, PAC

## 2024-03-17 NOTE — PLAN OF CARE
"Pt A&O x4. PO NORCO given for pain. Wound vac in place. Up w/ Ax1, using gait belt, and walker. Voiding. Tolerating regular diet. PICC line placed today. Plan is TCU @ discharge.     Problem: Adult Inpatient Plan of Care  Goal: Plan of Care Review  Description: The Plan of Care Review/Shift note should be completed every shift.  The Outcome Evaluation is a brief statement about your assessment that the patient is improving, declining, or no change.  This information will be displayed automatically on your shift  note.  Outcome: Progressing  Flowsheets (Taken 3/17/2024 1742)  Plan of Care Reviewed With:   patient   family  Overall Patient Progress: improving  Goal: Patient-Specific Goal (Individualized)  Description: You can add care plan individualizations to a care plan. Examples of Individualization might be:  \"Parent requests to be called daily at 9am for status\", \"I have a hard time hearing out of my right ear\", or \"Do not touch me to wake me up as it startles  me\".  Outcome: Progressing  Goal: Absence of Hospital-Acquired Illness or Injury  Outcome: Progressing  Intervention: Identify and Manage Fall Risk  Recent Flowsheet Documentation  Taken 3/17/2024 0900 by Yen Oneal RN  Safety Promotion/Fall Prevention:   activity supervised   assistive device/personal items within reach   increase visualization of patient   nonskid shoes/slippers when out of bed   safety round/check completed  Intervention: Prevent Skin Injury  Recent Flowsheet Documentation  Taken 3/17/2024 0900 by Yen Oneal RN  Device Skin Pressure Protection: positioning supports utilized  Intervention: Prevent and Manage VTE (Venous Thromboembolism) Risk  Recent Flowsheet Documentation  Taken 3/17/2024 0900 by Yen Oneal RN  VTE Prevention/Management: SCDs (sequential compression devices) on  Intervention: Prevent Infection  Recent Flowsheet Documentation  Taken 3/17/2024 0900 by Yen Oneal RN  Infection Prevention: " rest/sleep promoted  Goal: Optimal Comfort and Wellbeing  Outcome: Progressing  Intervention: Monitor Pain and Promote Comfort  Recent Flowsheet Documentation  Taken 3/17/2024 1339 by Yen Oneal RN  Pain Management Interventions: medication (see MAR)  Taken 3/17/2024 0900 by Yen Oneal RN  Pain Management Interventions: (pt falling asleep) other (see comments)  Goal: Readiness for Transition of Care  Outcome: Progressing     Problem: Infection  Goal: Absence of Infection Signs and Symptoms  Outcome: Progressing  Intervention: Prevent or Manage Infection  Recent Flowsheet Documentation  Taken 3/17/2024 0900 by Yen Oneal RN  Infection Management: aseptic technique maintained     Problem: Glycemic Control Impaired  Goal: Blood Glucose Level Within Targeted Range  Outcome: Progressing  Intervention: Optimize Glycemic Control  Recent Flowsheet Documentation  Taken 3/17/2024 0900 by Yen Oneal RN  Hyperglycemia Management: blood glucose monitored  Goal: Minimize Risk of Hypoglycemia  Outcome: Progressing  Intervention: Management and Risk Reduction of Hypoglycemia  Recent Flowsheet Documentation  Taken 3/17/2024 0900 by Yen Oneal RN  Hypoglycemia Management: blood glucose monitored     Problem: Fall Injury Risk  Goal: Absence of Fall and Fall-Related Injury  Outcome: Progressing  Intervention: Identify and Manage Contributors  Recent Flowsheet Documentation  Taken 3/17/2024 0900 by Yen Oneal RN  Medication Review/Management: medications reviewed  Intervention: Promote Injury-Free Environment  Recent Flowsheet Documentation  Taken 3/17/2024 0900 by Yen Oneal RN  Safety Promotion/Fall Prevention:   activity supervised   assistive device/personal items within reach   increase visualization of patient   nonskid shoes/slippers when out of bed   safety round/check completed   Goal Outcome Evaluation:      Plan of Care Reviewed With: patient, family    Overall Patient  Progress: improvingOverall Patient Progress: improving

## 2024-03-17 NOTE — PLAN OF CARE
"Goal Outcome Evaluation:      Plan of Care Reviewed With: patient    Overall Patient Progress: improvingOverall Patient Progress: improving      Pt A/O x4. VSS. PIV SL. Pain managed with PRN [place]. Assist x1 with walker and gait belt. Voiding well. Tolerating a regular diet well. Hemovac in place CDI, to suction. CMS at baseline. Plan is to discharge to TCU.     Problem: Adult Inpatient Plan of Care  Goal: Plan of Care Review  Description: The Plan of Care Review/Shift note should be completed every shift.  The Outcome Evaluation is a brief statement about your assessment that the patient is improving, declining, or no change.  This information will be displayed automatically on your shift  note.  Outcome: Progressing  Flowsheets (Taken 3/17/2024 0452)  Plan of Care Reviewed With: patient  Overall Patient Progress: improving  Goal: Patient-Specific Goal (Individualized)  Description: You can add care plan individualizations to a care plan. Examples of Individualization might be:  \"Parent requests to be called daily at 9am for status\", \"I have a hard time hearing out of my right ear\", or \"Do not touch me to wake me up as it startles  me\".  Outcome: Progressing  Goal: Absence of Hospital-Acquired Illness or Injury  Outcome: Progressing  Intervention: Identify and Manage Fall Risk  Recent Flowsheet Documentation  Taken 3/16/2024 2102 by Yen Marie RN  Safety Promotion/Fall Prevention:   activity supervised   mobility aid in reach   lighting adjusted   nonskid shoes/slippers when out of bed   safety round/check completed  Intervention: Prevent Skin Injury  Recent Flowsheet Documentation  Taken 3/16/2024 2102 by Yen Marie RN  Body Position: supine, head elevated  Intervention: Prevent and Manage VTE (Venous Thromboembolism) Risk  Recent Flowsheet Documentation  Taken 3/16/2024 2102 by Yen Marie RN  VTE Prevention/Management: SCDs (sequential compression devices) on  Intervention: Prevent " Infection  Recent Flowsheet Documentation  Taken 3/16/2024 2102 by Yen Marie, RN  Infection Prevention:   rest/sleep promoted   single patient room provided  Goal: Optimal Comfort and Wellbeing  Outcome: Progressing  Intervention: Monitor Pain and Promote Comfort  Recent Flowsheet Documentation  Taken 3/16/2024 2102 by Yen Marie RN  Pain Management Interventions: declines  Intervention: Provide Person-Centered Care  Recent Flowsheet Documentation  Taken 3/16/2024 2102 by Yen Marie RN  Trust Relationship/Rapport: care explained  Goal: Readiness for Transition of Care  Outcome: Progressing     Problem: Infection  Goal: Absence of Infection Signs and Symptoms  Outcome: Progressing     Problem: Glycemic Control Impaired  Goal: Blood Glucose Level Within Targeted Range  Outcome: Progressing  Goal: Minimize Risk of Hypoglycemia  Outcome: Progressing     Problem: Fall Injury Risk  Goal: Absence of Fall and Fall-Related Injury  Outcome: Progressing  Intervention: Identify and Manage Contributors  Recent Flowsheet Documentation  Taken 3/16/2024 2102 by Yen Marie, RN  Medication Review/Management: medications reviewed  Intervention: Promote Injury-Free Environment  Recent Flowsheet Documentation  Taken 3/16/2024 2102 by Yen Marie, RN  Safety Promotion/Fall Prevention:   activity supervised   mobility aid in reach   lighting adjusted   nonskid shoes/slippers when out of bed   safety round/check completed

## 2024-03-17 NOTE — PROCEDURES
"[facility]    Single Lumen PICC Placement    Date/Time: 3/17/2024 12:15 PM    Performed by: Luly Jha RN  Authorized by: Rudy Sarmiento MD  Indications: vascular access      UNIVERSAL PROTOCOL   Site Marked: Yes  Prior Images Obtained and Reviewed:  Yes  Required items: Required blood products, implants, devices and special equipment available    Patient identity confirmed:  Verbally with patient, arm band, provided demographic data and hospital-assigned identification number  NA - No sedation, light sedation, or local anesthesia  Confirmation Checklist:  Patient's identity using two indicators, relevant allergies, procedure was appropriate and matched the consent or emergent situation and correct equipment/implants were available  Time out: Immediately prior to the procedure a time out was called    Universal Protocol: the Joint Commission Universal Protocol was followed    Preparation: Patient was prepped and draped in usual sterile fashion       ANESTHESIA    Anesthesia:  Local infiltration  Local Anesthetic:  Lidocaine 1% without epinephrine  Anesthetic Total (mL):  1      SEDATION    Patient Sedated: No        Preparation: skin prepped with ChloraPrep  Skin prep agent: skin prep agent completely dried prior to procedure  Sterile barriers: maximum sterile barriers were used: cap, mask, sterile gown, sterile gloves, and large sterile sheet  Hand hygiene: hand hygiene performed prior to central venous catheter insertion  Type of line used: PICC  Catheter type: single lumen  Lumen type: power PICC and valved  Catheter size: 4 Fr  Brand: Bard  Lot number: [id number]  Placement method: venipuncture, MST, ultrasound and tip navigation system  Number of attempts: 1  Difficulty threading catheter: no  Successful placement: yes  Orientation: right  Catheter to Vein (%): 16  Location: brachial vein (lateral)  Tip Location: SVC (\"low\")  Site rationale: left shoulder replacement  Arm " circumference: adults 10 cm  Extremity circumference: 31  Visible catheter length: 2  Total catheter length: 41  Dressing and securement: alcohol impregnated caps, blood cleaned with CHG, additional securement method (see comment), chlorhexidine disc applied, dressing applied, securement device, site cleansed, sterile dressing applied, subcutaneous anchor securement system and transparent dressing  Post procedure assessment: blood return through all ports, placement verified by x-ray and free fluid flow  PROCEDURE   Patient Tolerance:  Patient tolerated the procedure well with no immediate complications   RUE PICC line inserted into brachial vein without difficulty. Pt in atrial fibrillation during insertion so CXR completed to confirm PICC tip placement. PICC tip is confirmed to be in low svc. PICC OK TO USE. Bedside RN Yen updated. See flowsheet for additional details.  Disposal: sharps and needle count correct at the end of procedure, needles and guidewire disposed in sharps container

## 2024-03-18 ENCOUNTER — APPOINTMENT (OUTPATIENT)
Dept: OCCUPATIONAL THERAPY | Facility: CLINIC | Age: 79
DRG: 500 | End: 2024-03-18
Payer: COMMERCIAL

## 2024-03-18 LAB
CREAT SERPL-MCNC: 0.91 MG/DL (ref 0.51–0.95)
EGFRCR SERPLBLD CKD-EPI 2021: 64 ML/MIN/1.73M2
GLUCOSE BLDC GLUCOMTR-MCNC: 183 MG/DL (ref 70–99)
GLUCOSE BLDC GLUCOMTR-MCNC: 187 MG/DL (ref 70–99)
GLUCOSE BLDC GLUCOMTR-MCNC: 191 MG/DL (ref 70–99)
GLUCOSE BLDC GLUCOMTR-MCNC: 200 MG/DL (ref 70–99)
GLUCOSE BLDC GLUCOMTR-MCNC: 212 MG/DL (ref 70–99)

## 2024-03-18 PROCEDURE — 97535 SELF CARE MNGMENT TRAINING: CPT | Mod: GO

## 2024-03-18 PROCEDURE — 99232 SBSQ HOSP IP/OBS MODERATE 35: CPT | Performed by: INTERNAL MEDICINE

## 2024-03-18 PROCEDURE — 250N000013 HC RX MED GY IP 250 OP 250 PS 637: Performed by: ORTHOPAEDIC SURGERY

## 2024-03-18 PROCEDURE — 97605 NEG PRS WND THER DME<=50SQCM: CPT

## 2024-03-18 PROCEDURE — 250N000011 HC RX IP 250 OP 636: Performed by: INTERNAL MEDICINE

## 2024-03-18 PROCEDURE — 250N000011 HC RX IP 250 OP 636: Performed by: ORTHOPAEDIC SURGERY

## 2024-03-18 PROCEDURE — 82565 ASSAY OF CREATININE: CPT | Performed by: STUDENT IN AN ORGANIZED HEALTH CARE EDUCATION/TRAINING PROGRAM

## 2024-03-18 PROCEDURE — 250N000013 HC RX MED GY IP 250 OP 250 PS 637: Performed by: STUDENT IN AN ORGANIZED HEALTH CARE EDUCATION/TRAINING PROGRAM

## 2024-03-18 PROCEDURE — 120N000001 HC R&B MED SURG/OB

## 2024-03-18 PROCEDURE — 250N000013 HC RX MED GY IP 250 OP 250 PS 637

## 2024-03-18 RX ORDER — CEFAZOLIN SODIUM 1 G/3ML
2 INJECTION, POWDER, FOR SOLUTION INTRAMUSCULAR; INTRAVENOUS EVERY 8 HOURS
Qty: 1 ML | Refills: 1 | Status: ON HOLD | OUTPATIENT
Start: 2024-03-18 | End: 2024-04-24

## 2024-03-18 RX ADMIN — INSULIN ASPART 1 UNITS: 100 INJECTION, SOLUTION INTRAVENOUS; SUBCUTANEOUS at 10:20

## 2024-03-18 RX ADMIN — HYDROCODONE BITARTRATE AND ACETAMINOPHEN 1 TABLET: 7.5; 325 TABLET ORAL at 06:36

## 2024-03-18 RX ADMIN — GABAPENTIN 600 MG: 300 CAPSULE ORAL at 08:43

## 2024-03-18 RX ADMIN — INSULIN ASPART 2 UNITS: 100 INJECTION, SOLUTION INTRAVENOUS; SUBCUTANEOUS at 16:52

## 2024-03-18 RX ADMIN — ATORVASTATIN CALCIUM 40 MG: 40 TABLET, FILM COATED ORAL at 08:43

## 2024-03-18 RX ADMIN — TIMOLOL MALEATE 1 DROP: 5 SOLUTION/ DROPS OPHTHALMIC at 08:46

## 2024-03-18 RX ADMIN — CEFAZOLIN SODIUM 2 G: 2 INJECTION, SOLUTION INTRAVENOUS at 13:42

## 2024-03-18 RX ADMIN — METOPROLOL SUCCINATE 100 MG: 100 TABLET, EXTENDED RELEASE ORAL at 08:43

## 2024-03-18 RX ADMIN — LEVOTHYROXINE SODIUM 88 MCG: 0.09 TABLET ORAL at 06:36

## 2024-03-18 RX ADMIN — PANTOPRAZOLE SODIUM 40 MG: 40 TABLET, DELAYED RELEASE ORAL at 08:43

## 2024-03-18 RX ADMIN — TIMOLOL MALEATE 1 DROP: 5 SOLUTION/ DROPS OPHTHALMIC at 21:14

## 2024-03-18 RX ADMIN — METOPROLOL SUCCINATE 100 MG: 100 TABLET, EXTENDED RELEASE ORAL at 21:02

## 2024-03-18 RX ADMIN — CEFAZOLIN SODIUM 2 G: 2 INJECTION, SOLUTION INTRAVENOUS at 05:23

## 2024-03-18 RX ADMIN — CEFAZOLIN SODIUM 2 G: 2 INJECTION, SOLUTION INTRAVENOUS at 21:27

## 2024-03-18 RX ADMIN — PANTOPRAZOLE SODIUM 40 MG: 40 TABLET, DELAYED RELEASE ORAL at 21:02

## 2024-03-18 RX ADMIN — PREDNISOLONE ACETATE 1 DROP: 10 SUSPENSION/ DROPS OPHTHALMIC at 08:46

## 2024-03-18 RX ADMIN — SENNOSIDES AND DOCUSATE SODIUM 1 TABLET: 50; 8.6 TABLET ORAL at 21:02

## 2024-03-18 RX ADMIN — CYCLOBENZAPRINE HYDROCHLORIDE 5 MG: 5 TABLET, FILM COATED ORAL at 11:55

## 2024-03-18 RX ADMIN — GABAPENTIN 600 MG: 300 CAPSULE ORAL at 21:02

## 2024-03-18 RX ADMIN — HYDROMORPHONE HYDROCHLORIDE 0.2 MG: 0.2 INJECTION, SOLUTION INTRAMUSCULAR; INTRAVENOUS; SUBCUTANEOUS at 10:18

## 2024-03-18 RX ADMIN — LATANOPROST 1 DROP: 50 SOLUTION/ DROPS OPHTHALMIC at 22:16

## 2024-03-18 ASSESSMENT — ACTIVITIES OF DAILY LIVING (ADL)
ADLS_ACUITY_SCORE: 34
ADLS_ACUITY_SCORE: 36
ADLS_ACUITY_SCORE: 34

## 2024-03-18 NOTE — PLAN OF CARE
Problem: Adult Inpatient Plan of Care  Goal: Plan of Care Review  Description:  premedicated with PRN 0.2mg IV dilaudid for wound vac dressing change.  Dressing changed by WOC RN and is CDI.  PRN Flexeril x1. CMS intact except baseline numbness and tingling to lower extremities. with A1 gait belt and walker. Tolerating regular diet. BG corrected per order. Declined lunch, no sliding scale given. Voiding without any issues. On tele: A.fib rate 75    Outcome: Progressing  Flowsheets (Taken 3/18/2024 1231)  Plan of Care Reviewed With: patient  Goal: Patient-Specific Goal (Individualized)  Description:   Outcome: Progressing  Goal: Absence of Hospital-Acquired Illness or Injury  Outcome: Progressing  Intervention: Identify and Manage Fall Risk  Recent Flowsheet Documentation  Taken 3/18/2024 0825 by Annette Rivera RN  Safety Promotion/Fall Prevention: safety round/check completed  Intervention: Prevent Skin Injury  Recent Flowsheet Documentation  Taken 3/18/2024 0825 by Annette Rivera RN  Body Position: supine, head elevated  Intervention: Prevent and Manage VTE (Venous Thromboembolism) Risk  Recent Flowsheet Documentation  Taken 3/18/2024 0825 by Annette Rivera RN  VTE Prevention/Management: SCDs (sequential compression devices) on  Goal: Optimal Comfort and Wellbeing  Outcome: Progressing  Intervention: Monitor Pain and Promote Comfort  Recent Flowsheet Documentation  Taken 3/18/2024 0825 by Annette Rivera RN  Pain Management Interventions: medication (see MAR)  Intervention: Provide Person-Centered Care  Recent Flowsheet Documentation  Taken 3/18/2024 0825 by Annette Rivera RN  Trust Relationship/Rapport:   care explained   choices provided   emotional support provided   questions answered   reassurance provided   thoughts/feelings acknowledged  Goal: Readiness for Transition of Care  Outcome: Progressing     Problem: Infection  Goal: Absence of Infection Signs and Symptoms  Outcome: Progressing     Problem: Glycemic  Control Impaired  Goal: Blood Glucose Level Within Targeted Range  Outcome: Progressing  Intervention: Optimize Glycemic Control  Recent Flowsheet Documentation  Taken 3/18/2024 0825 by Annette Rivera RN  Hyperglycemia Management: blood glucose monitored  Goal: Minimize Risk of Hypoglycemia  Outcome: Progressing  Intervention: Management and Risk Reduction of Hypoglycemia  Recent Flowsheet Documentation  Taken 3/18/2024 0825 by Annette Rivera RN  Hypoglycemia Management: blood glucose monitored     Problem: Fall Injury Risk  Goal: Absence of Fall and Fall-Related Injury  Outcome: Progressing  Intervention: Identify and Manage Contributors  Recent Flowsheet Documentation  Taken 3/18/2024 0825 by Annette Rivera RN  Medication Review/Management: medications reviewed  Intervention: Promote Injury-Free Environment  Recent Flowsheet Documentation  Taken 3/18/2024 0825 by Annette Rivera RN  Safety Promotion/Fall Prevention: safety round/check completed   Goal Outcome Evaluation:      Plan of Care Reviewed With: patient

## 2024-03-18 NOTE — PLAN OF CARE
"Goal Outcome Evaluation:      Plan of Care Reviewed With: patient    Overall Patient Progress: improvingOverall Patient Progress: improving    Outcome Evaluation: Pt A/O x4. VSS. PICC SL. Pain managed with PRN [place]. Assist x1 with walker and gait belt. Voiding well. Tolerating a regular diet well. Dressing to posterior neck CDI. Woundvac in place, continues suction. Plan is TCU at discharge.      Problem: Adult Inpatient Plan of Care  Goal: Plan of Care Review  Description: The Plan of Care Review/Shift note should be completed every shift.  The Outcome Evaluation is a brief statement about your assessment that the patient is improving, declining, or no change.  This information will be displayed automatically on your shift  note.  Outcome: Progressing  Flowsheets (Taken 3/18/2024 0300)  Outcome Evaluation: Pt A/O x4. VSS. PICC SL. Pain managed with PRN [place]. Assist x1 with walker and gait belt. Voiding well. Tolerating a regular diet well. Dressing to posterior neck CDI. Woundvac in place, continues suction. Plan is TCU at discharge.  Plan of Care Reviewed With: patient  Overall Patient Progress: improving  Goal: Patient-Specific Goal (Individualized)  Description: You can add care plan individualizations to a care plan. Examples of Individualization might be:  \"Parent requests to be called daily at 9am for status\", \"I have a hard time hearing out of my right ear\", or \"Do not touch me to wake me up as it startles  me\".  Outcome: Progressing  Goal: Absence of Hospital-Acquired Illness or Injury  Outcome: Progressing  Intervention: Identify and Manage Fall Risk  Recent Flowsheet Documentation  Taken 3/17/2024 2045 by Yen Marie RN  Safety Promotion/Fall Prevention:   activity supervised   clutter free environment maintained   mobility aid in reach   lighting adjusted   nonskid shoes/slippers when out of bed   safety round/check completed  Intervention: Prevent Skin Injury  Recent Flowsheet " Documentation  Taken 3/17/2024 2045 by Yen Marie RN  Body Position: supine, head elevated  Intervention: Prevent and Manage VTE (Venous Thromboembolism) Risk  Recent Flowsheet Documentation  Taken 3/17/2024 2045 by Yen Marie RN  VTE Prevention/Management: SCDs (sequential compression devices) on  Intervention: Prevent Infection  Recent Flowsheet Documentation  Taken 3/17/2024 2045 by Yen Marie RN  Infection Prevention:   rest/sleep promoted   single patient room provided  Goal: Optimal Comfort and Wellbeing  Outcome: Progressing  Intervention: Monitor Pain and Promote Comfort  Recent Flowsheet Documentation  Taken 3/17/2024 2045 by Yen Marie RN  Pain Management Interventions: medication (see MAR)  Intervention: Provide Person-Centered Care  Recent Flowsheet Documentation  Taken 3/17/2024 2045 by Yen Marie RN  Trust Relationship/Rapport: care explained  Goal: Readiness for Transition of Care  Outcome: Progressing     Problem: Infection  Goal: Absence of Infection Signs and Symptoms  Outcome: Progressing     Problem: Glycemic Control Impaired  Goal: Blood Glucose Level Within Targeted Range  Outcome: Progressing  Goal: Minimize Risk of Hypoglycemia  Outcome: Progressing     Problem: Fall Injury Risk  Goal: Absence of Fall and Fall-Related Injury  Outcome: Progressing  Intervention: Identify and Manage Contributors  Recent Flowsheet Documentation  Taken 3/17/2024 2045 by Yen Marie RN  Medication Review/Management: medications reviewed  Intervention: Promote Injury-Free Environment  Recent Flowsheet Documentation  Taken 3/17/2024 2045 by Yen Marie RN  Safety Promotion/Fall Prevention:   activity supervised   clutter free environment maintained   mobility aid in reach   lighting adjusted   nonskid shoes/slippers when out of bed   safety round/check completed

## 2024-03-18 NOTE — PROGRESS NOTES
[facility]  Hospitalist Progress Note  Rudy Sarmiento MD 03/18/2024    Reason for Stay (Diagnosis): staph aureus c-spine infection, bacteremia         Assessment and Plan:      Summary of Stay: Lj Castro is a 78 year old female admitted on 3/12/2024. 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      CT scan of the neck showed a deep space infection with gas formation and possible cord compression.  She was given Kcentra and taken to the operating room.  She underwent I&D with wound VAC application along with IntraOp culture and IV antibiotics.  Operative course was significant for hypotension requiring central line and pressor support and so she was admitted to the ICU.  Operative and blood cultures subsequently grew MSSA.  Patient improved in the ICU and was transferred to the general medical floor    Most recent positive blood culture for MSSA was on 3/13.  Repeat blood cultures 3/14 and 3/15 remain no growth to date.  PICC line was placed on 3/17 for anticipated weeks-long IV antibiotic course at discharge    Social work/care coordinator assisting with this position, TCU planned on discharge      Plans today:  -No significant changes made today  -TCU placement.  Appears medically stable for discharge when bed is found  -Okay to resume Eliquis tomorrow per neurosurgery    Addendum:  will increase Lantus to 25 units daily starting tomorrow AM     Severe sepsis 2/2 MSSA bacteremia due to deep space infection in the posterior cervical spine:  - S/p I&D of c-spine in  "the setting of recent c-spine surgery with purulent material found; operative cx positive for MSSA.   - Blood cultures also positive for MSSA x3 (3/12 x2 & 3/13 x1).  - follow up blood cx 3/14 and 3/15 NGTD  -ID consultation, appreciate recs; will need several week IV antibiotic course on discharge  -WOC following for wound vac management  -PICC line placed on 3/17     Type 2 diabetes:  Initially placed on insulin gtt in the perioperative setting. Now on subcutaneous insulin.  -Continue Lantus to 22 units daily  -Continue carb coverage 1 unit per 15 gm CHO  -MDSSI  -Will monitor blood sugar and adjust insulin dose as needed      A-fib with rapid ventricular rate, resolved:  Most recent echocardiogram in January 2023 shows a normal EF.  -Monitor on telemetry  -Holding Eliquis, will need resumption of this when deemed safe per neurosurgery     Acute encephalopathy, resolved:  Likely due to sepsis.      Diet: Advance Diet as Tolerated: Regular Diet Adult    DVT Prophylaxis: Pneumatic Compression Devices  [place] Catheter: Not present  Lines: None       Cardiac Monitoring: ACTIVE order. Indication: History of Afib  Code Status: Full Code             Clinically Significant Risk Factors               # Hypoalbuminemia: Lowest albumin = 2.8 g/dL at 3/13/2024  3:16 AM, will monitor as appropriate            # Obesity: Estimated body mass index is 37.22 kg/m  as calculated from the following:    Height as of this encounter: 1.575 m (5' 2\").    Weight as of this encounter: 92.3 kg (203 lb 7.8 oz)., PRESENT ON ADMISSION                   Disposition Plan     Destination: TCU anticipated; appears medically stable for discharge when TCU is found        Interval History (Subjective):      No new concerns or complaints today.  PICC line placed yesterday.                  Physical Exam:      Last Vital Signs:  /67 (BP Location: Right arm)   Pulse 74   Temp 97.8  F (36.6  C) (Temporal)   Resp 18   Ht 1.575 m (5' 2\")   Wt " 91.7 kg (202 lb 3.2 oz)   SpO2 99%   BMI 36.98 kg/m        Intake/Output Summary (Last 24 hours) at 3/18/2024 1210  Last data filed at 3/18/2024 0641  Gross per 24 hour   Intake 240 ml   Output 500 ml   Net -260 ml       Constitutional: Awake, alert, cooperative, no apparent distress     Respiratory: Clear to auscultation bilaterally, no crackles or wheezing   Cardiovascular: Regular rate and rhythm, normal S1 and S2, and no murmur noted   Abdomen: Normal bowel sounds, soft, non-distended, non-tender   Skin: No rashes, no cyanosis, dry to touch   Neuro: Alert and oriented x3, no weakness, numbness, memory loss   Extremities: No edema, normal range of motion   Other(s):        All other systems: Negative          Medications:      All current medications were reviewed with changes reflected in problem list.         Data:      All new lab and imaging data was reviewed.   Labs:       Lab Results   Component Value Date     03/16/2024     03/15/2024     03/14/2024     03/20/2015     03/19/2015     03/18/2015    Lab Results   Component Value Date    CHLORIDE 99 03/16/2024    CHLORIDE 101 03/15/2024    CHLORIDE 104 03/14/2024    CHLORIDE 110 03/20/2015    CHLORIDE 107 03/19/2015    CHLORIDE 109 03/18/2015    Lab Results   Component Value Date    BUN 17.8 03/16/2024    BUN 20.6 03/15/2024    BUN 26.2 03/14/2024    BUN 13 03/20/2015    BUN 17 03/19/2015    BUN 25 03/18/2015      Lab Results   Component Value Date    POTASSIUM 4.0 03/16/2024    POTASSIUM 3.9 03/15/2024    POTASSIUM 3.7 03/14/2024    POTASSIUM 4.1 03/20/2015    POTASSIUM 4.6 03/19/2015    POTASSIUM 4.4 03/18/2015    Lab Results   Component Value Date    CO2 21 03/16/2024    CO2 23 03/15/2024    CO2 23 03/14/2024    CO2 27 03/20/2015    CO2 27 03/19/2015    CO2 28 03/18/2015    Lab Results   Component Value Date    CR 0.91 03/18/2024    CR 0.98 03/17/2024    CR 0.91 03/16/2024    CR 0.96 03/20/2015    CR 1.02 03/19/2015     CR 0.93 03/18/2015        Recent Labs   Lab 03/16/24  0616   WBC 9.2   HGB 10.5*   HCT 34.1*   MCV 97         Imaging:   Recent Results (from the past 24 hour(s))   XR Chest Port 1 View    Narrative    EXAM: XR CHEST PORTABLE 1 VIEW  LOCATION: [facility]  DATE: 03/17/2024    INDICATION: RN placed PICC, verify tip placement.  COMPARISON: Chest CT 06/10/2011 and chest film 03/13/2024.      Impression    IMPRESSION: Right PICC line with catheter tip projected over the low SVC region in satisfactory position. Calcified bilateral pleural plaques. Low lung volumes. Small amount of infiltrate versus atelectasis left lung base. Normal heart size and pulmonary   vascularity. Surgical changes left shoulder arthroplasty and lower cervical spine.

## 2024-03-18 NOTE — PROGRESS NOTES
Care Management Follow Up    Length of Stay (days): 6    Expected Discharge Date: 03/19/2024     Concerns to be Addressed: discharge planning       Anticipated Discharge Disposition: Skilled Nursing Facility, Transitional Care  Anticipated Discharge Services:    Anticipated Discharge DME:      Patient/family educated on Medicare website which has current facility and service quality ratings: yes  Education Provided on the Discharge Plan:    Patient/Family in Agreement with the Plan: yes    Referrals Placed by CM/SW: Post Acute Facilities  Private pay costs discussed: private room/amenity fees and transportation costs    Additional Information:  CM following for TCU placement with wound vac and IV abx. No accepting facility yet. CM met with patient and  at the bedside and shared this update, they were agreeable to sending additional referrals to 1) [facility] 2) [facility] 3) [facility] and want a private room. CM sent referrals. [place] [facility] [place]. Reviewed out of pocket cost for [facility] transport, $81.80 for base rate and $5.26 per mile to the destination. Pt/family expressed understanding and are agreeable to this. Will continue to follow.     Roseanna Feliciano, RN, BSN  Inpatient Care Coordination  [facility]  [phone]

## 2024-03-18 NOTE — PROGRESS NOTES
[facility]  Infectious Disease Progress Note          Assessment and Plan:   Date of Admission:  3/12/2024  Date of Consult (When I saw the patient): 03/13/24        Assessment & Plan  Lj Castro is a 78 year old who was admitted on 3/12/2024.      Impression: 1 78-year-old female, acute sepsis and neck pain where she has recent hardware surgical repair, has an obvious deep infection to the hardware and also sepsis with Staph aureus bacteremia  2 Staph aureus bacteremia, sensitive by gene testing, no other obvious secondary involved sites does have left total shoulder  3 diabetes mellitus  4 infectious encephalopathy  5 atrial fibrillation  Op culture growing Staph aureus as well    REC 1 blood cultures have cleared no obvious secondary involved sites  2 PICC can, Ancef IV outpatient orders in place, looks like transitional care disposition when find a place  3 at least 2 drug interactions with rifampin so we will hold off for now but probably added still in the next 2 or 3 weeks.  4 see me in clinic in 4 to 5 weeks              Interval History:     no new complaints and doing well; no cp, sob, n/v/d, or abd pain.  Feels quite well, pain controlled, temp down, follow cultures March 13 positive, March 14 so far negative, operative cultures same Staph aureus              Medications:      atorvastatin  40 mg Oral Daily    ceFAZolin  2 g Intravenous Q8H    gabapentin  600 mg Oral BID    [Held by provider] hydrochlorothiazide  25 mg Oral Daily    insulin aspart   Subcutaneous TID w/meals    insulin aspart  1-7 Units Subcutaneous TID AC    insulin aspart  1-5 Units Subcutaneous At Bedtime    insulin glargine  22 Units Subcutaneous QAM AC    latanoprost  1 drop Both Eyes At Bedtime    levothyroxine  88 mcg Oral QAM AC    [Held by provider] losartan  50 mg Oral Daily    metoprolol succinate ER  100 mg Oral BID    pantoprazole  40 mg Oral BID    polyethylene glycol  17 g Oral Daily     "prednisoLONE acetate  1 drop Left Eye Daily    senna-docusate  1 tablet Oral BID    sodium chloride (PF)  10-40 mL Intracatheter Q7 Days    sodium chloride (PF)  3 mL Intracatheter Q8H    timolol maleate  1 drop Both Eyes BID                  Physical Exam:   Blood pressure 135/67, pulse 74, temperature 97.8  F (36.6  C), temperature source Temporal, resp. rate 18, height 1.575 m (5' 2\"), weight 91.7 kg (202 lb 3.2 oz), SpO2 99%.  Wt Readings from Last 2 Encounters:   03/18/24 91.7 kg (202 lb 3.2 oz)   11/06/23 81.7 kg (180 lb 1.6 oz)     Vital Signs with Ranges  Temp:  [97.8  F (36.6  C)-98.3  F (36.8  C)] 97.8  F (36.6  C)  Pulse:  [67-85] 74  Resp:  [16-18] 18  BP: (111-163)/(49-67) 135/67  SpO2:  [95 %-99 %] 99 %    Constitutional: Awake, alert, cooperative, no apparent distress mentation okay, not septic looking     Lungs: Clear to auscultation bilaterally, no crackles or wheezing   Cardiovascular: Regular rate and rhythm, normal S1 and S2, and no murmur noted   Abdomen: Normal bowel sounds, soft, non-distended, non-tender   Skin: No rashes, no cyanosis, no edema   Other: Spine wound seen and looks quite clean, but open still significant she is very tender in the tissues around it but no obvious abscess            Data:   All microbiology laboratory data reviewed.  Recent Labs   Lab Test 03/16/24  0616 03/15/24  0800 03/14/24  0510   WBC 9.2 7.8 7.3   HGB 10.5* 9.6* 8.2*   HCT 34.1* 30.7* 26.0*   MCV 97 97 95    237 220     Recent Labs   Lab Test 03/18/24  0519 03/17/24  0600 03/16/24  0616   CR 0.91 0.98* 0.91     Recent Labs   Lab Test 03/12/24  0855   SED 72*     No lab results found.    Invalid input(s): \"UC\"     "

## 2024-03-18 NOTE — PROGRESS NOTES
[facility] Nurse Inpatient Assessment     Consulted for: Neck    Summary: Patient s/p I&D on 3/13/24 of posterior neck for deep cervical infection down to the hardware with wound VAC placement.  Wound slightly smaller today.      Patient History (according to provider note(s):      Lj Castro is a 78 year old female admitted on 3/12/2024. 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  She came to the ER complaining that for the past 10 days he had increasing pain in the neck and gradual weakness to the arms and legs to the point that she was unable to ambulate.  She also had right arm pain  over the past 3 to 4 days along with confusion.  Her arm pain was 9 out of 10.  There have been no recent falls or shortness of breath or nausea or vomiting.  She was brought in by EMS.  CT scan of the neck showed a deep space infection with gas formation and possible cord compression.  She was given Kcentra and taken to the operating room.  She underwent I&D with wound VAC application along with IntraOp culture and IV antibiotics.  She underwent general endotracheal anesthesia.  Operative course was significant for hypotension requiring central line and pressor support and so she was admitted to the ICU.       Assessment:      Areas visualized during today's visit: Focused:    Negative pressure wound therapy applied to: Posterior neck   Last photo: 3/18/24     Wound due to: Surgical Wound   Wound history/plan of care:    Surgical date: 3/13/24   Service following: Spinal surgery  Date Negative Pressure Wound Therapy initiated: 3/13/24   Interventions  in place: repositioning  Is patient s nutritional status compromised? yes   If yes, what interventions are in place? Protein supplements  Reason for initiating vac therapy? Presence of co-morbidities, High risk of infections, and Need for accelerated granulation tissue  Which?of?the?following?co-morbidities?apply? Diabetes  If diabetic is patient on a diabetic management program? Yes   Is osteomyelitis present in wound? no   If yes what treatments are in place? N/A    Wound base: 20 % granulation tissue, 80 % pink non-granular tissue, adipose tissue and fascia.  One small area of exposed hardware.        Palpation of the wound bed: normal       Drainage:  large       Volume in cannister: 300 ml     Last cannister change date: 3/13/24     Description of drainage: serosanguinous      Measurements (length x width x depth, in cm) 11  x 3.3  x  3.5 cm       Tunneling N/A      Undermining up to 3.5 cm from 6-9 o'clock   Periwound skin: Intact       Color: normal and consistent with surrounding tissue       Temperature: normal    Odor: none   Pain: score 8 , sharp   Pain intervention prior to dressing change: IV Dilaudid  Treatment goal: Heal  and Increase granulation  STATUS: improving   Supplies ordered: ordered medium VAC dressing    Number of foam pieces removed from a wound (excluding foam for bridge) :  2 GranuFoam Black   Verified this matched the number of foam pieces applied last dressing change: Yes   Number of foam pieces packed into wound (excluding foam for bridge) :  2 GranuFoam Black       Treatment Plan:     Negative pressure wound therapy plan:  Wound location: Posterior neck   Change Days: Mon/Wed/Fri by WOC RN    Supplies (including all accessories) used: medium Black foam   Cleanse with Vashe prior to replacing NPWT  Suction setting: -50     Staff RN to assess integrity of dressing and ensure suction is set at appropriate level every shift.   Date canister. Chart canister output every shift. Change  "cannister weekly and PRN if full/occluded     Remove foam dressing and replace with BID normal saline moist gauze dressing if:   -a dressing failure which cannot be repaired within 2 hours   -patient is discharging to home without a home pump   -patient is discharging to a facility outside the local area   -if a dressing is a \"Silver Foam\", remove before Radiation Therapy or MRI     The hospital VAC pump is not to be discharged with the patient.?Ensure to disconnect patient from machine prior to discharge. Then,    - If a home KCI VAC pump has been delivered, connect home cannister to dressing tubing then connect cannister to home pump and turn on machine    - If transferring to a nearby facility with a KCI vac, can disconnect and clamp tubing then cover end with glove so can be reconnected within 2 hours        Orders: Reviewed    RECOMMEND PRIMARY TEAM ORDER: None, at this time  Education provided: plan of care  Discussed plan of care with: Patient and Nurse  WOC nurse follow-up plan: Monday/WednesdayFriday   Notify WOC if wound(s) deteriorate.  Nursing to notify the Provider(s) and re-consult the WOC Nurse if new skin concern.    DATA:     Current support surface: Standard  Low air loss (LYNNETTE pump, Isolibrium, Pulsate, skin guard, etc)  BMI: Body mass index is 36.98 kg/m .   Active diet order: Orders Placed This Encounter      Advance Diet as Tolerated: Regular Diet Adult      Diet     Output: I/O last 3 completed shifts:  In: 240 [P.O.:240]  Out: 810 [Urine:800; Drains:10]     Labs:   Recent Labs   Lab 03/16/24  0616 03/14/24  0510 03/13/24  0316   ALBUMIN  --   --  2.8*   HGB 10.5*   < > 9.5*   WBC 9.2   < > 13.4*   A1C  --   --  8.4*    < > = values in this interval not displayed.     Pressure injury risk assessment:   Sensory Perception: 3-->slightly limited  Moisture: 4-->rarely moist  Activity: 3-->walks occasionally  Mobility: 3-->slightly limited  Nutrition: 3-->adequate  Friction and Shear: 3-->no " apparent problem  Luke Score: 19    JEAN-PAUL Guadalupe [facility] [employer]  Dept. Office Number: [phone]

## 2024-03-19 LAB
BACTERIA BLD CULT: NO GROWTH
CREAT SERPL-MCNC: 0.84 MG/DL (ref 0.51–0.95)
EGFRCR SERPLBLD CKD-EPI 2021: 70 ML/MIN/1.73M2
GLUCOSE BLDC GLUCOMTR-MCNC: 131 MG/DL (ref 70–99)
GLUCOSE BLDC GLUCOMTR-MCNC: 148 MG/DL (ref 70–99)
GLUCOSE BLDC GLUCOMTR-MCNC: 157 MG/DL (ref 70–99)
GLUCOSE BLDC GLUCOMTR-MCNC: 175 MG/DL (ref 70–99)
GLUCOSE SERPL-MCNC: 150 MG/DL (ref 70–99)

## 2024-03-19 PROCEDURE — 99232 SBSQ HOSP IP/OBS MODERATE 35: CPT | Performed by: INTERNAL MEDICINE

## 2024-03-19 PROCEDURE — 250N000013 HC RX MED GY IP 250 OP 250 PS 637: Performed by: INTERNAL MEDICINE

## 2024-03-19 PROCEDURE — 250N000013 HC RX MED GY IP 250 OP 250 PS 637: Performed by: ORTHOPAEDIC SURGERY

## 2024-03-19 PROCEDURE — 250N000011 HC RX IP 250 OP 636: Performed by: INTERNAL MEDICINE

## 2024-03-19 PROCEDURE — 120N000001 HC R&B MED SURG/OB

## 2024-03-19 PROCEDURE — 250N000011 HC RX IP 250 OP 636: Performed by: ORTHOPAEDIC SURGERY

## 2024-03-19 PROCEDURE — 250N000013 HC RX MED GY IP 250 OP 250 PS 637: Performed by: STUDENT IN AN ORGANIZED HEALTH CARE EDUCATION/TRAINING PROGRAM

## 2024-03-19 PROCEDURE — 82565 ASSAY OF CREATININE: CPT | Performed by: STUDENT IN AN ORGANIZED HEALTH CARE EDUCATION/TRAINING PROGRAM

## 2024-03-19 PROCEDURE — 82947 ASSAY GLUCOSE BLOOD QUANT: CPT | Performed by: INTERNAL MEDICINE

## 2024-03-19 RX ORDER — MULTIPLE VITAMINS W/ MINERALS TAB 9MG-400MCG
1 TAB ORAL DAILY
Status: DISCONTINUED | OUTPATIENT
Start: 2024-03-20 | End: 2024-03-20 | Stop reason: HOSPADM

## 2024-03-19 RX ADMIN — PREDNISOLONE ACETATE 1 DROP: 10 SUSPENSION/ DROPS OPHTHALMIC at 08:51

## 2024-03-19 RX ADMIN — CEFAZOLIN SODIUM 2 G: 2 INJECTION, SOLUTION INTRAVENOUS at 20:36

## 2024-03-19 RX ADMIN — METOPROLOL SUCCINATE 100 MG: 100 TABLET, EXTENDED RELEASE ORAL at 08:49

## 2024-03-19 RX ADMIN — TIMOLOL MALEATE 1 DROP: 5 SOLUTION/ DROPS OPHTHALMIC at 08:54

## 2024-03-19 RX ADMIN — GABAPENTIN 600 MG: 300 CAPSULE ORAL at 20:33

## 2024-03-19 RX ADMIN — CEFAZOLIN SODIUM 2 G: 2 INJECTION, SOLUTION INTRAVENOUS at 05:26

## 2024-03-19 RX ADMIN — HYDROCODONE BITARTRATE AND ACETAMINOPHEN 1 TABLET: 7.5; 325 TABLET ORAL at 16:24

## 2024-03-19 RX ADMIN — HYDROMORPHONE HYDROCHLORIDE 0.2 MG: 0.2 INJECTION, SOLUTION INTRAMUSCULAR; INTRAVENOUS; SUBCUTANEOUS at 00:02

## 2024-03-19 RX ADMIN — INSULIN ASPART 1 UNITS: 100 INJECTION, SOLUTION INTRAVENOUS; SUBCUTANEOUS at 17:31

## 2024-03-19 RX ADMIN — LOSARTAN POTASSIUM 50 MG: 50 TABLET, FILM COATED ORAL at 08:50

## 2024-03-19 RX ADMIN — HYDROCODONE BITARTRATE AND ACETAMINOPHEN 1 TABLET: 7.5; 325 TABLET ORAL at 06:00

## 2024-03-19 RX ADMIN — HYDROCODONE BITARTRATE AND ACETAMINOPHEN 1 TABLET: 7.5; 325 TABLET ORAL at 20:32

## 2024-03-19 RX ADMIN — INSULIN ASPART 1 UNITS: 100 INJECTION, SOLUTION INTRAVENOUS; SUBCUTANEOUS at 11:00

## 2024-03-19 RX ADMIN — APIXABAN 5 MG: 5 TABLET, FILM COATED ORAL at 08:49

## 2024-03-19 RX ADMIN — ATORVASTATIN CALCIUM 40 MG: 40 TABLET, FILM COATED ORAL at 08:49

## 2024-03-19 RX ADMIN — CEFAZOLIN SODIUM 2 G: 2 INJECTION, SOLUTION INTRAVENOUS at 13:12

## 2024-03-19 RX ADMIN — SENNOSIDES AND DOCUSATE SODIUM 1 TABLET: 50; 8.6 TABLET ORAL at 20:33

## 2024-03-19 RX ADMIN — PANTOPRAZOLE SODIUM 40 MG: 40 TABLET, DELAYED RELEASE ORAL at 08:50

## 2024-03-19 RX ADMIN — METOPROLOL SUCCINATE 100 MG: 100 TABLET, EXTENDED RELEASE ORAL at 20:33

## 2024-03-19 RX ADMIN — POLYETHYLENE GLYCOL 3350 17 G: 17 POWDER, FOR SOLUTION ORAL at 13:17

## 2024-03-19 RX ADMIN — GABAPENTIN 600 MG: 300 CAPSULE ORAL at 08:50

## 2024-03-19 RX ADMIN — TIMOLOL MALEATE 1 DROP: 5 SOLUTION/ DROPS OPHTHALMIC at 20:39

## 2024-03-19 RX ADMIN — LEVOTHYROXINE SODIUM 88 MCG: 0.09 TABLET ORAL at 08:50

## 2024-03-19 RX ADMIN — LATANOPROST 1 DROP: 50 SOLUTION/ DROPS OPHTHALMIC at 21:19

## 2024-03-19 RX ADMIN — PANTOPRAZOLE SODIUM 40 MG: 40 TABLET, DELAYED RELEASE ORAL at 20:33

## 2024-03-19 RX ADMIN — HYDROCHLOROTHIAZIDE 25 MG: 25 TABLET ORAL at 08:48

## 2024-03-19 RX ADMIN — APIXABAN 5 MG: 5 TABLET, FILM COATED ORAL at 20:33

## 2024-03-19 ASSESSMENT — ACTIVITIES OF DAILY LIVING (ADL)
ADLS_ACUITY_SCORE: 31
ADLS_ACUITY_SCORE: 36
ADLS_ACUITY_SCORE: 35
ADLS_ACUITY_SCORE: 31
ADLS_ACUITY_SCORE: 36
ADLS_ACUITY_SCORE: 35
ADLS_ACUITY_SCORE: 36
ADLS_ACUITY_SCORE: 35
ADLS_ACUITY_SCORE: 35
ADLS_ACUITY_SCORE: 31
ADLS_ACUITY_SCORE: 36
ADLS_ACUITY_SCORE: 36
ADLS_ACUITY_SCORE: 31
ADLS_ACUITY_SCORE: 36
ADLS_ACUITY_SCORE: 36
ADLS_ACUITY_SCORE: 37
ADLS_ACUITY_SCORE: 35
ADLS_ACUITY_SCORE: 36
ADLS_ACUITY_SCORE: 31
ADLS_ACUITY_SCORE: 37
ADLS_ACUITY_SCORE: 35

## 2024-03-19 NOTE — PROGRESS NOTES
[facility]  Hospitalist Progress Note  Rudy Sarmiento MD 03/19/2024    Reason for Stay (Diagnosis): c-spine infection, MSSA bacteremia         Assessment and Plan:      Summary of Stay: Lj Castro is a 78 year old female admitted on 3/12/2024. 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      CT scan of the neck showed a deep space infection with gas formation and possible cord compression.  She was given Kcentra and taken to the operating room.  She underwent I&D with wound VAC application along with IntraOp culture and IV antibiotics.  Operative course was significant for hypotension requiring central line and pressor support and so she was admitted to the ICU.  Operative and blood cultures subsequently grew MSSA.  Patient improved in the ICU and was transferred to the general medical floor     Most recent positive blood culture for MSSA was on 3/13.  Repeat blood cultures 3/14 and 3/15 remain no growth to date.  PICC line was placed on 3/17 for anticipated weeks-long IV antibiotic course at discharge     Social work/care coordinator assisting with this position, TCU planned on discharge        Plans today:  - resumed Eliquis today; OK to resume per neurosurgery  -TCU placement.  Appears medically stable for discharge when bed is found       Severe sepsis 2/2 MSSA bacteremia due to deep space infection in the posterior cervical spine:  - S/p I&D of c-spine in the setting of recent c-spine surgery with purulent material found; operative cx positive for MSSA.   -  "Blood cultures also positive for MSSA x3 (3/12 x2 & 3/13 x1).  - follow up blood cx 3/14 and 3/15 NGTD  -ID consultation, appreciate recs; will need several week IV antibiotic course on discharge  -WOC following for wound vac management  -PICC line placed on 3/17     Type 2 diabetes:  Initially placed on insulin gtt in the perioperative setting. Now on subcutaneous insulin.  -Continue Lantus 25 units daily  -Continue carb coverage 1 unit per 15 gm CHO  -MDSSI  -Will monitor blood sugar and adjust insulin dose as needed      A-fib with rapid ventricular rate, resolved:  Most recent echocardiogram in January 2023 shows a normal EF.  -Monitor on telemetry  -Holding Eliquis, will need resumption of this when deemed safe per neurosurgery     Acute encephalopathy, resolved:  Likely due to sepsis.      Diet: Advance Diet as Tolerated: Regular Diet Adult    DVT Prophylaxis: Pneumatic Compression Devices  Winston Catheter: Not present  Lines: None       Cardiac Monitoring: ACTIVE order. Indication: History of Afib  Code Status: Full Code             Clinically Significant Risk Factors               # Hypoalbuminemia: Lowest albumin = 2.8 g/dL at 3/13/2024  3:16 AM, will monitor as appropriate            # Obesity: Estimated body mass index is 37.22 kg/m  as calculated from the following:    Height as of this encounter: 1.575 m (5' 2\").    Weight as of this encounter: 92.3 kg (203 lb 7.8 oz)., PRESENT ON ADMISSION                   Disposition Plan     Destination: TCU anticipated; appears medically stable for discharge when TCU is found        Interval History (Subjective):      No concerns or new complaints today.  Await TCU placement                  Physical Exam:      Last Vital Signs:  /61 (BP Location: Left arm)   Pulse 80   Temp 97.6  F (36.4  C) (Temporal)   Resp 16   Ht 1.575 m (5' 2\")   Wt 92.9 kg (204 lb 12.9 oz)   SpO2 99%   BMI 37.46 kg/m        Intake/Output Summary (Last 24 hours) at 3/19/2024 " 1204  Last data filed at 3/19/2024 0601  Gross per 24 hour   Intake 240 ml   Output 975 ml   Net -735 ml       Constitutional: Awake, alert, cooperative, no apparent distress     Respiratory: Clear to auscultation bilaterally, no crackles or wheezing   Cardiovascular: Regular rate and rhythm, normal S1 and S2, and no murmur noted   Abdomen: Normal bowel sounds, soft, non-distended, non-tender   Skin: No rashes, no cyanosis, dry to touch   Neuro: Alert and oriented x3, no weakness, numbness, memory loss   Extremities: No edema, normal range of motion   Other(s):        All other systems: Negative          Medications:      All current medications were reviewed with changes reflected in problem list.         Data:      All new lab and imaging data was reviewed.   Labs:       Lab Results   Component Value Date     03/16/2024     03/15/2024     03/14/2024     03/20/2015     03/19/2015     03/18/2015    Lab Results   Component Value Date    CHLORIDE 99 03/16/2024    CHLORIDE 101 03/15/2024    CHLORIDE 104 03/14/2024    CHLORIDE 110 03/20/2015    CHLORIDE 107 03/19/2015    CHLORIDE 109 03/18/2015    Lab Results   Component Value Date    BUN 17.8 03/16/2024    BUN 20.6 03/15/2024    BUN 26.2 03/14/2024    BUN 13 03/20/2015    BUN 17 03/19/2015    BUN 25 03/18/2015      Lab Results   Component Value Date    POTASSIUM 4.0 03/16/2024    POTASSIUM 3.9 03/15/2024    POTASSIUM 3.7 03/14/2024    POTASSIUM 4.1 03/20/2015    POTASSIUM 4.6 03/19/2015    POTASSIUM 4.4 03/18/2015    Lab Results   Component Value Date    CO2 21 03/16/2024    CO2 23 03/15/2024    CO2 23 03/14/2024    CO2 27 03/20/2015    CO2 27 03/19/2015    CO2 28 03/18/2015    Lab Results   Component Value Date    CR 0.84 03/19/2024    CR 0.91 03/18/2024    CR 0.98 03/17/2024    CR 0.96 03/20/2015    CR 1.02 03/19/2015    CR 0.93 03/18/2015        Recent Labs   Lab 03/16/24  0616   WBC 9.2   HGB 10.5*   HCT 34.1*   MCV 97          Imaging:   No results found for this or any previous visit (from the past 24 hour(s)).

## 2024-03-19 NOTE — PLAN OF CARE
"Goal Outcome Evaluation:      Plan of Care Reviewed With: patient    Overall Patient Progress: improvingOverall Patient Progress: improving       Problem: Adult Inpatient Plan of Care  Goal: Plan of Care Review  Description: A/Ox4, VSS, on RA, tele monitoring, PICC in place, baseline numbness/tingling otherwise CMS intact, Woundvac in place and set to suction, pain treated with PRN IV dilaudid, moves with assist x1 W/GB, voiding well, Pt denies HA, SOB, nausea, vomiting, or dizziness. Able to make needs known. Plan to discharge to TCU pending placement. Pt cooperative and agreeable to plan of care.     Outcome: Progressing  Flowsheets (Taken 3/19/2024)  Plan of Care Reviewed With: patient  Overall Patient Progress: improving  Goal: Patient-Specific Goal (Individualized)  Description: You can add care plan individualizations to a care plan. Examples of Individualization might be:  \"Parent requests to be called daily at 9am for status\", \"I have a hard time hearing out of my right ear\", or \"Do not touch me to wake me up as it startles  me\".  Outcome: Progressing  Goal: Absence of Hospital-Acquired Illness or Injury  Outcome: Progressing  Intervention: Identify and Manage Fall Risk  Recent Flowsheet Documentation  Taken 3/18/2024 2115 by Tammi Rodrigues, RN  Safety Promotion/Fall Prevention:   activity supervised   assistive device/personal items within reach   clutter free environment maintained   increase visualization of patient   nonskid shoes/slippers when out of bed   patient and family education   room near nurse's station   room organization consistent   safety round/check completed   supervised activity  Intervention: Prevent Skin Injury  Recent Flowsheet Documentation  Taken 3/18/2024 2115 by Tammi Rodrigues, RN  Body Position: supine, head elevated  Intervention: Prevent and Manage VTE (Venous Thromboembolism) Risk  Recent Flowsheet Documentation  Taken 3/18/2024 2115 by Tammi Rodrigues, RN  VTE " Prevention/Management: SCDs (sequential compression devices) on  Goal: Optimal Comfort and Wellbeing  Outcome: Progressing  Intervention: Provide Person-Centered Care  Recent Flowsheet Documentation  Taken 3/18/2024 2115 by Tammi Rodrigues RN  Trust Relationship/Rapport:   care explained   choices provided   emotional support provided   questions answered   reassurance provided   thoughts/feelings acknowledged  Goal: Readiness for Transition of Care  Outcome: Progressing     Problem: Infection  Goal: Absence of Infection Signs and Symptoms  Outcome: Progressing     Problem: Glycemic Control Impaired  Goal: Blood Glucose Level Within Targeted Range  Outcome: Progressing  Intervention: Optimize Glycemic Control  Recent Flowsheet Documentation  Taken 3/18/2024 2115 by Tammi Rodrigues RN  Hyperglycemia Management: blood glucose monitored  Goal: Minimize Risk of Hypoglycemia  Outcome: Progressing  Intervention: Management and Risk Reduction of Hypoglycemia  Recent Flowsheet Documentation  Taken 3/18/2024 2115 by Tammi Rodrigues RN  Hypoglycemia Management: blood glucose monitored     Problem: Fall Injury Risk  Goal: Absence of Fall and Fall-Related Injury  Outcome: Progressing  Intervention: Identify and Manage Contributors  Recent Flowsheet Documentation  Taken 3/18/2024 2115 by Tammi Rodrigues RN  Self-Care Promotion: independence encouraged  Medication Review/Management: medications reviewed  Intervention: Promote Injury-Free Environment  Recent Flowsheet Documentation  Taken 3/18/2024 2115 by Tammi Rodrigues RN  Safety Promotion/Fall Prevention:   activity supervised   assistive device/personal items within reach   clutter free environment maintained   increase visualization of patient   nonskid shoes/slippers when out of bed   patient and family education   room near nurse's station   room organization consistent   safety round/check completed   supervised activity

## 2024-03-19 NOTE — PLAN OF CARE
"Pt A&O x4. PO NORCO managing pain. Wound vac in place. Up w/ Ax1, using gait belt, and walker. Voiding. Tolerating regular diet. Plan is for discharge to TCU once bed available.     Problem: Adult Inpatient Plan of Care  Goal: Plan of Care Review  Description: The Plan of Care Review/Shift note should be completed every shift.  The Outcome Evaluation is a brief statement about your assessment that the patient is improving, declining, or no change.  This information will be displayed automatically on your shift  note.  Outcome: Progressing  Flowsheets (Taken 3/19/2024 1724)  Plan of Care Reviewed With:   patient   family  Overall Patient Progress: improving  Goal: Patient-Specific Goal (Individualized)  Description: You can add care plan individualizations to a care plan. Examples of Individualization might be:  \"Parent requests to be called daily at 9am for status\", \"I have a hard time hearing out of my right ear\", or \"Do not touch me to wake me up as it startles  me\".  Outcome: Progressing  Goal: Absence of Hospital-Acquired Illness or Injury  Outcome: Progressing  Intervention: Identify and Manage Fall Risk  Recent Flowsheet Documentation  Taken 3/19/2024 0900 by Yen Oneal RN  Safety Promotion/Fall Prevention:   activity supervised   assistive device/personal items within reach   nonskid shoes/slippers when out of bed   safety round/check completed  Intervention: Prevent Skin Injury  Recent Flowsheet Documentation  Taken 3/19/2024 0900 by Yen Oneal RN  Device Skin Pressure Protection: positioning supports utilized  Intervention: Prevent and Manage VTE (Venous Thromboembolism) Risk  Recent Flowsheet Documentation  Taken 3/19/2024 0900 by Yen Oneal RN  VTE Prevention/Management: SCDs (sequential compression devices) on  Intervention: Prevent Infection  Recent Flowsheet Documentation  Taken 3/19/2024 0900 by Yen Oneal RN  Infection Prevention: rest/sleep promoted  Goal: Optimal Comfort " and Wellbeing  Outcome: Progressing  Intervention: Monitor Pain and Promote Comfort  Recent Flowsheet Documentation  Taken 3/19/2024 1624 by Yen Oneal RN  Pain Management Interventions: medication (see MAR)  Taken 3/19/2024 0900 by Yen Oneal RN  Pain Management Interventions: declines  Goal: Readiness for Transition of Care  Outcome: Progressing     Problem: Infection  Goal: Absence of Infection Signs and Symptoms  Outcome: Progressing  Intervention: Prevent or Manage Infection  Recent Flowsheet Documentation  Taken 3/19/2024 0900 by Yen Oneal RN  Infection Management: aseptic technique maintained     Problem: Fall Injury Risk  Goal: Absence of Fall and Fall-Related Injury  Outcome: Progressing  Intervention: Identify and Manage Contributors  Recent Flowsheet Documentation  Taken 3/19/2024 0900 by Yen Oneal RN  Medication Review/Management: medications reviewed  Intervention: Promote Injury-Free Environment  Recent Flowsheet Documentation  Taken 3/19/2024 0900 by Yen Oneal RN  Safety Promotion/Fall Prevention:   activity supervised   assistive device/personal items within reach   nonskid shoes/slippers when out of bed   safety round/check completed   Goal Outcome Evaluation:      Plan of Care Reviewed With: patient, family    Overall Patient Progress: improvingOverall Patient Progress: improving

## 2024-03-19 NOTE — PROGRESS NOTES
DAILY PROGRESS NOTE    Lj Castro is a 78 year old old female admitted on 3/12/2024  8:39 AM.    Harpreet Holbrook is a 78 year old presenting POD7 from a irrigation and debridement and wound vac application to posterior cervical sine on 3/12/24 with Dr. Cadena.  She was found comfortably resting in bed and states she is doing very well and she denies any changes or concerns to report since seeing Dr. Cadena and myself yesterday. She denies any symptoms to her UE BETSY.   We discussed her discharge to a TCU.  She states she has been ambulating with a walker and 1 assist. She denies weakness.    Overall, Sheron is going very well and her pain as been managed with PRN Norco 7.5mg.   She is able to resume her Eliquis today per Dr. Cadena.    Objective    AAOx3, Ambulating w/ walker x1, sensation intact    Hemoglobin   Date Value Ref Range Status   03/16/2024 10.5 (L) 11.7 - 15.7 g/dL Final   03/20/2015 9.8 (L) 11.7 - 15.7 g/dL Final   ]      Impression / Plan  Awaiting TCU placement     Plan for today:     Patient doing well  Ambulate with help  Full diet   Continue PRN [place]  Awaiting for TCU discharge     Camelia Stern, PAC

## 2024-03-19 NOTE — PROGRESS NOTES
[facility]  Infectious Disease Progress Note          Assessment and Plan:   Date of Admission:  3/12/2024  Date of Consult (When I saw the patient): 03/13/24        Assessment & Plan  Lj Castro is a 78 year old who was admitted on 3/12/2024.      Impression: 1 78-year-old female, acute sepsis and neck pain where she has recent hardware surgical repair, has an obvious deep infection to the hardware and also sepsis with Staph aureus bacteremia  2 Staph aureus bacteremia, sensitive by gene testing, no other obvious secondary involved sites does have left total shoulder  3 diabetes mellitus  4 infectious encephalopathy  5 atrial fibrillation  Op culture growing Staph aureus as well    REC 1 blood cultures have cleared no obvious secondary involved sites, aggressively clinically better  2 PICC in, Ancef IV outpatient orders in place, looks like transitional care disposition when find a place  3 at least 2 drug interactions with rifampin so we will hold off for now but probably added still in the next 2 or 3 weeks.  4 see me in clinic in 4 to 5 weeks will follow labs and overall clinical course at the rehab center              Interval History:     no new complaints and doing well; no cp, sob, n/v/d, or abd pain.  Feels quite well, pain controlled, temp down, follow cultures March 13 positive, March 14 negative, operative cultures same Staph aureus              Medications:      apixaban ANTICOAGULANT  5 mg Oral BID    atorvastatin  40 mg Oral Daily    ceFAZolin  2 g Intravenous Q8H    gabapentin  600 mg Oral BID    hydrochlorothiazide  25 mg Oral Daily    insulin aspart   Subcutaneous TID w/meals    insulin aspart  1-7 Units Subcutaneous TID AC    insulin aspart  1-5 Units Subcutaneous At Bedtime    insulin glargine  25 Units Subcutaneous QAM AC    latanoprost  1 drop Both Eyes At Bedtime    levothyroxine  88 mcg Oral QAM AC    losartan  50 mg Oral Daily    metoprolol succinate ER  100 mg  "Oral BID    [START ON 3/20/2024] multivitamin w/minerals  1 tablet Oral Daily    pantoprazole  40 mg Oral BID    polyethylene glycol  17 g Oral Daily    prednisoLONE acetate  1 drop Left Eye Daily    senna-docusate  1 tablet Oral BID    sodium chloride (PF)  10-40 mL Intracatheter Q7 Days    sodium chloride (PF)  3 mL Intracatheter Q8H    timolol maleate  1 drop Both Eyes BID                  Physical Exam:   Blood pressure 139/61, pulse 80, temperature 97.6  F (36.4  C), temperature source Temporal, resp. rate 16, height 1.575 m (5' 2\"), weight 92.9 kg (204 lb 12.9 oz), SpO2 99%.  Wt Readings from Last 2 Encounters:   03/19/24 92.9 kg (204 lb 12.9 oz)   11/06/23 81.7 kg (180 lb 1.6 oz)     Vital Signs with Ranges  Temp:  [97.2  F (36.2  C)-97.9  F (36.6  C)] 97.6  F (36.4  C)  Pulse:  [80-87] 80  Resp:  [16] 16  BP: (139-157)/(61-82) 139/61  SpO2:  [95 %-99 %] 99 %    Constitutional: Awake, alert, cooperative, no apparent distress mentation okay, not septic looking     Lungs: Clear to auscultation bilaterally, no crackles or wheezing   Cardiovascular: Regular rate and rhythm, normal S1 and S2, and no murmur noted   Abdomen: Normal bowel sounds, soft, non-distended, non-tender   Skin: No rashes, no cyanosis, no edema   Other: Spine wound seen and looks quite clean, but open still significant she is very tender in the tissues around it but no obvious abscess            Data:   All microbiology laboratory data reviewed.  Recent Labs   Lab Test 03/16/24  0616 03/15/24  0800 03/14/24  0510   WBC 9.2 7.8 7.3   HGB 10.5* 9.6* 8.2*   HCT 34.1* 30.7* 26.0*   MCV 97 97 95    237 220     Recent Labs   Lab Test 03/19/24  0611 03/18/24  0519 03/17/24  0600   CR 0.84 0.91 0.98*     Recent Labs   Lab Test 03/12/24  0855   SED 72*     No lab results found.    Invalid input(s): \"UC\"     "

## 2024-03-19 NOTE — CONSULTS
CLINICAL NUTRITION SERVICES  -  ASSESSMENT NOTE      Recommendations:   - Continue diet as ordered.  Deferring need for formal diabetic diet/CHO restrictions to MD team (?high CHO diet).  Encouraged protein sources consistently with meals.  - Discussed adding MVI/M w/ patient for wound healing (surgical wound) and while she has wound VAC.     MALNUTRITION:  % Weight Loss:  None noted  % Intake:  Decreased intake does not meet criteria for malnutrition   Subcutaneous Fat Loss:  Upper arm region mild to moderate depletion --> not using as indicator with only 1 region present   Muscle Loss:  Temporal region mild depletion, Clavicle bone region mild to moderate depletion, and Acromion bone region mild to moderate depletion  Fluid Retention: Trace to mild, LEs --> not using as indicator given do not suspect masking true wt trends (pt is above usual body weight)    Malnutrition Diagnosis: Patient does not meet two of the above criteria necessary for diagnosing malnutrition          REASON FOR ASSESSMENT  Lj Castro is a 78 year old female seen by Registered Dietitian for length of stay.      PMH of: DMII, CKD stage 3, chronic neck and back pain, PAD, spinal history w/ previous laminectomy, decompression, fusion, myelopathy d/t stenosis and kyphosis.      Admit 2/2: Acute encephalopathy, found to have deep wound infection posterior cervical spine down to hardware requiring I&D of spine with placement of wound VAC 3/13.      NUTRITION HISTORY  - Information obtained from patient and chart.  Noted patient is awaiting TCU discharge.  - Diet at home: Regular w/ meals BID reported as baseline.  - Barriers to PO intakes: No decreased PO intakes PTA per pt report.  She states she was eating at her baseline until admit.  - Allergies: NKFA.      CURRENT NUTRITION ORDERS  Diet Order:     Regular    Current Intake/Tolerance:  Mostly 100% intakes based on flowsheet review since admission, 1 instance of 50% and 75% each.  Patient  "has been ordering 2 meals daily.      Sheron reports she is eating well here and has no nutrition questions or concerns.       ANTHROPOMETRICS  Height: 5' 2\"  Weight: 204 lbs 12.92 oz  Body mass index is 37.46 kg/m .  Weight Status:  Obesity Grade II BMI 35-39.9  Weight History:  Wt Readings from Last 10 Encounters:   03/19/24 92.9 kg (204 lb 12.9 oz)   11/06/23 81.7 kg (180 lb 1.6 oz)   11/02/23 81.7 kg (180 lb 1.6 oz)   10/30/23 81.2 kg (179 lb 1.6 oz)   10/23/23 81.2 kg (179 lb)   10/20/23 81.2 kg (179 lb)   10/18/23 81.2 kg (179 lb)   10/13/23 81.6 kg (179 lb 14.3 oz)   03/20/15 85 kg (187 lb 6.3 oz)     - Has documented trace to mild, LE edema.  - Appears to be above previous trending/wt gain noted.  Confirmed w/ patient.    LABS: Reviewed:  Lab Results   Component Value Date    A1C 8.4 03/13/2024    A1C 6.7 03/19/2015       MEDICATIONS: Reviewed.    GI: Stooling patterns noted.     SKIN: No current documentation of PI, WOCN following for surgical wound.      ASSESSED NUTRITION NEEDS PER APPROVED PRACTICE GUIDELINES:    Dosing Weight 89 kg - standing scale wt/dry wt?  Estimated Energy Needs: 20-25 Kcal/Kg  Justification: maintenance  Estimated Protein Needs: >/=1.2 g pro/Kg  Justification: preservation of lean body mass, wound healing  Estimated Fluid Needs: per MD      NUTRITION DIAGNOSIS:  Increased nutrient needs (energy/protein) related to surgical wound healing as evidenced by assessed needs as outlined.    NUTRITION INTERVENTIONS  Recommendations / Nutrition Prescription  See above.    Implementation  Nutrition education: Provided education on above.    Medical Food Supplement: As above.     Nutrition goals:  PO intakes of at least 75% of meals BID-TID while acutely admitted.    MONITORING AND EVALUATION:  Progress towards goals will be monitored and evaluated per protocol and Practice Guidelines          Chandrika Mazariegos RDN, LD  Clinical Dietitian  3rd floor/ICU: [phone]  All other floors: " [phone]  Weekend/holiday: [phone]  Office: [phone]

## 2024-03-20 ENCOUNTER — HOSPITAL ENCOUNTER (INPATIENT)
Facility: SKILLED NURSING FACILITY | Age: 79
LOS: 36 days | Discharge: HOME HEALTH | DRG: 871 | End: 2024-04-25
Attending: INTERNAL MEDICINE | Admitting: INTERNAL MEDICINE
Payer: COMMERCIAL

## 2024-03-20 VITALS
WEIGHT: 204.81 LBS | OXYGEN SATURATION: 98 % | BODY MASS INDEX: 37.69 KG/M2 | TEMPERATURE: 96.9 F | HEIGHT: 62 IN | HEART RATE: 81 BPM | DIASTOLIC BLOOD PRESSURE: 71 MMHG | SYSTOLIC BLOOD PRESSURE: 163 MMHG | RESPIRATION RATE: 16 BRPM

## 2024-03-20 DIAGNOSIS — I10 HYPERTENSION, UNSPECIFIED TYPE: ICD-10-CM

## 2024-03-20 DIAGNOSIS — R53.81 PHYSICAL DECONDITIONING: ICD-10-CM

## 2024-03-20 DIAGNOSIS — L08.9 NECK INFECTION: ICD-10-CM

## 2024-03-20 DIAGNOSIS — I48.0 PAROXYSMAL ATRIAL FIBRILLATION (H): ICD-10-CM

## 2024-03-20 DIAGNOSIS — G47.00 INSOMNIA, UNSPECIFIED TYPE: ICD-10-CM

## 2024-03-20 DIAGNOSIS — Z46.89 ENCOUNTER FOR MANAGEMENT OF VACUUM-ASSISTED CLOSURE (VAC) OF WOUND: Primary | ICD-10-CM

## 2024-03-20 DIAGNOSIS — Z98.890 H/O SPINAL SURGERY: ICD-10-CM

## 2024-03-20 LAB
BACTERIA ABSC ANAEROBE+AEROBE CULT: NORMAL
BACTERIA BLD CULT: NO GROWTH
CREAT SERPL-MCNC: 0.82 MG/DL (ref 0.51–0.95)
EGFRCR SERPLBLD CKD-EPI 2021: 72 ML/MIN/1.73M2
GLUCOSE BLDC GLUCOMTR-MCNC: 100 MG/DL (ref 70–99)
GLUCOSE BLDC GLUCOMTR-MCNC: 103 MG/DL (ref 70–99)
GLUCOSE BLDC GLUCOMTR-MCNC: 84 MG/DL (ref 70–99)
GLUCOSE BLDC GLUCOMTR-MCNC: 96 MG/DL (ref 70–99)
GLUCOSE BLDC GLUCOMTR-MCNC: 98 MG/DL (ref 70–99)

## 2024-03-20 PROCEDURE — 250N000013 HC RX MED GY IP 250 OP 250 PS 637: Performed by: ORTHOPAEDIC SURGERY

## 2024-03-20 PROCEDURE — 250N000013 HC RX MED GY IP 250 OP 250 PS 637: Performed by: INTERNAL MEDICINE

## 2024-03-20 PROCEDURE — 250N000011 HC RX IP 250 OP 636: Performed by: INTERNAL MEDICINE

## 2024-03-20 PROCEDURE — 120N000009 HC R&B SNF

## 2024-03-20 PROCEDURE — 250N000013 HC RX MED GY IP 250 OP 250 PS 637: Performed by: STUDENT IN AN ORGANIZED HEALTH CARE EDUCATION/TRAINING PROGRAM

## 2024-03-20 PROCEDURE — 250N000009 HC RX 250: Performed by: INTERNAL MEDICINE

## 2024-03-20 PROCEDURE — 99238 HOSP IP/OBS DSCHRG MGMT 30/<: CPT | Performed by: INTERNAL MEDICINE

## 2024-03-20 PROCEDURE — 250N000011 HC RX IP 250 OP 636: Performed by: ORTHOPAEDIC SURGERY

## 2024-03-20 PROCEDURE — 99232 SBSQ HOSP IP/OBS MODERATE 35: CPT | Performed by: INTERNAL MEDICINE

## 2024-03-20 PROCEDURE — 82565 ASSAY OF CREATININE: CPT | Performed by: STUDENT IN AN ORGANIZED HEALTH CARE EDUCATION/TRAINING PROGRAM

## 2024-03-20 PROCEDURE — 97605 NEG PRS WND THER DME<=50SQCM: CPT

## 2024-03-20 RX ORDER — METOPROLOL SUCCINATE 50 MG/1
100 TABLET, EXTENDED RELEASE ORAL 2 TIMES DAILY
Status: DISCONTINUED | OUTPATIENT
Start: 2024-03-20 | End: 2024-03-21

## 2024-03-20 RX ORDER — NALOXONE HYDROCHLORIDE 0.4 MG/ML
0.2 INJECTION, SOLUTION INTRAMUSCULAR; INTRAVENOUS; SUBCUTANEOUS
Status: DISCONTINUED | OUTPATIENT
Start: 2024-03-20 | End: 2024-04-25 | Stop reason: HOSPADM

## 2024-03-20 RX ORDER — LATANOPROST 50 UG/ML
1 SOLUTION/ DROPS OPHTHALMIC AT BEDTIME
Status: DISCONTINUED | OUTPATIENT
Start: 2024-03-20 | End: 2024-03-20

## 2024-03-20 RX ORDER — ATORVASTATIN CALCIUM 40 MG/1
40 TABLET, FILM COATED ORAL DAILY
Status: DISCONTINUED | OUTPATIENT
Start: 2024-03-21 | End: 2024-04-25 | Stop reason: HOSPADM

## 2024-03-20 RX ORDER — PREDNISOLONE ACETATE 10 MG/ML
1 SUSPENSION/ DROPS OPHTHALMIC DAILY
Status: DISCONTINUED | OUTPATIENT
Start: 2024-03-21 | End: 2024-04-25 | Stop reason: HOSPADM

## 2024-03-20 RX ORDER — GABAPENTIN 300 MG/1
600 CAPSULE ORAL 2 TIMES DAILY
Status: DISCONTINUED | OUTPATIENT
Start: 2024-03-20 | End: 2024-04-25 | Stop reason: HOSPADM

## 2024-03-20 RX ORDER — ACETAMINOPHEN 325 MG/1
650 TABLET ORAL DAILY PRN
Status: DISCONTINUED | OUTPATIENT
Start: 2024-03-20 | End: 2024-03-20

## 2024-03-20 RX ORDER — DEXTROSE MONOHYDRATE 25 G/50ML
25-50 INJECTION, SOLUTION INTRAVENOUS
Status: DISCONTINUED | OUTPATIENT
Start: 2024-03-20 | End: 2024-04-25 | Stop reason: HOSPADM

## 2024-03-20 RX ORDER — ACETAMINOPHEN 325 MG/1
650 TABLET ORAL 3 TIMES DAILY
Status: DISCONTINUED | OUTPATIENT
Start: 2024-03-20 | End: 2024-04-25 | Stop reason: HOSPADM

## 2024-03-20 RX ORDER — ACETAMINOPHEN 325 MG/1
650 TABLET ORAL DAILY PRN
Status: DISCONTINUED | OUTPATIENT
Start: 2024-03-20 | End: 2024-04-25 | Stop reason: HOSPADM

## 2024-03-20 RX ORDER — HYDROCODONE BITARTRATE AND ACETAMINOPHEN 5; 325 MG/1; MG/1
1 TABLET ORAL EVERY 4 HOURS PRN
Status: DISCONTINUED | OUTPATIENT
Start: 2024-03-20 | End: 2024-04-25 | Stop reason: HOSPADM

## 2024-03-20 RX ORDER — CYCLOBENZAPRINE HCL 5 MG
5 TABLET ORAL 3 TIMES DAILY PRN
Status: DISCONTINUED | OUTPATIENT
Start: 2024-03-20 | End: 2024-04-25 | Stop reason: HOSPADM

## 2024-03-20 RX ORDER — HEPARIN SODIUM,PORCINE 10 UNIT/ML
5-20 VIAL (ML) INTRAVENOUS EVERY 24 HOURS
Status: DISCONTINUED | OUTPATIENT
Start: 2024-03-20 | End: 2024-03-25

## 2024-03-20 RX ORDER — NALOXONE HYDROCHLORIDE 0.4 MG/ML
0.4 INJECTION, SOLUTION INTRAMUSCULAR; INTRAVENOUS; SUBCUTANEOUS
Status: DISCONTINUED | OUTPATIENT
Start: 2024-03-20 | End: 2024-04-25 | Stop reason: HOSPADM

## 2024-03-20 RX ORDER — ACETAMINOPHEN 325 MG/1
650 TABLET ORAL 3 TIMES DAILY
Status: DISCONTINUED | OUTPATIENT
Start: 2024-03-20 | End: 2024-03-20

## 2024-03-20 RX ORDER — CEFAZOLIN SODIUM 2 G/100ML
2 INJECTION, SOLUTION INTRAVENOUS EVERY 8 HOURS
Qty: 11100 ML | Refills: 0 | Status: COMPLETED | OUTPATIENT
Start: 2024-03-20 | End: 2024-04-24

## 2024-03-20 RX ORDER — MULTIVITAMIN,THERAPEUTIC
1 TABLET ORAL DAILY
Status: DISCONTINUED | OUTPATIENT
Start: 2024-03-21 | End: 2024-04-25 | Stop reason: HOSPADM

## 2024-03-20 RX ORDER — LATANOPROST 50 UG/ML
1 SOLUTION/ DROPS OPHTHALMIC AT BEDTIME
Status: DISCONTINUED | OUTPATIENT
Start: 2024-03-20 | End: 2024-04-25 | Stop reason: HOSPADM

## 2024-03-20 RX ORDER — PANTOPRAZOLE SODIUM 40 MG/1
40 TABLET, DELAYED RELEASE ORAL
Status: DISCONTINUED | OUTPATIENT
Start: 2024-03-20 | End: 2024-04-25 | Stop reason: HOSPADM

## 2024-03-20 RX ORDER — CEFAZOLIN SODIUM 1 G/3ML
2 INJECTION, POWDER, FOR SOLUTION INTRAMUSCULAR; INTRAVENOUS EVERY 8 HOURS
Status: DISCONTINUED | OUTPATIENT
Start: 2024-03-20 | End: 2024-03-20

## 2024-03-20 RX ORDER — KETOCONAZOLE 20 MG/ML
SHAMPOO TOPICAL DAILY PRN
Status: DISCONTINUED | OUTPATIENT
Start: 2024-03-20 | End: 2024-04-25 | Stop reason: HOSPADM

## 2024-03-20 RX ORDER — LOSARTAN POTASSIUM 50 MG/1
50 TABLET ORAL DAILY
Status: DISCONTINUED | OUTPATIENT
Start: 2024-03-21 | End: 2024-03-25

## 2024-03-20 RX ORDER — NICOTINE POLACRILEX 4 MG
15-30 LOZENGE BUCCAL
Status: DISCONTINUED | OUTPATIENT
Start: 2024-03-20 | End: 2024-04-25 | Stop reason: HOSPADM

## 2024-03-20 RX ORDER — LEVOTHYROXINE SODIUM 88 UG/1
88 TABLET ORAL
Status: DISCONTINUED | OUTPATIENT
Start: 2024-03-21 | End: 2024-04-25 | Stop reason: HOSPADM

## 2024-03-20 RX ORDER — TIMOLOL MALEATE 5 MG/ML
1 SOLUTION/ DROPS OPHTHALMIC 2 TIMES DAILY
Status: DISCONTINUED | OUTPATIENT
Start: 2024-03-20 | End: 2024-04-25 | Stop reason: HOSPADM

## 2024-03-20 RX ORDER — TIMOLOL MALEATE 5 MG/ML
1 SOLUTION/ DROPS OPHTHALMIC 2 TIMES DAILY
Status: DISCONTINUED | OUTPATIENT
Start: 2024-03-20 | End: 2024-03-20

## 2024-03-20 RX ORDER — HEPARIN SODIUM,PORCINE 10 UNIT/ML
5-20 VIAL (ML) INTRAVENOUS
Status: DISCONTINUED | OUTPATIENT
Start: 2024-03-20 | End: 2024-03-25

## 2024-03-20 RX ORDER — PREDNISOLONE ACETATE 10 MG/ML
1 SUSPENSION/ DROPS OPHTHALMIC DAILY
Status: DISCONTINUED | OUTPATIENT
Start: 2024-03-21 | End: 2024-03-20

## 2024-03-20 RX ADMIN — POLYETHYLENE GLYCOL 3350 17 G: 17 POWDER, FOR SOLUTION ORAL at 08:48

## 2024-03-20 RX ADMIN — APIXABAN 5 MG: 2.5 TABLET, FILM COATED ORAL at 20:52

## 2024-03-20 RX ADMIN — LATANOPROST 1 DROP: 50 SOLUTION OPHTHALMIC at 22:08

## 2024-03-20 RX ADMIN — TIMOLOL MALEATE 1 DROP: 5 SOLUTION/ DROPS OPHTHALMIC at 08:50

## 2024-03-20 RX ADMIN — APIXABAN 5 MG: 5 TABLET, FILM COATED ORAL at 08:47

## 2024-03-20 RX ADMIN — TIMOLOL MALEATE 1 DROP: 5 SOLUTION/ DROPS OPHTHALMIC at 22:02

## 2024-03-20 RX ADMIN — PANTOPRAZOLE SODIUM 40 MG: 40 TABLET, DELAYED RELEASE ORAL at 18:17

## 2024-03-20 RX ADMIN — HEPARIN, PORCINE (PF) 10 UNIT/ML INTRAVENOUS SYRINGE 5 ML: at 18:18

## 2024-03-20 RX ADMIN — GABAPENTIN 600 MG: 300 CAPSULE ORAL at 08:47

## 2024-03-20 RX ADMIN — METOPROLOL SUCCINATE 100 MG: 100 TABLET, EXTENDED RELEASE ORAL at 08:47

## 2024-03-20 RX ADMIN — LOSARTAN POTASSIUM 50 MG: 50 TABLET, FILM COATED ORAL at 08:47

## 2024-03-20 RX ADMIN — ATORVASTATIN CALCIUM 40 MG: 40 TABLET, FILM COATED ORAL at 08:46

## 2024-03-20 RX ADMIN — PANTOPRAZOLE SODIUM 40 MG: 40 TABLET, DELAYED RELEASE ORAL at 08:47

## 2024-03-20 RX ADMIN — HYDROCHLOROTHIAZIDE 25 MG: 25 TABLET ORAL at 08:48

## 2024-03-20 RX ADMIN — CEFAZOLIN SODIUM 2 G: 2 INJECTION, SOLUTION INTRAVENOUS at 20:53

## 2024-03-20 RX ADMIN — PREDNISOLONE ACETATE 1 DROP: 10 SUSPENSION/ DROPS OPHTHALMIC at 08:46

## 2024-03-20 RX ADMIN — CEFAZOLIN SODIUM 2 G: 2 INJECTION, SOLUTION INTRAVENOUS at 13:01

## 2024-03-20 RX ADMIN — ACETAMINOPHEN 650 MG: 325 TABLET, FILM COATED ORAL at 20:53

## 2024-03-20 RX ADMIN — CEFAZOLIN SODIUM 2 G: 2 INJECTION, SOLUTION INTRAVENOUS at 04:13

## 2024-03-20 RX ADMIN — HYDROMORPHONE HYDROCHLORIDE 0.1 MG: 0.2 INJECTION, SOLUTION INTRAMUSCULAR; INTRAVENOUS; SUBCUTANEOUS at 10:04

## 2024-03-20 RX ADMIN — LEVOTHYROXINE SODIUM 88 MCG: 0.09 TABLET ORAL at 08:48

## 2024-03-20 RX ADMIN — GABAPENTIN 600 MG: 300 CAPSULE ORAL at 20:53

## 2024-03-20 RX ADMIN — Medication 1 TABLET: at 08:47

## 2024-03-20 RX ADMIN — SENNOSIDES AND DOCUSATE SODIUM 1 TABLET: 50; 8.6 TABLET ORAL at 08:47

## 2024-03-20 ASSESSMENT — ACTIVITIES OF DAILY LIVING (ADL)
ADLS_ACUITY_SCORE: 31
ADLS_ACUITY_SCORE: 31
ADLS_ACUITY_SCORE: 36
ADLS_ACUITY_SCORE: 31
ADLS_ACUITY_SCORE: 27
ADLS_ACUITY_SCORE: 28
ADLS_ACUITY_SCORE: 31
WEAR_GLASSES_OR_BLIND: YES
ADLS_ACUITY_SCORE: 27
ADLS_ACUITY_SCORE: 28
ADLS_ACUITY_SCORE: 31
DOING_ERRANDS_INDEPENDENTLY_DIFFICULTY: YES
ADLS_ACUITY_SCORE: 31
ADLS_ACUITY_SCORE: 28
ADLS_ACUITY_SCORE: 31
ADLS_ACUITY_SCORE: 27

## 2024-03-20 NOTE — PROGRESS NOTES
Occupational Therapy Discharge Summary    Reason for therapy discharge:    Discharged to transitional care facility.    Progress towards therapy goal(s). See goals on Care Plan in [facility] electronic health record for goal details.  Goals partially met.  Barriers to achieving goals:   discharge from facility.    Therapy recommendation(s):    Continued therapy is recommended.  Rationale/Recommendations:  to progress indp with self care and ADL.

## 2024-03-20 NOTE — PROGRESS NOTES
I saw and examined this patient today.    Patient remains medically stable for TCU    I communicated with care coordinator, and TCU bed is available today    Patient to discharge to TCU today

## 2024-03-20 NOTE — PROGRESS NOTES
[facility]  Infectious Disease Progress Note          Assessment and Plan:   Date of Admission:  3/12/2024  Date of Consult (When I saw the patient): 03/13/24        Assessment & Plan  Lj Castro is a 78 year old who was admitted on 3/12/2024.      Impression: 1 78-year-old female, acute sepsis and neck pain where she has recent hardware surgical repair, has an obvious deep infection to the hardware and also sepsis with Staph aureus bacteremia  2 Staph aureus bacteremia, sensitive by gene testing, no other obvious secondary involved sites does have left total shoulder  3 diabetes mellitus  4 infectious encephalopathy  5 atrial fibrillation  Op culture growing Staph aureus as well    REC 1 blood cultures have cleared no obvious secondary involved sites, aggressively clinically better  2 PICC in, Ancef IV outpatient orders in place, looks like transitional care disposition today  3 at least 2 drug interactions with rifampin so we will hold off for now but probably added still in the next 2 or 3 weeks.  4 see me in clinic in 4 to 5 weeks will follow labs and overall clinical course at the rehab center              Interval History:     no new complaints and doing well; no cp, sob, n/v/d, or abd pain.  Feels quite well, pain controlled, temp down, follow cultures March 13 positive, March 14 negative, operative cultures same Staph aureus she feels well the wound is okay, no other focal symptom              Medications:      apixaban ANTICOAGULANT  5 mg Oral BID    atorvastatin  40 mg Oral Daily    ceFAZolin  2 g Intravenous Q8H    gabapentin  600 mg Oral BID    hydrochlorothiazide  25 mg Oral Daily    insulin aspart   Subcutaneous TID w/meals    insulin aspart  1-7 Units Subcutaneous TID AC    insulin aspart  1-5 Units Subcutaneous At Bedtime    insulin glargine  22 Units Subcutaneous QAM AC    latanoprost  1 drop Both Eyes At Bedtime    levothyroxine  88 mcg Oral QAM AC    losartan  50 mg  "Oral Daily    metoprolol succinate ER  100 mg Oral BID    multivitamin w/minerals  1 tablet Oral Daily    pantoprazole  40 mg Oral BID    polyethylene glycol  17 g Oral Daily    prednisoLONE acetate  1 drop Left Eye Daily    senna-docusate  1 tablet Oral BID    sodium chloride (PF)  10-40 mL Intracatheter Q7 Days    sodium chloride (PF)  3 mL Intracatheter Q8H    timolol maleate  1 drop Both Eyes BID                  Physical Exam:   Blood pressure (!) 145/56, pulse 76, temperature 97.8  F (36.6  C), temperature source Temporal, resp. rate 16, height 1.575 m (5' 2\"), weight 92.9 kg (204 lb 12.9 oz), SpO2 95%.  Wt Readings from Last 2 Encounters:   03/19/24 92.9 kg (204 lb 12.9 oz)   11/06/23 81.7 kg (180 lb 1.6 oz)     Vital Signs with Ranges  Temp:  [97.5  F (36.4  C)-98  F (36.7  C)] 97.8  F (36.6  C)  Pulse:  [59-82] 76  Resp:  [16] 16  BP: (124-158)/(56-72) 145/56  SpO2:  [95 %-99 %] 95 %    Constitutional: Awake, alert, cooperative, no apparent distress mentation okay, not septic looking     Lungs: Clear to auscultation bilaterally, no crackles or wheezing   Cardiovascular: Regular rate and rhythm, normal S1 and S2, and no murmur noted   Abdomen: Normal bowel sounds, soft, non-distended, non-tender   Skin: No rashes, no cyanosis, no edema   Other: Spine wound seen and looks quite clean, but open still significant she is very tender in the tissues around it but no obvious abscess            Data:   All microbiology laboratory data reviewed.  Recent Labs   Lab Test 03/16/24  0616 03/15/24  0800 03/14/24  0510   WBC 9.2 7.8 7.3   HGB 10.5* 9.6* 8.2*   HCT 34.1* 30.7* 26.0*   MCV 97 97 95    237 220     Recent Labs   Lab Test 03/20/24  0510 03/19/24  0611 03/18/24  0519   CR 0.82 0.84 0.91     Recent Labs   Lab Test 03/12/24  0855   SED 72*     No lab results found.    Invalid input(s): \"UC\"     "

## 2024-03-20 NOTE — DISCHARGE SUMMARY
"[facility]  Hospitalist Discharge Summary      Date of Admission:  3/12/2024  Date of Discharge:  3/20/2024  Discharging Provider: Rudy Sarmiento MD  Discharge Service: Hospitalist Service    Discharge Diagnoses   Severe sepsis due MSSA from deep space infection in the posterior cervical spine resulting in MSSA bacteremia  -PICC line placed this admission with 5-week IV Ancef course planned on discharge    S/p I&D of c-spine abscess this admission.    -Operative cultures positive for MSSA  -Post-surgery, treated with wound VAC    PMH:  History of C-spine laminectomy with posterior fusion and segmental instrumentation for progressive myelopathy due to stenosis in October 2023  DM  Afib  HTN  Hyperlipidemia  Hypothyroidism  Chronic kidney disease baseline creatinine near 1.2  Reflux disease        Clinically Significant Risk Factors     # Obesity: Estimated body mass index is 37.46 kg/m  as calculated from the following:    Height as of this encounter: 1.575 m (5' 2\").    Weight as of this encounter: 92.9 kg (204 lb 12.9 oz).       Follow-ups Needed After Discharge   Follow-up Appointments     Follow Up and recommended labs and tests      Follow up with primary care provider in 10-14 days s/p I&D for staple   removal    Follow up with Dr. Cadena of orthopedic spine surgery in 2-4 weeks.  Call   [phone] to arrange an appointment    Follow up with Dr. Alexis of infectious disease in 4-5 weeks to review   antibiotic treatment.  Call [phone] to arrange an appointment        Follow Up and recommended labs and tests      Follow up with primary care provider in 2 weeks.  No follow up labs or   test are needed.            Unresulted Labs Ordered in the Past 30 Days of this Admission       Date and Time Order Name Status Description    3/15/2024 11:17 AM Blood Culture Hand, Left Preliminary             Discharge Disposition   Discharged to short-term care facility  Condition at discharge: " Stable    Hospital Course   78 year old female admitted on 3/12/2024. 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      CT scan of the neck showed a deep space infection with gas formation and possible cord compression.  She was given Kcentra and taken to the operating room.  She underwent I&D with wound VAC application along with IntraOp culture and IV antibiotics.  Operative course was significant for hypotension requiring central line and pressor support and so she was admitted to the ICU.  Operative and blood cultures subsequently grew MSSA.  Patient clinically improved while hospitalized with resolution of sepsis.  Her initial presentation with weakness improved with drainage of C-spine abscess    Most recent positive blood culture for MSSA was on 3/13.  Repeat blood cultures 3/14 and 3/15 remain no growth to date.  PICC line was placed on 3/17 for recommended 37-day IV Ancef course on discharge    Infection disease recommended follow-up in their clinic in 4 to 5 weeks to discuss possible addition of rifampin at some point in the next several weeks    The patient's Eliquis was initially held, and resumed on 3/19/2024 when neurosurgery felt it was safe to resume    Patient discharged to TCU today    Consultations This Hospital Stay   PHARMACY TO DOSE VANCO  INFECTIOUS DISEASES IP CONSULT  PHARMACY TO DOSE VANCO  PHARMACY IP CONSULT  PHARMACY TO DOSE VANCO  PHARMACY IP CONSULT  OCCUPATIONAL THERAPY ADULT IP CONSULT  PHYSICAL THERAPY ADULT IP CONSULT  HOSPITALIST IP CONSULT  WOUND OSTOMY  CONTINENCE NURSE  IP CONSULT  CARE MANAGEMENT / SOCIAL WORK IP CONSULT  VASCULAR ACCESS ADULT IP CONSULT  PHYSICAL THERAPY ADULT IP CONSULT  OCCUPATIONAL THERAPY ADULT IP CONSULT  VASCULAR ACCESS ADULT IP CONSULT  VASCULAR ACCESS ADULT IP CONSULT  PHYSICAL THERAPY ADULT IP CONSULT  OCCUPATIONAL THERAPY ADULT IP CONSULT    Code Status   Full Code    Time Spent on this Encounter   I, Rudy Sarmiento MD, personally saw the patient today and spent less than or equal to 30 minutes discharging this patient.       Rudy Sarmiento MD  [facility]  [address] [facility] [address]  Phone: [phone]  Fax: [phone]  ______________________________________________________________________    Physical Exam   Vital Signs: Temp: 97.8  F (36.6  C) Temp src: Temporal BP: (!) 145/56 Pulse: 76   Resp: 16 SpO2: 95 % O2 Device: None (Room air)    Weight: 204 lbs 12.92 oz  Awake, alert, oriented x 3  Cardiovascular exam: Irregular rhythm  Lung exam: Clear to auscultation bilaterally  Abdomen: Nontender/nondistended  Neurologic exam: Nonfocal       Primary Care Physician   Cory Soliz    Discharge Orders      General info for SNF    Length of Stay Estimate: Short Term Care: Estimated # of Days <30  Condition at Discharge: Improving  Level of care:skilled   Rehabilitation Potential: Good  Admission H&P remains valid and up-to-date: Yes  Recent Chemotherapy: N/A  Use Nursing Home Standing Orders: Yes     Mantoux instructions    Give two-step Mantoux (PPD) Per Facility Policy Yes     Reason for your hospital stay    S/p  irrigation and debridement and wound vac application to posterior cervical sine after a soft tissue infection requiring IV abx and I&D     Glucose monitor nursing POCT    Before meals and at bedtime     Wound care    Site:   Posterior Cervical Spine     Activity - Up ad elissa     Follow Up and recommended labs and tests    Follow up with primary care provider in 10-14  days s/p I&D for staple removal    Follow up with Dr. Cadena of orthopedic spine surgery in 2-4 weeks.  Call [phone] to arrange an appointment    Follow up with Dr. Alexis of infectious disease in 4-5 weeks to review antibiotic treatment.  Call [phone] to arrange an appointment     General info for SNF    Length of Stay Estimate: Short Term Care: Estimated # of Days <30  Condition at Discharge: Improving  Level of care:skilled   Rehabilitation Potential: Good  Admission H&P remains valid and up-to-date: Yes  Recent Chemotherapy: N/A  Use Nursing Home Standing Orders: Yes     Mantoux instructions    Give two-step Mantoux (PPD) Per Facility Policy Yes     Follow Up and recommended labs and tests    Follow up with primary care provider in 2 weeks.  No follow up labs or test are needed.     Wound care (specify)    Site:   Posterior neck  Instructions:  Keep wound vac at 50mL of continuous pressure. Change vac and dressing every Q3days until closure.     Physical Therapy Adult Consult    Evaluate and treat as clinically indicated.    Reason:  s/p  irrigation and debridement and wound vac application to posterior cervical sine     Occupational Therapy Adult Consult    Evaluate and treat as clinically indicated.    Reason:  s/p  irrigation and debridement and wound vac application to posterior cervical sine     Physical Therapy Adult Consult    Evaluate and treat as clinically indicated.     Occupational Therapy Adult Consult    Evaluate and treat as clinically indicated.     Diet    Follow this diet upon discharge: Orders Placed This Encounter      Advance Diet as Tolerated: Regular Diet Adult     Diet    Follow this diet upon discharge: Regular       Significant Results and Procedures   Results for orders placed or performed during the hospital encounter of 03/12/24   Head CT w/o contrast    Narrative    CT OF THE HEAD WITHOUT CONTRAST  3/12/2024 10:11 AM     COMPARISON: None.    HISTORY: Altered mental  status.    TECHNIQUE: 5 mm thick axial CT images of the head were acquired  without IV contrast material.    FINDINGS:  There is moderate diffuse cerebral volume loss. There are  subtle patchy areas of decreased density in the cerebral white matter  bilaterally that are consistent with sequela of chronic small vessel  ischemic disease.     The ventricles and basal cisterns are within normal limits in  configuration given the degree of cerebral volume loss.  There is no  midline shift. There are no extra-axial fluid collections.     No intracranial hemorrhage, mass or recent infarct.    Possible acute left maxillary sinusitis. There is no mastoiditis.  There are no fractures of the visualized bones.       Impression    IMPRESSION: Possible acute left maxillary sinusitis. Diffuse cerebral  volume loss and cerebral white matter changes consistent with chronic  small vessel ischemic disease. No evidence for acute intracranial  pathology.      Radiation dose for this scan was reduced using automated exposure  control, adjustment of the mA and/or kV according to patient size, or  iterative reconstruction technique.    JAMAL COLEMAN MD         SYSTEM ID:  [id number]   CT Cervical Spine w/o Contrast    Narrative    CT OF THE CERVICAL SPINE WITHOUT CONTRAST   3/12/2024 10:12 AM     COMPARISON: None available.    HISTORY: Altered mental status, lower neck and upper thoracic spine  pain (please include down to T3). Neck pain. History spine surgery. No  suspected hardware failure. No suspected cervical disc disease.     TECHNIQUE: Axial images of the cervical spine were acquired without  intravenous contrast. Multiplanar reformations were created.      FINDINGS: There is posterior spinal fusion hardware from C3 down to  T2. Hardware appears well-positioned with no evidence for hardware  failure or loosening. There are laminectomies of C4, C5, C6, C7 and  T1. There is mixed fluid and gas in the posterior paraspinous  tissues  that may be due to recent surgery. Clinical correlation recommended.  If there is no history of recent surgery, infected fluid collection  must be considered.    There is minimal degenerative retrolisthesis of C2 upon C3. There is  minimal degenerative anterolisthesis of C7 upon T1. Alignment  otherwise normal; however, there is straightening of normal cervical  lordosis. Vertebral body heights of the cervical spine are normal.  Craniocervical alignment is normal. There is no evidence for fracture  of the cervical spine. Loss of disc space height and degenerative  endplate spurring at C4-C5, C5-C6 and C6-C7.      Impression    IMPRESSION:  1. Postoperative and degenerative change of the cervical spine as  described above.  2. Fluid and gas collection in the posterior paraspinous soft tissues  possibly due to recent surgery. If there is no history of recent  surgery, infection must be considered.  3. No fractures. No evidence for hardware failure or loosening.      Radiation dose for this scan was reduced using automated exposure  control, adjustment of the mA and/or kV according to patient size, or  iterative reconstruction technique.    JAMAL COLEMAN MD         SYSTEM ID:  [id number]   XR Chest Port 1 View    Narrative    EXAM: XR CHEST PORT 1 VIEW  LOCATION: [facility]  DATE: 3/13/2024    INDICATION: central line placement  COMPARISON: Chest radiograph 06/12/2011.      Impression    IMPRESSION: Right internal jugular venous catheter tip near the superior cavoatrial junction. Mild atelectasis/infiltrate of the lower left lung. Right lung clear. Normal heart size and pulmonary vascularity. Calcified plaque aortic arch. No   pneumothorax. Left glenohumeral arthroplasty. Cervicothoracic spinal fusion hardware.   XR Chest Port 1 View    Narrative    EXAM: XR CHEST PORTABLE 1 VIEW  LOCATION: [facility]  DATE: 03/17/2024    INDICATION: RN placed PICC, verify tip  placement.  COMPARISON: Chest CT 06/10/2011 and chest film 2024.      Impression    IMPRESSION: Right PICC line with catheter tip projected over the low SVC region in satisfactory position. Calcified bilateral pleural plaques. Low lung volumes. Small amount of infiltrate versus atelectasis left lung base. Normal heart size and pulmonary   vascularity. Surgical changes left shoulder arthroplasty and lower cervical spine.     Echocardiogram Complete     Value    LVEF  55%    [facility]    [id number]  [id number]  [id number]  [id number]^TIANNA^EMMANUEL^JULIO     [facility]  Echocardiography Laboratory  [address] [place] [address]     Name: REI GRIER  MRN: [id number]  : 1945  Study Date: 2024 07:26 AM  Age: 78 yrs  Gender: Female  Patient Location: [facility]  Reason For Study: Endocarditis  Ordering Physician: EMMANUEL WYNN  Referring Physician: Cory Soliz  Performed By: Ron Vasquez RDCS     BSA: 1.8 m2  Height: 62 in  Weight: 170 lb  HR: 77  BP: 98/62 mmHg  ______________________________________________________________________________  Procedure  Complete Echo Adult.  ______________________________________________________________________________  Interpretation Summary     1. The left ventricle is normal in structure, function and size. The visual  ejection fraction is estimated at 55%.  2. The right ventricle is normal in structure, function and size.  3. There is moderate to mod-severe (2-3+) tricuspid regurgitation. The right  ventricular systolic pressure is approximated at 38mmHg plus the right atrial  pressure.     [facility] EF 60%, 1+ TR.  ______________________________________________________________________________  Left Ventricle  The left ventricle is normal in structure, function and size. There is normal  left ventricular wall thickness. The visual ejection fraction is estimated at  55%. Diastolic function not assessed due to  atrial fibrillation. Normal left  ventricular wall motion.     Right Ventricle  The right ventricle is normal in structure, function and size.     Atria  The left atrium is mildly dilated. The right atrium is mildly dilated. There  is no atrial shunt seen.     Mitral Valve  There is mild (1+) mitral regurgitation.     Tricuspid Valve  There is moderate to mod-severe (2-3+) tricuspid regurgitation. The right  ventricular systolic pressure is approximated at 38mmHg plus the right atrial  pressure.     Aortic Valve  There is mild (1+) aortic regurgitation.     Pulmonic Valve  The pulmonic valve is normal in structure and function.     Vessels  Normal ascending, transverse (arch), and descending aorta. The inferior vena  cava was normal in size with preserved respiratory variability.     Pericardium  There is no pericardial effusion.     Rhythm  The rhythm was atrial fibrillation.  ______________________________________________________________________________  MMode/2D Measurements & Calculations  IVSd: 1.0 cm     LVIDd: 3.7 cm  LVIDs: 2.3 cm  LVPWd: 0.99 cm  IVC diam: 1.8 cm  FS: 36.6 %  LV mass(C)d: 112.4 grams  LV mass(C)dI: 63.0 grams/m2  Ao root diam: 2.8 cm  asc Aorta Diam: 3.3 cm  LVOT diam: 1.8 cm  LVOT area: 2.5 cm2  Ao root diam index Ht(cm/m): 1.8  Ao root diam index BSA (cm/m2): 1.6  Asc Ao diam index BSA (cm/m2): 1.8  Asc Ao diam index Ht(cm/m): 2.1  LA Volume (BP): 66.3 ml     LA Volume Index (BP): 37.2 ml/m2  RWT: 0.54  TAPSE: 1.5 cm     Doppler Measurements & Calculations  MV E max eylse: 102.2 cm/sec  MV max P.5 mmHg  MV mean P.5 mmHg  MV V2 VTI: 26.1 cm  AI P1/2t: 394.8 msec  PA acc time: 0.07 sec  TR max elyse: 305.7 cm/sec  TR max P.5 mmHg  E/E' av.7  Lateral E/e': 6.8  Medial E/e': 10.5     ______________________________________________________________________________  Report approved by: Tommy Cody 2024 02:51 PM             Discharge Medications   Current Discharge  H/O carotid stenosis Medication List        START taking these medications    Details   ceFAZolin (ANCEF) 1 GM vial Inject 2 g into the vein every 8 hours for 37 days ESR,CRP,CBC with differential, creatinine, SGOT weekly while on this medication to be faxed to Dr. Alexis office.  Qty: 1 mL, Refills: 1    Associated Diagnoses: Gram-positive bacteremia      cyclobenzaprine (FLEXERIL) 5 MG tablet Take 1 tablet (5 mg) by mouth 3 times daily as needed for muscle spasms  Qty: 56 tablet, Refills: 0    Associated Diagnoses: Neck infection; Open wound involving head with neck, complicated; H/O Spinal surgery      HYDROcodone-acetaminophen (NORCO) 5-325 MG tablet Take 1.5 tablets by mouth every 4 hours as needed for severe pain  Qty: 40 tablet, Refills: 0    Associated Diagnoses: Neck infection; Open wound involving head with neck, complicated; H/O Spinal surgery      !! insulin aspart (NOVOLOG PEN) 100 UNIT/ML pen For Pre-Meal  - 189 give 1 unit.   For Pre-Meal  - 239 give 2 units.   For Pre-Meal  - 289 give 3 units.   For Pre-Meal  - 339 give 4 units.   For Pre-Meal -399 give 5 units.  For Pre-Meal -449 give 6 units.  For Pre-Meal BG = or > 450 give 7 units.    Associated Diagnoses: Type 2 diabetes mellitus without complication, with long-term current use of insulin (H)       !! - Potential duplicate medications found. Please discuss with provider.        CONTINUE these medications which have NOT CHANGED    Details   acetaminophen (TYLENOL) 325 MG tablet Take 2 tablets (650 mg) by mouth 3 times daily. May also take 2 tablets (650 mg) daily as needed for mild pain.  Qty: 30 tablet, Refills: 0    Associated Diagnoses: S/P laminectomy      apixaban ANTICOAGULANT (ELIQUIS) 5 MG tablet Take 5 mg by mouth 2 times daily      atorvastatin (LIPITOR) 40 MG tablet Take 1 tablet by mouth daily      gabapentin (NEURONTIN) 300 MG capsule Take 600 mg by mouth 2 times daily      hydrochlorothiazide (HYDRODIURIL) 25 MG  tablet Take 1 tablet (25 mg) by mouth daily  Qty: 30 tablet, Refills: 0    Associated Diagnoses: Hypertension, unspecified type      !! insulin aspart (NOVOLOG PEN) 100 UNIT/ML pen Inject 3 Units Subcutaneous daily (with breakfast)      !! insulin aspart (NOVOLOG PEN) 100 UNIT/ML pen Inject 3 Units Subcutaneous daily (with lunch)      !! insulin aspart (NOVOLOG PEN) 100 UNIT/ML pen Inject 5 Units Subcutaneous daily (with dinner)      insulin glargine (LANTUS PEN) 100 UNIT/ML pen Inject 18 Units Subcutaneous at bedtime  Qty: 15 mL, Refills: 0    Comments: If Lantus is not covered by insurance, may substitute Basaglar or Semglee or other insulin glargine product per insurance preference at same dose and frequency.    Associated Diagnoses: Type 2 diabetes mellitus with unspecified complications (H)      ketoconazole (NIZORAL) 2 % external shampoo Shampoo the hair thoroughly each as need.      latanoprost (XALATAN) 0.005 % ophthalmic solution Place 1 drop into both eyes at bedtime      levothyroxine (SYNTHROID/LEVOTHROID) 88 MCG tablet Take 88 mcg by mouth daily      losartan (COZAAR) 50 MG tablet Take 1 tablet (50 mg) by mouth daily HOLD if blood pressure <100  Qty: 30 tablet, Refills: 0    Associated Diagnoses: Hypertension, unspecified type      melatonin 3 MG tablet Take 1 tablet (3 mg) by mouth nightly as needed for sleep  Qty: 30 tablet, Refills: 0    Associated Diagnoses: Insomnia, unspecified type      metoprolol succinate ER (TOPROL XL) 100 MG 24 hr tablet Take 1 tablet (100 mg) by mouth 2 times daily  Qty: 30 tablet, Refills: 0    Associated Diagnoses: Hypertension, unspecified type      Multiple Vitamins-Calcium (ONE-A-DAY WOMENS FORMULA PO) Take 1 tablet by mouth daily      omeprazole (PRILOSEC) 40 MG DR capsule Take 40 mg by mouth daily      prednisoLONE acetate (PRED FORTE) 1 % ophthalmic suspension Place 1 drop Into the left eye daily      timolol maleate (TIMOPTIC) 0.5 % ophthalmic solution Place 1 drop  into both eyes 2 times daily       !! - Potential duplicate medications found. Please discuss with provider.        Allergies   No Known Allergies   Discharge planning issues H/O carotid stenosis

## 2024-03-20 NOTE — PROGRESS NOTES
Vitals stable.  Doing well.  Tolerating wound vac changes.   Awaiting placement.  Elloquis resumed.  Ambulating.

## 2024-03-20 NOTE — PROGRESS NOTES
Care Management Follow Up    Length of Stay (days): 8    Expected Discharge Date: 03/21/2024     Concerns to be Addressed: discharge planning     Patient plan of care discussed at interdisciplinary rounds: Yes    Anticipated Discharge Disposition: Skilled Nursing Facility, Transitional Care    Patient/family educated on Medicare website which has current facility and service quality ratings: yes  Patient/Family in Agreement with the Plan: yes    Referrals Placed by CM/SW: Post Acute Facilities    Additional Information:  Patient has been declined by the following TCUs: [facility], [facility], [facility]. Referral to [facility] is still pending at this time. Called [facility] admissions dept and left voicemail for TCU to call back to discuss. Referral sent to  TCU /  [facility] liaison to review as well. Awaiting responses from facilities.       ADDENDUM 3/20/24 1141:     Care Management Discharge Note    Discharge Date: 03/20/2024     Discharge Disposition: Skilled Nursing Facility, Transitional Care    Discharge Transportation: agency    Private pay costs discussed: transportation costs    PAS Confirmation Code: [id number]  Patient/family educated on Medicare website which has current facility and service quality ratings: yes    Education Provided on the Discharge Plan:  yes  Persons Notified of Discharge Plans: patient, pts dtr, bedside nurse, charge nurse, [facility] TCU liaison Pauline  Patient/Family in Agreement with the Plan: yes    Additional Information:  Patient is medically ready for discharge to TCU. Patient has been accepted at [facility] ([address] [place] [address]).  TCU will have on site WOCN to see patient 3x week to assess wound vac. Pt will have a semi private room with her own room area but share a bathroom with another female client.     Met with patient and pt's daughter at bedside to discuss. They are happy about pt's acceptance, ok with the  semi private room situation, but concerned about the longer drive for pt's spouse. Ultimately they discussed with spouse Julio C and he/pt/family are in agreement with discharge to  TCU today. Julio C will bring pt's clothes and toiletries from home today to hospital prior to discharge for patient to bring to TCU.     [facility] w/c transport has been set up for  today at in hospital room between [id number]. PAS has been completed. Discharge orders have been faxed to  TCU. No further discharge needs noted at this time.       Patience Unger, RN  Care Coordinator  [facility]   [facility]  [phone]

## 2024-03-20 NOTE — PHARMACY-ADMISSION MEDICATION HISTORY
Admission medication history completed at [facility]. Please see Pharmacist Admission Medication History note from 3/12/2024.

## 2024-03-20 NOTE — PLAN OF CARE
"Patient vital signs are at baseline: Yes  Patient able to ambulate as they were prior to admission or with assist devices provided by therapies during their stay:  Yes  Patient MUST void prior to discharge:  Yes  Patient able to tolerate oral intake:  Yes  Pain has adequate pain control using Oral analgesics:  Yes  Does patient have an identified :  Yes  Has goal D/C date and time been discussed with patient:  Yes    BP (!) 143/63   Pulse 59   Temp 97.5  F (36.4  C) (Temporal)   Resp 16   Ht 1.575 m (5' 2\")   Wt 92.9 kg (204 lb 12.9 oz)   SpO2 97%   BMI 37.46 kg/m       Pt is alert is alert and oriented x4. Pt denies pain or discomfort. Pt has a wound vac @ 50mmhg. pt has a PICC line single lumen which is intact and patent. Blood return noted. Pt is on blood sugar checks. Pt is on Ancef for Bacteremia. Pt is on Eliquis for Afib. Infectious disease following. Pt is assist of one in transfer and cares. Pt is sleeping in bed. Bed alarm in place and call light within reach. Will continue to monitor and assess pt.   "

## 2024-03-20 NOTE — PLAN OF CARE
"Physical Therapy Discharge Summary    Reason for therapy discharge:    Discharged to transitional care facility.    Progress towards therapy goal(s). See goals on Care Plan in [facility] electronic health record for goal details.  Goals partially met.  Barriers to achieving goals:   discharge from facility.    Therapy recommendation(s):    Continued therapy is recommended.  Rationale/Recommendations:  Per prior PT recommendation, \"Pt is making gains with mobility but still requiring Ax1 and quite limited for ambulation distance. Rec TCU to help improve strength, mobility, and balance as well as to manage wound vac\".      "

## 2024-03-20 NOTE — PLAN OF CARE
"Pt A&O x4. Up w/ Ax1, using gait belt, and walker. Wound vac dressing changed by [facility] nurse. Voiding. Tolerating regular diet.     Wound vac clamped and covered per TCU directions for transport.    Reviewed discharge instructions with patient and spouse. Questions answered. [facility] with [facility] instructions, and belongings.    Problem: Adult Inpatient Plan of Care  Goal: Plan of Care Review  Description: The Plan of Care Review/Shift note should be completed every shift.  The Outcome Evaluation is a brief statement about your assessment that the patient is improving, declining, or no change.  This information will be displayed automatically on your shift  note.  Outcome: Adequate for Care Transition  Goal: Patient-Specific Goal (Individualized)  Description: You can add care plan individualizations to a care plan. Examples of Individualization might be:  \"Parent requests to be called daily at 9am for status\", \"I have a hard time hearing out of my right ear\", or \"Do not touch me to wake me up as it startles  me\".  Outcome: Adequate for Care Transition  Goal: Absence of Hospital-Acquired Illness or Injury  Outcome: Adequate for Care Transition  Intervention: Identify and Manage Fall Risk  Recent Flowsheet Documentation  Taken 3/20/2024 0854 by Yen Oneal RN  Safety Promotion/Fall Prevention:   activity supervised   assistive device/personal items within reach   nonskid shoes/slippers when out of bed   safety round/check completed  Intervention: Prevent Skin Injury  Recent Flowsheet Documentation  Taken 3/20/2024 0854 by Yen Oneal RN  Device Skin Pressure Protection: positioning supports utilized  Intervention: Prevent and Manage VTE (Venous Thromboembolism) Risk  Recent Flowsheet Documentation  Taken 3/20/2024 0854 by Yen Oneal RN  VTE Prevention/Management: SCDs (sequential compression devices) on  Intervention: Prevent Infection  Recent Flowsheet " Documentation  Taken 3/20/2024 0854 by Yen Oneal RN  Infection Prevention: rest/sleep promoted  Goal: Optimal Comfort and Wellbeing  Outcome: Adequate for Care Transition  Intervention: Monitor Pain and Promote Comfort  Recent Flowsheet Documentation  Taken 3/20/2024 1004 by Yen Oneal RN  Pain Management Interventions: medication (see MAR)  Taken 3/20/2024 0839 by Yen Oneal RN  Pain Management Interventions: declines  Goal: Readiness for Transition of Care  Outcome: Adequate for Care Transition     Problem: Infection  Goal: Absence of Infection Signs and Symptoms  Outcome: Adequate for Care Transition     Problem: Fall Injury Risk  Goal: Absence of Fall and Fall-Related Injury  Outcome: Adequate for Care Transition  Intervention: Identify and Manage Contributors  Recent Flowsheet Documentation  Taken 3/20/2024 0854 by Yen Oneal RN  Medication Review/Management: medications reviewed  Intervention: Promote Injury-Free Environment  Recent Flowsheet Documentation  Taken 3/20/2024 0854 by Yen Oneal RN  Safety Promotion/Fall Prevention:   activity supervised   assistive device/personal items within reach   nonskid shoes/slippers when out of bed   safety round/check completed   Goal Outcome Evaluation:      Plan of Care Reviewed With: patient, family    Overall Patient Progress: improvingOverall Patient Progress: improving

## 2024-03-20 NOTE — PLAN OF CARE
Patient is a 78 year old female  admitted to room 403 via wheelchair.  Patient is alert and oriented X 4. See Epic for VS and assessment.  Patient is able to transfer with one assist using walker. Patient was settled into their room, shown call light, tv, mealtimes etc. Oriented to unit. Will continue monitoring pain level and VS. Notifying MD with any concerns.  Follow MD orders for cares and medications.    Flu Vaccine:   - Yes, up to date (patient had vaccine this flu season)      COVID Vaccine:   -  Declined COVID vaccine at this time, education on risks and benefits provided        Pneumococcal Vaccine:   - Yes, up to date       Ethnicity:   Race: White  Dentures: No  Hearing Aid: No  Smoker:  No  Glasses: No  Falls 0-1 mo: 0 2-6 mo: 0  Prior device use: Walker   Advanced Care Directive Referral to Social Work?No

## 2024-03-20 NOTE — PROGRESS NOTES
[facility] Nurse Inpatient Assessment     Consulted for: Neck    Summary: Patient s/p I&D on 3/13/24 of posterior neck for deep cervical infection down to the hardware with wound VAC placement.  Increased granulation tissue today.    Patient History (according to provider note(s):      Lj Castro is a 78 year old female admitted on 3/12/2024. 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  She came to the ER complaining that for the past 10 days he had increasing pain in the neck and gradual weakness to the arms and legs to the point that she was unable to ambulate.  She also had right arm pain  over the past 3 to 4 days along with confusion.  Her arm pain was 9 out of 10.  There have been no recent falls or shortness of breath or nausea or vomiting.  She was brought in by EMS.  CT scan of the neck showed a deep space infection with gas formation and possible cord compression.  She was given Kcentra and taken to the operating room.  She underwent I&D with wound VAC application along with IntraOp culture and IV antibiotics.  She underwent general endotracheal anesthesia.  Operative course was significant for hypotension requiring central line and pressor support and so she was admitted to the ICU.       Assessment:      Areas visualized during today's visit: Focused:    Negative pressure wound therapy applied to: Posterior neck   Last photo: 3/20/24     Wound due to: Surgical Wound   Wound history/plan of care:    Surgical date: 3/13/24   Service following: Spinal surgery  Date Negative Pressure Wound Therapy initiated: 3/13/24    Interventions in place: repositioning  Is patient s nutritional status compromised? yes   If yes, what interventions are in place? Protein supplements  Reason for initiating vac therapy? Presence of co-morbidities, High risk of infections, and Need for accelerated granulation tissue  Which?of?the?following?co-morbidities?apply? Diabetes  If diabetic is patient on a diabetic management program? Yes   Is osteomyelitis present in wound? no   If yes what treatments are in place? N/A    Wound base: 50 % granulation tissue, 50 % pink non-granular tissue, adipose tissue and fascia.  One small area of exposed hardware, one additional area of hardware just below tissue.        Palpation of the wound bed: normal       Drainage:  large       Volume in cannister: 350 ml     Last cannister change date: 3/20/24     Description of drainage: serosanguinous      Measurements (length x width x depth, in cm) 11  x 3  x  3.5 cm       Tunneling N/A      Undermining up to 4.5 cm from 6-9 o'clock   Periwound skin: Intact       Color: normal and consistent with surrounding tissue       Temperature: normal    Odor: none   Pain: score 8 , sharp   Pain intervention prior to dressing change: IV Dilaudid  Treatment goal: Heal  and Increase granulation  STATUS: improving   Supplies ordered: ordered medium VAC dressing    Number of foam pieces removed from a wound (excluding foam for bridge) :  2 GranuFoam Black   Verified this matched the number of foam pieces applied last dressing change: Yes   Number of foam pieces packed into wound (excluding foam for bridge) :  2 GranuFoam Black and 1 Oil emulsion dressing       Treatment Plan:     Negative pressure wound therapy plan:  Wound location: Posterior neck   Change Days: Mon/Wed/Fri by WOC RN    Supplies (including all accessories) used: medium Black foam , oil emulsion over exposed hardware  Cleanse with Vashe prior to replacing NPWT  Suction setting: -50     Staff RN to assess integrity of  "dressing and ensure suction is set at appropriate level every shift.   Date canister. Chart canister output every shift. Change cannister weekly and PRN if full/occluded     Remove foam dressing and replace with BID normal saline moist gauze dressing if:   -a dressing failure which cannot be repaired within 2 hours   -patient is discharging to home without a home pump   -patient is discharging to a facility outside the local area   -if a dressing is a \"Silver Foam\", remove before Radiation Therapy or MRI     The hospital VAC pump is not to be discharged with the patient.?Ensure to disconnect patient from machine prior to discharge. Then,    - If a home KCI VAC pump has been delivered, connect home cannister to dressing tubing then connect cannister to home pump and turn on machine    - If transferring to a nearby facility with a KCI vac, can disconnect and clamp tubing then cover end with glove so can be reconnected within 2 hours        Orders: Reviewed    RECOMMEND PRIMARY TEAM ORDER: None, at this time  Education provided: plan of care  Discussed plan of care with: Patient and Nurse  WOC nurse follow-up plan: Monday/WednesdayFriday   Notify WOC if wound(s) deteriorate.  Nursing to notify the Provider(s) and re-consult the WOC Nurse if new skin concern.    DATA:     Current support surface: Standard  Low air loss (LYNNETTE pump, Isolibrium, Pulsate, skin guard, etc)  BMI: Body mass index is 37.46 kg/m .   Active diet order: Orders Placed This Encounter      Advance Diet as Tolerated: Regular Diet Adult      Diet      Diet     Output: I/O last 3 completed shifts:  In: 720 [P.O.:720]  Out: 2100 [Urine:1950; Drains:150]     Labs:   Recent Labs   Lab 03/16/24  0616   HGB 10.5*   WBC 9.2     Pressure injury risk assessment:   Sensory Perception: 3-->slightly limited  Moisture: 4-->rarely moist  Activity: 3-->walks occasionally  Mobility: 3-->slightly limited  Nutrition: 3-->adequate  Friction and Shear: 3-->no apparent " problem  Luke Score: 19    JEAN-PAUL Guadalupe  [facility] [employer]  Dept. Office Number: [phone]

## 2024-03-21 ENCOUNTER — APPOINTMENT (OUTPATIENT)
Dept: OCCUPATIONAL THERAPY | Facility: SKILLED NURSING FACILITY | Age: 79
DRG: 871 | End: 2024-03-21
Attending: INTERNAL MEDICINE
Payer: COMMERCIAL

## 2024-03-21 ENCOUNTER — APPOINTMENT (OUTPATIENT)
Dept: PHYSICAL THERAPY | Facility: SKILLED NURSING FACILITY | Age: 79
DRG: 871 | End: 2024-03-21
Attending: INTERNAL MEDICINE
Payer: COMMERCIAL

## 2024-03-21 LAB
GLUCOSE BLDC GLUCOMTR-MCNC: 105 MG/DL (ref 70–99)
GLUCOSE BLDC GLUCOMTR-MCNC: 143 MG/DL (ref 70–99)
GLUCOSE BLDC GLUCOMTR-MCNC: 74 MG/DL (ref 70–99)
GLUCOSE BLDC GLUCOMTR-MCNC: 81 MG/DL (ref 70–99)

## 2024-03-21 PROCEDURE — 97110 THERAPEUTIC EXERCISES: CPT | Mod: GP | Performed by: PHYSICAL THERAPIST

## 2024-03-21 PROCEDURE — 250N000011 HC RX IP 250 OP 636: Performed by: INTERNAL MEDICINE

## 2024-03-21 PROCEDURE — 97161 PT EVAL LOW COMPLEX 20 MIN: CPT | Mod: GP | Performed by: PHYSICAL THERAPIST

## 2024-03-21 PROCEDURE — 97165 OT EVAL LOW COMPLEX 30 MIN: CPT | Mod: GO

## 2024-03-21 PROCEDURE — 97535 SELF CARE MNGMENT TRAINING: CPT | Mod: GO

## 2024-03-21 PROCEDURE — 97530 THERAPEUTIC ACTIVITIES: CPT | Mod: GP | Performed by: PHYSICAL THERAPIST

## 2024-03-21 PROCEDURE — 250N000013 HC RX MED GY IP 250 OP 250 PS 637: Performed by: INTERNAL MEDICINE

## 2024-03-21 PROCEDURE — 120N000009 HC R&B SNF

## 2024-03-21 PROCEDURE — 250N000009 HC RX 250: Performed by: INTERNAL MEDICINE

## 2024-03-21 PROCEDURE — 99306 1ST NF CARE HIGH MDM 50: CPT | Performed by: INTERNAL MEDICINE

## 2024-03-21 RX ORDER — METOPROLOL SUCCINATE 50 MG/1
100 TABLET, EXTENDED RELEASE ORAL 2 TIMES DAILY
Status: DISCONTINUED | OUTPATIENT
Start: 2024-03-21 | End: 2024-04-25 | Stop reason: HOSPADM

## 2024-03-21 RX ADMIN — CEFAZOLIN SODIUM 2 G: 2 INJECTION, SOLUTION INTRAVENOUS at 13:24

## 2024-03-21 RX ADMIN — HYDROCODONE BITARTRATE AND ACETAMINOPHEN 1 TABLET: 5; 325 TABLET ORAL at 09:39

## 2024-03-21 RX ADMIN — THERA TABS 1 TABLET: TAB at 13:28

## 2024-03-21 RX ADMIN — HYDROCODONE BITARTRATE AND ACETAMINOPHEN 1 TABLET: 5; 325 TABLET ORAL at 22:54

## 2024-03-21 RX ADMIN — CEFAZOLIN SODIUM 2 G: 2 INJECTION, SOLUTION INTRAVENOUS at 20:25

## 2024-03-21 RX ADMIN — HEPARIN, PORCINE (PF) 10 UNIT/ML INTRAVENOUS SYRINGE 5 ML: at 16:04

## 2024-03-21 RX ADMIN — APIXABAN 5 MG: 2.5 TABLET, FILM COATED ORAL at 10:33

## 2024-03-21 RX ADMIN — ATORVASTATIN CALCIUM 40 MG: 40 TABLET, FILM COATED ORAL at 10:33

## 2024-03-21 RX ADMIN — LEVOTHYROXINE SODIUM 88 MCG: 0.09 TABLET ORAL at 06:36

## 2024-03-21 RX ADMIN — ACETAMINOPHEN 650 MG: 325 TABLET, FILM COATED ORAL at 13:28

## 2024-03-21 RX ADMIN — LOSARTAN POTASSIUM 50 MG: 50 TABLET, FILM COATED ORAL at 10:33

## 2024-03-21 RX ADMIN — METOPROLOL SUCCINATE 100 MG: 50 TABLET, EXTENDED RELEASE ORAL at 20:24

## 2024-03-21 RX ADMIN — TIMOLOL MALEATE 1 DROP: 5 SOLUTION/ DROPS OPHTHALMIC at 21:09

## 2024-03-21 RX ADMIN — CYCLOBENZAPRINE 5 MG: 5 TABLET, FILM COATED ORAL at 22:55

## 2024-03-21 RX ADMIN — METOPROLOL SUCCINATE 100 MG: 50 TABLET, EXTENDED RELEASE ORAL at 10:33

## 2024-03-21 RX ADMIN — PANTOPRAZOLE SODIUM 40 MG: 40 TABLET, DELAYED RELEASE ORAL at 16:04

## 2024-03-21 RX ADMIN — APIXABAN 5 MG: 2.5 TABLET, FILM COATED ORAL at 20:25

## 2024-03-21 RX ADMIN — TUBERCULIN PURIFIED PROTEIN DERIVATIVE 5 UNITS: 5 INJECTION, SOLUTION INTRADERMAL at 11:25

## 2024-03-21 RX ADMIN — GABAPENTIN 600 MG: 300 CAPSULE ORAL at 10:33

## 2024-03-21 RX ADMIN — CEFAZOLIN SODIUM 2 G: 2 INJECTION, SOLUTION INTRAVENOUS at 04:36

## 2024-03-21 RX ADMIN — ACETAMINOPHEN 650 MG: 325 TABLET, FILM COATED ORAL at 20:25

## 2024-03-21 RX ADMIN — LATANOPROST 1 DROP: 50 SOLUTION OPHTHALMIC at 21:09

## 2024-03-21 RX ADMIN — GABAPENTIN 600 MG: 300 CAPSULE ORAL at 20:25

## 2024-03-21 RX ADMIN — HEPARIN, PORCINE (PF) 10 UNIT/ML INTRAVENOUS SYRINGE 5 ML: at 21:12

## 2024-03-21 RX ADMIN — PANTOPRAZOLE SODIUM 40 MG: 40 TABLET, DELAYED RELEASE ORAL at 06:36

## 2024-03-21 ASSESSMENT — ACTIVITIES OF DAILY LIVING (ADL)
ADLS_ACUITY_SCORE: 31
ADLS_ACUITY_SCORE: 30
ADLS_ACUITY_SCORE: 31
ADLS_ACUITY_SCORE: 31
ADLS_ACUITY_SCORE: 30
ADLS_ACUITY_SCORE: 31
ADLS_ACUITY_SCORE: 30
ADLS_ACUITY_SCORE: 31
PREVIOUS_RESPONSIBILITIES: MEDICATION MANAGEMENT
ADLS_ACUITY_SCORE: 30
ADLS_ACUITY_SCORE: 31
ADLS_ACUITY_SCORE: 31

## 2024-03-21 NOTE — PLAN OF CARE
Goal Outcome Evaluation:    VS: BP (!) 153/66   Pulse 82   Temp 98.7  F (37.1  C) (Oral)   Resp 17   SpO2 99%    O2: RA   Output: Voids in toilet   Last BM: 3/10 - con't to monitor/verify   Activity: Therapy; family at bedside; bedrest   Skin: X: bruising  BLE edema; family brought in stockings to apply later  Left thigh SELENA   Pain: 8/10, Norco effective   CMS:  Neuro: BLE edema and tingling  A&O x3, makes needs known   Dressing: Wound vac to posterior neck, CDI   Diet: Reg  BG 74   LDA: R PICC, ABX infused w/ no complications; +BR  Wound vac, settings changed to -50 from -125   Equipment: Wound vac, IV pole, pump, WW, GB   Plan: Con't POC.    Additional Info: Ordered PCDs for pt     Patient's most recent vital signs are:     Vital signs:  BP: 153/66  Temp: 98.7  HR: 82  RR: 17  SpO2: 99 %     Patient does not have new respiratory symptoms.  Patient does not have new sore throat.  Patient does not have a fever greater than 99.5.        Plan of Care Reviewed With: patient, spouse

## 2024-03-21 NOTE — PROGRESS NOTES
MDS Pain Assessment    The following is the pain interview as conducted by the TCU RN caring for the patient on March 21, 2024. This assessment is required by the [facility] for all patients in [place] SNF (Skilled Nursing Facilities).     . Pain Presence  Have you had pain or hurting at any time in the last 5 days?   1. Yes    . Pain Frequency  How much of the time have you experienced pain or hurting over the last 5 days?   4. Almost constantly    . Pain Effect on Sleep  Over the past 5 days, how much of the time has pain made it hard for you to sleep at night?   1. Rarely or not at all    . Pain Interference with Therapy Activities  Over the past 5 days, how often have you limited your participation in rehabilitation therapy sessions due to pain?   1. Rarely or not at all    . Pain Interference with Day-to-Day Activities  Over the past 5 days, have you limited your day-to-day activities (excluding rehabilitation therapy sessions) because of pain?  1. Rarely or not at all    . Pain intensity   Numeric Rating Scale (00-10): Please rate your worst pain over the last 5 days on a zero to ten scale, with zero being no pain and ten as the worst pain you can imagine. 08

## 2024-03-21 NOTE — PLAN OF CARE
1900-0730      VS: BP (!) 151/66 (BP Location: Left arm)   Pulse 82   Temp 98.1  F (36.7  C) (Oral)   Resp 17   SpO2 99%     O2: On room air, denies chest pain or shortness of breath   Output: Continent of bowel and bladder   Last BM: No BM this shift   Activity: Stand by Assist/ Walker   Skin: See flowsheet   Pain: 6/10 Neck / Managed with Tylenol   CMS: Alert ad oriented x4   Dressing: Wound Vac Posterior Neck   Diet: Regular diet, carb count   LDA: Right PICC Single lumen   Equipment:    Plan: Continue with POC   Additional Info: Blood sugar at 2am 81mg/dl. Apple juice given. Call light within reach. Bed Alarm on for safety.

## 2024-03-21 NOTE — PHARMACY-MEDICATION REGIMEN REVIEW
Pharmacy Medication Regimen Review  Lj Castro is a 78 year old female who is currently in the Transitional Care Unit.    Assessment:   All medications have appropriate indications, durations and no unnecessary use was found.    Plan:   Attending provider will be sent this note for review.  If there are any emergent issues noted above, pharmacist will contact provider directly by phone.      Pharmacy will periodically review the resident's medication regimen for any PRN medications not administered in > 72 hours and discontinue them. The pharmacist will discuss gradual dose reductions of psychopharmacologic medications with interdisciplinary team on a regular basis.    Please contact pharmacy if the above does not answer specific medication questions/concerns.    Background:  A pharmacist has reviewed all medications and pertinent medical history today.  Medications were reviewed for appropriate use and any irregularities found are listed with recommendations.      Current Facility-Administered Medications:     [START ON 3/23/2024] - Skin Test Reading -, , Does not apply, Q21 Days, Mandy Simms MD    acetaminophen (TYLENOL) tablet 650 mg, 650 mg, Oral, TID, Mandy Simms MD, 650 mg at 03/20/24 2053    acetaminophen (TYLENOL) tablet 650 mg, 650 mg, Oral, Daily PRN, Mandy Simms MD    apixaban ANTICOAGULANT (ELIQUIS) tablet 5 mg, 5 mg, Oral, BID, Mandy Simms MD, 5 mg at 03/21/24 1033    atorvastatin (LIPITOR) tablet 40 mg, 40 mg, Oral, Daily, Mandy Simms MD, 40 mg at 03/21/24 1033    ceFAZolin (ANCEF) 2 g in 100 mL D5W intermittent infusion, 2 g, Intravenous, Q8H, Mandy Simms MD, Last Rate: 200 mL/hr at 03/21/24 0436, 2 g at 03/21/24 0436    cyclobenzaprine (FLEXERIL) tablet 5 mg, 5 mg, Oral, TID PRN, Mandy Simms MD    glucose gel 15-30 g, 15-30 g, Oral, Q15 Min PRN **OR** dextrose 50 % injection 25-50 mL, 25-50 mL, Intravenous, Q15 Min PRN **OR** glucagon injection 1 mg, 1 mg, Subcutaneous, Q15 Min  PRN, Mandy Simms MD    gabapentin (NEURONTIN) capsule 600 mg, 600 mg, Oral, BID, Mandy Simms MD, 600 mg at 03/21/24 1033    heparin lock flush 10 UNIT/ML injection 5-20 mL, 5-20 mL, Intracatheter, Q24H, Mandy Simms MD, 5 mL at 03/20/24 1818    heparin lock flush 10 UNIT/ML injection 5-20 mL, 5-20 mL, Intracatheter, Q1H PRN, Mnady Simms MD    HYDROcodone-acetaminophen (NORCO) 5-325 MG per tablet 1 tablet, 1 tablet, Oral, Q4H PRN, Mandy Simms MD, 1 tablet at 03/21/24 0939    insulin aspart (NovoLOG) injection (RAPID ACTING), 1-7 Units, Subcutaneous, TID AC, Mandy Simms MD    insulin aspart (NovoLOG) injection (RAPID ACTING), 1-5 Units, Subcutaneous, At Bedtime, Mandy Simms MD    insulin aspart (NovoLOG) injection (RAPID ACTING), , Subcutaneous, Daily with breakfast, Mandy Simms MD    insulin aspart (NovoLOG) injection (RAPID ACTING), , Subcutaneous, Daily with lunch, Mandy Simms MD    insulin aspart (NovoLOG) injection (RAPID ACTING), , Subcutaneous, Daily with supper, Mandy Simms MD    insulin glargine (LANTUS PEN) injection 22 Units, 22 Units, Subcutaneous, QADemi KEN Amna, MD    ketoconazole (NIZORAL) 2 % shampoo, , Topical, Daily PRN, Mandy Simms MD    latanoprost (XALATAN) 0.005 % ophthalmic solution 1 drop, 1 drop, Both Eyes, At Bedtime, Mandy Simms MD, 1 drop at 03/20/24 2208    levothyroxine (SYNTHROID/LEVOTHROID) tablet 88 mcg, 88 mcg, Oral, QAM AC, Mandy Simms MD, 88 mcg at 03/21/24 0636    losartan (COZAAR) tablet 50 mg, 50 mg, Oral, Daily, Mandy Simms MD, 50 mg at 03/21/24 1033    metoprolol succinate ER (TOPROL XL) 24 hr tablet 100 mg, 100 mg, Oral, BID, Mandy Simms MD, 100 mg at 03/21/24 1033    multivitamin, therapeutic (THERA-VIT) tablet 1 tablet, 1 tablet, Oral, Daily, Mandy Simms MD    naloxone (NARCAN) injection 0.2 mg, 0.2 mg, Intravenous, Q2 Min PRN **OR** naloxone (NARCAN) injection 0.4 mg, 0.4 mg, Intravenous, Q2 Min PRN **OR** naloxone (NARCAN) injection 0.2  mg, 0.2 mg, Intramuscular, Q2 Min PRN **OR** naloxone (NARCAN) injection 0.4 mg, 0.4 mg, Intramuscular, Q2 Min PRN, Mandy Simms MD    Nurse may request from Pharmacy a change of form of medication (e.g. Liquid to tablet)., , Does not apply, Continuous PRN, Mandy Simms MD    pantoprazole (PROTONIX) EC tablet 40 mg, 40 mg, Oral, BID AC, Mandy Simms MD, 40 mg at 03/21/24 0636    Patient is already receiving anticoagulation with heparin, enoxaparin (LOVENOX), warfarin (COUMADIN)  or other anticoagulant medication, , Does not apply, Continuous PRN, Mandy Simms MD    prednisoLONE acetate (PRED FORTE) 1 % ophthalmic susp 1 drop, 1 drop, Left Eye, Daily, Mandy Simms MD    sodium chloride (PF) 0.9% PF flush 10-20 mL, 10-20 mL, Intracatheter, q1 min prn, Mandy Simms MD    sodium chloride (PF) 0.9% PF flush 10-40 mL, 10-40 mL, Intracatheter, Q8H, Mandy Simms MD, 10 mL at 03/21/24 0207    timolol maleate (TIMOPTIC) 0.5 % ophthalmic solution 1 drop, 1 drop, Both Eyes, BID, Mandy Simms MD, 1 drop at 03/20/24 2202    tuberculin injection 5 Units, 5 Units, Intradermal, Q21 Days, Mandy Simms MD  No current outpatient prescriptions on file.   Caridad Person, [facility]  PharmD,BCPS  March 21, 2024

## 2024-03-21 NOTE — PROGRESS NOTES
Social Work: Initial Assessment with Discharge Plan    Patient Name: Lj aCstro  : 1945  Age: 78 year old  MRN: [id number]  Completed assessment with: chart review and interview with pt, pt spouse and pt son  Admitted to TCU: 3/20/2024    Presenting Information   Date of SW assessment: 2024  Health Care Directive: Will bring in copy  Primary Health Care Agent: Self/pt  Secondary Health Care Agent: NOK/pt spouse (Julio C Castro)  Living Situation: Lives at home with spouse and adult son (Leon) in single level home.   Previous Functional Status: Pt reports independence with dressing and toileting. Some assistance for bathing from . Uses walker for ambulation. Pt spouse and son assist with cooking, cleaning, laundry and transportation  DME available: 2 walkers (1 for community and 1 for home).  Patient and family understanding of hospitalization: Appropriate  Cultural/Language/Spiritual Considerations: Pt is a , 77 y/o  woman of [employer].   Abuse concerns: None reported  -------------------------------------------------------------------------------------------------------------  TRANSPORTATION:    Has lack of transportation kept you from medical appointments, meetings, work, or from getting things needed for daily living?  A. Yes, it has kept me from medical appointments or from getting my medications  B. Yes, it has kept me from non-medical meetings, appointments, work or from getting things that I need  C. No  X. Patient Unable to respond  Y. Patient declines to respond  -------------------------------------------------------------------------------------------------------------  Health Literacy:   How Often do you need to have someone help you when you read instructions, pamphlets, or other written material from your doctor or pharmacy?  0.       Never  1.       Rarely  2.       Sometimes  3.       Often  4.       Always  5.       Patient declines to respond  6.        Patient unable to respond  ------------------------------------------------------------------------------------------------------------   BIMS:  See flow sheet   -----------------------------------------------------------------------------------------------------------  CAM:  1.) Acute Onset/Fluctuating Course:  Is there evidence of an acute change in mental status from the patient's baseline?  0. No  Yes  2.) Inattention:  Does the patient have difficulty focusing attention, for example, being easily distractable, or having difficulty keeping track of what was being said?  0. No - Behavior not present  1. Yes - Behavior present  3.) Disorganized Thinking:  Is the patient's speech disorganized or incoherent, such as rambling or irrelevant conversation, unclear or illogical flow of ideas, or unpredictable switching from subject to subject?  0. No - Behavior not present  1. Yes - Behavior present  4.) Altered level of consciousness:  Did the patient have altered level of consciousness as indicated by any of the following criteria?  0. No - Alert  1. Yes - Vigilant (hyperalert), lethargic (drowsy) , stupor (difficult to arouse) or comatose (unable to be aroused)  -------------------------------------------------------------------------------------------------------------  PHQ-9:   Over the last 2 weeks, have you been bothered by any of the following problems?     If symptom is present, then ask the patient:  About how often have you been bothered by this?   Symptom Presence                                              Symptom Frequency  0. No                                                                     0. Never or 1 day  1. Yes                                                                   1. 2-6 days (several days)  9. No Response                                                    2. 7-11 days (half or more of the days)                                                                                3. 12-14  "days (nearly every day)       Present Frequency   A.       Little interest of pleasure doing things  0  0   B.       Feeling down, depressed, or hopeless  0  0   C.       Trouble falling or staying asleep, or sleeping too much  1  0   D.       Feeling tired or having little energy  1  0   E.       Poor appetite or overeating  0  0   F.        Feeling bad about yourself - or that you are a failure, or have let yourself or your family down  0  0   G.      Trouble concentrating on things, such as reading the newspaper or watching television  0  0   H.      Moving or speaking so slowly that other people could have noticed. Or the opposite - being so fidgety or restless that you have been moving around a lot more than usual  0  0   I.         Thoughts that you would be better off dead, or of hurting yourself in some way  0  0     -------------------------------------------------------------------------------------------------------------  SOCIAL ISOLATION  How often do you feel lonely or isolated form those around you?  0.       Never  1.       Rarely  2.       Sometimes  3.       Often  4.       Always  5.       Patient declines to respond  6.       Patient unable to respond  -------------------------------------------------------------------------------------------------------------    BIMS: Pt scored 15 on BIMS indicating cognition intact  PHQ-9: Pt scored 0 on PHQ-9 indicating no depressive symptoms  PAS: confirmation number- [id number]   Has there been a level II screen?  No  Were there any recommendations in the screen? N/A  If yes, will the recommendations we incorporated into the Plan of Care?  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      CT scan of the neck showed a deep space infection with gas formation and possible cord compression.  She was given Kcentra and taken to the operating room.  She underwent I&D with wound VAC application along with IntraOp culture and IV antibiotics.       Operative course was significant for hypotension requiring central line and pressor support and so she was admitted to the ICU.    Operative and blood cultures subsequently grew MSSA.  Patient improved in the ICU and was transferred to the general medical floor     Most recent positive blood culture for MSSA was on 3/13.  Repeat blood cultures 3/14 and 3/15 remained.  PICC line was placed on 3/17 for anticipated weeks-long IV antibiotic course at discharge.     She had a wound vac applied to her neck wound .     She was discharged to TCU on 3/20 for IV abx and wound cares and therapy \"    Provider Information   Primary Care Physician:Cory Soliz   [phone] [id number] [facility] [id number]     : None reported    Mental Health:   Diagnosis: None reported  Current Support/Services: None reported  Previous Services: None reported  Services Needed/Recommended:  or Health Psych services available to pt if desired.    Substance Use:  Diagnosis: None reported  Current Support/Services: None reported  Previous Services: None reported  Services Needed/Recommended: N/A    Support System  Marital Status: . 60 years in June 2024.  Family support: Pt spouse (Julio C) and pt son (Leon) are pt's primary sources of support. Pt also has two daughters (Tammi and Sofia) who both live within an hour distance of pt home.   Other support available: None " reported  Gaps in support system: N/A    Personalized Care and Trauma  What other information would help us give you more personalized care or how can we help you with any past trauma?No    MyChart:  Do you have access to [facility] to view my Baseline Care Plan? No - plan provided to pt at bedside  If not, then SW will print out and provide Baseline Care Plan.     Community Resources  Current in home services: Pt and family reports that she was receiving PT/OT services through a home care agency PTA, but could not remember the name of the agency. They believe agency is based in [place].   Previous services: None reported    Financial/Employment/Education  Employment Status: Retired  Income Source: Not discussed  Education: Not discussed  Financial Concerns:  None reported  Insurance: UCARE/UCARE MEDICARE     Discharge Plan   Patient and family discharge goal: Home with spouse and son  Provided Education on discharge plan: YES  Patient agreeable to discharge plan:  YES  A list of Medicare Certified Facilities was provided to the patient and/or family to encourage patient choice. Based on location and rating, patient would like referrals made to: N/A  General information regarding anticipated insurance coverage and possible out of pocket cost was discussed. Patient and patient's family are aware patient may incur the cost of transportation to the facility, pending insurance payment: NO  Barriers to discharge: TBD    Discharge Recommendations   Disposition: Home  Transportation Needs: Family will provide  Name of Transportation Company and Phone: N/A    Additional comments   SRINI met with pt, pt spouse and pt son at bedside to complete SW Assessment.     JEANNE Sinha   [facility]   Covering TCU SW  TCU SW Ph: [phone]  [address]th [place] [address]

## 2024-03-21 NOTE — PROGRESS NOTES
[facility]  OUTPATIENT OCCUPATIONAL THERAPY  EVALUATION  PLAN OF TREATMENT FOR OUTPATIENT REHABILITATION  (COMPLETE FOR INITIAL CLAIMS ONLY)  Patient's Last Name, First Name, M.I.  YOB: 1945  Lj Castro                          Provider's Name  [facility] Medical Record No.  [id number]                             Onset Date:  03/12/24   Start of Care Date:  03/21/24   Type:     ___PT   _X_OT   ___SLP Medical Diagnosis:  physical deconditioning, c-spine post surgical wound infection                    OT Diagnosis:  Decreased safety/engagement in ADLs/IADLs now with post surgical prec Visits from SOC:  1     See note for plan of treatment, functional goals and certification details    I CERTIFY THE NEED FOR THESE SERVICES FURNISHED UNDER        THIS PLAN OF TREATMENT AND WHILE UNDER MY CARE     (Physician co-signature of this document indicates review and certification of the therapy plan).                   03/21/24 1042   Appointment Info   Signing Clinician's Name / Credentials (OT) CONNOR Pereira/L   Quick Adds   Quick Adds Certification   Living Environment   People in Home child(lopez), adult;spouse   Current Living Arrangements house   Home Accessibility stairs to enter home;stairs within home   Number of Stairs, Main Entrance 1   Stair Railings, Main Entrance none   Number of Stairs, Within Home, Primary one   Stair Railings, Within Home, Primary none   Transportation Anticipated family or friend will provide;agency   Living Environment Comments Pt reports has 1 step to enter house. Has stairs down to basement but does not use. Son/ help   Self-Care   Usual Activity Tolerance moderate   Current Activity Tolerance fair   Regular Exercise Yes   Activity/Exercise Type other (see comments)   Exercise Amount/Frequency 3-5 times/wk   Equipment Currently Used at Home shower chair;walker, rolling   Fall history within last  six months no   Activity/Exercise/Self-Care Comment Pt has tub shower with shower chair. IND with dressing, toileting,  assists with shower tx at baseline.   Instrumental Activities of Daily Living (IADL)   Previous Responsibilities medication management   IADL Comments  and son assist with heavy IADLs and driving at baseline.   General Information   Onset of Illness/Injury or Date of Surgery 03/12/24   Referring Physician Mandy Simms MD   Patient/Family Therapy Goal Statement (OT) To be able to walk   Additional Occupational Profile Info/Pertinent History of Current Problem Per H&P: 78 year old female who was admitted on 3/12/2024 to [facility] She has a PMH significant for HTN, HLD, hypothyroid, DM2, PN, CKD3 (baseline cr about 1.15-1.35), AFib-on DOAC, chronic neck and back pain, neurogenic claudication, PAD, and GERD who is s/p C3 to T2 cervical lamimectomy and spinal cord decompression, C3 to T2 posterior fusion, C3 to T2 segemental instrumentation for progressive myelopathy due to stenosis and kyphosis from C3-T3 (10/13/2013) who was admitted on 3/13/2024 with neck pain, weakness, inability to ambulate.   Existing Precautions/Restrictions fall;spinal   Limitations/Impairments sensory   Left Upper Extremity (Weight-bearing Status) other (see comments)   Right Upper Extremity (Weight-bearing Status) other (see comments)   Left Lower Extremity (Weight-bearing Status) full weight-bearing (FWB)   Right Lower Extremity (Weight-bearing Status) full weight-bearing (FWB)   General Observations and Info Activity Up with assist   Cognitive Status Examination   Orientation Status orientation to person, place and time   Cognitive Status Comments Pt appears grossly cognitively intact throughout obs/interview. Will continue to monitor.   Visual Perception   Visual Impairment/Limitations WFL;corrective lenses for reading   Sensory   Sensory Comments Reports BLE neuropathy at baseline.   Pain Assessment    Patient Currently in Pain Yes, see Vital Sign flowsheet   Posture   Posture forward head position;protracted shoulders;kyphosis   Range of Motion Comprehensive   General Range of Motion bilateral upper extremity ROM WFL   Strength Comprehensive (MMT)   Comment, General Manual Muscle Testing (MMT) Assessment Not formally assessed 2/2 spinal prec, observed WFL for ADLs   Muscle Tone Assessment   Muscle Tone Quick Adds No deficits were identified   Coordination   Upper Extremity Coordination No deficits were identified   Transfers   Transfer Comments SBA with FWW   Activities of Daily Living   BADL Assessment/Intervention   (See GG)   Clinical Impression   Criteria for Skilled Therapeutic Interventions Met (OT) Yes, treatment indicated   OT Diagnosis Decreased safety/engagement in ADLs/IADLs now with post surgical prec   Influenced by the following impairments impaired balance, impaired strength, pain, decreased activity tolerance, post surgical prec   OT Problem List-Impairments impacting ADL problems related to;activity tolerance impaired;pain;strength;post-surgical precautions;mobility   Assessment of Occupational Performance 3-5 Performance Deficits   Identified Performance Deficits dressing, bathing, toileting, functional mobility   Planned Therapy Interventions (OT) ADL retraining;IADL retraining;transfer training;home program guidelines;progressive activity/exercise;risk factor education   Clinical Decision Making Complexity (OT) problem focused assessment/low complexity   Risk & Benefits of therapy have been explained evaluation/treatment results reviewed;care plan/treatment goals reviewed;risks/benefits reviewed;current/potential barriers reviewed;participants voiced agreement with care plan;participants included;patient   Clinical Impression Comments Pt will benefit from skilled OT services to progress IND and safety within prec for ADLs/IADLs.   OT Total Evaluation Time   OT Eval, Low Complexity Minutes  (35054) 10   Therapy Certification   Medical Diagnosis physical deconditioning, c-spine post surgical wound infection   Start of Care Date 03/21/24   Certification date from 03/21/24   Certification date to 04/19/24   OT Goals   Therapy Frequency (OT) 5 times/week   OT Predicted Duration/Target Date for Goal Attainment 03/25/24   OT Goals Toilet Transfer/Toileting;Hygiene/Grooming;Lower Body Dressing;Lower Body Bathing;OT Goal 1;Upper Body Dressing;Upper Body Bathing   OT: Hygiene/Grooming modified independent   OT: Upper Body Dressing Modified independent   OT: Lower Body Dressing Modified independent   OT: Upper Body Bathing Supervision/stand-by assist   OT: Lower Body Bathing Supervision/stand-by assist   OT: Toilet Transfer/Toileting Modified independent   OT: Goal 1 Pt will verbalize 100% of post surgical prec to progress safety with ADLs/   Self-Care/Home Management   Self-Care/Home Mgmt/ADL, Compensatory, Meal Prep Minutes (46521) 30   Symptoms Noted During/After Treatment (Meal Preparation/Planning Training) increased pain;fatigue   Treatment Detail/Skilled Intervention Session focused on completion of GG. Pt reporting pain in neck throughout, requires Min VC to maintain prec. Edu on use of sock aid, declines to attempt at this time. See GG below for details.   OT Discharge Planning   OT Plan prec: fall, spinal, BLE neuropathy, wound vac on neck Rx: shower vs sponge bath (pending WOC as pt with wound vac), GG full body dressing with AE, tub tx to simulate home set up, strengthening within precautions   OT Discharge Recommendation (DC Rec) home with assist;home with home care occupational therapy   OT Rationale for DC Rec Pt below baseline, requires Max-total A with LB dressing, SBA with FWW for g/h, toileting. Son and  are able to assist with heavy IADLs and do so at baseline. Rec d/c to home with assist PRN and HHOT to progress IND and safety within home environment.   OT Brief overview of current  status SBA with FWW   OT Equipment Needed at Discharge dressing equipment  (sock aid/reacher)   Total Session Time   Timed Code Treatment Minutes 30   Total Session Time (sum of timed and untimed services) 40   Post Acute Settings Only   What unit is patient on? TCU   OT- Transitional Care Unit Time   Individual Time (minutes) - OT 40   TCU Total Session Time (minutes) - OT 40   TCU Daily Total Session Time   OT TCU Daily Total Session Time 40   Rehab TCU Daily Total Session Time 40   Transfers: Sit to Stand   Describe Performance SBA with FWW   Lower Body Dressing (Putting On/Taking-Off Footwear)   Describe Performance DEP to maintain prec, limited by BLE edema at this time   Toileting Hygiene - Ability to maintain perineal hygiene and adjust clothes   Describe Performance SBA   Toilet transfer - Ability to get on and off a toilet or commode   Describe Performance SBA with FWW   Personal Hygiene - Ability to maintain personal hygiene (combing hair, shaving, apply makeup wash/dry face/hands.   Describe Performance SBA with FWW combing hair, washing face/hands   Oral Hygiene - Ability to use suitable items to clean teeth.   Describe Performance SBA with FWW

## 2024-03-21 NOTE — PROGRESS NOTES
"   03/21/24 6437   Appointment Info   Signing Clinician's Name / Credentials (PT) Karis Augustine, PT, DPT   Living Environment   People in Home child(lopez), dependent;spouse   Current Living Arrangements house   Home Accessibility stairs to enter home;stairs within home   Number of Stairs, Main Entrance 1   Stair Railings, Main Entrance none   Number of Stairs, Within Home, Primary one   Stair Railings, Within Home, Primary none   Transportation Anticipated family or friend will provide;agency   Living Environment Comments Pt has one step outdoors and one step in the home.  Pt's son and  helped her.   Self-Care   Usual Activity Tolerance moderate   Current Activity Tolerance fair   Regular Exercise Yes   Activity/Exercise Type other (see comments)   Exercise Amount/Frequency 3-5 times/wk   Equipment Currently Used at Home shower chair;walker, rolling   Fall history within last six months no   Activity/Exercise/Self-Care Comment Pt has fww in home , 4ww in car for outdoors.  Pt states  is in good physical shape for his age. Pt did seated ex, and STS for leg strength   Post-Acute Assessment Only   Post-Acute Functional Assessment See below   Previous Level of Function/Home Environm   Previous Level of Function PT : pt used walker or furniture in the home.   helped with bathing. Pt's son and  did cooking and cleaning, \"most stuff\" per pt.   General Information   Onset of Illness/Injury or Date of Surgery 03/14/24   Referring Physician MD Demi   Patient/Family Therapy Goals Statement (PT) \"Get well so I can walk again. \"  Pt walked around athome, did not go downstairs.   Pertinent History of Current Problem (include personal factors and/or comorbidities that impact the POC) 78 year old female admitted on 3/12/2024. She has a PMH significant for HTN, HLD, hypothyroid, DM2, PN, CKD3 (baseline cr about 1.15-1.35), AFib-on DOAC, chronic neck and back pain, neurogenic claudication, PAD, and GERD " who is  s/p C3 to T2 cervical lamimectomy and spinal cord decompression, C3 to T2 posterior fusion, C3 to T2 segemental instrumentation for progressive myelopathy due to stenosis and kyphosis from C3-T3 (10/13/2013) who was admitted on 3/13/2024 with neck pain, weakness, inability to ambulate. Pt found to have Severe sepsis due MSSA from deep space infection in the posterior cervical spine resulting in MSSA bacteremia  -PICC line placed this admission with 5-week IV Ancef course planned on discharge   Existing Precautions/Restrictions fall   Heart Disease Risk Factors High blood pressure;Dislipidemia;Diabetes   General Observations Wound vac, PICC line R arm   Cognition   Orientation Status (Cognition) oriented x 4   Pain Assessment   Patient Currently in Pain Yes, see Vital Sign flowsheet  (neck, pain most of the time.)   Integumentary/Edema   Integumentary/Edema other (describe)   Integumentary/Edema Comments Pt has LE edema, has compression stockings at home, but did not wear.   Posture    Posture Forward head position   Range of Motion (ROM)   ROM Comment Pt with WFL BLes, pt with edema.  Pt with cervical spine wound vac , limited ROM.   Strength (Manual Muscle Testing)   Strength (Manual Muscle Testing) Deficits observed during functional mobility   Strength Comments P: Pt hip flex , and hip abd B LEs about 3+/5.  Pt with edema , harder to move LEs.  Ankle DF, PF grossly 4/5, ankle knee ext 4/5 B LEs.   Balance   Balance no deficits were identified   Balance Comments Sitting balance present, moves gingerly due to neck soreness/wound vac,  Pt standing without UE support x 30 seconds, cga, and needs Anneliese with EC, increased sway.   Sensory Examination   Sensory Perception other (describe)   Sensory Perception Comments Pt reports less sensation in feet, neuropathy.  Pt does have protective sensation, tested with semmes weinsetin monofilament, intact to light touch on LEs.   Coordination   Coordination no deficits  were identified   Muscle Tone   Muscle Tone no deficits were identified   Clinical Impression   Criteria for Skilled Therapeutic Intervention Yes, treatment indicated   PT Diagnosis (PT) PT: Impaired mobility, force production deficit due to infection, deconditioning   Influenced by the following impairments PT: Pt with B LE edema, pain in cervical spine area, upper back, now with wound vac at cervical spine wound, decreased activity tolerance, decreased balance, LE weakness and decreased sensation B LEs (neuropathy)   Functional limitations due to impairments PT; Pt with difficulty with bed mobility, transfers, gait .  Pt unable to tolerate stairs at this time due to fatigue.   Clinical Presentation (PT Evaluation Complexity) stable   Clinical Presentation Rationale clinical judgment, pt status improving.   Clinical Decision Making (Complexity) low complexity   Planned Therapy Interventions (PT) balance training;bed mobility training;gait training;patient/family education;stair training;strengthening;transfer training;progressive activity/exercise;home exercise program;risk factor education;home program guidelines   Risk & Benefits of therapy have been explained evaluation/treatment results reviewed;care plan/treatment goals reviewed;risks/benefits reviewed;current/potential barriers reviewed;patient;participants included;spouse/significant other;participants voiced agreement with care plan   Clinical Impression Comments PT: Pt is a 78 year old woman with cervical spine infection after surgery last fall.  Pt now with a wound vac, pain, B edema and is funcitoning below her previous level of function.  Pt seems to be motivated to progress.  ELOS - 6 days. p t owns a fww at home   Pt's son and  can assist her with some ADLs and IADls.   PT Total Evaluation Time   PT Shalom Low Complexity Minutes (48194) 20   Therapy Certification   Start of care date 03/21/24   Certification date from 03/21/24   Certification  date to 04/19/24   Medical Diagnosis c-spine post surgical wound infection, physical deconditioning   Physical Therapy Goals   PT Frequency 5x/week   PT Predicted Duration/Target Date for Goal Attainment 03/27/24   PT: Bed Mobility Modified independent;Supine to/from sit;Rolling;Bridging;Within precautions   PT: Transfers Modified independent;Sit to/from stand;Bed to/from chair;Assistive device  (fww)   PT: Gait Modified independent;Rolling walker;150 feet   PT: Stairs 1 stair;Minimal assist;Assistive device  (walker)   PT: Goal 1 Pt able to state 3 fall prevention strategies after participating in fall prevention training for improved safety at home.   PT: Goal 2 Pt able to perform a HEP Radha for LE strengthening   PT: Goal 3 Pt able to perform a car tx with fww and sba   Therapeutic Procedure/Exercise   Ther. Procedure: strength, endurance, ROM, flexibillity Minutes (85981) 17   Treatment Detail/Skilled Intervention PT: Pt performed aps in supine x 20 to help with lessening edema, hip abd/add aarom x 15 B LEs, SLR x 5 B LEs  amb for endurance, see below.   Therapeutic Activity   Therapeutic Activities: dynamic activities to improve functional performance Minutes (86977) 15   Treatment Detail/Skilled Intervention PT: Spandigrips added to B LEs to help decrease edema. see gg   PT Discharge Planning   PT Plan PT: GG items as able curb step, car, amb short distances , TUG , Ibrahim.   Total Session Time   Timed Code Treatment Minutes 32   Total Session Time (sum of timed and untimed services) 52   PT - Transitional Care Unit Time   Individual Time (minutes) - PT 52   Group Time (minutes) - PT 0   Concurrent Time (minutes) - PT 0   Co-Treatment Time (minutes) - PT 0   TCU Total Session Time (minutes) - PT 52   TCU Daily Total Session Time   PT TCU Daily Total Session Time 52   Rehab TCU Daily Total Session Time 52   Bed Mobility: Sit to lying   Describe Performance cga for LEs due to LE weakness, pt with neck pain   Bed  Mobility: Lying to sitting on the side of bed   Describe Performance sba, rail   Transfers: Sit to Stand   Equipment Used Rolling walker   Describe Performance cga, from bed, fww   Walk 10 Feet - Ability to walk once standing   Describe Performance PT: Pt amb with fww close sba to cga, A with wound vac   Walk 10 Feet on uneven surfaces - Ability to walk on various surfaces.   Reason Not Done Safety concerns   Describe Performance fatigue   Walk 50 Feet with Two Turns - Ability to walk at least 50 feet.   Describe Performance PT; Pt amb with fww slow pace,increased sway, smaller step length and slow, but no lob, cga to sba, A for wound vac, amb 120 ft x 1, hodges by LE fatigue, some neck pain. Min cues for posture.   Walk 150 Feet - Ability to walk 150 feet   Reason Not Done Safety concerns   Describe Performance fatigue   Wheel 50 Feet - Ability to move wheelchair/scooter   Reason Not Done Activity not applicable   Wheel 150 Feet - Ability to move wheelchair/scooter   Reason Not Done Activity not applicable   1 Step (curb) - Ability to go up/down 1 step/curb.   Reason Not Done Safety concerns   Describe Performance fatigue   4 Steps - Ability to go up/down steps.   Reason Not Done Safety concerns   12 Steps - Ability to go up/down steps.   Reason Not Done Safety concerns   Car Transfer - Ability to transfer in/out of car or van.   Reason Not Done Safety concerns   Picking up Object - Ability to bend/stoop while standing.   Reason Not Done Safety concerns   Describe Performance neck pain   Psychosocial Support   Trust Relationship/Rapport care explained;questions answered

## 2024-03-21 NOTE — H&P
Patient Name: Lj Castro MRN# [id number]   Age: 78 year old YOB: 1945     Date of Admission: 3/20/2024      Primary Care Provider: Cory Soliz           Assessment and Plan:     Lj Castro is a 78 year old female who was admitted on 3/12/2024 to [facility] She has a PMH significant for HTN, HLD, hypothyroid, DM2, PN, CKD3 (baseline cr about 1.15-1.35), AFib-on DOAC, chronic neck and back pain, neurogenic claudication, PAD, and GERD who is s/p C3 to T2 cervical lamimectomy and spinal cord decompression, C3 to T2 posterior fusion, C3 to T2 segemental instrumentation for progressive myelopathy due to stenosis and kyphosis from C3-T3 (10/13/2013) who was admitted on 3/13/2024 with neck pain, weakness, inability to ambulate.      CT scan of the neck showed a deep space infection with gas formation and possible cord compression.  She was given Kcentra and taken to the operating room.  She underwent I&D with wound VAC application along with IntraOp culture and IV antibiotics.      Operative course was significant for hypotension requiring central line and pressor support and so she was admitted to the ICU.    Operative and blood cultures subsequently grew MSSA.  Patient improved in the ICU and was transferred to the general medical floor    Most recent positive blood culture for MSSA was on 3/13.  Repeat blood cultures 3/14 and 3/15 remained.  PICC line was placed on 3/17 for anticipated weeks-long IV antibiotic course at discharge.    She had a wound vac applied to her neck wound .    She was discharged to TCU on 3/20 for IV abx and wound cares and therapy     Deconditioning ;  PT   OT      MSSA deep space infection in the posterior cervical spine   MSSA bacteremia    On cefazolin for 37 days .( Start 3/20 at TCU )   Needs ID follow in 2 weeks . Dr Alexis / to be arranged   PICC to be removed once abx completed   May be started on Rifampin at that time ( 2-3 weeks with ID )   Weekly  labs at TCU       Post cervical spine I and D by Dr Sagar Cadena on 3/13  Wound vac in place   Wound nurse consulted  Spine follow up in 2- weeks to be arranged . Call [phone] to arrange   Pain control with percocet and flexeril and tylenol and gabapentin . Wean of oxycodone as able     Type 2 diabetes:  On Lantus 22 units   Medium sliding scale insulin and carb coverage 1 unit per 15 .  CTM    A fib   Apixiban resumed 3/19   Start Toprol   Most recent echocardiogram in January 2023 shows a normal EF.    Acute encephalopathy: due to infectious etiology ( toxic ) resolved    CTM  Hypothyroid ; continue synthroid   HLD ; statin   CKD , creatinine 1.2 . CTM  Hypertension ; continue cozaar . Toprol was held in hospital , can resume as BP stable now . Also on HCTZ    GERD ; PPI BID   CODE: full   Diet: regular     DVT PPX: DOAC     Indication for Psychotropic Medications: oxycodone for pain     Immunization status : up to date with flu and pneumonia . Declined updated  Covid     Disposition: home with  and son            Chief Complaint:     Here to get stronger .        HPI:   Lj Castro is admitted to TCU for rehabilitation after being hospitalized for spine infection in the setting of cervical spine surgery in October 2023. Please see discharge summary from 3/20 by Dr Sarmiento for details .      Currently, patient feels well . No chest pain , no Shortness of breath , no nausea, no abdominal pain , no burning with micturition . No fever , no chills.  She is passing gas and having regular BM . She has no urinary complaints. Her pain is controlled. Her wound  vac is operational and her PICC is with out discomfort .  She verbalizes plan for being in TCU for abx and wound cares and therapy.           Review of Systems:   A 10 point ROS was performed and negative unless otherwise noted in HPI.          Past Medical History:   Reviewed  Past Medical History:   Diagnosis Date    Arrhythmia     Bursitis,  olecranon     Diabetes (H)     Displacement of cervical intervertebral disc without myelopathy     Gastro-oesophageal reflux disease     Glaucoma     Hyperlipemia     Hypertension     Hypothyroid     Renal failure, acute (H24) 6-7-11 to 6-17-11    hospitalized     Varicose veins             Past Surgical History:   Reviewed   Past Surgical History:   Procedure Laterality Date    DAVINCI HYSTERECTOMY SUPRACERVICAL N/A 3/18/2015    Procedure: DAVINCI HYSTERECTOMY SUPRACERVICAL;  Surgeon: Randall Melgar MD;  Location:  OR    DAVINCI SACROCOLPOPEXY, MIDURETHRAL SLING, CYSTOSCOPY N/A 3/18/2015    Procedure: DAVINCI SACROCOLPOPEXY, MIDURETHRAL SLING, CYSTOSCOPY;  Surgeon: Thalia Esquivel MD;  Location:  OR    DAVINCI SALPINGO-OOPHORECTOMY INCLUDING BILATERAL Bilateral 3/18/2015    Procedure: DAVINCI SALPINGO-OOPHORECTOMY INCLUDING BILATERAL;  Surgeon: Randall Melgar MD;  Location:  OR    EYE SURGERY Left 2009    MN revision eye sunt with graft left eye     IR LOWER EXTREMITY ANGIOGRAM LEFT  7/8/2020    OPTICAL TRACKING SYSTEM FUSION POSTERIOR CERVICAL THREE + LEVELS N/A 10/13/2023    Procedure: C3 to T2 cervical laminectomy and spinal cord decompression C3 to T2 posterior fusion C3 to T2 segemental instrumetation Application and detachment of Isaac dyer Navigation using Stealth System;  Surgeon: Sagar Cadena MD;  Location:  OR            Social History:   Reviewed   Social History     Socioeconomic History    Marital status:      Spouse name: Not on file    Number of children: Not on file    Years of education: Not on file    Highest education level: Not on file   Occupational History    Not on file   Tobacco Use    Smoking status: Never    Smokeless tobacco: Never   Substance and Sexual Activity    Alcohol use: Yes     Comment: occ    Drug use: No    Sexual activity: Not on file   Other Topics Concern    Not on file   Social History Narrative    Not on file     Social Determinants of  Health     Financial Resource Strain: Not on file   Food Insecurity: Not on file   Transportation Needs: Not on file   Physical Activity: Not on file   Stress: Not on file   Social Connections: Not on file   Interpersonal Safety: Not on file   Housing Stability: Not on file            Family History:   Reviewed   No family history on file.         Allergies:    No Known Allergies         Medications:     Medications Prior to Admission   Medication Sig Dispense Refill Last Dose    acetaminophen (TYLENOL) 325 MG tablet Take 2 tablets (650 mg) by mouth 3 times daily. May also take 2 tablets (650 mg) daily as needed for mild pain. 30 tablet 0     apixaban ANTICOAGULANT (ELIQUIS) 5 MG tablet Take 5 mg by mouth 2 times daily       atorvastatin (LIPITOR) 40 MG tablet Take 1 tablet by mouth daily       ceFAZolin (ANCEF) 1 GM vial Inject 2 g into the vein every 8 hours for 37 days ESR,CRP,CBC with differential, creatinine, SGOT weekly while on this medication to be faxed to Dr. Alexis office. 1 mL 1     cyclobenzaprine (FLEXERIL) 5 MG tablet Take 1 tablet (5 mg) by mouth 3 times daily as needed for muscle spasms 56 tablet 0     gabapentin (NEURONTIN) 300 MG capsule Take 600 mg by mouth 2 times daily       hydrochlorothiazide (HYDRODIURIL) 25 MG tablet Take 1 tablet (25 mg) by mouth daily 30 tablet 0     HYDROcodone-acetaminophen (NORCO) 5-325 MG tablet Take 1.5 tablets by mouth every 4 hours as needed for severe pain 40 tablet 0     insulin aspart (NOVOLOG PEN) 100 UNIT/ML pen For Pre-Meal  - 189 give 1 unit.   For Pre-Meal  - 239 give 2 units.   For Pre-Meal  - 289 give 3 units.   For Pre-Meal  - 339 give 4 units.   For Pre-Meal -399 give 5 units.  For Pre-Meal -449 give 6 units.  For Pre-Meal BG = or > 450 give 7 units.       insulin aspart (NOVOLOG PEN) 100 UNIT/ML pen Inject 3 Units Subcutaneous daily (with breakfast)       insulin aspart (NOVOLOG PEN) 100 UNIT/ML pen Inject 3 Units  Subcutaneous daily (with lunch)       insulin aspart (NOVOLOG PEN) 100 UNIT/ML pen Inject 5 Units Subcutaneous daily (with dinner)       insulin glargine (LANTUS PEN) 100 UNIT/ML pen Inject 18 Units Subcutaneous at bedtime 15 mL 0     ketoconazole (NIZORAL) 2 % external shampoo Shampoo the hair thoroughly each as need.       latanoprost (XALATAN) 0.005 % ophthalmic solution Place 1 drop into both eyes at bedtime       levothyroxine (SYNTHROID/LEVOTHROID) 88 MCG tablet Take 88 mcg by mouth daily       losartan (COZAAR) 50 MG tablet Take 1 tablet (50 mg) by mouth daily HOLD if blood pressure <100 30 tablet 0     melatonin 3 MG tablet Take 1 tablet (3 mg) by mouth nightly as needed for sleep 30 tablet 0     metoprolol succinate ER (TOPROL XL) 100 MG 24 hr tablet Take 1 tablet (100 mg) by mouth 2 times daily 30 tablet 0     Multiple Vitamins-Calcium (ONE-A-DAY WOMENS FORMULA PO) Take 1 tablet by mouth daily       omeprazole (PRILOSEC) 40 MG DR capsule Take 40 mg by mouth daily       prednisoLONE acetate (PRED FORTE) 1 % ophthalmic suspension Place 1 drop Into the left eye daily       timolol maleate (TIMOPTIC) 0.5 % ophthalmic solution Place 1 drop into both eyes 2 times daily                Physical Exam:   Blood pressure (!) 151/66, pulse 82, temperature 98.7  F (37.1  C), temperature source Oral, resp. rate 17, SpO2 99%.  GENERAL: Alert and oriented x 3.   HEENT: Anicteric sclera. PERRL. Mucous membranes moist and without lesions.   CV: RRR. S1, S2. No murmurs appreciated.   RESPIRATORY: Effort normal on room air. Lungs CTAB with no wheezing, rales, rhonchi.   GI: Abdomen soft and non distended, bowel sounds present. No tenderness, rebound, guarding.   Spine site covered with wound vac  EXTREMITIES: Trace edema. Intact bilateral pedal pulses.   SKIN: No jaundice. No rashes.     Lines/Tubes/Drains:   PICC 03/17/24 Single Lumen Right Brachial vein lateral antibiotics (Active)   Site Assessment WDL 03/21/24 0000    External Cath Length (cm) 2 cm 03/17/24 1215   Extremity Circumference (cm) 31 cm 03/19/24 2044   Dressing Chlorhexidine disk;Transparent;Securement device 03/19/24 2044   Dressing Status clean;dry;intact 03/21/24 0000   Dressing Intervention other (comment) 03/17/24 1215   Dressing Change Due 03/24/24 03/19/24 2044   Line Necessity Yes, meets criteria 03/19/24 2044   Purple - Status blood return noted;saline locked 03/20/24 1000   Purple - Intervention Flushed;Cap change 03/19/24 2044   Phlebitis Scale 0-->no symptoms 03/19/24 2044   Infiltration? no 03/19/24 2044   PICC Comment OK TO USE 03/17/24 1215   Number of days: 4            Data:   reviewed the following studies:  Cbc and bmp  Unresulted Labs Ordered in the Past 30 Days of this Admission       No orders found for last 31 day(s).

## 2024-03-22 ENCOUNTER — APPOINTMENT (OUTPATIENT)
Dept: PHYSICAL THERAPY | Facility: SKILLED NURSING FACILITY | Age: 79
DRG: 871 | End: 2024-03-22
Attending: INTERNAL MEDICINE
Payer: COMMERCIAL

## 2024-03-22 ENCOUNTER — APPOINTMENT (OUTPATIENT)
Dept: OCCUPATIONAL THERAPY | Facility: SKILLED NURSING FACILITY | Age: 79
DRG: 871 | End: 2024-03-22
Attending: INTERNAL MEDICINE
Payer: COMMERCIAL

## 2024-03-22 LAB
GLUCOSE BLDC GLUCOMTR-MCNC: 101 MG/DL (ref 70–99)
GLUCOSE BLDC GLUCOMTR-MCNC: 140 MG/DL (ref 70–99)
GLUCOSE BLDC GLUCOMTR-MCNC: 142 MG/DL (ref 70–99)
GLUCOSE BLDC GLUCOMTR-MCNC: 144 MG/DL (ref 70–99)
GLUCOSE BLDC GLUCOMTR-MCNC: 176 MG/DL (ref 70–99)
GLUCOSE BLDC GLUCOMTR-MCNC: 94 MG/DL (ref 70–99)

## 2024-03-22 PROCEDURE — 250N000011 HC RX IP 250 OP 636: Performed by: INTERNAL MEDICINE

## 2024-03-22 PROCEDURE — 97605 NEG PRS WND THER DME<=50SQCM: CPT

## 2024-03-22 PROCEDURE — 120N000009 HC R&B SNF

## 2024-03-22 PROCEDURE — 97530 THERAPEUTIC ACTIVITIES: CPT | Mod: GP

## 2024-03-22 PROCEDURE — 250N000013 HC RX MED GY IP 250 OP 250 PS 637: Performed by: INTERNAL MEDICINE

## 2024-03-22 PROCEDURE — G0463 HOSPITAL OUTPT CLINIC VISIT: HCPCS | Mod: 25

## 2024-03-22 PROCEDURE — 97535 SELF CARE MNGMENT TRAINING: CPT | Mod: GO

## 2024-03-22 PROCEDURE — 250N000009 HC RX 250: Performed by: INTERNAL MEDICINE

## 2024-03-22 PROCEDURE — F08D5YZ WOUND MANAGEMENT TREATMENT OF INTEGUMENTARY SYSTEM - HEAD AND NECK USING OTHER EQUIPMENT: ICD-10-PCS | Performed by: INTERNAL MEDICINE

## 2024-03-22 PROCEDURE — 250N000012 HC RX MED GY IP 250 OP 636 PS 637: Performed by: INTERNAL MEDICINE

## 2024-03-22 RX ORDER — OXYCODONE HYDROCHLORIDE 5 MG/1
5 TABLET ORAL DAILY PRN
Status: DISCONTINUED | OUTPATIENT
Start: 2024-03-22 | End: 2024-03-22

## 2024-03-22 RX ORDER — SODIUM CHLORIDE 9 MG/ML
INJECTION, SOLUTION INTRAVENOUS
Status: DISPENSED
Start: 2024-03-22 | End: 2024-03-23

## 2024-03-22 RX ADMIN — PANTOPRAZOLE SODIUM 40 MG: 40 TABLET, DELAYED RELEASE ORAL at 07:06

## 2024-03-22 RX ADMIN — ACETAMINOPHEN 650 MG: 325 TABLET, FILM COATED ORAL at 08:02

## 2024-03-22 RX ADMIN — HEPARIN, PORCINE (PF) 10 UNIT/ML INTRAVENOUS SYRINGE 5 ML: at 05:34

## 2024-03-22 RX ADMIN — METOPROLOL SUCCINATE 100 MG: 50 TABLET, EXTENDED RELEASE ORAL at 20:31

## 2024-03-22 RX ADMIN — ATORVASTATIN CALCIUM 40 MG: 40 TABLET, FILM COATED ORAL at 09:54

## 2024-03-22 RX ADMIN — LEVOTHYROXINE SODIUM 88 MCG: 0.09 TABLET ORAL at 07:06

## 2024-03-22 RX ADMIN — THERA TABS 1 TABLET: TAB at 12:30

## 2024-03-22 RX ADMIN — METOPROLOL SUCCINATE 100 MG: 50 TABLET, EXTENDED RELEASE ORAL at 08:04

## 2024-03-22 RX ADMIN — INSULIN ASPART 1 UNITS: 100 INJECTION, SOLUTION INTRAVENOUS; SUBCUTANEOUS at 10:30

## 2024-03-22 RX ADMIN — ACETAMINOPHEN 650 MG: 325 TABLET, FILM COATED ORAL at 14:37

## 2024-03-22 RX ADMIN — LOSARTAN POTASSIUM 50 MG: 50 TABLET, FILM COATED ORAL at 08:04

## 2024-03-22 RX ADMIN — GABAPENTIN 600 MG: 300 CAPSULE ORAL at 20:31

## 2024-03-22 RX ADMIN — CEFAZOLIN SODIUM 2 G: 2 INJECTION, SOLUTION INTRAVENOUS at 12:30

## 2024-03-22 RX ADMIN — GABAPENTIN 600 MG: 300 CAPSULE ORAL at 08:03

## 2024-03-22 RX ADMIN — PREDNISOLONE ACETATE 1 DROP: 10 SUSPENSION/ DROPS OPHTHALMIC at 08:08

## 2024-03-22 RX ADMIN — INSULIN GLARGINE 18 UNITS: 100 INJECTION, SOLUTION SUBCUTANEOUS at 09:45

## 2024-03-22 RX ADMIN — APIXABAN 5 MG: 2.5 TABLET, FILM COATED ORAL at 20:31

## 2024-03-22 RX ADMIN — Medication 1 MG: at 10:26

## 2024-03-22 RX ADMIN — HEPARIN, PORCINE (PF) 10 UNIT/ML INTRAVENOUS SYRINGE 5 ML: at 21:29

## 2024-03-22 RX ADMIN — ACETAMINOPHEN 650 MG: 325 TABLET, FILM COATED ORAL at 20:30

## 2024-03-22 RX ADMIN — CEFAZOLIN SODIUM 2 G: 2 INJECTION, SOLUTION INTRAVENOUS at 04:41

## 2024-03-22 RX ADMIN — TIMOLOL MALEATE 1 DROP: 5 SOLUTION/ DROPS OPHTHALMIC at 08:07

## 2024-03-22 RX ADMIN — APIXABAN 5 MG: 2.5 TABLET, FILM COATED ORAL at 09:54

## 2024-03-22 RX ADMIN — LATANOPROST 1 DROP: 50 SOLUTION OPHTHALMIC at 21:29

## 2024-03-22 RX ADMIN — HEPARIN, PORCINE (PF) 10 UNIT/ML INTRAVENOUS SYRINGE 5 ML: at 16:38

## 2024-03-22 RX ADMIN — PANTOPRAZOLE SODIUM 40 MG: 40 TABLET, DELAYED RELEASE ORAL at 16:38

## 2024-03-22 RX ADMIN — HYDROCODONE BITARTRATE AND ACETAMINOPHEN 1 TABLET: 5; 325 TABLET ORAL at 07:58

## 2024-03-22 RX ADMIN — TIMOLOL MALEATE 1 DROP: 5 SOLUTION/ DROPS OPHTHALMIC at 20:31

## 2024-03-22 RX ADMIN — CEFAZOLIN SODIUM 2 G: 2 INJECTION, SOLUTION INTRAVENOUS at 20:31

## 2024-03-22 ASSESSMENT — ACTIVITIES OF DAILY LIVING (ADL)
ADLS_ACUITY_SCORE: 30

## 2024-03-22 NOTE — PROGRESS NOTES
TCU/ ARU care coordination assessment:    Reason for consult: Wound Vac and IV abx     Assessment completed with: Patient     Date Admitted to unit: 3/20/24    BACKGROUND    Admitting Dx: MSSA deep space infection in the posterior cervical spine. MSSA bacteremia.     PCP:   Cory Soliz [phone] [id number] [facility] [id number]     Insurance:   [employer]     Additional Care Team:   ID- Dr. Alexis ([phone])  Spine- Dr. Cadena ([phone])    Living arrangements:    Lives w/ spouse and adult children in a single level home.     Caregiver after discharge (yes or no): Yes    Name/ Relationship: Julio C Castro     Are they trainable for cares?      Previous Community Services:  Has used [facility] in the past- did not remember the agency. Had no preference on where referrals are sent.     Previous DME/Supplies:  Has Glucometer at home.     CURRENT STATUS:    Functional Status/ Transfers: SBA w/ walker.      Tubes/Lines/Drains:   PICC- Single Lumen- Place 3/17.     Skin/ Wounds: Posterior Neck- Vac     Medications that require education or coordinations:    Eliquis- PTA. No education required.   Insulin- PTA. No education required.     Cefazolin IV q 8 hrs.     Additional Medical Information:  Glucose Monitoring- QID    Wound Vac every M/W/F.     DISCHARGE PLANNING:    Anticipated discharge date: TBD       Discharge Location: Home with family support     Services: Home Care, Home Infusion.      DME/ Supplies:   IV abx and supplies   Wound Vac      Education needs:     PICC and IV abx education- Bedside RN to start teaching PICC flushing, and basics. Writer rd coordinate with HI on IV abx administration.     Other notes:   Writer met with Ervin and reviewed role. Reviewed patients education and home care/ Home infusion needs at discharge. Explained that therapies would be working with her for a short period of time, and her IV abx needs would be for 4-6 weeks. Its likely that she will not need a rehab  course that long. Patient identified that her spouse and son would be teachable parties, and they may learn how to do the IV abx. It would require further discussion. Writer will call the spouse on Monday.     Writer also reviewed patient's follow-up appointments w/ patient and listed them on patient's white board. Left number and name for any other questions or concerns that arise.     Writer sent referral to check IV home coverage to [facility]- response pending.

## 2024-03-22 NOTE — CARE PLAN
Care Plan updated. Bedside RN to assess on progression and update.     Problem: Spinal Surgery  Goal: Optimal Coping with Surgery  Description: Maintain individualized postoperative restrictions, such as neutral spine positioning and avoiding bending, twisting, lifting or prolonged sitting.    Encourage gradual increase in functional activity and mobility; coordinate pain control to optimize comfort with activity.    Maintain an accessible environment to facilitate safe activity.    Implement wearing of brace or orthosis as indicated; train in donning and doffing along with wear and care.    Train in proper body mechanics for daily activities and mobility.    Assess and retrain in functional mobility, such as bed mobility using log rolling techniques, transfers and gait with neutral spine. Encourage ambulation to minimize risks associated with immobility.    Assess activity of daily living performance; train in compensatory strategies, energy conservation and adaptive equipment; consider preferred routines or habits and respect patient privacy.         Problem: Fall Injury Risk  Goal: Absence of Fall and Fall-Related Injury  Description: Provide a safe, barrier-free environment that encourages independent activity.    Keep care area uncluttered and well-lighted.    Determine need for increased observation or monitoring.    Avoid use of devices that minimize mobility, such as restraints or indwelling urinary catheter.         Problem: BADL (Basic Activities of Daily Living) Impairment  Goal: Optimal Safe BADL Performance  Description: Assess BADL (basic activity of daily living) abilities; encourage participation at maximally safe independent level.    Provide assistance and supervision needed to maintain safety; involve caregiver in BADL (basic activity of daily living) training.    Ensure effective use of equipment or devices, such as a long-handled reacher, shower seat or orthosis.    Ensure proper body mechanics  and positioning for optimal task performance.    Provide set-up of items if patient is unable to retrieve; store personal care items in accessible location.    Schedule BADL (basic activity of daily living) activities when pain and fatigue are at a minimum; pace activity to conserve energy.         Problem: Skin Injury Risk Increased  Goal: Skin Health and Integrity  Description: Perform a full pressure injury risk assessment, as indicated by screening, upon admission to care unit.    Reassess skin (full inspection and injury risk, including skin temperature, consistency and color) frequently (e.g., scheduled interval, with change in condition) to provide optimal early detection and prevention.    Maintain adequate tissue perfusion (e.g., encourage fluid balance; avoid crossing legs, constrictive clothing or devices) to promote tissue oxygenation.    Maintain head of bed at lowest degree of elevation tolerated, considering medical condition and other restrictions. Use positioning supports to prevent sliding and friction. Consider low friction textiles.    Avoid positioning onto an area that remains reddened or on bony prominences.         Problem: Infection  Goal: Absence of Infection Signs and Symptoms  Description: Implement transmission-based precautions and isolation, as indicated, to prevent spread of infection.    Obtain cultures prior to initiating antimicrobial therapy, when possible. Do not delay treatment for laboratory results in the presence of high suspicion or clinical indicators.    Administer ordered antimicrobial therapy promptly; reassess need regularly.    Monitor laboratory value, diagnostic test and clinical status trends for signs of infection progression.    Identify early signs of sepsis, such as increased heart rate and decreased blood pressure, as well as changes in mental state, respiratory pattern or peripheral perfusion.    Prepare for rapid sepsis management, including lactate level,  intravenous access, fluid administration and oxygen therapy.    Provide fever-reduction and comfort measures.    Promote antimicrobial stewardship.           Problem: Wound  Goal: Optimal Wound Healing  Description: Use a standard wound assessment tool to monitor progression of wound healing.    Perform a nutrition screening or assessment and physical exam; assess adequacy of oral intake and risk of vitamin and mineral deficiency; if suspect inadequacy or deficiency provide supplementation.    Optimize fluids, nutritional intake, sleep/rest and glycemic control to enhance healing.    Consider oxygen therapy in the presence of hypoxia to enhance tissue oxygenation.    Position to avoid tension or pressure on wound surface.  Manage edema (e.g., elevate extremities) and maintain blood pressure to optimize tissue perfusion.    Provide nonpharmacologic and pharmacologic comfort measures to manage pain and minimize vasoconstriction; premedicate for painful procedures.    Provide wound care, such as cleansing, debridement, topical therapy, appropriate dressing selection to promote wound healing and prevent infection.    Maintain sterile and occlusive dressing, if prescribed; minimize dressing changes to decrease infection risk and trauma to the wound bed.         Problem: Pain Chronic (Persistent)  Goal: Optimal Pain Control and Function  Description: Evaluate pain level, effect of treatment and patient response at regular intervals.    Minimize pain stimuli; coordinate care and adjust environment (e.g., light, noise, unnecessary movement); promote sleep/rest.    Match pharmacologic analgesia to severity and type of pain mechanism (e.g., neuropathic, muscle, inflammatory); consider multimodal approach (e.g., nonopioid, opioid, adjuvant).    Provide medication at regular intervals; titrate to patient response.    Manage breakthrough pain with additional doses; consider rotation or switching medicate         Problem:  Comorbidity Management  Goal: Blood Glucose Levels Within Targeted Range  Description: Establish target blood glucose levels based on patient-specific factors, such as illness severity and comorbidity.    Document blood glucose levels and monitor trend; advocate for treatment if not within targeted range.    Provide pharmacologic therapy to maintain blood glucose levels within targeted range.    Advocate for insulin therapy if blood glucose level remains elevated.    Check blood glucose level if there is a change in mental or cognitive status.         Problem: Comorbidity Management  Goal: Blood Pressure in Desired Range  Description: Evaluate adherence to home antihypertensive regimen (e.g., exercise and activity, diet modification, medication).    Provide scheduled antihypertensive medication; consider administration time and effects (e.g., avoid giving diuretic prior to bedtime).    Monitor response to antihypertensive medication therapy (e.g., blood pressure, electrolyte levels, medication effects).    Minimize risk of orthostatic hypotension; encourage caution with position changes, particularly if elderly.

## 2024-03-22 NOTE — CONSULTS
[facility] Nurse Inpatient Assessment     Consulted for: Neck    Summary: Patient s/p I&D on 3/13/24 of posterior neck for deep cervical infection down to the hardware with wound VAC placement.  Increased granulation tissue today.    Patient History (according to provider note(s):      Lj Castro is a 78 year old female admitted on 3/12/2024. 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  She came to the ER complaining that for the past 10 days he had increasing pain in the neck and gradual weakness to the arms and legs to the point that she was unable to ambulate.  She also had right arm pain  over the past 3 to 4 days along with confusion.  Her arm pain was 9 out of 10.  There have been no recent falls or shortness of breath or nausea or vomiting.  She was brought in by EMS.  CT scan of the neck showed a deep space infection with gas formation and possible cord compression.  She was given Kcentra and taken to the operating room.  She underwent I&D with wound VAC application along with IntraOp culture and IV antibiotics.  She underwent general endotracheal anesthesia.  Operative course was significant for hypotension requiring central line and pressor support and so she was admitted to the ICU.       Assessment:      Areas visualized during today's visit: Focused:    Negative pressure wound therapy applied to: Posterior neck   Last photo: 3/20/24     3/20   Wound due to: Surgical Wound   Wound history/plan of care:    Surgical date: 3/13/24   Service following: Spinal surgery  Date Negative Pressure Wound Therapy initiated: 3/13/24   Interventions in place:  repositioning  Is patient s nutritional status compromised? yes   If yes, what interventions are in place? Protein supplements  Reason for initiating vac therapy? Presence of co-morbidities, High risk of infections, and Need for accelerated granulation tissue  Which?of?the?following?co-morbidities?apply? Diabetes  If diabetic is patient on a diabetic management program? Yes   Is osteomyelitis present in wound? no   If yes what treatments are in place? N/A    Wound base: 50 % granulation tissue, 50 % pink non-granular tissue, adipose tissue and fascia.  One small area of exposed hardware, one additional area of hardware just below tissue.        Palpation of the wound bed: normal       Drainage:  large       Volume in cannister: 350 ml     Last cannister change date: 3/20/24     Description of drainage: serosanguinous      Measurements (length x width x depth, in cm) 11  x 3  x  3.5 cm       Tunneling N/A      Undermining up to 4.5 cm from 6-9 o'clock   Periwound skin: Intact       Color: normal and consistent with surrounding tissue       Temperature: normal    Odor: none   Pain: score 8 , sharp   Pain intervention prior to dressing change: IV Dilaudid  Treatment goal: Heal  and Increase granulation  STATUS: improving   Supplies ordered: ordered medium VAC dressing    Number of foam pieces removed from a wound (excluding foam for bridge) :  1 GranuFoam Black and 1 Oil emulsion dressing   Verified this matched the number of foam pieces applied last dressing change: Yes   Number of foam pieces packed into wound (excluding foam for bridge) :  2 GranuFoam Black and 1 Oil emulsion dressing       Treatment Plan:     Negative pressure wound therapy plan:  Wound location: Posterior neck   Change Days: Mon/Wed/Fri by WOC RN    Supplies (including all accessories) used: medium Black foam , oil emulsion over exposed hardware  Cleanse with Vashe prior to replacing NPWT  Suction setting: -50     Staff RN to assess integrity of  "dressing and ensure suction is set at appropriate level every shift.   Date canister. Chart canister output every shift. Change cannister weekly and PRN if full/occluded     Remove foam dressing and replace with BID normal saline moist gauze dressing if:   -a dressing failure which cannot be repaired within 2 hours   -patient is discharging to home without a home pump   -patient is discharging to a facility outside the local area   -if a dressing is a \"Silver Foam\", remove before Radiation Therapy or MRI     The hospital VAC pump is not to be discharged with the patient.?Ensure to disconnect patient from machine prior to discharge. Then,    - If a home KCI VAC pump has been delivered, connect home cannister to dressing tubing then connect cannister to home pump and turn on machine    - If transferring to a nearby facility with a KCI vac, can disconnect and clamp tubing then cover end with glove so can be reconnected within 2 hours        Orders: Reviewed    RECOMMEND PRIMARY TEAM ORDER: None, at this time  Education provided: plan of care  Discussed plan of care with: Patient and Nurse  WOC nurse follow-up plan: Monday/WednesdayFriday   Notify WOC if wound(s) deteriorate.  Nursing to notify the Provider(s) and re-consult the WOC Nurse if new skin concern.    DATA:     Current support surface: Standard  Low air loss (LYNNETTE pump, Isolibrium, Pulsate, skin guard, etc)  BMI: There is no height or weight on file to calculate BMI.   Active diet order: Orders Placed This Encounter      Regular Diet Adult     Output: I/O last 3 completed shifts:  In: 240 [P.O.:240]  Out: -      Labs:   Recent Labs   Lab 03/16/24  0616   HGB 10.5*   WBC 9.2     Pressure injury risk assessment:   Sensory Perception: 3-->slightly limited  Moisture: 4-->rarely moist  Activity: 3-->walks occasionally  Mobility: 3-->slightly limited  Nutrition: 3-->adequate  Friction and Shear: 3-->no apparent problem  Luke Score: 19      Filomena Gamez RN BSN " JO Arroy[facility]  Dept. Office Number: [phone]

## 2024-03-22 NOTE — PLAN OF CARE
Goal Outcome Evaluation:      Plan of Care Reviewed With: patient    Overall Patient Progress: improvingOverall Patient Progress: improving     Overall Pt had no acute issue this shift. Pt is alert and oriented x 4. Able to make needs known. Pt appears to be continent of both bowel and bladder. Pt requires assistance of 1 with transfers using a walker and GB. Pt denied having chest pain, SOB, N/V, fever and chills. Pt antibiotics infused via right arm PICC line without issue. Wound VAC in place with continuous suctioning @ 50 mmHg. Pain managed with Norco x 1. Bilateral lower extremity edema managed with PCD. Pt presently sleeping at this time. Will continue with POC

## 2024-03-22 NOTE — PLAN OF CARE
Vital stable .  Last night notes reviewed .  No nursing concerns brought forward  Labs ; none reported thus far today.  Continue prior treatment plan.   Recent Labs   Lab 03/22/24  0216 03/21/24  2107 03/21/24  1551 03/21/24  0951 03/21/24  0203 03/20/24  2205   * 143* 105* 74 [phone] 98

## 2024-03-22 NOTE — PLAN OF CARE
Goal Outcome Evaluation:    Writer shift from 0700 to 1500:      Plan of Care Reviewed With: patient    Overall Patient Progress: improving    Outcome Evaluation: PRN Dilaudid added this shift for pain and wound dressing changes. WOC RN changed wound vac this shift.    Pt A&Ox4, denies SOB, CP, nausea, vomiting, diarrhea, and constipation. Pt endorses N/T to BLE, baseline. Pt endorses pain, managed with PRN PO NORCO and PO Dilaudid. Pt continent of B&B, LBM 3/21. Pt on regular diet, takes pills whole with thin liquids. PICC in [facility], . BG checks QID, sliding scale and carb coverage; pt ate late breakfast and skipped lunch. Skin intact except for BUE bruising and sx incision in neck. Wound vac to sx neck incision intact, continuously running at -50 mmHg. BLE edema, managed with PCD and elevation. Pt SBA with walker. Pt has call light in reach, calls appropriately, and has no unanswered questions or concerns at end of shift. Continue POC.

## 2024-03-22 NOTE — PROGRESS NOTES
"   03/22/24 130   Appointment Info   Signing Clinician's Name / Credentials (PT) Sandi Zheng PTA   PT Assistant Visit Number 1   Therapeutic Activity   Therapeutic Activities: dynamic activities to improve functional performance Minutes (05100) 50   Symptoms Noted During/After Treatment Fatigue   Treatment Detail/Skilled Intervention PT: focus on progression of functional activities. see gg......step ups in // as pre-stair activity. Pt reports having one step to enter home and one step within home. In //, step ups w/2\" step for 1 set x 10 reps, then progressed to 4\" step for 2 sets x 10 reps. Pt reports increased UE use d/t BLE weakness. cues for decreased dependence on BUE and guarding at R knee to prevent buckling. No buckling noted. Pt steps up w/RLE and down w/LLE. Noted spandigrips illfitting. cut new size F spandigrips in longer length. Educated Pt on wearing schedule. At end of session, Pt supine in bed, educated Pt on elevation of BLE and floating of B heels for edema management and skin protection. Pt verbalized understanding.   PT Discharge Planning   PT Plan PT: continue to progress gait, safety w/gait and functional transfers. trial step ups w/6\" step in // then progress to curb step w/FWW to simulate Pt's home entry. standing BLE strengthening. check on BLE edema and spandi .   PT Discharge Recommendation (DC Rec) home with assist;home with home care physical therapy   PT Rationale for DC Rec PT: Pt lives with spouse and son who are willing/able to provide high level of assist.   PT Brief overview of current status PT: transfers and gait w/FWW, CGA. needs w/c follow for rest break when fatigued.   Total Session Time   Timed Code Treatment Minutes 50   Total Session Time (sum of timed and untimed services) 50   Post Acute Settings Only   What unit is patient on? TCU   PT - Transitional Care Unit Time   Individual Time (minutes) - PT 50   Group Time (minutes) - PT 0   Concurrent Time (minutes) - " PT 0   Co-Treatment Time (minutes) - PT 0   TCU Total Session Time (minutes) - PT 50   TCU Daily Total Session Time   PT TCU Daily Total Session Time 50   Rehab TCU Daily Total Session Time 85   Transfers: Sit to Stand   Patient Performance Steadying Assist   Staff Performance One person assist (one person physical assist)   Equipment Used Rolling walker   Describe Performance PT: STA, FWW, CGA w/cues for reaching back and control of velocity with sitting for spinal protection. Pt displays poor eccentric control and cues for management of line/tubes. Awareness improving w/repetition   Transfers: Chair/Bed transfers   Patient Performance Steadying Assist   Staff Performance One person assist (one person physical assist)   Equipment Used Rolling walker   Describe Performance PT: SPT, FWW, CGA w/cues for reaching back and control of velocity with sitting for spinal protection. Pt displays poor eccentric control.   Walk 10 Feet - Ability to walk once standing   Patient Performance Steadying Assist   Describe Performance PT: amb 110ft x 1, FWW, CGA while performing turns, navigating around objects and crossing different surfaces. Pt able to perform CGA w/occasional cues for keeping walker close to CADENCE as Pt tends to push walker away from body. Pt able to comply w/cues then returns to flexed posture. requires w/c follow by Th d/t level of fatigue. A x second person for maangement of IV pole and wound vac.   Walk 10 Feet on uneven surfaces - Ability to walk on various surfaces.   Patient Performance Steadying Assist   Mobility device used rolling walker   Describe Performance PT: amb 110ft x 1, FWW, CGA while performing turns, navigating around objects and crossing different surfaces. Pt able to perform CGA w/occasional cues for keeping walker close to CADENCE as Pt tends to push walker away from body. Pt able to comply w/cues then returns to flexed posture. requires w/c follow by Th d/t level of fatigue. A x second person for  "maangement of IV pole and wound vac.   Walk 50 Feet with Two Turns - Ability to walk at least 50 feet.   Patient Performance Steadying Assist   Mobility device used rolling walker   Describe Performance PT: amb 110ft x 1, FWW, CGA while performing turns, navigating around objects and crossing different surfaces. Pt able to perform CGA w/occasional cues for keeping walker close to CADENCE as Pt tends to push walker away from body. Pt able to comply w/cues then returns to flexed posture. requires w/c follow by Th d/t level of fatigue. A x second person for maangement of IV pole and wound vac.   1 Step (curb) - Ability to go up/down 1 step/curb.   Patient Performance Partial/moderate assist \"includes weight bearing assist of trunk or limbs\"   Describe Performance In //, step ups w/2\" step for 1 set x 10 reps, then progressed to 4\" step for 2 sets x 10 reps. Pt reports increased UE use d/t BLE weakness. cues for decreased dependence on BUE and guarding at R knee to prevent buckling. No buckling noted. Pt steps up w/RLE and down w/LLE       " Detail Level: Detailed Quality 110: Preventive Care And Screening: Influenza Immunization: Influenza Immunization previously received during influenza season Quality 431: Preventive Care And Screening: Unhealthy Alcohol Use - Screening: Patient not identified as an unhealthy alcohol user when screened for unhealthy alcohol use using a systematic screening method Quality 226: Preventive Care And Screening: Tobacco Use: Screening And Cessation Intervention: Patient screened for tobacco use and is an ex/non-smoker Quality 402: Tobacco Use And Help With Quitting Among Adolescents: Patient screened for tobacco and never smoked

## 2024-03-23 LAB
GLUCOSE BLDC GLUCOMTR-MCNC: 102 MG/DL (ref 70–99)
GLUCOSE BLDC GLUCOMTR-MCNC: 105 MG/DL (ref 70–99)
GLUCOSE BLDC GLUCOMTR-MCNC: 107 MG/DL (ref 70–99)
GLUCOSE BLDC GLUCOMTR-MCNC: 114 MG/DL (ref 70–99)
GLUCOSE BLDC GLUCOMTR-MCNC: 116 MG/DL (ref 70–99)
GLUCOSE BLDC GLUCOMTR-MCNC: 69 MG/DL (ref 70–99)
GLUCOSE BLDC GLUCOMTR-MCNC: 76 MG/DL (ref 70–99)
GLUCOSE BLDC GLUCOMTR-MCNC: 81 MG/DL (ref 70–99)

## 2024-03-23 PROCEDURE — 250N000011 HC RX IP 250 OP 636: Performed by: INTERNAL MEDICINE

## 2024-03-23 PROCEDURE — 250N000013 HC RX MED GY IP 250 OP 250 PS 637: Performed by: INTERNAL MEDICINE

## 2024-03-23 PROCEDURE — 250N000009 HC RX 250: Performed by: INTERNAL MEDICINE

## 2024-03-23 PROCEDURE — 120N000009 HC R&B SNF

## 2024-03-23 RX ADMIN — GABAPENTIN 600 MG: 300 CAPSULE ORAL at 09:20

## 2024-03-23 RX ADMIN — LOSARTAN POTASSIUM 50 MG: 50 TABLET, FILM COATED ORAL at 09:21

## 2024-03-23 RX ADMIN — GABAPENTIN 600 MG: 300 CAPSULE ORAL at 21:08

## 2024-03-23 RX ADMIN — ACETAMINOPHEN 650 MG: 325 TABLET, FILM COATED ORAL at 09:20

## 2024-03-23 RX ADMIN — METOPROLOL SUCCINATE 100 MG: 50 TABLET, EXTENDED RELEASE ORAL at 21:07

## 2024-03-23 RX ADMIN — ACETAMINOPHEN 650 MG: 325 TABLET, FILM COATED ORAL at 21:07

## 2024-03-23 RX ADMIN — HEPARIN, PORCINE (PF) 10 UNIT/ML INTRAVENOUS SYRINGE 5 ML: at 05:37

## 2024-03-23 RX ADMIN — METOPROLOL SUCCINATE 100 MG: 50 TABLET, EXTENDED RELEASE ORAL at 09:21

## 2024-03-23 RX ADMIN — TIMOLOL MALEATE 1 DROP: 5 SOLUTION/ DROPS OPHTHALMIC at 09:24

## 2024-03-23 RX ADMIN — CEFAZOLIN SODIUM 2 G: 2 INJECTION, SOLUTION INTRAVENOUS at 21:07

## 2024-03-23 RX ADMIN — PANTOPRAZOLE SODIUM 40 MG: 40 TABLET, DELAYED RELEASE ORAL at 06:52

## 2024-03-23 RX ADMIN — LEVOTHYROXINE SODIUM 88 MCG: 0.09 TABLET ORAL at 06:52

## 2024-03-23 RX ADMIN — LATANOPROST 1 DROP: 50 SOLUTION OPHTHALMIC at 21:07

## 2024-03-23 RX ADMIN — CEFAZOLIN SODIUM 2 G: 2 INJECTION, SOLUTION INTRAVENOUS at 13:01

## 2024-03-23 RX ADMIN — PANTOPRAZOLE SODIUM 40 MG: 40 TABLET, DELAYED RELEASE ORAL at 16:02

## 2024-03-23 RX ADMIN — ATORVASTATIN CALCIUM 40 MG: 40 TABLET, FILM COATED ORAL at 09:21

## 2024-03-23 RX ADMIN — HEPARIN, PORCINE (PF) 10 UNIT/ML INTRAVENOUS SYRINGE 5 ML: at 16:02

## 2024-03-23 RX ADMIN — THERA TABS 1 TABLET: TAB at 13:02

## 2024-03-23 RX ADMIN — TIMOLOL MALEATE 1 DROP: 5 SOLUTION/ DROPS OPHTHALMIC at 21:07

## 2024-03-23 RX ADMIN — APIXABAN 5 MG: 2.5 TABLET, FILM COATED ORAL at 09:21

## 2024-03-23 RX ADMIN — HYDROCODONE BITARTRATE AND ACETAMINOPHEN 1 TABLET: 5; 325 TABLET ORAL at 05:37

## 2024-03-23 RX ADMIN — APIXABAN 5 MG: 2.5 TABLET, FILM COATED ORAL at 21:07

## 2024-03-23 RX ADMIN — PREDNISOLONE ACETATE 1 DROP: 10 SUSPENSION/ DROPS OPHTHALMIC at 09:24

## 2024-03-23 RX ADMIN — CEFAZOLIN SODIUM 2 G: 2 INJECTION, SOLUTION INTRAVENOUS at 04:54

## 2024-03-23 RX ADMIN — INSULIN GLARGINE 18 UNITS: 100 INJECTION, SOLUTION SUBCUTANEOUS at 11:02

## 2024-03-23 RX ADMIN — ACETAMINOPHEN 650 MG: 325 TABLET, FILM COATED ORAL at 13:02

## 2024-03-23 ASSESSMENT — ACTIVITIES OF DAILY LIVING (ADL)
ADLS_ACUITY_SCORE: 30
ADLS_ACUITY_SCORE: 32
ADLS_ACUITY_SCORE: 30
ADLS_ACUITY_SCORE: 32
ADLS_ACUITY_SCORE: 30
ADLS_ACUITY_SCORE: 32

## 2024-03-23 NOTE — PLAN OF CARE
Goal Outcome Evaluation:    Patient is alert and oriented x4. Able to make needs known. Denied CP, SOB, N/V. Regular diet and takes medicatrion whole with thin liquid.  and 116. Refused breakfast and ate 100% of her lunch. Wound vac on continuous -50. Tolerated IV antibiotic. Continent of B/B. A1 walker and GB. SL PICC on R arm patent and CDI. Call light within reach. No acute issue this shift. Will continue to follow POC.     /70   Pulse 84   Temp 97.8  F (36.6  C) (Oral)   Resp 16   SpO2 100%

## 2024-03-23 NOTE — PLAN OF CARE
Goal Outcome Evaluation:      Plan of Care Reviewed With: patient    Overall Patient Progress: no change    VSS, denies SOB, no complaint of pain this shift.Wound vac to back of neck CDI, dressing done by WOC today. Due IV antibiotics given via right arm PICC, heparin locked after.Good appetite, on sliding scale insulin and carb coverage.No acute issues this shift, comfortable at this time, will continue POC.     Patient's most recent vital signs are:     Vital signs:  BP: 117/58  Temp: 97.8  HR: 75  RR: 16  SpO2: 98 % /RA    Patient does not have new respiratory symptoms.  Patient does not have new sore throat.  Patient does not have a fever greater than 99.5.

## 2024-03-23 NOTE — PLAN OF CARE
Goal Outcome Evaluation:      Plan of Care Reviewed With: patient    Overall Patient Progress: no change    No acute issue in the night pt ambulated to the bathroom  with walker assist of 1 to help transferwound vac , continent of both bowel and bladder. Wound vac to the back of the neck running continuous at 50, dressing CDI . Iv antibiotic administered via right upper arm single lumen, and heparin locked. Pt pain managed by prn NARCO x 1 . Denies SOB, CP and no n/v. Call light within reach, continue with plan of care.         Patient's most recent vital signs are:     Vital signs:  BP: 117/58  Temp: 97.8  HR: 75  RR: 16  SpO2: 98 %     Patient does not have new respiratory symptoms.  Patient does not have new sore throat.  Patient does not have a fever greater than 99.5.

## 2024-03-24 LAB
GLUCOSE BLDC GLUCOMTR-MCNC: 116 MG/DL (ref 70–99)
GLUCOSE BLDC GLUCOMTR-MCNC: 120 MG/DL (ref 70–99)
GLUCOSE BLDC GLUCOMTR-MCNC: 127 MG/DL (ref 70–99)
GLUCOSE BLDC GLUCOMTR-MCNC: 132 MG/DL (ref 70–99)
GLUCOSE BLDC GLUCOMTR-MCNC: 138 MG/DL (ref 70–99)

## 2024-03-24 PROCEDURE — 120N000009 HC R&B SNF

## 2024-03-24 PROCEDURE — 250N000011 HC RX IP 250 OP 636: Performed by: INTERNAL MEDICINE

## 2024-03-24 PROCEDURE — 250N000013 HC RX MED GY IP 250 OP 250 PS 637: Performed by: INTERNAL MEDICINE

## 2024-03-24 RX ADMIN — METOPROLOL SUCCINATE 100 MG: 50 TABLET, EXTENDED RELEASE ORAL at 21:17

## 2024-03-24 RX ADMIN — TIMOLOL MALEATE 1 DROP: 5 SOLUTION/ DROPS OPHTHALMIC at 21:29

## 2024-03-24 RX ADMIN — ATORVASTATIN CALCIUM 40 MG: 40 TABLET, FILM COATED ORAL at 08:40

## 2024-03-24 RX ADMIN — PANTOPRAZOLE SODIUM 40 MG: 40 TABLET, DELAYED RELEASE ORAL at 16:06

## 2024-03-24 RX ADMIN — METOPROLOL SUCCINATE 100 MG: 50 TABLET, EXTENDED RELEASE ORAL at 08:40

## 2024-03-24 RX ADMIN — APIXABAN 5 MG: 2.5 TABLET, FILM COATED ORAL at 21:17

## 2024-03-24 RX ADMIN — APIXABAN 5 MG: 2.5 TABLET, FILM COATED ORAL at 08:40

## 2024-03-24 RX ADMIN — PANTOPRAZOLE SODIUM 40 MG: 40 TABLET, DELAYED RELEASE ORAL at 06:43

## 2024-03-24 RX ADMIN — LEVOTHYROXINE SODIUM 88 MCG: 0.09 TABLET ORAL at 06:43

## 2024-03-24 RX ADMIN — HEPARIN, PORCINE (PF) 10 UNIT/ML INTRAVENOUS SYRINGE 5 ML: at 04:59

## 2024-03-24 RX ADMIN — CEFAZOLIN SODIUM 2 G: 2 INJECTION, SOLUTION INTRAVENOUS at 21:18

## 2024-03-24 RX ADMIN — CEFAZOLIN SODIUM 2 G: 2 INJECTION, SOLUTION INTRAVENOUS at 13:06

## 2024-03-24 RX ADMIN — LATANOPROST 1 DROP: 50 SOLUTION OPHTHALMIC at 21:17

## 2024-03-24 RX ADMIN — ACETAMINOPHEN 650 MG: 325 TABLET, FILM COATED ORAL at 13:06

## 2024-03-24 RX ADMIN — HYDROCODONE BITARTRATE AND ACETAMINOPHEN 1 TABLET: 5; 325 TABLET ORAL at 18:05

## 2024-03-24 RX ADMIN — THERA TABS 1 TABLET: TAB at 13:06

## 2024-03-24 RX ADMIN — ACETAMINOPHEN 650 MG: 325 TABLET, FILM COATED ORAL at 21:17

## 2024-03-24 RX ADMIN — GABAPENTIN 600 MG: 300 CAPSULE ORAL at 08:40

## 2024-03-24 RX ADMIN — CEFAZOLIN SODIUM 2 G: 2 INJECTION, SOLUTION INTRAVENOUS at 04:59

## 2024-03-24 RX ADMIN — HEPARIN, PORCINE (PF) 10 UNIT/ML INTRAVENOUS SYRINGE 5 ML: at 16:07

## 2024-03-24 RX ADMIN — ACETAMINOPHEN 650 MG: 325 TABLET, FILM COATED ORAL at 08:40

## 2024-03-24 RX ADMIN — INSULIN GLARGINE 16 UNITS: 100 INJECTION, SOLUTION SUBCUTANEOUS at 10:26

## 2024-03-24 RX ADMIN — GABAPENTIN 600 MG: 300 CAPSULE ORAL at 21:17

## 2024-03-24 RX ADMIN — TIMOLOL MALEATE 1 DROP: 5 SOLUTION/ DROPS OPHTHALMIC at 08:42

## 2024-03-24 RX ADMIN — PREDNISOLONE ACETATE 1 DROP: 10 SUSPENSION/ DROPS OPHTHALMIC at 08:42

## 2024-03-24 ASSESSMENT — ACTIVITIES OF DAILY LIVING (ADL)
ADLS_ACUITY_SCORE: 32

## 2024-03-24 NOTE — PLAN OF CARE
Goal Outcome Evaluation:      Plan of Care Reviewed With: patient    Overall Patient Progress: no change    Pt is alert and oriented x 4, can make needs know. Call light appropriate. Pt is continent of both bowel and bladder,  ambulated to the bathroom assist of 1 to help carry the wound vac. Pt denies SOB, CP and no n/v. Pt takes pills whole with thin liquid, on regular diet. BG check at 0200 H - 127. Pt comfortable in bed during safety checks,. Iv antibiotics given. Call light within reach. Continue with plan of care.         Patient's most recent vital signs are:     Vital signs:  BP: 127/67  Temp: 97.5  HR: 71  RR: 16  SpO2: 99 %     Patient does not have new respiratory symptoms.  Patient does not have new sore throat.  Patient does not have a fever greater than 99.5.

## 2024-03-24 NOTE — PLAN OF CARE
Goal Outcome Evaluation:      Plan of Care Reviewed With: patient    Overall Patient Progress: no change    VSS, denies SOB, no complaint of pain . With episode of low BG : 69 at 1633H,patient asymptomatic,  glucose gel not given as patient was eating her supper already, improved to 102mg/dl at 1823H. Wound vac to back of neck CDI, on continuous therapy, no alarms/ issues this shift. On IV antibiotics via right arm PICC, saline locked after administered. Sleeping at this time, comfortable. Will continue POC.         Patient's most recent vital signs are:     Vital signs:  BP: 127/67  Temp: 97.5  HR: 71  RR: 16  SpO2: 99 %     Patient does not have new respiratory symptoms.  Patient does not have new sore throat.  Patient does not have a fever greater than 99.5.

## 2024-03-24 NOTE — PLAN OF CARE
Goal Outcome Evaluation:      Patient is alert and oriented x4. Able to make needs known. Denied CP, SOB, N/V. Regular diet and takes medicatrion whole with thin liquid.  and 138.  Wound vac on continuous -50. Tolerated IV antibiotic. Continent of B/B. A1 walker and GB. SL PICC on R arm dressing changed and  patent. Call light within reach. No acute issue this shift. Will continue to follow POC.    /62 (BP Location: Left arm)   Pulse 87   Temp 97.7  F (36.5  C) (Oral)   Resp 18   SpO2 98%

## 2024-03-24 NOTE — PLAN OF CARE
Vital stable .  Last night notes reviewed .  No nursing concerns brought forward  Labs ; none reported thus far today.  Continue prior treatment plan.   Reduce lantus to 16

## 2024-03-25 ENCOUNTER — APPOINTMENT (OUTPATIENT)
Dept: OCCUPATIONAL THERAPY | Facility: SKILLED NURSING FACILITY | Age: 79
DRG: 871 | End: 2024-03-25
Attending: INTERNAL MEDICINE
Payer: COMMERCIAL

## 2024-03-25 LAB
ANION GAP SERPL CALCULATED.3IONS-SCNC: 8 MMOL/L (ref 7–15)
BUN SERPL-MCNC: 15 MG/DL (ref 8–23)
CALCIUM SERPL-MCNC: 8 MG/DL (ref 8.8–10.2)
CHLORIDE SERPL-SCNC: 104 MMOL/L (ref 98–107)
CREAT SERPL-MCNC: 0.92 MG/DL (ref 0.51–0.95)
CRP SERPL-MCNC: 26.38 MG/L
DEPRECATED HCO3 PLAS-SCNC: 30 MMOL/L (ref 22–29)
EGFRCR SERPLBLD CKD-EPI 2021: 63 ML/MIN/1.73M2
ERYTHROCYTE [DISTWIDTH] IN BLOOD BY AUTOMATED COUNT: 14.7 % (ref 10–15)
GLUCOSE BLDC GLUCOMTR-MCNC: 101 MG/DL (ref 70–99)
GLUCOSE BLDC GLUCOMTR-MCNC: 146 MG/DL (ref 70–99)
GLUCOSE BLDC GLUCOMTR-MCNC: 146 MG/DL (ref 70–99)
GLUCOSE BLDC GLUCOMTR-MCNC: 149 MG/DL (ref 70–99)
GLUCOSE BLDC GLUCOMTR-MCNC: 154 MG/DL (ref 70–99)
GLUCOSE SERPL-MCNC: 158 MG/DL (ref 70–99)
HCT VFR BLD AUTO: 27 % (ref 35–47)
HGB BLD-MCNC: 8.2 G/DL (ref 11.7–15.7)
MCH RBC QN AUTO: 29.9 PG (ref 26.5–33)
MCHC RBC AUTO-ENTMCNC: 30.4 G/DL (ref 31.5–36.5)
MCV RBC AUTO: 99 FL (ref 78–100)
PLATELET # BLD AUTO: 288 10E3/UL (ref 150–450)
POTASSIUM SERPL-SCNC: 3.8 MMOL/L (ref 3.4–5.3)
RBC # BLD AUTO: 2.74 10E6/UL (ref 3.8–5.2)
SODIUM SERPL-SCNC: 142 MMOL/L (ref 135–145)
WBC # BLD AUTO: 5.9 10E3/UL (ref 4–11)

## 2024-03-25 PROCEDURE — 36592 COLLECT BLOOD FROM PICC: CPT | Performed by: INTERNAL MEDICINE

## 2024-03-25 PROCEDURE — 99310 SBSQ NF CARE HIGH MDM 45: CPT | Performed by: INTERNAL MEDICINE

## 2024-03-25 PROCEDURE — 86140 C-REACTIVE PROTEIN: CPT | Performed by: INTERNAL MEDICINE

## 2024-03-25 PROCEDURE — F08D5YZ WOUND MANAGEMENT TREATMENT OF INTEGUMENTARY SYSTEM - HEAD AND NECK USING OTHER EQUIPMENT: ICD-10-PCS | Performed by: INTERNAL MEDICINE

## 2024-03-25 PROCEDURE — 80048 BASIC METABOLIC PNL TOTAL CA: CPT | Performed by: INTERNAL MEDICINE

## 2024-03-25 PROCEDURE — 120N000009 HC R&B SNF

## 2024-03-25 PROCEDURE — 97605 NEG PRS WND THER DME<=50SQCM: CPT

## 2024-03-25 PROCEDURE — 258N000003 HC RX IP 258 OP 636

## 2024-03-25 PROCEDURE — 250N000013 HC RX MED GY IP 250 OP 250 PS 637: Performed by: INTERNAL MEDICINE

## 2024-03-25 PROCEDURE — 97535 SELF CARE MNGMENT TRAINING: CPT | Mod: GO

## 2024-03-25 PROCEDURE — 250N000011 HC RX IP 250 OP 636: Performed by: INTERNAL MEDICINE

## 2024-03-25 PROCEDURE — 85027 COMPLETE CBC AUTOMATED: CPT | Performed by: INTERNAL MEDICINE

## 2024-03-25 RX ORDER — SODIUM CHLORIDE 9 MG/ML
INJECTION, SOLUTION INTRAVENOUS
Status: COMPLETED
Start: 2024-03-25 | End: 2024-03-25

## 2024-03-25 RX ORDER — LOPERAMIDE HCL 1 MG/7.5ML
2 SOLUTION ORAL 4 TIMES DAILY PRN
Status: DISCONTINUED | OUTPATIENT
Start: 2024-03-25 | End: 2024-03-27

## 2024-03-25 RX ORDER — LACTOBACILLUS RHAMNOSUS GG 10B CELL
1 CAPSULE ORAL 2 TIMES DAILY
Status: DISCONTINUED | OUTPATIENT
Start: 2024-03-25 | End: 2024-04-25 | Stop reason: HOSPADM

## 2024-03-25 RX ORDER — LANOLIN ALCOHOL/MO/W.PET/CERES
3 CREAM (GRAM) TOPICAL
Status: DISCONTINUED | OUTPATIENT
Start: 2024-03-25 | End: 2024-04-16

## 2024-03-25 RX ADMIN — LOSARTAN POTASSIUM 75 MG: 50 TABLET, FILM COATED ORAL at 10:11

## 2024-03-25 RX ADMIN — INSULIN ASPART 1 UNITS: 100 INJECTION, SOLUTION INTRAVENOUS; SUBCUTANEOUS at 18:23

## 2024-03-25 RX ADMIN — SODIUM CHLORIDE 500 ML: 9 INJECTION, SOLUTION INTRAVENOUS at 12:26

## 2024-03-25 RX ADMIN — Medication 3 MG: at 23:50

## 2024-03-25 RX ADMIN — ATORVASTATIN CALCIUM 40 MG: 40 TABLET, FILM COATED ORAL at 08:32

## 2024-03-25 RX ADMIN — ACETAMINOPHEN 650 MG: 325 TABLET, FILM COATED ORAL at 21:25

## 2024-03-25 RX ADMIN — ACETAMINOPHEN 650 MG: 325 TABLET, FILM COATED ORAL at 08:32

## 2024-03-25 RX ADMIN — GABAPENTIN 600 MG: 300 CAPSULE ORAL at 21:25

## 2024-03-25 RX ADMIN — TIMOLOL MALEATE 1 DROP: 5 SOLUTION/ DROPS OPHTHALMIC at 21:29

## 2024-03-25 RX ADMIN — APIXABAN 5 MG: 2.5 TABLET, FILM COATED ORAL at 08:32

## 2024-03-25 RX ADMIN — PANTOPRAZOLE SODIUM 40 MG: 40 TABLET, DELAYED RELEASE ORAL at 05:48

## 2024-03-25 RX ADMIN — INSULIN ASPART 1 UNITS: 100 INJECTION, SOLUTION INTRAVENOUS; SUBCUTANEOUS at 08:28

## 2024-03-25 RX ADMIN — INSULIN ASPART 1 UNITS: 100 INJECTION, SOLUTION INTRAVENOUS; SUBCUTANEOUS at 14:14

## 2024-03-25 RX ADMIN — HEPARIN, PORCINE (PF) 10 UNIT/ML INTRAVENOUS SYRINGE 5 ML: at 05:48

## 2024-03-25 RX ADMIN — CEFAZOLIN SODIUM 2 G: 2 INJECTION, SOLUTION INTRAVENOUS at 12:27

## 2024-03-25 RX ADMIN — LEVOTHYROXINE SODIUM 88 MCG: 0.09 TABLET ORAL at 05:48

## 2024-03-25 RX ADMIN — CEFAZOLIN SODIUM 2 G: 2 INJECTION, SOLUTION INTRAVENOUS at 05:10

## 2024-03-25 RX ADMIN — ACETAMINOPHEN 650 MG: 325 TABLET, FILM COATED ORAL at 13:16

## 2024-03-25 RX ADMIN — CEFAZOLIN SODIUM 2 G: 2 INJECTION, SOLUTION INTRAVENOUS at 21:24

## 2024-03-25 RX ADMIN — GABAPENTIN 600 MG: 300 CAPSULE ORAL at 08:31

## 2024-03-25 RX ADMIN — PANTOPRAZOLE SODIUM 40 MG: 40 TABLET, DELAYED RELEASE ORAL at 17:22

## 2024-03-25 RX ADMIN — THERA TABS 1 TABLET: TAB at 12:26

## 2024-03-25 RX ADMIN — Medication 1 CAPSULE: at 10:11

## 2024-03-25 RX ADMIN — HEPARIN, PORCINE (PF) 10 UNIT/ML INTRAVENOUS SYRINGE 5 ML: at 06:10

## 2024-03-25 RX ADMIN — METOPROLOL SUCCINATE 100 MG: 50 TABLET, EXTENDED RELEASE ORAL at 08:32

## 2024-03-25 RX ADMIN — PREDNISOLONE ACETATE 1 DROP: 10 SUSPENSION/ DROPS OPHTHALMIC at 08:29

## 2024-03-25 RX ADMIN — TIMOLOL MALEATE 1 DROP: 5 SOLUTION/ DROPS OPHTHALMIC at 08:29

## 2024-03-25 RX ADMIN — LATANOPROST 1 DROP: 50 SOLUTION OPHTHALMIC at 21:28

## 2024-03-25 RX ADMIN — LOPERAMIDE HCL 2 MG: 1 SOLUTION ORAL at 12:32

## 2024-03-25 RX ADMIN — METOPROLOL SUCCINATE 100 MG: 50 TABLET, EXTENDED RELEASE ORAL at 21:24

## 2024-03-25 RX ADMIN — HYDROCODONE BITARTRATE AND ACETAMINOPHEN 1 TABLET: 5; 325 TABLET ORAL at 23:50

## 2024-03-25 RX ADMIN — INSULIN GLARGINE 16 UNITS: 100 INJECTION, SOLUTION SUBCUTANEOUS at 08:27

## 2024-03-25 RX ADMIN — HEPARIN, PORCINE (PF) 10 UNIT/ML INTRAVENOUS SYRINGE 5 ML: at 17:22

## 2024-03-25 RX ADMIN — Medication 1 CAPSULE: at 21:25

## 2024-03-25 RX ADMIN — APIXABAN 5 MG: 2.5 TABLET, FILM COATED ORAL at 21:25

## 2024-03-25 ASSESSMENT — ACTIVITIES OF DAILY LIVING (ADL)
ADLS_ACUITY_SCORE: 32

## 2024-03-25 NOTE — PLAN OF CARE
VS: BP (!) 160/74   Pulse 69   Temp 97.6  F (36.4  C) (Oral)   Resp 16   SpO2 98%     O2: 98% on Ra   Output: Continent bowel and bladder   Last BM: 3/25/24   Activity: SBA w/w and GB   Skin: Wound vac to back of neck, BLE edema +3 - compression devices applied,    Pain: C/o pain in the neck. Managed with scheduled Tylenol   CMS:  Neuro: Alert and oriented x4, able to make needs known.    Dressing: Back of neck wound vac CDI   Diet: Regular diet, thin liquids, BG checks w/sliding scale and carb counts. Bg was 154 and 149 this shift. Patient refused lunch.    LDA: R SL PICC line, saline locked, positive blood return noted, wound vac to back of neck at 50 mmHg   Equipment: IV pump and pole, walker, GB, call light   Plan: Con't POC.    Additional Info: Patient was calm and cooperative with all cares, denies SOB and CP. C/o inability to sleep previous night, provider notified and melatonin ordered. Patient has also c/o loose stools, given PRN imodium x1 this shift. Tolerated IV abx well. No wound vac alarms noted this shift. No acute issues this shift, continue POC.    Goal Outcome Evaluation:      Plan of Care Reviewed With: patient    Overall Patient Progress: no changeOverall Patient Progress: no change     Patient's most recent vital signs are:     Vital signs:  BP: 160/74  Temp: 97.6  HR: 69  RR: 16  SpO2: 98 %     Patient does not have new respiratory symptoms.  Patient does not have new sore throat.  Patient does not have a fever greater than 99.5.

## 2024-03-25 NOTE — PLAN OF CARE
Goal Outcome Evaluation:      Plan of Care Reviewed With: patient    Overall Patient Progress: improvingOverall Patient Progress: improving     Overall pt had no acute issue this shift. Antibiotics infused without issue. Complained of having loose stool, and requested for Imodium. Sticky note placed for provider.

## 2024-03-25 NOTE — ANESTHESIA POSTPROCEDURE EVALUATION
Patient: Lj Castro    Procedure: Procedure(s):  irrigation and debridement and wound vac application to posterior cervical sine       Anesthesia Type:  General    Note:     Postop Pain Control: Uneventful            Sign Out: Well controlled pain   PONV: No   Neuro/Psych: Uneventful            Sign Out: Acceptable/Baseline neuro status   Airway/Respiratory:             Sign Out: Acceptable/Baseline resp. status   CV/Hemodynamics:             Sign Out: Acceptable CV status   Other NRE:    DID A NON-ROUTINE EVENT OCCUR?     Event details/Postop Comments:  Late entry           Last vitals:  Vitals Value Taken Time   /80 03/13/24 0130   Temp 96.9  F (36.1  C) 03/13/24 0130   Pulse 86 03/13/24 0130   Resp 17 03/13/24 0130   SpO2 95 % 03/13/24 0130       Electronically Signed By: Jair Shankar DO  March 25, 2024  7:29 AM

## 2024-03-25 NOTE — PLAN OF CARE
Goal Outcome Evaluation:      Plan of Care Reviewed With: patient    Overall Patient Progress: no change    VSS, denies SOB, pain to back of neck ( wound vac site), managed with Oxycodone PRN given x1 and scheduled Tylenol .Wound vac to back of neck CDI, on continuous therapy, no alarms/ issues this shift. On IV antibiotics via right arm PICC,saline locked after administered. Comfortable at this time, will continue POC.         Patient's most recent vital signs are:     Vital signs:  BP: 115/77  Temp: 98.4  HR: 79  RR: 19  SpO2: 98 %     Patient does not have new respiratory symptoms.  Patient does not have new sore throat.  Patient does not have a fever greater than 99.5.

## 2024-03-25 NOTE — PROGRESS NOTES
[facility] Nurse [facility]patient [facility]     Consulted for: Neck    Summary: Patient s/p I&D on 3/13/24 of posterior neck for deep cervical infection down to the hardware with wound VAC placement.  Increased granulation tissue today.    Patient History (according to provider note(s):      Lj Castro is a 78 year old female admitted on 3/12/2024. 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  She came to the ER complaining that for the past 10 days he had increasing pain in the neck and gradual weakness to the arms and legs to the point that she was unable to ambulate.  She also had right arm pain  over the past 3 to 4 days along with confusion.  Her arm pain was 9 out of 10.  There have been no recent falls or shortness of breath or nausea or vomiting.  She was brought in by EMS.  CT scan of the neck showed a deep space infection with gas formation and possible cord compression.  She was given Kcentra and taken to the operating room.  She underwent I&D with wound VAC application along with IntraOp culture and IV antibiotics.  She underwent general endotracheal anesthesia.  Operative course was significant for hypotension requiring central line and pressor support and so she was admitted to the ICU.       Assessment:      Areas visualized during today's visit: Focused:    Negative pressure wound therapy applied to: Posterior neck   Last photo: 3/20/24     3/20   Wound due to: Surgical Wound   Wound history/plan of care:    Surgical date: 3/13/24   Service following: Spinal surgery  Date Negative Pressure Wound Therapy initiated: 3/13/24   Interventions in place:  repositioning  Is patient s nutritional status compromised? yes   If yes, what interventions are in place? Protein supplements  Reason for initiating vac therapy? Presence of co-morbidities, High risk of infections, and Need for accelerated granulation tissue  Which?of?the?following?co-morbidities?apply? Diabetes  If diabetic is patient on a diabetic management program? Yes   Is osteomyelitis present in wound? no   If yes what treatments are in place? N/A    Wound base: 50 % granulation tissue, 50 % pink non-granular tissue, adipose tissue and fascia.  One small area of exposed hardware, one additional area of hardware just below tissue.        Palpation of the wound bed: normal       Drainage:  large       Volume in cannister: 350 ml     Last cannister change date: 3/20/24     Description of drainage: serosanguinous      Measurements (length x width x depth, in cm) 11  x 3  x  3.5 cm       Tunneling N/A      Undermining up to 4.5 cm from 6-9 o'clock   Periwound skin: Intact       Color: normal and consistent with surrounding tissue       Temperature: normal    Odor: none   Pain: score 8 , sharp   Pain intervention prior to dressing change: IV Dilaudid  Treatment goal: Heal  and Increase granulation  STATUS: improving   Supplies ordered: ordered medium VAC dressing    Number of foam pieces removed from a wound (excluding foam for bridge) :  1 GranuFoam Black and 1 Oil emulsion dressing   Verified this matched the number of foam pieces applied last dressing change: Yes   Number of foam pieces packed into wound (excluding foam for bridge) :  2 GranuFoam Black and 1 Oil emulsion dressing       Treatment Plan:     Negative pressure wound therapy plan:  Wound location: Posterior neck   Change Days: Mon/Wed/Fri by WOC RN    Supplies (including all accessories) used: medium Black foam , oil emulsion over exposed hardware  Cleanse with Vashe prior to replacing NPWT  Suction setting: -50     Staff RN to assess integrity of  "dressing and ensure suction is set at appropriate level every shift.   Date canister. Chart canister output every shift. Change cannister weekly and PRN if full/occluded     Remove foam dressing and replace with BID normal saline moist gauze dressing if:   -a dressing failure which cannot be repaired within 2 hours   -patient is discharging to home without a home pump   -patient is discharging to a facility outside the local area   -if a dressing is a \"Silver Foam\", remove before Radiation Therapy or MRI     The hospital VAC pump is not to be discharged with the patient.?Ensure to disconnect patient from machine prior to discharge. Then,    - If a home KCI VAC pump has been delivered, connect home cannister to dressing tubing then connect cannister to home pump and turn on machine    - If transferring to a nearby facility with a KCI vac, can disconnect and clamp tubing then cover end with glove so can be reconnected within 2 hours        Orders: Reviewed    RECOMMEND PRIMARY TEAM ORDER: None, at this time  Education provided: plan of care  Discussed plan of care with: Patient and Nurse  WOC nurse follow-up plan: Monday/WednesdayFriday   Notify WOC if wound(s) deteriorate.  Nursing to notify the Provider(s) and re-consult the WOC Nurse if new skin concern.    DATA:     Current support surface: Standard  Low air loss (LYNNETTE pump, Isolibrium, Pulsate, skin guard, etc)  BMI: There is no height or weight on file to calculate BMI.   Active diet order: Orders Placed This Encounter      Regular Diet Adult     Output: I/O last 3 completed shifts:  In: 20 [I.V.:20]  Out: -      Labs:   Recent Labs   Lab 03/25/24  0614   HGB 8.2*   WBC 5.9     Pressure injury risk assessment:   Sensory Perception: 3-->slightly limited  Moisture: 4-->rarely moist  Activity: 3-->walks occasionally  Mobility: 3-->slightly limited  Nutrition: 3-->adequate  Friction and Shear: 3-->no apparent problem  Luke Score: 19      Filomena Gamez RN BSN " JO Arroy[facility]  Dept. Office Number: [phone]

## 2024-03-25 NOTE — PROGRESS NOTES
[facility]    Medicine Progress Note - Hospitalist Service    Date of Admission:  3/20/2024    Assessment & Plan      Lj Castro is a 78 year old female who was admitted on 3/12/2024 to [facility] She has a PMH significant for HTN, HLD, hypothyroid, DM2, PN, CKD3 (baseline cr about 1.15-1.35), AFib-on DOAC, chronic neck and back pain, neurogenic claudication, PAD, and GERD who is s/p C3 to T2 cervical lamimectomy and spinal cord decompression, C3 to T2 posterior fusion, C3 to T2 segemental instrumentation for progressive myelopathy due to stenosis and kyphosis from C3-T3 (10/13/2013) who was admitted on 3/13/2024 with neck pain, weakness, inability to ambulate.      CT scan of the neck showed a deep space infection with gas formation and possible cord compression.  She was given Kcentra and taken to the operating room.  She underwent I&D with wound VAC application along with IntraOp culture and IV antibiotics.       Operative course was significant for hypotension requiring central line and pressor support and so she was admitted to the ICU.    Operative and blood cultures subsequently grew MSSA.  Patient improved in the ICU and was transferred to the general medical floor     Most recent positive blood culture for MSSA was on 3/13.  Repeat blood cultures 3/14 and 3/15 remained.  PICC line was placed on 3/17 for anticipated weeks-long IV antibiotic course at discharge.     She had a wound vac applied to her neck wound .     She was discharged to TCU on 3/20 for IV abx and wound cares and therapy     3/25    ID appointment 4/2  Spine ortho on 4/16   Wound vac changes M/W/F ( 1 mg Dilaudid before dressing change )  BG stable   Increase Cozaar to 75 mg ( BP elevated )  Add imodium for prn loose stool   Add probiotic  Labs cbc and bmp on 3/25 reviewed . Weekly ordered        Deconditioning ;  PT   OT       MSSA deep space infection in the posterior cervical spine   MSSA bacteremia    "  On cefazolin for 37 days .( Started 3/20 at TCU )   Needs ID follow in 2 weeks . Dr Alexis / to be arranged   PICC to be removed once abx completed   May be started on Rifampin at that time . Discuss at ID appointmwnt  Weekly labs at TCU         Post cervical spine I and D by Dr Sagar Cadena on 3/13  Wound vac in place   Wound nurse consulted  Spine follow up in 2- weeks to be arranged . Call [phone] to arrange   Pain control with percocet and flexeril and tylenol and gabapentin . Wean of oxycodone as able      Type 2 diabetes:  On Lantus 16 ( had been on 22 )   Medium sliding scale insulin and carb coverage 1 unit per 15 .  CTM     A fib   Apixiban resumed 3/19   Start Toprol   Most recent echocardiogram in January 2023 shows a normal EF.     Acute encephalopathy: due to infectious etiology ( toxic ) resolved    CTM  Hypothyroid ; continue synthroid   HLD ; statin   CKD , creatinine 1.2 . CTM  Hypertension ; continue cozaar  and Toprol Also on HCTZ        Diet: Regular Diet Adult    DVT Prophylaxis: DOAC  Winston Catheter: Not present  Lines: PRESENT      PICC 03/17/24 Single Lumen Right Brachial vein lateral antibiotics-Site Assessment: WDL      Cardiac Monitoring: None  Code Status: Full Code      Clinically Significant Risk Factors                         # Obesity: Estimated body mass index is 37.46 kg/m  as calculated from the following:    Height as of 3/12/24: 1.575 m (5' 2\").    Weight as of 3/19/24: 92.9 kg (204 lb 12.9 oz).                      Mandy Simms MD  Hospitalist Service  [facility]  Securely message with [employer] (more info)  Text page via [facility] Paging/Directory   ______________________________________________________________________    Interval History   No new symptoms reported per nursing staff .  Intermittent loose stools   No abdominal pain   No fever   No odor to stool   No chest pain or Shortness of breath reported.  No Fever   No vomiting   No difficulty " with voiding   Passing gas .  Tolerating diet     4 system ROS reviewed .     Physical Exam   Vital Signs: Temp: 97.6  F (36.4  C) Temp src: Oral BP: (!) 164/72 Pulse: 68   Resp: 16 SpO2: 98 % O2 Device: None (Room air)    Weight: 0 lbs 0 oz    General Appearance: Alert and oriented . NAD  Respiratory: clear BL  Cardiovascular: RRR  GI: soft , non tender , BS positive  Skin: wound vac on post spine  Other: pleasant     Medical Decision Making       45 MINUTES SPENT BY ME on the date of service doing chart review, history, exam, documentation & further activities per the note.      Data     I have personally reviewed the following data over the past 24 hrs:    5.9  \   8.2 (L)   / 288     142 104 15.0 /  154 (H)   3.8 30 (H) 0.92 \     Procal: N/A CRP: 26.38 (H) Lactic Acid: N/A

## 2024-03-26 ENCOUNTER — APPOINTMENT (OUTPATIENT)
Dept: PHYSICAL THERAPY | Facility: SKILLED NURSING FACILITY | Age: 79
DRG: 871 | End: 2024-03-26
Attending: INTERNAL MEDICINE
Payer: COMMERCIAL

## 2024-03-26 ENCOUNTER — APPOINTMENT (OUTPATIENT)
Dept: OCCUPATIONAL THERAPY | Facility: SKILLED NURSING FACILITY | Age: 79
DRG: 871 | End: 2024-03-26
Attending: INTERNAL MEDICINE
Payer: COMMERCIAL

## 2024-03-26 LAB
GLUCOSE BLDC GLUCOMTR-MCNC: 109 MG/DL (ref 70–99)
GLUCOSE BLDC GLUCOMTR-MCNC: 115 MG/DL (ref 70–99)
GLUCOSE BLDC GLUCOMTR-MCNC: 124 MG/DL (ref 70–99)
GLUCOSE BLDC GLUCOMTR-MCNC: 138 MG/DL (ref 70–99)
GLUCOSE BLDC GLUCOMTR-MCNC: 139 MG/DL (ref 70–99)

## 2024-03-26 PROCEDURE — 120N000009 HC R&B SNF

## 2024-03-26 PROCEDURE — 022N000001 HC SNF RUG CODE OPNP

## 2024-03-26 PROCEDURE — 97530 THERAPEUTIC ACTIVITIES: CPT | Mod: GP

## 2024-03-26 PROCEDURE — 250N000011 HC RX IP 250 OP 636: Performed by: INTERNAL MEDICINE

## 2024-03-26 PROCEDURE — 97535 SELF CARE MNGMENT TRAINING: CPT | Mod: GO

## 2024-03-26 PROCEDURE — 250N000013 HC RX MED GY IP 250 OP 250 PS 637: Performed by: INTERNAL MEDICINE

## 2024-03-26 PROCEDURE — 258N000003 HC RX IP 258 OP 636

## 2024-03-26 RX ORDER — SODIUM CHLORIDE 9 MG/ML
INJECTION, SOLUTION INTRAVENOUS
Status: COMPLETED
Start: 2024-03-26 | End: 2024-03-26

## 2024-03-26 RX ADMIN — PANTOPRAZOLE SODIUM 40 MG: 40 TABLET, DELAYED RELEASE ORAL at 05:19

## 2024-03-26 RX ADMIN — TIMOLOL MALEATE 1 DROP: 5 SOLUTION/ DROPS OPHTHALMIC at 20:24

## 2024-03-26 RX ADMIN — METOPROLOL SUCCINATE 100 MG: 50 TABLET, EXTENDED RELEASE ORAL at 20:25

## 2024-03-26 RX ADMIN — ACETAMINOPHEN 650 MG: 325 TABLET, FILM COATED ORAL at 08:16

## 2024-03-26 RX ADMIN — Medication 1 CAPSULE: at 08:16

## 2024-03-26 RX ADMIN — PANTOPRAZOLE SODIUM 40 MG: 40 TABLET, DELAYED RELEASE ORAL at 16:26

## 2024-03-26 RX ADMIN — INSULIN GLARGINE 16 UNITS: 100 INJECTION, SOLUTION SUBCUTANEOUS at 09:08

## 2024-03-26 RX ADMIN — APIXABAN 5 MG: 2.5 TABLET, FILM COATED ORAL at 08:16

## 2024-03-26 RX ADMIN — CEFAZOLIN SODIUM 2 G: 2 INJECTION, SOLUTION INTRAVENOUS at 20:16

## 2024-03-26 RX ADMIN — LEVOTHYROXINE SODIUM 88 MCG: 0.09 TABLET ORAL at 05:20

## 2024-03-26 RX ADMIN — SODIUM CHLORIDE 500 ML: 9 INJECTION, SOLUTION INTRAVENOUS at 20:19

## 2024-03-26 RX ADMIN — LOSARTAN POTASSIUM 75 MG: 50 TABLET, FILM COATED ORAL at 08:16

## 2024-03-26 RX ADMIN — LOPERAMIDE HCL 2 MG: 1 SOLUTION ORAL at 20:31

## 2024-03-26 RX ADMIN — THERA TABS 1 TABLET: TAB at 13:04

## 2024-03-26 RX ADMIN — LATANOPROST 1 DROP: 50 SOLUTION OPHTHALMIC at 22:12

## 2024-03-26 RX ADMIN — GABAPENTIN 600 MG: 300 CAPSULE ORAL at 20:24

## 2024-03-26 RX ADMIN — METOPROLOL SUCCINATE 100 MG: 50 TABLET, EXTENDED RELEASE ORAL at 08:17

## 2024-03-26 RX ADMIN — GABAPENTIN 600 MG: 300 CAPSULE ORAL at 08:17

## 2024-03-26 RX ADMIN — TIMOLOL MALEATE 1 DROP: 5 SOLUTION/ DROPS OPHTHALMIC at 08:22

## 2024-03-26 RX ADMIN — ACETAMINOPHEN 650 MG: 325 TABLET, FILM COATED ORAL at 20:24

## 2024-03-26 RX ADMIN — APIXABAN 5 MG: 2.5 TABLET, FILM COATED ORAL at 20:25

## 2024-03-26 RX ADMIN — Medication 1 CAPSULE: at 20:25

## 2024-03-26 RX ADMIN — ACETAMINOPHEN 650 MG: 325 TABLET, FILM COATED ORAL at 13:04

## 2024-03-26 RX ADMIN — CEFAZOLIN SODIUM 2 G: 2 INJECTION, SOLUTION INTRAVENOUS at 13:04

## 2024-03-26 RX ADMIN — PREDNISOLONE ACETATE 1 DROP: 10 SUSPENSION/ DROPS OPHTHALMIC at 08:22

## 2024-03-26 RX ADMIN — CEFAZOLIN SODIUM 2 G: 2 INJECTION, SOLUTION INTRAVENOUS at 05:17

## 2024-03-26 RX ADMIN — ATORVASTATIN CALCIUM 40 MG: 40 TABLET, FILM COATED ORAL at 08:16

## 2024-03-26 ASSESSMENT — ACTIVITIES OF DAILY LIVING (ADL)
ADLS_ACUITY_SCORE: 36
ADLS_ACUITY_SCORE: 33
ADLS_ACUITY_SCORE: 33
ADLS_ACUITY_SCORE: 32
ADLS_ACUITY_SCORE: 33
ADLS_ACUITY_SCORE: 36
ADLS_ACUITY_SCORE: 33
ADLS_ACUITY_SCORE: 36
ADLS_ACUITY_SCORE: 32
ADLS_ACUITY_SCORE: 37
ADLS_ACUITY_SCORE: 33
ADLS_ACUITY_SCORE: 37
ADLS_ACUITY_SCORE: 33
ADLS_ACUITY_SCORE: 33
ADLS_ACUITY_SCORE: 37
ADLS_ACUITY_SCORE: 36
ADLS_ACUITY_SCORE: 36

## 2024-03-26 NOTE — PLAN OF CARE
Goal Outcome Evaluation:      Plan of Care Reviewed With: patient    Overall Patient Progress: no changeOverall Patient Progress: no change    Outcome Evaluation: See RD note 3/26    PAM Guillaume, RD, LD  Available on [employer]: M-F (7:00-3:30) TCU Clinical Dietitian  Weekend/Holiday (7:00-3:30) - Weekend Clinical Dietitian      **Clinical Dietitians are no longer available by pager

## 2024-03-26 NOTE — PROGRESS NOTES
"   03/26/24 0905   Appointment Info   Signing Clinician's Name / Credentials (PT) YIFAN Sanchez   PT Assistant Visit Number 2   Student Supervision Line of sight supervision provided   Rehab Comments (PT) PT: -15 min d/t finishing breakfast and occupied w/ other services.   Therapeutic Activity   Therapeutic Activities: dynamic activities to improve functional performance Minutes (43128) 30   Symptoms Noted During/After Treatment Increased pain   Treatment Detail/Skilled Intervention PT: focused on functional activities, see gg... stair training w/ 4'' step-ups in // with CGA, 4 sets and 10 reps. Nonreciprocal pattern w/ activitiy, mod verbal cues for sequencing and posture. Performs retro steps w/ 4'' platform for last two sets. Pt did retrowalking in // x 3 bouts for balance and progression of gait w/ CGA. Min verbal cues for CADENCE and slowing down movmenets for increased stability.   PT Discharge Planning   PT Plan PT: continue to progress gait, safety w/gait and functional transfers. trial step ups w/6\" step in // then progress to curb step w/FWW to simulate Pt's home entry. standing BLE strengthening. Check on BLE edema and spandi - measure circumference of B lower leg and midfoot.   PT Discharge Recommendation (DC Rec) home with assist;home with home care physical therapy   PT Rationale for DC Rec PT: Pt lives with spouse and son who are willing/able to provide high level of assist.   PT Brief overview of current status PT: transfers and gait w/FWW, CGA. needs w/c follow for rest break when fatigued. Edema slightly improved on LLE. Slightly worse on RLE, increased fluid in dorsal R foot. Mild pain in BLE throughout d/t swelling. Pt. able to complete step-ups in // with CGA, fairly easy task for pt.   Total Session Time   Timed Code Treatment Minutes 30   Total Session Time (sum of timed and untimed services) 30   Post Acute Settings Only   What unit is patient on? TCU   PT - Transitional Care Unit Time "   Individual Time (minutes) - PT 30   Group Time (minutes) - PT 0   Concurrent Time (minutes) - PT 0   Co-Treatment Time (minutes) - PT 0   TCU Total Session Time (minutes) - PT 30   TCU Daily Total Session Time   PT TCU Daily Total Session Time 30   Rehab TCU Daily Total Session Time 30   Transfers: Sit to Stand   Patient Performance Supervision or verbal cues   Staff Performance Set up help only   Equipment Used Standard walker   Describe Performance PT: 10 reps x STS from w/c to // w/ SBA<>sup. Min cues for appropriate handplacement, body mechanics, and line management, pt. receptive to cueing and applies feedback within session. Mod cues to use BLE<BUE. Performs STS in little time and effort.   Walk 10 Feet - Ability to walk once standing   Patient Performance Supervision or verbal cues   Mobility device used standard walker   Describe Performance PT: amb 15' from room>hallway to measure weight w/ sup and walker. Little time and effort to complete activity, keeps AD close to body after min verbal cues, full A to manage wound vac.

## 2024-03-26 NOTE — PLAN OF CARE
Care Coordination:   Home Infusion Benefits check from [facility]:     Pt has a Medicare replacement plan, which does not cover IV ABX in the home. (Pt would have coverage for short term TCU or IC). Below is what pt would be responsible for if pt wanted to go w [facility] home infusion  Drug would go to Part-D (pt would be responsible for the co-pay per dispense)   Pt would have to self-pay for the per-katy (daily  If not homebound, nursing would also be self-pay (per visit)    Here is what we are anticipating for out of pocket costs, based on the above information (Estimate has also been attached for your review):    Cost of drugs & supplies per day- Cefazolin 2gm q8h/ $31.74    Cost per RN visit if patient is not Homebound- This cost will be dependent on what the Homecare Agency charges. Could range from $90-$200      Writer discussed the options with both patient and her spouse Brice (contacted brice seperately via phone). Writer explained to patient that she does not have home infusion benefits- and that the cost of the IV abx and supplies would be an out of pocket cost to the patient. Gave them SPQ from hospitals- of about 31.74 per day x 30 days ~$952. Did explain that patient does have benefits to stay at TCU or go to an infusion center (although not viable option for q 8 hr meds). Patient understood both options, but is hopeful to go home w/ infusions at a self pay cost.     Writer explained that the family would be responsible for doing the daily infusions, and [facility] nursing would be responsible for dressing changes etc. Patient identified brice and her son as trainable parties. Reviewed the role of home care PT/OT/Rn services, and that [facility] is a separate entity from [facility]. Reviewed that HC rn would change wound vac dressing to posterior neck. Review process of renting vac unit, and coverage for vac.     Above information was also reviewed w/ Brice on phone. Brice states that he would have a discussion w/ Sheron about  options and will report back to writer on decision.     Writer also sent referral to [facility] for cost comparison.     Christelle Valentine   Patient Care Management Coordinator  Acute Rehabilitation Unit/ Transitional Care Unit.   Ph: [phone]

## 2024-03-26 NOTE — PROGRESS NOTES
Vital stable . Labs from 3/25 reviewed.  Previous notes review. BG trend reviewed and documented below.  Continue prior treatment plan as outlined.   Recent Labs   Lab 03/26/24  0829 03/26/24  0204 03/25/24  2151 03/25/24  1821 03/25/24  1400 03/25/24  0802   * 115* 101* 146* 149* 154*

## 2024-03-26 NOTE — PLAN OF CARE
Goal Outcome Evaluation:  Pt.A&Ox4. Uses call light appropriately. SBA/walker to BR, only needing assist w/wound vac. Beginning of shift pt.c/o inability to sleep and neck pain - medicated w/Melatonin and Norco @ 2350 and was effective. Posterior neck wound vac @ 50mmHg continuous suction - no alarms, approx.300mls serosang.drainage in cannister. Continues IV abx via SL valved picc. BG @ 0200 = 115.        Patient's most recent vital signs are:     Vital signs:  BP: 145/71  Temp: 97.3  HR: 69  RR: 18  SpO2: 98 %     Patient does not have new respiratory symptoms.  Patient does not have new sore throat.  Patient does not have a fever greater than 99.5.

## 2024-03-26 NOTE — PLAN OF CARE
Pt is alert and oriented x4. Able to make needs known to staff. Continent of bowel and bladder. Standby assist with walker and galt belt. Neck pain is manage with scheduled Tylenol. On a regular diet. Take medication whole with thin liquid. BG check x 4 with sliding scale and carb count. Last check was 146 and 101. PICC line on right arm with SL. IV abx infuse with no issue. Will continued plan of care.        Patient's most recent vital signs are:     Vital signs:  BP: 145/71  Temp: 97.3  HR: 69  RR: 18  SpO2: 98 %     Patient does not have new respiratory symptoms.  Patient does not have new sore throat.  Patient does not have a fever greater than 99.5.

## 2024-03-26 NOTE — PROGRESS NOTES
CLINICAL NUTRITION SERVICES - ASSESSMENT NOTE     Nutrition Prescription    RECOMMENDATIONS FOR MDs/PROVIDERS TO ORDER:  Could consider modifying Thera-Vit to Thera-Vit-M to better meet micronutrient needs.    Malnutrition Status:    Severe malnutrition in the context of acute on chronic illness    Recommendations already ordered by Registered Dietitian (RD):  - Weekly weights  - Glucerna once/day + PRN    Future/Additional Recommendations:  Monitor appetite/po intake, wt trends, wound healing, pt acceptance of ONS.     REASON FOR ASSESSMENT  Lj Castro is a/an 78 year old female assessed by the dietitian for LOS    CLINICAL HISTORY  PMH significant for HTN, HLD, hypothyroid, DM2, PN, CKD3 (baseline cr about 1.15-1.35), AFib-on DOAC, chronic neck and back pain, neurogenic claudication, PAD, and GERD who is s/p C3 to T2 cervical lamimectomy and spinal cord decompression, C3 to T2 posterior fusion, C3 to T2 segemental instrumentation for progressive myelopathy due to stenosis and kyphosis from C3-T3 (10/13/2013) who was admitted on 3/13/2024 with neck pain, weakness, inability to ambulate.     CT scan of the neck showed a deep space infection with gas formation and possible cord compression. She underwent I&D with wound VAC application along with IntraOp culture and IV antibiotics. She had a wound vac applied to her neck wound. She was discharged to TCU on 3/20 for IV abx and wound cares and therapy    NUTRITION HISTORY  Pt reports her appetite is fine and is eating meals BID. Reports this is similar to her usual intake at home and doesn't typically have snacks, although today did have a couple packages of ze crackers. Pt has tried Glucerna in the past and was agreeable to having one/day, but did not want more than one/day at this time.    GI: 0-1x BM/day, last BM on 3/25, formed/soft/brown    CURRENT NUTRITION ORDERS  Diet: Regular  Intake/Tolerance: % intakes documented in flowsheets. Pt is ordering  "2 meals daily per [employer] records, 5-day average ordered ~670 kcals and 31 g protein. If eating 100% of all meals, would have met 44% low-end estimated kcal needs and 51% low-end estimated protein needs.    LABS  CRP 26.38 (H)  Glucose POCT  (WNL-H)    MEDICATIONS  Novolog  Lantus  Culturell  Levothyroxine  Thera-Vit  Protonix  PRN: Imodium    IV Fluids over last 7 days? No    ANTHROPOMETRICS  Height: 157.5 cm (5' 2\")  Most Recent Weight: 92.9 kg (204 lb 12.9 oz)  IBW: 50 kg  BMI: Obesity Grade II BMI 35-39.9  Weight History:   Wt Readings from Last 15 Encounters:   03/26/24 87.1 kg (192 lb 1.6 oz)   03/19/24 92.9 kg (204 lb 12.9 oz)   11/06/23 81.7 kg (180 lb 1.6 oz)   11/02/23 81.7 kg (180 lb 1.6 oz)   10/30/23 81.2 kg (179 lb 1.6 oz)   10/23/23 81.2 kg (179 lb)   10/20/23 81.2 kg (179 lb)   10/18/23 81.2 kg (179 lb)   10/13/23 81.6 kg (179 lb 14.3 oz)   03/20/15 85 kg (187 lb 6.3 oz)   6.2% wt loss in 1 week, pt has 2+ edema to lower extremities. Pt reports her weight is currently up from her usual body weight.    Dosing Weight: 60.7 kg (adjusted wt based on most recent wt of 92.9 kg and IBW of 50 kg)    ASSESSED NUTRITION NEEDS  Estimated Energy Needs: 1515 - 1820 kcals/day (25 - 30 kcals/kg)  Justification: Maintenance  Estimated Protein Needs: 61 - 73 grams protein/day (1 - 1.2 grams of pro/kg)  Justification: CKD and Wound healing  Estimated Fluid Needs: 1515 - 1820 mL/day (1 mL/kcal)   Justification: Maintenance OR per provider pending fluid status    PHYSICAL FINDINGS  Surgical wound to posterior neck, has wound vac -> per WOCN note on 3/25, status: improving  Dentition: Patient with own teeth, no pain/difficulty chewing/swallowing    MALNUTRITION  % Intake: </= 50% for >/= 5 days (severe)  % Weight Loss: Unable to assess -> confounded by fluid status  Subcutaneous Fat Loss: Facial region:  moderate and Upper arm:  moderate  Muscle Loss: Temporal:  moderate-severe, Thoracic region (clavicle, " acromium bone, deltoid, trapezius, pectoral):  moderate, Upper arm (bicep, tricep):  moderate, Lower arm  (forearm):  moderate, and Dorsal hand:  moderate-severe  Fluid Accumulation/Edema: Mild  Malnutrition Diagnosis: Severe malnutrition in the context of acute on chronic illness    NUTRITION DIAGNOSIS  Inadequate oral intake related to low appetite as evidenced by pt report, meal ordering hx per [employer], and physical signs of muscle/fat loss.      INTERVENTIONS  Implementation  Nutrition Education: RD role in care, importance of adequate protein for muscle mass maintenance and wound healing. Discussed that pt has increased nutrition needs currently.  Encouraged oral intake, especially protein foods and encouraged pt to order snacks between her meals.  Medical food supplement therapy - Glucerna once/day    Goals  Patient to consume % of nutritionally adequate meal trays TID, or the equivalent with supplements/snacks.     Monitoring/Evaluation  Progress toward goals will be monitored and evaluated per protocol.    Yen Mejia, MPS, RD, LD  Available on [employer]  [facility]: M-F (7:00-3:30) TCU Clinical Dietitian  Weekend/Holiday (7:00-3:30) - Weekend Clinical Dietitian      **Clinical Dietitians are no longer available by pager

## 2024-03-26 NOTE — PLAN OF CARE
Goal Outcome Evaluation:    Patient is alert and oriented x4. Able to make needs known. Denied CP, N/V, ans SOB. On regular diet and takes medication whole with thin liquid. Continent of B/B. Standby assist. On continuous wound @50.  and 109. Carb coverage per order for breakfast. Refused lunch. Call light within reach. No acute issue this shift. Will continue to follow POC.    BP (!) 155/79   Pulse 74   Temp 98  F (36.7  C) (Oral)   Resp 16   SpO2 98%

## 2024-03-27 ENCOUNTER — APPOINTMENT (OUTPATIENT)
Dept: PHYSICAL THERAPY | Facility: SKILLED NURSING FACILITY | Age: 79
DRG: 871 | End: 2024-03-27
Attending: INTERNAL MEDICINE
Payer: COMMERCIAL

## 2024-03-27 ENCOUNTER — APPOINTMENT (OUTPATIENT)
Dept: OCCUPATIONAL THERAPY | Facility: SKILLED NURSING FACILITY | Age: 79
DRG: 871 | End: 2024-03-27
Attending: INTERNAL MEDICINE
Payer: COMMERCIAL

## 2024-03-27 LAB
GLUCOSE BLDC GLUCOMTR-MCNC: 120 MG/DL (ref 70–99)
GLUCOSE BLDC GLUCOMTR-MCNC: 135 MG/DL (ref 70–99)
GLUCOSE BLDC GLUCOMTR-MCNC: 136 MG/DL (ref 70–99)
GLUCOSE BLDC GLUCOMTR-MCNC: 154 MG/DL (ref 70–99)
GLUCOSE BLDC GLUCOMTR-MCNC: 92 MG/DL (ref 70–99)

## 2024-03-27 PROCEDURE — 99310 SBSQ NF CARE HIGH MDM 45: CPT | Performed by: STUDENT IN AN ORGANIZED HEALTH CARE EDUCATION/TRAINING PROGRAM

## 2024-03-27 PROCEDURE — 250N000011 HC RX IP 250 OP 636: Performed by: INTERNAL MEDICINE

## 2024-03-27 PROCEDURE — 97530 THERAPEUTIC ACTIVITIES: CPT | Mod: GP

## 2024-03-27 PROCEDURE — 97535 SELF CARE MNGMENT TRAINING: CPT | Mod: GO

## 2024-03-27 PROCEDURE — 250N000013 HC RX MED GY IP 250 OP 250 PS 637: Performed by: INTERNAL MEDICINE

## 2024-03-27 PROCEDURE — F08D5YZ WOUND MANAGEMENT TREATMENT OF INTEGUMENTARY SYSTEM - HEAD AND NECK USING OTHER EQUIPMENT: ICD-10-PCS | Performed by: INTERNAL MEDICINE

## 2024-03-27 PROCEDURE — 97605 NEG PRS WND THER DME<=50SQCM: CPT

## 2024-03-27 PROCEDURE — 120N000009 HC R&B SNF

## 2024-03-27 RX ORDER — LOPERAMIDE HCL 2 MG
2 CAPSULE ORAL 4 TIMES DAILY PRN
Status: DISCONTINUED | OUTPATIENT
Start: 2024-03-27 | End: 2024-04-04

## 2024-03-27 RX ADMIN — LOPERAMIDE HYDROCHLORIDE 2 MG: 2 CAPSULE ORAL at 04:23

## 2024-03-27 RX ADMIN — TIMOLOL MALEATE 1 DROP: 5 SOLUTION/ DROPS OPHTHALMIC at 09:55

## 2024-03-27 RX ADMIN — PANTOPRAZOLE SODIUM 40 MG: 40 TABLET, DELAYED RELEASE ORAL at 17:23

## 2024-03-27 RX ADMIN — APIXABAN 5 MG: 2.5 TABLET, FILM COATED ORAL at 09:46

## 2024-03-27 RX ADMIN — PREDNISOLONE ACETATE 1 DROP: 10 SUSPENSION/ DROPS OPHTHALMIC at 09:54

## 2024-03-27 RX ADMIN — APIXABAN 5 MG: 2.5 TABLET, FILM COATED ORAL at 20:32

## 2024-03-27 RX ADMIN — LOPERAMIDE HYDROCHLORIDE 2 MG: 2 CAPSULE ORAL at 11:20

## 2024-03-27 RX ADMIN — LATANOPROST 1 DROP: 50 SOLUTION OPHTHALMIC at 21:58

## 2024-03-27 RX ADMIN — ATORVASTATIN CALCIUM 40 MG: 40 TABLET, FILM COATED ORAL at 09:50

## 2024-03-27 RX ADMIN — ACETAMINOPHEN 650 MG: 325 TABLET, FILM COATED ORAL at 20:32

## 2024-03-27 RX ADMIN — METOPROLOL SUCCINATE 100 MG: 50 TABLET, EXTENDED RELEASE ORAL at 09:46

## 2024-03-27 RX ADMIN — Medication 1 CAPSULE: at 20:32

## 2024-03-27 RX ADMIN — LOPERAMIDE HYDROCHLORIDE 2 MG: 2 CAPSULE ORAL at 21:41

## 2024-03-27 RX ADMIN — METOPROLOL SUCCINATE 100 MG: 50 TABLET, EXTENDED RELEASE ORAL at 20:32

## 2024-03-27 RX ADMIN — Medication 1 CAPSULE: at 09:45

## 2024-03-27 RX ADMIN — CEFAZOLIN SODIUM 2 G: 2 INJECTION, SOLUTION INTRAVENOUS at 04:21

## 2024-03-27 RX ADMIN — CEFAZOLIN SODIUM 2 G: 2 INJECTION, SOLUTION INTRAVENOUS at 12:01

## 2024-03-27 RX ADMIN — LEVOTHYROXINE SODIUM 88 MCG: 0.09 TABLET ORAL at 04:24

## 2024-03-27 RX ADMIN — INSULIN GLARGINE 16 UNITS: 100 INJECTION, SOLUTION SUBCUTANEOUS at 11:14

## 2024-03-27 RX ADMIN — GABAPENTIN 600 MG: 300 CAPSULE ORAL at 20:32

## 2024-03-27 RX ADMIN — LOSARTAN POTASSIUM 75 MG: 50 TABLET, FILM COATED ORAL at 09:45

## 2024-03-27 RX ADMIN — TIMOLOL MALEATE 1 DROP: 5 SOLUTION/ DROPS OPHTHALMIC at 20:32

## 2024-03-27 RX ADMIN — ACETAMINOPHEN 650 MG: 325 TABLET, FILM COATED ORAL at 13:01

## 2024-03-27 RX ADMIN — CEFAZOLIN SODIUM 2 G: 2 INJECTION, SOLUTION INTRAVENOUS at 20:30

## 2024-03-27 RX ADMIN — THERA TABS 1 TABLET: TAB at 11:20

## 2024-03-27 RX ADMIN — PANTOPRAZOLE SODIUM 40 MG: 40 TABLET, DELAYED RELEASE ORAL at 04:24

## 2024-03-27 RX ADMIN — ACETAMINOPHEN 650 MG: 325 TABLET, FILM COATED ORAL at 09:46

## 2024-03-27 RX ADMIN — HYDROCODONE BITARTRATE AND ACETAMINOPHEN 1 TABLET: 5; 325 TABLET ORAL at 04:09

## 2024-03-27 RX ADMIN — GABAPENTIN 600 MG: 300 CAPSULE ORAL at 09:45

## 2024-03-27 ASSESSMENT — ACTIVITIES OF DAILY LIVING (ADL)
ADLS_ACUITY_SCORE: 36
ADLS_ACUITY_SCORE: 37
ADLS_ACUITY_SCORE: 37
ADLS_ACUITY_SCORE: 36
ADLS_ACUITY_SCORE: 36
ADLS_ACUITY_SCORE: 37
ADLS_ACUITY_SCORE: 36
ADLS_ACUITY_SCORE: 37
ADLS_ACUITY_SCORE: 36
ADLS_ACUITY_SCORE: 37
ADLS_ACUITY_SCORE: 36
ADLS_ACUITY_SCORE: 37
ADLS_ACUITY_SCORE: 36
ADLS_ACUITY_SCORE: 37
ADLS_ACUITY_SCORE: 37

## 2024-03-27 NOTE — PLAN OF CARE
Goal Outcome Evaluation:      Plan of Care Reviewed With: patient    Overall Patient Progress: no changeOverall Patient Progress: no change    A&Ox4. VSS on RA. Continent of bowel and bladder. Regular diet, good appetite. , 138. On carb coverage and sliding scale insulin. Takes medications whole with thin liquids. Wound vac to back of neck @ -50 mmHg continuous. PRN imodium given per pt request. Denies chest pain, SOB. Able to make needs known. Continue with plan of care.      Patient's most recent vital signs are:     Vital signs:  BP: 130/54  Temp: 97.2  HR: 83  RR: 16  SpO2: 98 %     Patient does not have new respiratory symptoms.  Patient does not have new sore throat.  Patient does not have a fever greater than 99.5.

## 2024-03-27 NOTE — PLAN OF CARE
Goal Outcome Evaluation:  Pt.A&Ox4. Sleeping well tonight. Uses call light appropriately. SBA/walker to BR, continent B&B - reported small loose stool and perirectal discomfort / small amount blood tinged on toilet paper. Applied criticaid paste and administered Imodium capsule 2mg po. Continues IV Ancef via SL picc RUE - saline locked. Also given Norco 1t po for c/o posterior neck pain / vac intact & patent @ 50mmHg cont.suction - no alarms. BG @ 0200 = 120.        Patient's most recent vital signs are:     Vital signs:  BP: 130/54  Temp: 97.2  HR: 83  RR: 16  SpO2: 98 %     Patient does not have new respiratory symptoms.  Patient does not have new sore throat.  Patient does not have a fever greater than 99.5.

## 2024-03-27 NOTE — PROGRESS NOTES
[facility] Nurse Inpatient Assessment     Consulted for: Neck    Summary: Patient s/p I&D on 3/13/24 of posterior neck for deep cervical infection down to the hardware with wound VAC placement.  Increased granulation tissue today.    Patient History (according to provider note(s):      Lj Castro is a 78 year old female admitted on 3/12/2024. 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  She came to the ER complaining that for the past 10 days he had increasing pain in the neck and gradual weakness to the arms and legs to the point that she was unable to ambulate.  She also had right arm pain  over the past 3 to 4 days along with confusion.  Her arm pain was 9 out of 10.  There have been no recent falls or shortness of breath or nausea or vomiting.  She was brought in by EMS.  CT scan of the neck showed a deep space infection with gas formation and possible cord compression.  She was given Kcentra and taken to the operating room.  She underwent I&D with wound VAC application along with IntraOp culture and IV antibiotics.  She underwent general endotracheal anesthesia.  Operative course was significant for hypotension requiring central line and pressor support and so she was admitted to the ICU.       Assessment:      Areas visualized during today's visit: Focused:    Negative pressure wound therapy applied to: Posterior neck   Last photo: 3/20/24     3/20     3/22  Wound due to: Surgical Wound   Wound history/plan of care:    Surgical date: 3/13/24   Service following: Spinal surgery  Date Negative Pressure Wound Therapy initiated: 3/13/24   Interventions in  place: repositioning  Is patient s nutritional status compromised? yes   If yes, what interventions are in place? Protein supplements  Reason for initiating vac therapy? Presence of co-morbidities, High risk of infections, and Need for accelerated granulation tissue  Which?of?the?following?co-morbidities?apply? Diabetes  If diabetic is patient on a diabetic management program? Yes   Is osteomyelitis present in wound? no   If yes what treatments are in place? N/A    Wound base: 50 % granulation tissue, 50 % pink non-granular tissue, adipose tissue and fascia.  One small area of exposed hardware, one additional area of hardware just below tissue.        Palpation of the wound bed: normal       Drainage:  large       Volume in cannister: 350 ml     Last cannister change date: 3/20/24     Description of drainage: serosanguinous      Measurements (length x width x depth, in cm) 11  x 3  x  3.5 cm       Tunneling N/A      Undermining up to 4.5 cm from 6-9 o'clock   Periwound skin: Intact       Color: normal and consistent with surrounding tissue       Temperature: normal    Odor: none   Pain: score 8 , sharp   Pain intervention prior to dressing change: IV Dilaudid  Treatment goal: Heal  and Increase granulation  STATUS: improving   Supplies ordered: ordered medium VAC dressing    Number of foam pieces removed from a wound (excluding foam for bridge) :  1 GranuFoam Black and 1 Oil emulsion dressing   Verified this matched the number of foam pieces applied last dressing change: Yes   Number of foam pieces packed into wound (excluding foam for bridge) :  2 GranuFoam Black and 1 Oil emulsion dressing       Treatment Plan:     Negative pressure wound therapy plan:  Wound location: Posterior neck   Change Days: Mon/Wed/Fri by WOC RN    Supplies (including all accessories) used: medium Black foam , oil emulsion over exposed hardware  Cleanse with Vashe prior to replacing NPWT  Suction setting: -50     Staff RN to assess  "integrity of dressing and ensure suction is set at appropriate level every shift.   Date canister. Chart canister output every shift. Change cannister weekly and PRN if full/occluded     Remove foam dressing and replace with BID normal saline moist gauze dressing if:   -a dressing failure which cannot be repaired within 2 hours   -patient is discharging to home without a home pump   -patient is discharging to a facility outside the local area   -if a dressing is a \"Silver Foam\", remove before Radiation Therapy or MRI     The hospital VAC pump is not to be discharged with the patient.?Ensure to disconnect patient from machine prior to discharge. Then,    - If a home KCI VAC pump has been delivered, connect home cannister to dressing tubing then connect cannister to home pump and turn on machine    - If transferring to a nearby facility with a KCI vac, can disconnect and clamp tubing then cover end with glove so can be reconnected within 2 hours        Orders: Reviewed    RECOMMEND PRIMARY TEAM ORDER: None, at this time  Education provided: plan of care  Discussed plan of care with: Patient and Nurse  WO nurse follow-up plan: Monday/WednesdayFriday   Notify WO if wound(s) deteriorate.  Nursing to notify the Provider(s) and re-consult the WO Nurse if new skin concern.    DATA:     Current support surface: Standard  Low air loss (LYNNETTE pump, Isolibrium, Pulsate, skin guard, etc)  BMI: Body mass index is 35.14 kg/m .   Active diet order: Orders Placed This Encounter      Regular Diet Adult     Output: No intake/output data recorded.     Labs:   Recent Labs   Lab 03/25/24  0614   HGB 8.2*   WBC 5.9     Pressure injury risk assessment:   Sensory Perception: 3-->slightly limited  Moisture: 4-->rarely moist  Activity: 3-->walks occasionally  Mobility: 3-->slightly limited  Nutrition: 3-->adequate  Friction and Shear: 3-->no apparent problem  Luke Score: 19      Filomena Gamez RN BSN CWOCN  [facility]  Dept. " Office Number: [phone]

## 2024-03-27 NOTE — PLAN OF CARE
Occupational Therapy Discharge Summary    Reason for therapy discharge:    All goals and outcomes met, no further needs identified.at this setting    Progress towards therapy goal(s). See goals on Care Plan in [facility] electronic health record for goal details.  Goals met    Therapy recommendation(s):    Last day OT 3/27/24, pt met goals, pt modified indep w/ adls w/ no AE but use of FWW but mobility sba due to need for assist to manage wound vac and small room and spinal precautions, pt/'s  avail to assist w/ IADLs at home PRN. OT and pt problem solved how to be indep at home w/ mobility, if pt goes home w/ wound vac and able to use smaller more portable one pt could place it on her walker tray/walker to allow self transport/mobilty at home, oT: discharge today but antic pt will remain on TCU longer for IV antibiotics, OT discussed AE w/ pt and pt does not antic need for any additional AE when d/cing to home.

## 2024-03-27 NOTE — PROGRESS NOTES
03/27/24 0903   Appointment Info   Signing Clinician's Name / Credentials (PT) YIFAN Sanchez   PT Assistant Visit Number 3   Student Supervision Line of sight supervision provided   Rehab Comments (PT) PT: IND day   Therapeutic Activity   Therapeutic Activities: dynamic activities to improve functional performance Minutes (14574) 42   Treatment Detail/Skilled Intervention PT: IND day see gg...   PT Discharge Planning   PT Plan PT: needs d/c   PT Discharge Recommendation (DC Rec) home with assist;home with home care physical therapy   PT Rationale for DC Rec PT: Pt lives with spouse and son who are willing/able to provide high level of assist.   PT Brief overview of current status PT: transfers and gait w/FWW, SBA>Sup. needs w/c follow for rest break when fatigued. Edema slightly improved on LLE. Slightly worse on RLE, increased fluid in dorsal R foot. Able to complete all IND activities with ease for majority of tasks. Full A with wound vac.   Total Session Time   Timed Code Treatment Minutes 42   Total Session Time (sum of timed and untimed services) 42   Post Acute Settings Only   What unit is patient on? TCU   PT - Transitional Care Unit Time   Individual Time (minutes) - PT 42   Group Time (minutes) - PT 0   Concurrent Time (minutes) - PT 0   Co-Treatment Time (minutes) - PT 0   TCU Total Session Time (minutes) - PT 42   TCU Daily Total Session Time   PT TCU Daily Total Session Time 42   Rehab TCU Daily Total Session Time 95   Bed Mobility: Turning side to side/Roll Left and Right   Patient Performance Independent   Describe Performance PT: flat bed w/o rails, ind rolls L<>R w/ little effort.   Bed Mobility: Sit to lying   Patient Performance Independent   Staff Performance No setup or physical help (No setup or physical help from staff)   Describe Performance PT: flat bed w/o rails, ind with  sit>supine, good eccentric control.   Bed Mobility: Lying to sitting on the side of bed   Patient Performance  Independent   Staff Performance No set up or physical help (No set up or physical help from staff)   Describe Performance PT: flat bed w/o rails, ind. w/ logroll method from sup>sit, appropriate body mechanics.   Transfers: Sit to Stand   Patient Performance Independent   Equipment Used Rolling walker   Describe Performance PT: mod I, little time and effort with STS from EOB<>walker with varying bed heights. Good anterior weight shifting for stability and trunk control.   Transfers: Chair/Bed transfers   Patient Performance Independent   Equipment Used Rolling walker   Describe Performance PT: SPT from wc<>walker<>car seat, pt. is mod I. Little time and effort, good stepping maneuver for pivot nd appropriate sequencing.  No LOB noted.   Walk 10 Feet - Ability to walk once standing   Patient Performance Supervision or verbal cues   Mobility device used rolling walker   Describe Performance PT: amb from room>gym, about 110' w/ FWW and w/c follow, pt is SBA. Min verbal cues to keep AD close to body and increase step length, pt. receptive to cueing. Good foot clearance over wood and carpeted floors. Mild UMAÑA d/t fatigue.   Walk 10 Feet on uneven surfaces - Ability to walk on various surfaces.   Patient Performance Supervision or verbal cues   Mobility device used rolling walker   Describe Performance PT: amb from room>gym, about 110' w/ FWW and w/c follow, pt is SBA. Min verbal cues to keep AD close to body and increase step length, pt. receptive to cueing. Good foot clearance over wood and carpeted floors. Mild UMAÑA d/t fatigue.   Walk 50 Feet with Two Turns - Ability to walk at least 50 feet.   Patient Performance Supervision or verbal cues   Mobility device used rolling walker   Describe Performance PT: amb from room>gym, about 110' w/ FWW and w/c follow, pt is SBA. Min verbal cues to keep AD close to body and increase step length, pt. receptive to cueing. Good foot clearance over wood and carpeted floors. Mild UMAÑA  d/t fatigue.   Walk 150 Feet - Ability to walk 150 feet   Reason Not Done Safety concerns   Wheel 50 Feet - Ability to move wheelchair/scooter   Reason Not Done Activity not applicable   Wheel 150 Feet - Ability to move wheelchair/scooter   Reason Not Done Activity not applicable   1 Step (curb) - Ability to go up/down 1 step/curb.   Patient Performance Steadying Assist   Describe Performance PT: CGA<>SBA with walker. Mod verbal and visual cues for sequencing to demonstrate, pt. hgihly receptive, faded cues d/t good body mechanics and safety insight. Able to  FWW w/ ease. No LOB. Completed 3 steps w/ CGA and BUE support on both HRs. Min verbal cues for foot sequencing, pt. very receptive to feedback. Nonreceiprocal pattern throughout, good eccentric control w/ min cues. Good foot clearance.   4 Steps - Ability to go up/down steps.   Patient Performance Steadying Assist   Describe Performance Completed 3 steps w/ CGA and BUE support on both HRs. Min verbal cues for foot sequencing, pt. very receptive to feedback. Nonreceiprocal pattern throughout, good eccentric control w/ min cues. Good foot clearance.   12 Steps - Ability to go up/down steps.   Reason Not Done Safety concerns   Car Transfer - Ability to transfer in/out of car or van.   Patient Performance Supervision or verbal cues   Describe Performance PT: SBA>Sup from w/c<>walker<>car seat on 4th floor gym. Good sequencing w/ min cueing for appropriate hand placement. Increased time and effort to transfer out of vehicle d/t lower seat height, improved w/ tactile and verbal cues for body mechanics. Pt. able to safely transfer in and out of car by end of session.   Picking up Object - Ability to bend/stoop while standing.   Patient Performance Independent   Describe Performance PT: Mod I w/ FWW for single UE support, pt. uses reacher to  object from floor and adheres to spinal precautions.

## 2024-03-27 NOTE — PLAN OF CARE
Patient is alert and oriented x 4. Able to make needs known. Denied pain or discomfort. WOC nurse changed wound dressing on back of neck. Wound vac back of neck running at -50 mmHg continuous. Continent of B&B. BG 92 ,136, 135. On carb coverage insulin given. Right PICC single lumen patent and dressing CDI. Infused IV antibiotic with out any difficulties. PRN imodium given per pt request. Diarrhea x 1 per pt report. Regular diet and thin liquids. Pt eat breakfast late and refused lunch. Call light with in reach. Continue with POC.    Patient's most recent vital signs are:     Vital signs:  BP: 151/86  Temp: 98.2  HR: 90  RR: 16  SpO2: 99 %     Patient does not have new respiratory symptoms.  Patient does not have new sore throat.  Patient does not have a fever greater than 99.5.

## 2024-03-27 NOTE — PROGRESS NOTES
[facility] Transitional Care    Medicine Progress Note - Hospitalist Service    Date of Admission:  3/20/2024    Assessment & Plan    Lj Castro is a 78 year old female who was admitted on 3/12/2024 to [facility] She has a PMH significant for HTN, HLD, hypothyroid, DM2, PN, CKD3 (baseline cr about 1.15-1.35), AFib-on DOAC, chronic neck and back pain, neurogenic claudication, PAD, and GERD who is s/p C3 to T2 cervical lamimectomy and spinal cord decompression, C3 to T2 posterior fusion, C3 to T2 segemental instrumentation for progressive myelopathy due to stenosis and kyphosis from C3-T3 (10/13/2013) who was admitted on 3/13/2024 with neck pain, weakness, inability to ambulate.      CT scan of the neck showed a deep space infection with gas formation and possible cord compression.  She was given Kcentra and taken to the operating room.  She underwent I&D with wound VAC application along with IntraOp culture and IV antibiotics.       Operative course was significant for hypotension requiring central line and pressor support and so she was admitted to the ICU.    Operative and blood cultures subsequently grew MSSA.  Patient improved in the ICU and was transferred to the general medical floor     Most recent positive blood culture for MSSA was on 3/13.  Repeat blood cultures 3/14 and 3/15 remained.  PICC line was placed on 3/17 for anticipated weeks-long IV antibiotic course at discharge.     She had a wound vac applied to her neck wound .     She was discharged to TCU on 3/20 for IV abx and wound cares and therapy      3/27  Vitally stable, pain controlled  Discuss in rounds this morning, last day for PT/OT today 3/27  Antibiotics through at least 4/26  Follow up appt   ID appointment 4/2  Spine ortho on 4/16   Wound vac changes M/W/F ( 1 mg Dilaudid before dressing change )  Labs cbc and bmp on 3/25 reviewed . Weekly ordered      # Physical Deconditioning ;  -- Continue PT/ OT       #MSSA deep space  "infection in the posterior cervical spine   #MSSA bacteremia  -- Antibiotics  On cefazolin for 37 days .( Started 3/20 at TCU ) anticipated stop day 4/26  -- Needs ID follow in 2 weeks 4/2 . Dr Alexis / to be arranged   -- Plan to discontinue PICC once abx completed (tentative 4/26) ID appointment 4/2  -- May be started on Rifampin at that time . -- Discuss at ID appointmwnt  Weekly labs at TCU (CBC,CMP, and CRP ordered)     #Post cervical spine I and D by Dr Sagar Cadena on 3/13  -- Wound vac in place   -- WOC consulted  -- Spine follow up in 2- weeks to be arranged . Call [phone] to arrange   -- Pain control with percocet and flexeril and tylenol and gabapentin . Wean of oxycodone as able      # Type 2 diabetes:  -- On Lantus 16 ( had been on 22 )   -- Medium sliding scale insulin and carb coverage 1 unit per 15 .  -- CTM  -- BG in good range  Recent Labs   Lab 03/27/24  0204 03/26/24  2116 03/26/24  1716 03/26/24  1314 03/26/24  0829 03/26/24  0204   * 138* 139* 109* 124* 115*          #A fib   -- Apixiban resumed 3/19   -- on Toprol  mg bid   -- Most recent echocardiogram in January 2023 shows a normal EF.     #Acute encephalopathy: due to infectious etiology ( toxic ) resolved     #Hypothyroid ; continue synthroid     #HLD ; statin     #CKD , creatinine 1.2 . CTM    #Hypertension ; continue cozaar  and Toprol Also on HCTZ             Diet: Regular Diet Adult  Snacks/Supplements Adult: Glucerna; Between Meals    DVT Prophylaxis: DOAC  Winston Catheter: Not present  Lines: PRESENT      PICC 03/17/24 Single Lumen Right Brachial vein lateral antibiotics-Site Assessment: WDL      Cardiac Monitoring: None  Code Status: Full Code      Clinically Significant Risk Factors                         # Obesity: Estimated body mass index is 35.14 kg/m  as calculated from the following:    Height as of 3/12/24: 1.575 m (5' 2\").    Weight as of this encounter: 87.1 kg (192 lb 1.6 oz).   # Severe Malnutrition: based " on nutrition assessment           Disposition Plan             Kalani Burris MD  Hospitalist Service  [facility] Transitional Care  Securely message with [employer] (more info)  Text page via [facility] Paging/Directory   ______________________________________________________________________    Interval History   Appears comfortable today. No complaints. Denies SOB, CP, headache lightheadedness or dizziness.     Physical Exam   Vital Signs: Temp: 98.2  F (36.8  C) Temp src: Oral BP: (!) 156/76 Pulse: 79   Resp: 16 SpO2: 99 % O2 Device: None (Room air)    Weight: 192 lbs 1.6 oz    General Appearance: Appears comfortable.  Respiratory: Without wheezes rhonchi or rales.  CTA  Cardiovascular: RRR, without murmurs  GI: Soft, nontender, plus BS  Skin: Without obvious bleeding, bruising or excoriations     Medical Decision Making       57 MINUTES SPENT BY ME on the date of service doing chart review, history, exam, documentation & further activities per the note.      Data   ------------------------- PAST 24 HR DATA REVIEWED -----------------------------------------------

## 2024-03-28 LAB
GLUCOSE BLDC GLUCOMTR-MCNC: 114 MG/DL (ref 70–99)
GLUCOSE BLDC GLUCOMTR-MCNC: 140 MG/DL (ref 70–99)
GLUCOSE BLDC GLUCOMTR-MCNC: 163 MG/DL (ref 70–99)
GLUCOSE BLDC GLUCOMTR-MCNC: 170 MG/DL (ref 70–99)
GLUCOSE BLDC GLUCOMTR-MCNC: 177 MG/DL (ref 70–99)

## 2024-03-28 PROCEDURE — 250N000011 HC RX IP 250 OP 636: Performed by: INTERNAL MEDICINE

## 2024-03-28 PROCEDURE — 258N000003 HC RX IP 258 OP 636

## 2024-03-28 PROCEDURE — 250N000013 HC RX MED GY IP 250 OP 250 PS 637: Performed by: INTERNAL MEDICINE

## 2024-03-28 PROCEDURE — 120N000009 HC R&B SNF

## 2024-03-28 RX ORDER — SODIUM CHLORIDE 9 MG/ML
INJECTION, SOLUTION INTRAVENOUS
Status: COMPLETED
Start: 2024-03-28 | End: 2024-03-28

## 2024-03-28 RX ADMIN — ACETAMINOPHEN 650 MG: 325 TABLET, FILM COATED ORAL at 09:31

## 2024-03-28 RX ADMIN — CEFAZOLIN SODIUM 2 G: 2 INJECTION, SOLUTION INTRAVENOUS at 05:09

## 2024-03-28 RX ADMIN — CEFAZOLIN SODIUM 2 G: 2 INJECTION, SOLUTION INTRAVENOUS at 12:49

## 2024-03-28 RX ADMIN — Medication 3 MG: at 01:29

## 2024-03-28 RX ADMIN — PANTOPRAZOLE SODIUM 40 MG: 40 TABLET, DELAYED RELEASE ORAL at 05:09

## 2024-03-28 RX ADMIN — INSULIN ASPART 1 UNITS: 100 INJECTION, SOLUTION INTRAVENOUS; SUBCUTANEOUS at 14:03

## 2024-03-28 RX ADMIN — LOSARTAN POTASSIUM 75 MG: 50 TABLET, FILM COATED ORAL at 09:30

## 2024-03-28 RX ADMIN — GABAPENTIN 600 MG: 300 CAPSULE ORAL at 09:31

## 2024-03-28 RX ADMIN — LOPERAMIDE HYDROCHLORIDE 2 MG: 2 CAPSULE ORAL at 17:51

## 2024-03-28 RX ADMIN — METOPROLOL SUCCINATE 100 MG: 50 TABLET, EXTENDED RELEASE ORAL at 09:31

## 2024-03-28 RX ADMIN — APIXABAN 5 MG: 2.5 TABLET, FILM COATED ORAL at 09:31

## 2024-03-28 RX ADMIN — ATORVASTATIN CALCIUM 40 MG: 40 TABLET, FILM COATED ORAL at 09:31

## 2024-03-28 RX ADMIN — APIXABAN 5 MG: 2.5 TABLET, FILM COATED ORAL at 20:45

## 2024-03-28 RX ADMIN — CEFAZOLIN SODIUM 2 G: 2 INJECTION, SOLUTION INTRAVENOUS at 20:44

## 2024-03-28 RX ADMIN — LEVOTHYROXINE SODIUM 88 MCG: 0.09 TABLET ORAL at 05:09

## 2024-03-28 RX ADMIN — SODIUM CHLORIDE 500 ML: 9 INJECTION, SOLUTION INTRAVENOUS at 12:53

## 2024-03-28 RX ADMIN — TIMOLOL MALEATE 1 DROP: 5 SOLUTION/ DROPS OPHTHALMIC at 20:44

## 2024-03-28 RX ADMIN — GABAPENTIN 600 MG: 300 CAPSULE ORAL at 20:45

## 2024-03-28 RX ADMIN — INSULIN GLARGINE 16 UNITS: 100 INJECTION, SOLUTION SUBCUTANEOUS at 09:44

## 2024-03-28 RX ADMIN — Medication 1 CAPSULE: at 09:31

## 2024-03-28 RX ADMIN — TIMOLOL MALEATE 1 DROP: 5 SOLUTION/ DROPS OPHTHALMIC at 09:32

## 2024-03-28 RX ADMIN — PANTOPRAZOLE SODIUM 40 MG: 40 TABLET, DELAYED RELEASE ORAL at 16:25

## 2024-03-28 RX ADMIN — LOPERAMIDE HYDROCHLORIDE 2 MG: 2 CAPSULE ORAL at 09:45

## 2024-03-28 RX ADMIN — INSULIN ASPART 1 UNITS: 100 INJECTION, SOLUTION INTRAVENOUS; SUBCUTANEOUS at 09:36

## 2024-03-28 RX ADMIN — ACETAMINOPHEN 650 MG: 325 TABLET, FILM COATED ORAL at 13:27

## 2024-03-28 RX ADMIN — THERA TABS 1 TABLET: TAB at 12:48

## 2024-03-28 RX ADMIN — ACETAMINOPHEN 650 MG: 325 TABLET, FILM COATED ORAL at 20:45

## 2024-03-28 RX ADMIN — PREDNISOLONE ACETATE 1 DROP: 10 SUSPENSION/ DROPS OPHTHALMIC at 09:32

## 2024-03-28 RX ADMIN — METOPROLOL SUCCINATE 100 MG: 50 TABLET, EXTENDED RELEASE ORAL at 20:44

## 2024-03-28 RX ADMIN — LATANOPROST 1 DROP: 50 SOLUTION OPHTHALMIC at 21:20

## 2024-03-28 RX ADMIN — Medication 1 CAPSULE: at 20:45

## 2024-03-28 RX ADMIN — INSULIN ASPART 1 UNITS: 100 INJECTION, SOLUTION INTRAVENOUS; SUBCUTANEOUS at 17:41

## 2024-03-28 ASSESSMENT — ACTIVITIES OF DAILY LIVING (ADL)
ADLS_ACUITY_SCORE: 35
ADLS_ACUITY_SCORE: 36
ADLS_ACUITY_SCORE: 35
ADLS_ACUITY_SCORE: 36
ADLS_ACUITY_SCORE: 35
ADLS_ACUITY_SCORE: 36
ADLS_ACUITY_SCORE: 35
ADLS_ACUITY_SCORE: 36
ADLS_ACUITY_SCORE: 35
ADLS_ACUITY_SCORE: 36
ADLS_ACUITY_SCORE: 35
ADLS_ACUITY_SCORE: 36

## 2024-03-28 NOTE — PROGRESS NOTES
SW was called into pt's room.   said no to coming home.  He is not going to be in charge of IV.  He doesn't want expense. He is agreeable to paying for HE to take pt to 4/2 appt.  They are going to try and postpone the 4/16 appt until after discharge.     COLLINS Gordon   [facility], Transitional Care Unit   Social Work   [address] [place] [address], [address] [place] [address]  () [phone]

## 2024-03-28 NOTE — PLAN OF CARE
Goal Outcome Evaluation:      Plan of Care Reviewed With: patient    Overall Patient Progress: no change    Pt is alert and oriented x 4, can make needs known. Wound vac on the back of the neck running continuous at 50 mm Hg, dressing CDI. BG at 0200 H - 170. Prn imodium given x 1, pt reports having loose stool x 1 on this shift. Iv antibiotic given via Right upper single lumen PICC, pt tolerated well. No acute issue call light within reach, continue with plan of care.         Patient's most recent vital signs are:     Vital signs:  BP: 120/52  Temp: 97.8  HR: 79  RR: 16  SpO2: 99 %     Patient does not have new respiratory symptoms.  Patient does not have new sore throat.  Patient does not have a fever greater than 99.5.

## 2024-03-28 NOTE — PLAN OF CARE
Patient is alert and oriented x 4. Able to make needs known. Denied pain or discomfort.  denied SOB , N/V, and chest pain.Wound vac back of neck running at -50 mmHg continuous, dressing CDI. Continent of B&B. Loose stools x 3 , offered imodium.BG 92 ,177, 163. On carb coverage insulin given. Right PICC single lumen patent and dressing CDI. Infused IV antibiotic with out any difficulties. Pt son and  at bed side. Edema on LLE, elevated on pillows ,edema ware in place.Call light with in reach. Continue with POC.    Patient's most recent vital signs are:     Vital signs:  BP: 143/64  Temp: 97.8  HR: 71  RR: 16  SpO2: 97 %     Patient does not have new respiratory symptoms.  Patient does not have new sore throat.  Patient does not have a fever greater than 99.5.

## 2024-03-28 NOTE — PLAN OF CARE
Physical Therapy Discharge Summary    Reason for therapy discharge:    Goals met adequately for discharge; all PT goals met except gait distance, pt has ambulated up to 120' when goal was 150'    Progress towards therapy goal(s). See goals on Care Plan in [facility] electronic health record for goal details.  Goals partially met.  Barriers to achieving goals:   All goals met except gait distance.    Therapy recommendation(s):    Continued therapy is recommended.  Rationale/Recommendations:  Home PT follow up recommended to progress functional strength, endurance and optimize overall functional mobility.

## 2024-03-29 LAB
GLUCOSE BLDC GLUCOMTR-MCNC: 116 MG/DL (ref 70–99)
GLUCOSE BLDC GLUCOMTR-MCNC: 128 MG/DL (ref 70–99)
GLUCOSE BLDC GLUCOMTR-MCNC: 141 MG/DL (ref 70–99)
GLUCOSE BLDC GLUCOMTR-MCNC: 152 MG/DL (ref 70–99)
GLUCOSE BLDC GLUCOMTR-MCNC: 153 MG/DL (ref 70–99)

## 2024-03-29 PROCEDURE — 120N000009 HC R&B SNF

## 2024-03-29 PROCEDURE — 250N000013 HC RX MED GY IP 250 OP 250 PS 637: Performed by: INTERNAL MEDICINE

## 2024-03-29 PROCEDURE — 250N000011 HC RX IP 250 OP 636: Performed by: INTERNAL MEDICINE

## 2024-03-29 PROCEDURE — F08D5YZ WOUND MANAGEMENT TREATMENT OF INTEGUMENTARY SYSTEM - HEAD AND NECK USING OTHER EQUIPMENT: ICD-10-PCS | Performed by: INTERNAL MEDICINE

## 2024-03-29 PROCEDURE — 258N000003 HC RX IP 258 OP 636

## 2024-03-29 PROCEDURE — 97605 NEG PRS WND THER DME<=50SQCM: CPT

## 2024-03-29 RX ORDER — SODIUM CHLORIDE 9 MG/ML
INJECTION, SOLUTION INTRAVENOUS
Status: COMPLETED
Start: 2024-03-29 | End: 2024-03-29

## 2024-03-29 RX ADMIN — THERA TABS 1 TABLET: TAB at 13:19

## 2024-03-29 RX ADMIN — TIMOLOL MALEATE 1 DROP: 5 SOLUTION/ DROPS OPHTHALMIC at 21:11

## 2024-03-29 RX ADMIN — LOPERAMIDE HYDROCHLORIDE 2 MG: 2 CAPSULE ORAL at 13:25

## 2024-03-29 RX ADMIN — SODIUM CHLORIDE 500 ML: 9 INJECTION, SOLUTION INTRAVENOUS at 13:26

## 2024-03-29 RX ADMIN — GABAPENTIN 600 MG: 300 CAPSULE ORAL at 21:06

## 2024-03-29 RX ADMIN — INSULIN ASPART 1 UNITS: 100 INJECTION, SOLUTION INTRAVENOUS; SUBCUTANEOUS at 13:19

## 2024-03-29 RX ADMIN — LOPERAMIDE HYDROCHLORIDE 2 MG: 2 CAPSULE ORAL at 05:27

## 2024-03-29 RX ADMIN — CEFAZOLIN SODIUM 2 G: 2 INJECTION, SOLUTION INTRAVENOUS at 13:26

## 2024-03-29 RX ADMIN — INSULIN GLARGINE 16 UNITS: 100 INJECTION, SOLUTION SUBCUTANEOUS at 08:03

## 2024-03-29 RX ADMIN — ACETAMINOPHEN 650 MG: 325 TABLET, FILM COATED ORAL at 08:05

## 2024-03-29 RX ADMIN — ACETAMINOPHEN 650 MG: 325 TABLET, FILM COATED ORAL at 21:06

## 2024-03-29 RX ADMIN — ACETAMINOPHEN 650 MG: 325 TABLET, FILM COATED ORAL at 13:19

## 2024-03-29 RX ADMIN — ATORVASTATIN CALCIUM 40 MG: 40 TABLET, FILM COATED ORAL at 08:05

## 2024-03-29 RX ADMIN — LEVOTHYROXINE SODIUM 88 MCG: 0.09 TABLET ORAL at 05:27

## 2024-03-29 RX ADMIN — LATANOPROST 1 DROP: 50 SOLUTION OPHTHALMIC at 22:01

## 2024-03-29 RX ADMIN — PANTOPRAZOLE SODIUM 40 MG: 40 TABLET, DELAYED RELEASE ORAL at 05:27

## 2024-03-29 RX ADMIN — CEFAZOLIN SODIUM 2 G: 2 INJECTION, SOLUTION INTRAVENOUS at 21:06

## 2024-03-29 RX ADMIN — GABAPENTIN 600 MG: 300 CAPSULE ORAL at 08:06

## 2024-03-29 RX ADMIN — APIXABAN 5 MG: 2.5 TABLET, FILM COATED ORAL at 08:05

## 2024-03-29 RX ADMIN — INSULIN ASPART 1 UNITS: 100 INJECTION, SOLUTION INTRAVENOUS; SUBCUTANEOUS at 17:29

## 2024-03-29 RX ADMIN — Medication 1 CAPSULE: at 21:07

## 2024-03-29 RX ADMIN — CEFAZOLIN SODIUM 2 G: 2 INJECTION, SOLUTION INTRAVENOUS at 05:28

## 2024-03-29 RX ADMIN — APIXABAN 5 MG: 2.5 TABLET, FILM COATED ORAL at 21:06

## 2024-03-29 RX ADMIN — LOPERAMIDE HYDROCHLORIDE 2 MG: 2 CAPSULE ORAL at 21:19

## 2024-03-29 RX ADMIN — METOPROLOL SUCCINATE 100 MG: 50 TABLET, EXTENDED RELEASE ORAL at 21:06

## 2024-03-29 RX ADMIN — PREDNISOLONE ACETATE 1 DROP: 10 SUSPENSION/ DROPS OPHTHALMIC at 08:03

## 2024-03-29 RX ADMIN — METOPROLOL SUCCINATE 100 MG: 50 TABLET, EXTENDED RELEASE ORAL at 08:03

## 2024-03-29 RX ADMIN — PANTOPRAZOLE SODIUM 40 MG: 40 TABLET, DELAYED RELEASE ORAL at 16:34

## 2024-03-29 RX ADMIN — LOSARTAN POTASSIUM 75 MG: 50 TABLET, FILM COATED ORAL at 08:06

## 2024-03-29 RX ADMIN — Medication 1 CAPSULE: at 08:06

## 2024-03-29 RX ADMIN — TIMOLOL MALEATE 1 DROP: 5 SOLUTION/ DROPS OPHTHALMIC at 08:03

## 2024-03-29 ASSESSMENT — ACTIVITIES OF DAILY LIVING (ADL)
ADLS_ACUITY_SCORE: 36

## 2024-03-29 NOTE — PROGRESS NOTES
[facility]patient [facility]     Consulted for: Neck    Summary: Patient s/p I&D on 3/13/24 of posterior neck for deep cervical infection down to the hardware with wound VAC placement.  Increased granulation tissue today.    Patient History (according to provider note(s):      Lj Castro is a 78 year old female admitted on 3/12/2024. 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  She came to the ER complaining that for the past 10 days he had increasing pain in the neck and gradual weakness to the arms and legs to the point that she was unable to ambulate.  She also had right arm pain  over the past 3 to 4 days along with confusion.  Her arm pain was 9 out of 10.  There have been no recent falls or shortness of breath or nausea or vomiting.  She was brought in by EMS.  CT scan of the neck showed a deep space infection with gas formation and possible cord compression.  She was given Kcentra and taken to the operating room.  She underwent I&D with wound VAC application along with IntraOp culture and IV antibiotics.  She underwent general endotracheal anesthesia.  Operative course was significant for hypotension requiring central line and pressor support and so she was admitted to the ICU.       Assessment:      Areas visualized during today's visit: Focused:    Negative pressure wound therapy applied to: Posterior neck   Last photo: 3/20/24     3/20     3/29  Wound due to: Surgical Wound   Wound history/plan of care:    Surgical date: 3/13/24   Service following: Spinal surgery  Date Negative Pressure Wound Therapy initiated: 3/13/24   Interventions in  place: repositioning  Is patient s nutritional status compromised? yes   If yes, what interventions are in place? Protein supplements  Reason for initiating vac therapy? Presence of co-morbidities, High risk of infections, and Need for accelerated granulation tissue  Which?of?the?following?co-morbidities?apply? Diabetes  If diabetic is patient on a diabetic management program? Yes   Is osteomyelitis present in wound? no   If yes what treatments are in place? N/A    Wound base: 50 % granulation tissue, 50 % pink non-granular tissue, adipose tissue and fascia.  One small area of exposed hardware, one additional area of hardware just below tissue.        Palpation of the wound bed: normal       Drainage:  large       Volume in cannister: 350 ml     Last cannister change date: 3/20/24     Description of drainage: serosanguinous      Measurements (length x width x depth, in cm) 8  x 2.5 x  2.7 cm       Tunneling N/A      Undermining up to 1 cm from 6-9 o'clock   Periwound skin: Intact       Color: normal and consistent with surrounding tissue       Temperature: normal    Odor: none   Pain: score 8 , sharp   Pain intervention prior to dressing change: IV Dilaudid  Treatment goal: Heal  and Increase granulation  STATUS: improving   Supplies ordered: ordered medium VAC dressing    Number of foam pieces removed from a wound (excluding foam for bridge) :  2 GranuFoam Black and 1 Oil emulsion dressing   Verified this matched the number of foam pieces applied last dressing change: Yes   Number of foam pieces packed into wound (excluding foam for bridge) :  2 GranuFoam Black and 1 Oil emulsion dressing       Treatment Plan:     Negative pressure wound therapy plan:  Wound location: Posterior neck   Change Days: Mon/Wed/Fri by WOC RN    Supplies (including all accessories) used: medium Black foam , oil emulsion over exposed hardware  Cleanse with Vashe prior to replacing NPWT  Suction setting: -50     Staff RN to assess integrity  "of dressing and ensure suction is set at appropriate level every shift.   Date canister. Chart canister output every shift. Change cannister weekly and PRN if full/occluded     Remove foam dressing and replace with BID normal saline moist gauze dressing if:   -a dressing failure which cannot be repaired within 2 hours   -patient is discharging to home without a home pump   -patient is discharging to a facility outside the local area   -if a dressing is a \"Silver Foam\", remove before Radiation Therapy or MRI     The hospital VAC pump is not to be discharged with the patient.?Ensure to disconnect patient from machine prior to discharge. Then,    - If a home KCI VAC pump has been delivered, connect home cannister to dressing tubing then connect cannister to home pump and turn on machine    - If transferring to a nearby facility with a KCI vac, can disconnect and clamp tubing then cover end with glove so can be reconnected within 2 hours        Orders: Reviewed    RECOMMEND PRIMARY TEAM ORDER: None, at this time  Education provided: plan of care  Discussed plan of care with: Patient and Nurse  WO nurse follow-up plan: Monday/WednesdayFriday   Notify WOC if wound(s) deteriorate.  Nursing to notify the Provider(s) and re-consult the WOC Nurse if new skin concern.    DATA:     Current support surface: Standard  Low air loss (LYNNETTE pump, Isolibrium, Pulsate, skin guard, etc)  BMI: Body mass index is 35.14 kg/m .   Active diet order: Orders Placed This Encounter      Regular Diet Adult     Output: I/O last 3 completed shifts:  In: 280 [P.O.:280]  Out: -      Labs:   Recent Labs   Lab 03/25/24  0614   HGB 8.2*   WBC 5.9     Pressure injury risk assessment:   Sensory Perception: 3-->slightly limited  Moisture: 4-->rarely moist  Activity: 3-->walks occasionally  Mobility: 3-->slightly limited  Nutrition: 3-->adequate  Friction and Shear: 3-->no apparent problem  Luke Score: 19      Filomena Gamez RN BSN [id number]  [facility] [employer] [facility]  Dept. Office Number: [phone]

## 2024-03-29 NOTE — PLAN OF CARE
Goal Outcome Evaluation:    Pt is alert and oriented x 4, can make needs know. Call light appropriate. Pt is continent of both bowel and bladder,  ambulated to the bathroom assist of 1 to help carry the wound vac. Pt denies SOB, CP and no n/v. Pt takes pills whole with thin liquid, on regular diet. BG check at 0200 H - 128. Pt comfortable in bed during safety checks,. Iv antibiotics given via right upper arm single lumen saline locked. Call light within reach. Continue with plan of care.      PRN imodium given x 1     Patient's most recent vital signs are:     Vital signs:  BP: 135/62  Temp: 97.4  HR: 82  RR: 16  SpO2: 95 %     Patient does not have new respiratory symptoms.  Patient does not have new sore throat.  Patient does not have a fever greater than 99.5.

## 2024-03-29 NOTE — PLAN OF CARE
VS: BP (!) 152/77   Pulse 73   Temp 97.7  F (36.5  C) (Oral)   Resp 16   Wt 87.1 kg (192 lb 1.6 oz)   SpO2 96%   BMI 35.14 kg/m      O2: 96% on RA   Output: Continent bowel and bladder   Last BM: 3/29/24   Activity: A1 w/w and GB   Skin: Wound vac on back of neck, scattered bruising, multiple scabs on BLEs, BLE edema R>L   Pain: Denies   CMS:  Neuro: Alert and oriented x4, able to make needs known.    Dressing: WOC changed wound vac dressing on neck   Diet: Regular diet, thin liquids, BG checks w/sliding scale and carb counts. Patient refused lunch. BG checks were 116 and 153.   LDA: R SL PICC line, positive blood return noted, saline locked   Equipment: IV Pump and pole, call light   Plan: Con't POC.    Additional Info: Patient was calm and cooperative with all cares, denies SOB and CP. Tolerated IV abx well. Wound vac to back of neck running at 50 mmHg, cannister replaced. Compression devices applied to ivan calves. PRN loperamide given at 1325 this shift. No acute issues this shift, continue POC.    Goal Outcome Evaluation:      Plan of Care Reviewed With: patient    Overall Patient Progress: no changeOverall Patient Progress: no change         Patient's most recent vital signs are:     Vital signs:  BP: 152/77  Temp: 97.7  HR: 73  RR: 16  SpO2: 96 %     Patient does not have new respiratory symptoms.  Patient does not have new sore throat.  Patient does not have a fever greater than 99.5.

## 2024-03-29 NOTE — PROGRESS NOTES
This patient does not require Contact Precautions because the patient had no hospitalization or invasive procedures outside of [place] or [place].    March 29, 2024  Linda Cullen, Infection Prevention

## 2024-03-30 LAB
GLUCOSE BLDC GLUCOMTR-MCNC: 101 MG/DL (ref 70–99)
GLUCOSE BLDC GLUCOMTR-MCNC: 121 MG/DL (ref 70–99)
GLUCOSE BLDC GLUCOMTR-MCNC: 130 MG/DL (ref 70–99)
GLUCOSE BLDC GLUCOMTR-MCNC: 158 MG/DL (ref 70–99)
GLUCOSE BLDC GLUCOMTR-MCNC: 165 MG/DL (ref 70–99)

## 2024-03-30 PROCEDURE — 120N000009 HC R&B SNF

## 2024-03-30 PROCEDURE — 258N000003 HC RX IP 258 OP 636

## 2024-03-30 PROCEDURE — 250N000011 HC RX IP 250 OP 636: Performed by: INTERNAL MEDICINE

## 2024-03-30 PROCEDURE — 250N000013 HC RX MED GY IP 250 OP 250 PS 637: Performed by: INTERNAL MEDICINE

## 2024-03-30 RX ORDER — SODIUM CHLORIDE 9 MG/ML
INJECTION, SOLUTION INTRAVENOUS
Status: COMPLETED
Start: 2024-03-30 | End: 2024-03-30

## 2024-03-30 RX ADMIN — LATANOPROST 1 DROP: 50 SOLUTION OPHTHALMIC at 21:52

## 2024-03-30 RX ADMIN — METOPROLOL SUCCINATE 100 MG: 50 TABLET, EXTENDED RELEASE ORAL at 20:59

## 2024-03-30 RX ADMIN — LEVOTHYROXINE SODIUM 88 MCG: 0.09 TABLET ORAL at 05:03

## 2024-03-30 RX ADMIN — APIXABAN 5 MG: 2.5 TABLET, FILM COATED ORAL at 20:59

## 2024-03-30 RX ADMIN — INSULIN GLARGINE 16 UNITS: 100 INJECTION, SOLUTION SUBCUTANEOUS at 09:20

## 2024-03-30 RX ADMIN — PANTOPRAZOLE SODIUM 40 MG: 40 TABLET, DELAYED RELEASE ORAL at 05:03

## 2024-03-30 RX ADMIN — Medication 1 CAPSULE: at 09:21

## 2024-03-30 RX ADMIN — INSULIN ASPART 1 UNITS: 100 INJECTION, SOLUTION INTRAVENOUS; SUBCUTANEOUS at 14:22

## 2024-03-30 RX ADMIN — ATORVASTATIN CALCIUM 40 MG: 40 TABLET, FILM COATED ORAL at 09:22

## 2024-03-30 RX ADMIN — GABAPENTIN 600 MG: 300 CAPSULE ORAL at 09:20

## 2024-03-30 RX ADMIN — CEFAZOLIN SODIUM 2 G: 2 INJECTION, SOLUTION INTRAVENOUS at 20:54

## 2024-03-30 RX ADMIN — SODIUM CHLORIDE 500 ML: 9 INJECTION, SOLUTION INTRAVENOUS at 12:20

## 2024-03-30 RX ADMIN — PANTOPRAZOLE SODIUM 40 MG: 40 TABLET, DELAYED RELEASE ORAL at 16:03

## 2024-03-30 RX ADMIN — LOSARTAN POTASSIUM 75 MG: 50 TABLET, FILM COATED ORAL at 09:21

## 2024-03-30 RX ADMIN — PREDNISOLONE ACETATE 1 DROP: 10 SUSPENSION/ DROPS OPHTHALMIC at 09:20

## 2024-03-30 RX ADMIN — TIMOLOL MALEATE 1 DROP: 5 SOLUTION/ DROPS OPHTHALMIC at 09:20

## 2024-03-30 RX ADMIN — THERA TABS 1 TABLET: TAB at 12:20

## 2024-03-30 RX ADMIN — GABAPENTIN 600 MG: 300 CAPSULE ORAL at 20:59

## 2024-03-30 RX ADMIN — TIMOLOL MALEATE 1 DROP: 5 SOLUTION/ DROPS OPHTHALMIC at 20:59

## 2024-03-30 RX ADMIN — ACETAMINOPHEN 650 MG: 325 TABLET, FILM COATED ORAL at 13:32

## 2024-03-30 RX ADMIN — ACETAMINOPHEN 650 MG: 325 TABLET, FILM COATED ORAL at 19:52

## 2024-03-30 RX ADMIN — Medication 1 CAPSULE: at 20:59

## 2024-03-30 RX ADMIN — METOPROLOL SUCCINATE 100 MG: 50 TABLET, EXTENDED RELEASE ORAL at 09:21

## 2024-03-30 RX ADMIN — LOPERAMIDE HYDROCHLORIDE 2 MG: 2 CAPSULE ORAL at 14:32

## 2024-03-30 RX ADMIN — ACETAMINOPHEN 650 MG: 325 TABLET, FILM COATED ORAL at 09:22

## 2024-03-30 RX ADMIN — APIXABAN 5 MG: 2.5 TABLET, FILM COATED ORAL at 09:20

## 2024-03-30 RX ADMIN — CEFAZOLIN SODIUM 2 G: 2 INJECTION, SOLUTION INTRAVENOUS at 04:52

## 2024-03-30 RX ADMIN — CEFAZOLIN SODIUM 2 G: 2 INJECTION, SOLUTION INTRAVENOUS at 12:20

## 2024-03-30 ASSESSMENT — ACTIVITIES OF DAILY LIVING (ADL)
ADLS_ACUITY_SCORE: 37
ADLS_ACUITY_SCORE: 35
ADLS_ACUITY_SCORE: 35
ADLS_ACUITY_SCORE: 37
ADLS_ACUITY_SCORE: 35
ADLS_ACUITY_SCORE: 37
ADLS_ACUITY_SCORE: 35
ADLS_ACUITY_SCORE: 37
ADLS_ACUITY_SCORE: 35
ADLS_ACUITY_SCORE: 35

## 2024-03-30 NOTE — PLAN OF CARE
Goal Outcome Evaluation:  No acute issues overnight. Sleeping very well and has no c/o's. Continues IV Ancef via SL picc RUE. PCDs intact BLEs. Posterior neck wound vac intact/patent @ 50mmHg continuous - no alarms. BG @ 0200 = 158.        Patient's most recent vital signs are:     Vital signs:  BP: 128/64  Temp: 97  HR: 74  RR: 18  SpO2: 97 %     Patient does not have new respiratory symptoms.  Patient does not have new sore throat.  Patient does not have a fever greater than 99.5.

## 2024-03-30 NOTE — PLAN OF CARE
Goal Outcome Evaluation:      Plan of Care Reviewed With: patient    Overall Patient Progress: no changeOverall Patient Progress: no change    A&Ox4. Continent of bowel and bladder, pt had 2 small BMs this shift. Assist of 1 with walker and gaitbelt. Regular diet, good appetite. , 121. On carb coverage and sliding scale insulin. Takes medications whole with thin liquids. Single lumen PICC on R arm, blood return noted. Wound vac to back of neck @ -50 mmHg continuous. BLE edema, legs elevated on pillows. Able to make needs known. Continue with plan of care.      Patient's most recent vital signs are:     Vital signs:  BP: 148/69  Temp: 98.2  HR: 76  RR: 17  SpO2: 97 %     Patient does not have new respiratory symptoms.  Patient does not have new sore throat.  Patient does not have a fever greater than 99.5.

## 2024-03-30 NOTE — PLAN OF CARE
Patient is A&O x 4. Able to make needs known. Denied pain or discomfort. WOC nurse changed back of neck wound dressing. Wound vac intact and suctioning continuous at -50 mmHg. No noted alarms. Infused IV antibiotic with out any difficulties. R PICC single lumen patent and dressing CDI. NS locked, noted good blood return. ,152. Carb coverage and sliding scale insulin given. On regular diet , Ate with good appetite. BLE edema , elevated on pillows . Denied SOB, N/V, chest pain.Call light with in reach. Continue with POC.      Patient's most recent vital signs are:     Vital signs:  BP: 128/64  Temp: 97  HR: 74  RR: 18  SpO2: 97 %     Patient does not have new respiratory symptoms.  Patient does not have new sore throat.  Patient does not have a fever greater than 99.5.

## 2024-03-30 NOTE — PLAN OF CARE
VS: /58 (BP Location: Left arm)   Pulse 84   Temp 97.1  F (36.2  C) (Oral)   Resp 16   Wt 87.1 kg (192 lb 1.6 oz)   SpO2 99%   BMI 35.14 kg/m      O2: 99% on RA   Output: Continent bowel and bladder   Last BM: 3/30/24   Activity: A1 w/w and GB   Skin: Wound vac on posterior neck, scattered bruising, multiple scabs on BLEs, BLE edema, patient c/o rawness on bottom from frequent toileting, barrier cream applied   Pain: Posterior neck pain at a 7 out of 10, scheduled Tylenol   CMS:  Neuro: Alert and oriented x4, able to make needs known.    Dressing: Wound vac dressing on neck CDI, R SL PICC dressing CDI   Diet: Regular diet, thin liquids, BG checks w/sliding scale and carb counts. Patient refused lunch, had a glucerna in late afternoon; BG checks were 101 and 165   LDA: R SL PICC line, positive blood return noted, saline locked   Equipment: IV pump and pole, call light, walker   Plan: Con't POC.    Additional Info: Patient was calm and cooperative with all cares, denies SOB and CP. Tolerated IV abx well. Wound vac to back of neck running at 50 mmHg, no alarms noted. PRN loperamide given at 1430 this shift and barrier cream applied to bottom. No acute issues this shift, continue POC.    Goal Outcome Evaluation:      Plan of Care Reviewed With: patient    Overall Patient Progress: no changeOverall Patient Progress: no change       Patient's most recent vital signs are:     Vital signs:  BP: 136/58  Temp: 97.1  HR: 84  RR: 16  SpO2: 99 %     Patient does not have new respiratory symptoms.  Patient does not have new sore throat.  Patient does not have a fever greater than 99.5.

## 2024-03-31 LAB
GLUCOSE BLDC GLUCOMTR-MCNC: 104 MG/DL (ref 70–99)
GLUCOSE BLDC GLUCOMTR-MCNC: 108 MG/DL (ref 70–99)
GLUCOSE BLDC GLUCOMTR-MCNC: 125 MG/DL (ref 70–99)
GLUCOSE BLDC GLUCOMTR-MCNC: 170 MG/DL (ref 70–99)
GLUCOSE BLDC GLUCOMTR-MCNC: 95 MG/DL (ref 70–99)

## 2024-03-31 PROCEDURE — 258N000003 HC RX IP 258 OP 636

## 2024-03-31 PROCEDURE — 250N000013 HC RX MED GY IP 250 OP 250 PS 637: Performed by: INTERNAL MEDICINE

## 2024-03-31 PROCEDURE — 120N000009 HC R&B SNF

## 2024-03-31 PROCEDURE — 250N000011 HC RX IP 250 OP 636: Performed by: INTERNAL MEDICINE

## 2024-03-31 RX ORDER — SODIUM CHLORIDE 9 MG/ML
INJECTION, SOLUTION INTRAVENOUS
Status: COMPLETED
Start: 2024-03-31 | End: 2024-03-31

## 2024-03-31 RX ADMIN — GABAPENTIN 600 MG: 300 CAPSULE ORAL at 20:35

## 2024-03-31 RX ADMIN — CEFAZOLIN SODIUM 2 G: 2 INJECTION, SOLUTION INTRAVENOUS at 20:35

## 2024-03-31 RX ADMIN — CEFAZOLIN SODIUM 2 G: 2 INJECTION, SOLUTION INTRAVENOUS at 04:57

## 2024-03-31 RX ADMIN — Medication 1 CAPSULE: at 20:35

## 2024-03-31 RX ADMIN — CEFAZOLIN SODIUM 2 G: 2 INJECTION, SOLUTION INTRAVENOUS at 12:13

## 2024-03-31 RX ADMIN — SODIUM CHLORIDE 500 ML: 9 INJECTION, SOLUTION INTRAVENOUS at 12:13

## 2024-03-31 RX ADMIN — INSULIN GLARGINE 16 UNITS: 100 INJECTION, SOLUTION SUBCUTANEOUS at 09:21

## 2024-03-31 RX ADMIN — GABAPENTIN 600 MG: 300 CAPSULE ORAL at 09:21

## 2024-03-31 RX ADMIN — Medication 1 CAPSULE: at 09:21

## 2024-03-31 RX ADMIN — LATANOPROST 1 DROP: 50 SOLUTION OPHTHALMIC at 21:22

## 2024-03-31 RX ADMIN — ATORVASTATIN CALCIUM 40 MG: 40 TABLET, FILM COATED ORAL at 09:22

## 2024-03-31 RX ADMIN — APIXABAN 5 MG: 2.5 TABLET, FILM COATED ORAL at 09:21

## 2024-03-31 RX ADMIN — TIMOLOL MALEATE 1 DROP: 5 SOLUTION/ DROPS OPHTHALMIC at 20:48

## 2024-03-31 RX ADMIN — LOPERAMIDE HYDROCHLORIDE 2 MG: 2 CAPSULE ORAL at 14:42

## 2024-03-31 RX ADMIN — LOPERAMIDE HYDROCHLORIDE 2 MG: 2 CAPSULE ORAL at 02:23

## 2024-03-31 RX ADMIN — APIXABAN 5 MG: 2.5 TABLET, FILM COATED ORAL at 20:34

## 2024-03-31 RX ADMIN — PREDNISOLONE ACETATE 1 DROP: 10 SUSPENSION/ DROPS OPHTHALMIC at 09:21

## 2024-03-31 RX ADMIN — ACETAMINOPHEN 650 MG: 325 TABLET, FILM COATED ORAL at 14:25

## 2024-03-31 RX ADMIN — ACETAMINOPHEN 650 MG: 325 TABLET, FILM COATED ORAL at 20:34

## 2024-03-31 RX ADMIN — PANTOPRAZOLE SODIUM 40 MG: 40 TABLET, DELAYED RELEASE ORAL at 16:56

## 2024-03-31 RX ADMIN — HYDROCODONE BITARTRATE AND ACETAMINOPHEN 1 TABLET: 5; 325 TABLET ORAL at 05:02

## 2024-03-31 RX ADMIN — LEVOTHYROXINE SODIUM 88 MCG: 0.09 TABLET ORAL at 05:00

## 2024-03-31 RX ADMIN — PANTOPRAZOLE SODIUM 40 MG: 40 TABLET, DELAYED RELEASE ORAL at 05:00

## 2024-03-31 RX ADMIN — LOSARTAN POTASSIUM 75 MG: 50 TABLET, FILM COATED ORAL at 09:22

## 2024-03-31 RX ADMIN — TIMOLOL MALEATE 1 DROP: 5 SOLUTION/ DROPS OPHTHALMIC at 09:21

## 2024-03-31 RX ADMIN — ACETAMINOPHEN 650 MG: 325 TABLET, FILM COATED ORAL at 09:21

## 2024-03-31 RX ADMIN — METOPROLOL SUCCINATE 100 MG: 50 TABLET, EXTENDED RELEASE ORAL at 09:21

## 2024-03-31 RX ADMIN — THERA TABS 1 TABLET: TAB at 12:14

## 2024-03-31 RX ADMIN — METOPROLOL SUCCINATE 100 MG: 50 TABLET, EXTENDED RELEASE ORAL at 20:35

## 2024-03-31 ASSESSMENT — ACTIVITIES OF DAILY LIVING (ADL)
ADLS_ACUITY_SCORE: 36
ADLS_ACUITY_SCORE: 37
ADLS_ACUITY_SCORE: 37
ADLS_ACUITY_SCORE: 36
ADLS_ACUITY_SCORE: 36
ADLS_ACUITY_SCORE: 37
ADLS_ACUITY_SCORE: 36
ADLS_ACUITY_SCORE: 37
ADLS_ACUITY_SCORE: 37
ADLS_ACUITY_SCORE: 36
ADLS_ACUITY_SCORE: 37
ADLS_ACUITY_SCORE: 36
ADLS_ACUITY_SCORE: 37
ADLS_ACUITY_SCORE: 36
ADLS_ACUITY_SCORE: 36
ADLS_ACUITY_SCORE: 37
ADLS_ACUITY_SCORE: 36
ADLS_ACUITY_SCORE: 36
ADLS_ACUITY_SCORE: 37
ADLS_ACUITY_SCORE: 36
ADLS_ACUITY_SCORE: 37

## 2024-03-31 NOTE — PLAN OF CARE
"Goal Outcome Evaluation:  Pt.A&Ox4. Uses call light appropriately. Assist of 1 / walker to BR, continent B&B, med.loose stool w/small amount blood on toilet paper and pt.c/o rectal/perirectal discomfort. Applied criticaid paste and administered Imodium 2mg po. Posterior neck wound w/vac intact, patent @ 50mmHg continuous suction - no alarms and very small amount serosang.drng in cannister. Continues IV Ancef via SL valved picc RUE. Refused PCDs tonight. BG @ 0200 = 95.    0510 - Assisted to BR - pt.stated criticaid paste very helpful. C/o neck & back pain \"starting to act up\", medicated with Norco 1t po @ 0500.        Patient's most recent vital signs are:     Vital signs:  BP: 148/69  Temp: 98.2  HR: 76  RR: 17  SpO2: 97 %     Patient does not have new respiratory symptoms.  Patient does not have new sore throat.  Patient does not have a fever greater than 99.5.                               "

## 2024-03-31 NOTE — PLAN OF CARE
VS: BP (!) 148/77 (BP Location: Left arm)   Pulse 74   Temp 97.5  F (36.4  C) (Oral)   Resp 16   Wt 87.1 kg (192 lb 1.6 oz)   SpO2 98%   BMI 35.14 kg/m      O2: 98% on RA   Output: Continent bowel and bladder   Last BM: 3/31/24   Activity: A1 w/GB and W   Skin: Wound vac on posterior neck, scattered bruising, multiple scabs on BLEs, BLE edema, barrier cream applied to bottom for rectal discomfort.    Pain: C/o posterior neck pain, given scheduled Tylenol   CMS:  Neuro: Alert and oriented x4, able to make needs known.    Dressing: Wound vac dressing on neck CDI, R SL PICC dressing changed   Diet: Regular diet, thin liquids, BG checks w/sliding scale and carb counts. Patient had a glucerna for lunch; BG checks were 108 and 104.    LDA: R SL PICC line, positive blood return noted, saline locked, cap changed   Equipment: IV pump and pole, call light, walker   Plan: Con't POC.    Additional Info: Patient was calm and cooperative with all cares, denies SOB and CP. Tolerated IV abx well. Wound vac to back of neck running at 50 mmHg, no alarms noted. PRN loperamide given at 1442. Criticaid paste applied to bottom. Patient refused compression devices. No acute issues this shift, continue POC.    Goal Outcome Evaluation:      Plan of Care Reviewed With: patient    Overall Patient Progress: no changeOverall Patient Progress: no change         Patient's most recent vital signs are:     Vital signs:  BP: 148/77  Temp: 97.5  HR: 74  RR: 16  SpO2: 98 %     Patient does not have new respiratory symptoms.  Patient does not have new sore throat.  Patient does not have a fever greater than 99.5.

## 2024-03-31 NOTE — PROGRESS NOTES
SHIFT [id number]    Vitals are stable, A/O x4. Wound vac running @ 50 mmHg. Pt continent of Bowel and bladder. Assist x1 w/ walker and GB. Call light within reach, no concerns this shift.

## 2024-04-01 LAB
ALBUMIN SERPL BCG-MCNC: 2.9 G/DL (ref 3.5–5.2)
ALP SERPL-CCNC: 92 U/L (ref 40–150)
ALT SERPL W P-5'-P-CCNC: <5 U/L (ref 0–50)
ANION GAP SERPL CALCULATED.3IONS-SCNC: 8 MMOL/L (ref 7–15)
AST SERPL W P-5'-P-CCNC: 16 U/L (ref 0–45)
BILIRUB SERPL-MCNC: 0.3 MG/DL
BUN SERPL-MCNC: 14.1 MG/DL (ref 8–23)
CALCIUM SERPL-MCNC: 8.4 MG/DL (ref 8.8–10.2)
CHLORIDE SERPL-SCNC: 105 MMOL/L (ref 98–107)
CREAT SERPL-MCNC: 0.76 MG/DL (ref 0.51–0.95)
CRP SERPL-MCNC: 35.14 MG/L
DEPRECATED HCO3 PLAS-SCNC: 26 MMOL/L (ref 22–29)
EGFRCR SERPLBLD CKD-EPI 2021: 79 ML/MIN/1.73M2
ERYTHROCYTE [DISTWIDTH] IN BLOOD BY AUTOMATED COUNT: 15.4 % (ref 10–15)
GLUCOSE BLDC GLUCOMTR-MCNC: 115 MG/DL (ref 70–99)
GLUCOSE BLDC GLUCOMTR-MCNC: 116 MG/DL (ref 70–99)
GLUCOSE BLDC GLUCOMTR-MCNC: 129 MG/DL (ref 70–99)
GLUCOSE BLDC GLUCOMTR-MCNC: 138 MG/DL (ref 70–99)
GLUCOSE BLDC GLUCOMTR-MCNC: 149 MG/DL (ref 70–99)
GLUCOSE BLDC GLUCOMTR-MCNC: 169 MG/DL (ref 70–99)
GLUCOSE SERPL-MCNC: 136 MG/DL (ref 70–99)
HCT VFR BLD AUTO: 27.7 % (ref 35–47)
HGB BLD-MCNC: 8.1 G/DL (ref 11.7–15.7)
MCH RBC QN AUTO: 29.3 PG (ref 26.5–33)
MCHC RBC AUTO-ENTMCNC: 29.2 G/DL (ref 31.5–36.5)
MCV RBC AUTO: 100 FL (ref 78–100)
PLATELET # BLD AUTO: 206 10E3/UL (ref 150–450)
POTASSIUM SERPL-SCNC: 4.1 MMOL/L (ref 3.4–5.3)
PROT SERPL-MCNC: 6.1 G/DL (ref 6.4–8.3)
RBC # BLD AUTO: 2.76 10E6/UL (ref 3.8–5.2)
SODIUM SERPL-SCNC: 139 MMOL/L (ref 135–145)
WBC # BLD AUTO: 3 10E3/UL (ref 4–11)

## 2024-04-01 PROCEDURE — 86140 C-REACTIVE PROTEIN: CPT | Performed by: INTERNAL MEDICINE

## 2024-04-01 PROCEDURE — 97605 NEG PRS WND THER DME<=50SQCM: CPT

## 2024-04-01 PROCEDURE — 80053 COMPREHEN METABOLIC PANEL: CPT | Performed by: INTERNAL MEDICINE

## 2024-04-01 PROCEDURE — 258N000003 HC RX IP 258 OP 636

## 2024-04-01 PROCEDURE — 120N000009 HC R&B SNF

## 2024-04-01 PROCEDURE — 85048 AUTOMATED LEUKOCYTE COUNT: CPT | Performed by: INTERNAL MEDICINE

## 2024-04-01 PROCEDURE — 999N000007 HC SITE CHECK

## 2024-04-01 PROCEDURE — 36592 COLLECT BLOOD FROM PICC: CPT | Performed by: INTERNAL MEDICINE

## 2024-04-01 PROCEDURE — F08D5YZ WOUND MANAGEMENT TREATMENT OF INTEGUMENTARY SYSTEM - HEAD AND NECK USING OTHER EQUIPMENT: ICD-10-PCS | Performed by: INTERNAL MEDICINE

## 2024-04-01 PROCEDURE — 250N000013 HC RX MED GY IP 250 OP 250 PS 637: Performed by: INTERNAL MEDICINE

## 2024-04-01 PROCEDURE — 250N000011 HC RX IP 250 OP 636: Performed by: INTERNAL MEDICINE

## 2024-04-01 RX ORDER — SODIUM CHLORIDE 9 MG/ML
INJECTION, SOLUTION INTRAVENOUS
Status: COMPLETED
Start: 2024-04-01 | End: 2024-04-01

## 2024-04-01 RX ADMIN — TIMOLOL MALEATE 1 DROP: 5 SOLUTION/ DROPS OPHTHALMIC at 08:10

## 2024-04-01 RX ADMIN — PANTOPRAZOLE SODIUM 40 MG: 40 TABLET, DELAYED RELEASE ORAL at 05:36

## 2024-04-01 RX ADMIN — ACETAMINOPHEN 650 MG: 325 TABLET, FILM COATED ORAL at 01:59

## 2024-04-01 RX ADMIN — LOSARTAN POTASSIUM 75 MG: 50 TABLET, FILM COATED ORAL at 08:11

## 2024-04-01 RX ADMIN — ACETAMINOPHEN 650 MG: 325 TABLET, FILM COATED ORAL at 08:11

## 2024-04-01 RX ADMIN — LEVOTHYROXINE SODIUM 88 MCG: 0.09 TABLET ORAL at 05:36

## 2024-04-01 RX ADMIN — THERA TABS 1 TABLET: TAB at 12:12

## 2024-04-01 RX ADMIN — TIMOLOL MALEATE 1 DROP: 5 SOLUTION/ DROPS OPHTHALMIC at 20:45

## 2024-04-01 RX ADMIN — PREDNISOLONE ACETATE 1 DROP: 10 SUSPENSION/ DROPS OPHTHALMIC at 08:10

## 2024-04-01 RX ADMIN — LOPERAMIDE HYDROCHLORIDE 2 MG: 2 CAPSULE ORAL at 14:30

## 2024-04-01 RX ADMIN — PANTOPRAZOLE SODIUM 40 MG: 40 TABLET, DELAYED RELEASE ORAL at 16:56

## 2024-04-01 RX ADMIN — LOPERAMIDE HYDROCHLORIDE 2 MG: 2 CAPSULE ORAL at 20:45

## 2024-04-01 RX ADMIN — ACETAMINOPHEN 650 MG: 325 TABLET, FILM COATED ORAL at 13:13

## 2024-04-01 RX ADMIN — GABAPENTIN 600 MG: 300 CAPSULE ORAL at 08:11

## 2024-04-01 RX ADMIN — INSULIN GLARGINE 16 UNITS: 100 INJECTION, SOLUTION SUBCUTANEOUS at 08:10

## 2024-04-01 RX ADMIN — Medication 1 CAPSULE: at 08:11

## 2024-04-01 RX ADMIN — CEFAZOLIN SODIUM 2 G: 2 INJECTION, SOLUTION INTRAVENOUS at 12:12

## 2024-04-01 RX ADMIN — LATANOPROST 1 DROP: 50 SOLUTION OPHTHALMIC at 20:45

## 2024-04-01 RX ADMIN — ATORVASTATIN CALCIUM 40 MG: 40 TABLET, FILM COATED ORAL at 08:11

## 2024-04-01 RX ADMIN — CEFAZOLIN SODIUM 2 G: 2 INJECTION, SOLUTION INTRAVENOUS at 20:44

## 2024-04-01 RX ADMIN — Medication 1 CAPSULE: at 20:45

## 2024-04-01 RX ADMIN — GABAPENTIN 600 MG: 300 CAPSULE ORAL at 20:45

## 2024-04-01 RX ADMIN — APIXABAN 5 MG: 2.5 TABLET, FILM COATED ORAL at 20:45

## 2024-04-01 RX ADMIN — METOPROLOL SUCCINATE 100 MG: 50 TABLET, EXTENDED RELEASE ORAL at 20:44

## 2024-04-01 RX ADMIN — METOPROLOL SUCCINATE 100 MG: 50 TABLET, EXTENDED RELEASE ORAL at 08:10

## 2024-04-01 RX ADMIN — CEFAZOLIN SODIUM 2 G: 2 INJECTION, SOLUTION INTRAVENOUS at 05:36

## 2024-04-01 RX ADMIN — APIXABAN 5 MG: 2.5 TABLET, FILM COATED ORAL at 08:11

## 2024-04-01 RX ADMIN — SODIUM CHLORIDE 500 ML: 9 INJECTION, SOLUTION INTRAVENOUS at 12:13

## 2024-04-01 RX ADMIN — ACETAMINOPHEN 650 MG: 325 TABLET, FILM COATED ORAL at 19:23

## 2024-04-01 ASSESSMENT — ACTIVITIES OF DAILY LIVING (ADL)
ADLS_ACUITY_SCORE: 36
ADLS_ACUITY_SCORE: 37
ADLS_ACUITY_SCORE: 36
ADLS_ACUITY_SCORE: 37
ADLS_ACUITY_SCORE: 36
ADLS_ACUITY_SCORE: 37
ADLS_ACUITY_SCORE: 36
ADLS_ACUITY_SCORE: 37

## 2024-04-01 NOTE — PLAN OF CARE
VS: BP (!) 146/81 (BP Location: Left arm)   Pulse 79   Temp 97.9  F (36.6  C) (Oral)   Resp 16   Wt 87.1 kg (192 lb 1.6 oz)   SpO2 98%   BMI 35.14 kg/m      O2: 98% on RA   Output: Continent bowel and bladder   Last BM: 3/31/24   Activity: SBA w/w and GB   Skin: Wound vac on posterior neck, scattered bruising, multiple scabs on BLEs, BLE edema   Pain: C/o posterior neck pain, given scheduled Tylenol   CMS:  Neuro: Alert and oriented x4, able to make needs known.    Dressing: WOC changed wound vac dressing on posterior neck, R SL PICC dressing CDI   Diet: Regular diet, thin liquids, BG checks w/sliding scale and carb counts, patient refused breakfast; BG checks were 115 and 116   LDA: R SL PICC line, positive blood return noted, saline locked, posterior neck wound vac   Equipment: Walker, GB, call light   Plan: Con't POC.    Additional Info: Patient was calm and cooperative with all cares, denies SOB and CP. Wound vac to back of neck running at 50 mmHg, no alarms noted. PRN loperamide given at 1430. Tolerated IV abx well. Patient refused compression devices. No acute issues this shift, continue POC.    Goal Outcome Evaluation:      Plan of Care Reviewed With: patient    Overall Patient Progress: no changeOverall Patient Progress: no change       Patient's most recent vital signs are:     Vital signs:  BP: 146/81  Temp: 97.9  HR: 79  RR: 16  SpO2: 98 %     Patient does not have new respiratory symptoms.  Patient does not have new sore throat.  Patient does not have a fever greater than 99.5.

## 2024-04-01 NOTE — PROGRESS NOTES
[facility] Nurse Inpatient Assessment     Consulted for: Neck    Summary: Patient s/p I&D on 3/13/24 of posterior neck for deep cervical infection down to the hardware with wound VAC placement.     4/1: Message sent to Dr Sagar Cadena surgeon to request increase in wound vac settings from -50 mmHg to -75 mmHg.     Patient History (according to provider note(s):      Lj Castro is a 78 year old female admitted on 3/12/2024. 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  She came to the ER complaining that for the past 10 days he had increasing pain in the neck and gradual weakness to the arms and legs to the point that she was unable to ambulate.  She also had right arm pain  over the past 3 to 4 days along with confusion.  Her arm pain was 9 out of 10.  There have been no recent falls or shortness of breath or nausea or vomiting.  She was brought in by EMS.  CT scan of the neck showed a deep space infection with gas formation and possible cord compression.  She was given Kcentra and taken to the operating room.  She underwent I&D with wound VAC application along with IntraOp culture and IV antibiotics.  She underwent general endotracheal anesthesia.  Operative course was significant for hypotension requiring central line and pressor support and so she was admitted to the ICU.       Assessment:      Areas visualized during today's visit: Focused:    Negative pressure wound therapy applied to: Posterior neck   Last photo: 3/20/24     3/20     3/29  Wound due to: Surgical Wound   Wound history/plan of care:    Surgical date: 3/13/24   Service following: Spinal  surgery  Date Negative Pressure Wound Therapy initiated: 3/13/24   Interventions in place: repositioning  Is patient s nutritional status compromised? yes   If yes, what interventions are in place? Protein supplements  Reason for initiating vac therapy? Presence of co-morbidities, High risk of infections, and Need for accelerated granulation tissue  Which?of?the?following?co-morbidities?apply? Diabetes  If diabetic is patient on a diabetic management program? Yes   Is osteomyelitis present in wound? no   If yes what treatments are in place? N/A    Wound base: 50 % granulation tissue, 50 % pink non-granular tissue, adipose tissue and fascia.  One small area of exposed hardware, one additional area of hardware just below tissue.        Palpation of the wound bed: normal       Drainage:  large       Volume in cannister: 350 ml     Last cannister change date: 3/20/24     Description of drainage: serosanguinous      Measurements (length x width x depth, in cm) 8  x 2.5 x  2.7 cm       Tunneling N/A      Undermining up to 0.5 cm from 6-9 o'clock   Periwound skin: Intact       Color: normal and consistent with surrounding tissue       Temperature: normal    Odor: none   Pain: score 8 , sharp   Pain intervention prior to dressing change: IV Dilaudid  Treatment goal: Heal  and Increase granulation  STATUS: improving   Supplies ordered: ordered medium VAC dressing    Number of foam pieces removed from a wound (excluding foam for bridge) :  2 GranuFoam Black and 1 Oil emulsion dressing   Verified this matched the number of foam pieces applied last dressing change: Yes   Number of foam pieces packed into wound (excluding foam for bridge) :  2 GranuFoam Black and 1 Oil emulsion dressing       Treatment Plan:     Negative pressure wound therapy plan:  Wound location: Posterior neck   Change Days: Mon/Wed/Fri by WOC RN    Supplies (including all accessories) used: medium Black foam , oil emulsion over exposed hardware  Cleanse  "with Vashe prior to replacing NPWT  Suction setting: -50     Staff RN to assess integrity of dressing and ensure suction is set at appropriate level every shift.   Date canister. Chart canister output every shift. Change cannister weekly and PRN if full/occluded     Remove foam dressing and replace with BID normal saline moist gauze dressing if:   -a dressing failure which cannot be repaired within 2 hours   -patient is discharging to home without a home pump   -patient is discharging to a facility outside the local area   -if a dressing is a \"Silver Foam\", remove before Radiation Therapy or MRI     The hospital VAC pump is not to be discharged with the patient.?Ensure to disconnect patient from machine prior to discharge. Then,    - If a home KCI VAC pump has been delivered, connect home cannister to dressing tubing then connect cannister to home pump and turn on machine    - If transferring to a nearby facility with a KCI vac, can disconnect and clamp tubing then cover end with glove so can be reconnected within 2 hours        Orders: Reviewed    RECOMMEND PRIMARY TEAM ORDER: None, at this time  Education provided: plan of care  Discussed plan of care with: Patient and Nurse  WOC nurse follow-up plan: Monday/WednesdayFriday   Notify WOC if wound(s) deteriorate.  Nursing to notify the Provider(s) and re-consult the WOC Nurse if new skin concern.    DATA:     Current support surface: Standard  Low air loss (LYNNETTE pump, Isolibrium, Pulsate, skin guard, etc)  BMI: Body mass index is 35.14 kg/m .   Active diet order: Orders Placed This Encounter      Regular Diet Adult     Output: No intake/output data recorded.     Labs:   Recent Labs   Lab 04/01/24  0751   ALBUMIN 2.9*   HGB 8.1*   WBC 3.0*     Pressure injury risk assessment:   Sensory Perception: 3-->slightly limited  Moisture: 4-->rarely moist  Activity: 3-->walks occasionally  Mobility: 3-->slightly limited  Nutrition: 3-->adequate  Friction and Shear: 3-->no " apparent problem  Luke Score: 19      Filomena Gamez RN BSN CWOCN  [facility]  Dept. Office Number: [phone]

## 2024-04-01 NOTE — PROGRESS NOTES
"Offered Eliquis education to patient. She declined stating she was on it prior to admission. States understanding of the medication instructions, signs/symptoms of problems and when to call 911.  Handout \"Guide to the Newer Oral Anticoagulants\" was given to patient.  "

## 2024-04-01 NOTE — PLAN OF CARE
Patient alert and oriented. Slept through this shift. Pain managed with Tylenol with effective results. X1 assist to the bathroom. Voiding adequately without concern. Wound Vac with adequate suction. BG within parameters not needing coverage. No care concerns voiced at this time. Call light within reach. Continue with plan of care.

## 2024-04-02 LAB
GLUCOSE BLDC GLUCOMTR-MCNC: 111 MG/DL (ref 70–99)
GLUCOSE BLDC GLUCOMTR-MCNC: 128 MG/DL (ref 70–99)
GLUCOSE BLDC GLUCOMTR-MCNC: 134 MG/DL (ref 70–99)
GLUCOSE BLDC GLUCOMTR-MCNC: 138 MG/DL (ref 70–99)
GLUCOSE BLDC GLUCOMTR-MCNC: 163 MG/DL (ref 70–99)

## 2024-04-02 PROCEDURE — 120N000009 HC R&B SNF

## 2024-04-02 PROCEDURE — 250N000011 HC RX IP 250 OP 636: Performed by: INTERNAL MEDICINE

## 2024-04-02 PROCEDURE — 258N000003 HC RX IP 258 OP 636

## 2024-04-02 PROCEDURE — 250N000013 HC RX MED GY IP 250 OP 250 PS 637: Performed by: INTERNAL MEDICINE

## 2024-04-02 RX ORDER — SODIUM CHLORIDE 9 MG/ML
INJECTION, SOLUTION INTRAVENOUS
Status: COMPLETED
Start: 2024-04-02 | End: 2024-04-02

## 2024-04-02 RX ADMIN — ACETAMINOPHEN 650 MG: 325 TABLET, FILM COATED ORAL at 13:04

## 2024-04-02 RX ADMIN — TIMOLOL MALEATE 1 DROP: 5 SOLUTION/ DROPS OPHTHALMIC at 21:14

## 2024-04-02 RX ADMIN — PANTOPRAZOLE SODIUM 40 MG: 40 TABLET, DELAYED RELEASE ORAL at 05:15

## 2024-04-02 RX ADMIN — CEFAZOLIN SODIUM 2 G: 2 INJECTION, SOLUTION INTRAVENOUS at 05:16

## 2024-04-02 RX ADMIN — APIXABAN 5 MG: 2.5 TABLET, FILM COATED ORAL at 13:05

## 2024-04-02 RX ADMIN — GABAPENTIN 600 MG: 300 CAPSULE ORAL at 13:07

## 2024-04-02 RX ADMIN — PREDNISOLONE ACETATE 1 DROP: 10 SUSPENSION/ DROPS OPHTHALMIC at 13:06

## 2024-04-02 RX ADMIN — THERA TABS 1 TABLET: TAB at 13:05

## 2024-04-02 RX ADMIN — SODIUM CHLORIDE 500 ML: 9 INJECTION, SOLUTION INTRAVENOUS at 14:15

## 2024-04-02 RX ADMIN — INSULIN GLARGINE 16 UNITS: 100 INJECTION, SOLUTION SUBCUTANEOUS at 13:02

## 2024-04-02 RX ADMIN — Medication 1 CAPSULE: at 13:05

## 2024-04-02 RX ADMIN — APIXABAN 5 MG: 2.5 TABLET, FILM COATED ORAL at 21:11

## 2024-04-02 RX ADMIN — LOSARTAN POTASSIUM 75 MG: 50 TABLET, FILM COATED ORAL at 13:04

## 2024-04-02 RX ADMIN — LEVOTHYROXINE SODIUM 88 MCG: 0.09 TABLET ORAL at 05:15

## 2024-04-02 RX ADMIN — PANTOPRAZOLE SODIUM 40 MG: 40 TABLET, DELAYED RELEASE ORAL at 16:51

## 2024-04-02 RX ADMIN — METOPROLOL SUCCINATE 100 MG: 50 TABLET, EXTENDED RELEASE ORAL at 21:10

## 2024-04-02 RX ADMIN — METOPROLOL SUCCINATE 100 MG: 50 TABLET, EXTENDED RELEASE ORAL at 13:05

## 2024-04-02 RX ADMIN — CEFAZOLIN SODIUM 2 G: 2 INJECTION, SOLUTION INTRAVENOUS at 14:15

## 2024-04-02 RX ADMIN — Medication 1 CAPSULE: at 21:10

## 2024-04-02 RX ADMIN — CEFAZOLIN SODIUM 2 G: 2 INJECTION, SOLUTION INTRAVENOUS at 21:11

## 2024-04-02 RX ADMIN — ATORVASTATIN CALCIUM 40 MG: 40 TABLET, FILM COATED ORAL at 13:03

## 2024-04-02 RX ADMIN — ACETAMINOPHEN 650 MG: 325 TABLET, FILM COATED ORAL at 21:11

## 2024-04-02 RX ADMIN — GABAPENTIN 600 MG: 300 CAPSULE ORAL at 21:10

## 2024-04-02 RX ADMIN — Medication 3 MG: at 01:45

## 2024-04-02 RX ADMIN — LATANOPROST 1 DROP: 50 SOLUTION OPHTHALMIC at 21:20

## 2024-04-02 ASSESSMENT — ACTIVITIES OF DAILY LIVING (ADL)
ADLS_ACUITY_SCORE: 37
ADLS_ACUITY_SCORE: 36
ADLS_ACUITY_SCORE: 37
ADLS_ACUITY_SCORE: 36
ADLS_ACUITY_SCORE: 37
ADLS_ACUITY_SCORE: 37
ADLS_ACUITY_SCORE: 36
ADLS_ACUITY_SCORE: 37
ADLS_ACUITY_SCORE: 36
ADLS_ACUITY_SCORE: 37
ADLS_ACUITY_SCORE: 37
ADLS_ACUITY_SCORE: 36
ADLS_ACUITY_SCORE: 37

## 2024-04-02 NOTE — PLAN OF CARE
Goal Outcome Evaluation:      Plan of Care Reviewed With: patient    Overall Patient Progress: no change    VSS, denies SOB, pain to posterior neck ( wound vac site) managed with scheduled Tylenol, effective.Wound vac dressing CDI, on continuous therapy, no alarms this shift. Due IV antibiotic given via right arm PICC, saline locked after. Fair appetite, on sliding scale insulin and carb coverage. With complaint of diarrhea, had about 3 loose BM's today per patient , Imodium PRN given x 1 this shift . Comfortable at this time, will continue POC.     Appointment for tomorrow: ID at 1020H,  at 0928H .       Patient's most recent vital signs are:     Vital signs:  BP: 146/63  Temp: 97.7  HR: 80  RR: 16  SpO2: 96 % /RA    Patient does not have new respiratory symptoms.  Patient does not have new sore throat.  Patient does not have a fever greater than 99.5.

## 2024-04-02 NOTE — PLAN OF CARE
Goal Outcome Evaluation:      Plan of Care Reviewed With: patient    Overall Patient Progress: no changeOverall Patient Progress: no change     Pt had an uneventful night. Pt is alert and oriented x 4. Able to make needs known. Continent of both bowel and bladder. Requires SBA with transfers. Wound vac to back of neck intact with suctioning @ 50ml/hr. No alarm noted on wound VAC. Pt refused PCD this shift stating she was still sore from previous application. IV ancef to be administered via picc on right upper extremity. No complain at this time. Will continue with POC

## 2024-04-02 NOTE — PLAN OF CARE
Goal Outcome Evaluation:         VS: /68 (BP Location: Left arm)   Pulse 77   Temp 97.4  F (36.3  C) (Oral)   Resp 16   Wt 87.9 kg (193 lb 12.8 oz)   SpO2 96%   BMI 35.45 kg/m       O2: RA   Output: Voided freely with 1 assistance on walker to the bathroom.   Last BM: 04/01/2024   Activity: Able to transfer to bed from chair with walker and 1 assistant.  Out for Infectious Disease consultation.   Skin: With surgical wound at posterior neck area on wound vac draining serosanguinous at 50 mmHg.   Pain: No pain as claimed.   CMS: WDL   Dressing: Dry, intact and clean.   Diet: Regular.   LDA: PICC on Right arm. Wound Vac at posterior neck area. Abx infused.   Equipment: Wound vac; on PCD.   Plan: Continue on plan of care.    Additional Info:

## 2024-04-02 NOTE — PROGRESS NOTES
04/02/24 1000   Name of Certified Therapeutic Rec Specialist   Name of Certified Therapeutic Rec Specialist DAGOBERTO Matson   Appointment Type   Type of Therapeutic Rec Session Therapeutic Rec Assessment   General Information   Patient Profile Review See Profile for full history and prior level of function   Daily Contact with Relatives or Friends Phone call;Visit   Pets   (likes dogs)   Community Involvement   Community Involvement Retired   Spiritual Practice Not connected/interested   Hobbies/Interests   Cards Other (see comments)  ([place])   Word Puzzles Word Search;Crossword   Music   Music Preferences Rock   Listens to Recorded Music Yes   Brought Own Equipment No   Media   TV / Movies TV   Sports / Physical Activities   Sports Fan Basketball;Football;Hockey   Impression   Open to Socializing with Others Independent   Patient, family and / or staff in agreement with Plan of Care Yes   Treatment Plan   Interested in Unit [place]? No   Type of Intervention Independent with activity   Equipment and Supplies While on Unit None needed   Assessment Assessment completed, pt was oriented to role of rec therapy and provided with leisure resource list. Pt has leisure materials in room,has no other needs at this time. will monitor and provide materials as needed.

## 2024-04-02 NOTE — PROGRESS NOTES
CLINICAL NUTRITION SERVICES - REASSESSMENT NOTE     Nutrition Prescription    RECOMMENDATIONS FOR MDs/PROVIDERS TO ORDER:  Offer/provide snacks/supplements PRN between meals    Malnutrition Status:    Severe malnutrition in the context of acute on chronic illness    Recommendations already ordered by Registered Dietitian (RD):  Continue Glucerna daily   Encouraged adequate intakes    Future/Additional Recommendations:  Monitor labs, intakes, and weight trends.  Monitor supplement preference     EVALUATION OF THE PROGRESS TOWARD GOALS   Diet: Regular  Intake/[place]: mostly 100% of documented meals. Poorly documented  Snacks/supplements: Glucerna daily   Pt ordering (on average) 694 kcal and 36 g protein per day per [employer]. Glucerna daily adds 220 kcal and 10 g protein daily. With documented intakes, pt is likely meeting 50-60% minimum energy and 60-75% protein needs.     NEW FINDINGS/REVIEW OF SYSTEMS    Nutrition/GI: RD met with pt at bedside. Pt states she is eating well. RD expressed concern for pt getting enough calories and protein and encouraged increased intakes. Pt states she is definitely getting enough and does not think she should be eating any more than she does. Even with encouragement pt adamant she does not need any other snacks or supplements during the day since she has gained weight.     Weights: Pt with limited weight history prior to admission,with 11 lb (5%) weight loss in 1 month, 9 lb (5%) weight gain in 6 months.  Wt Readings from Last Encounters:   04/02/24 87.9 kg (193 lb 12.8 oz)   03/26/24 87.1 kg (192 lb 1.6 oz)   03/19/24 92.9 kg (204 lb 12.9 oz)   11/06/23 81.7 kg (180 lb 1.6 oz)   10/02/24 83.8 kg (184 lb 11.9 oz)    11/02/23 81.7 kg (180 lb 1.6 oz)   10/30/23 81.2 kg (179 lb 1.6 oz)   08/24/23 83.8 kg (184 lb 11.2 oz)    10/18/23 81.2 kg (179 lb)   10/13/23 81.6 kg (179 lb 14.3 oz)     Skin: surgical sound, see [facility] nurse note 4/1     Labs reviewed   03/16/24 06:16 03/19/24  06:11 03/25/24 06:14 04/01/24 07:51   CRP Inflammation   26.38 (H) 35.14 (H)   Glucose 237 (H) 150 (H) 158 (H) 136 (H)     Medications reviewed: insulin (novolog, lantus) culturell, levothyroxine, multivitamin,  protonix, imodium PRN  IV Fluids over last 7 days: No    MALNUTRITION  % Intake: < 75% for > 7 days (moderate)  % Weight Loss: 5% in 1 month (moderate)   Subcutaneous Fat Loss: Facial/Jaw Region mild-moderate  Muscle Loss: Global moderate  Fluid Accumulation/Edema: Mild  Malnutrition Diagnosis: Severe malnutrition in the context of acute on chronic illness    Previous Goals   Patient to consume % of nutritionally adequate meal trays TID, or the equivalent with supplements/snacks.  Evaluation: Not met    Previous Nutrition Diagnosis  Inadequate oral intake related to low appetite as evidenced by pt report, meal ordering hx per [employer], and physical signs of muscle/fat loss.    Evaluation: No change    CURRENT NUTRITION DIAGNOSIS  Inadequate oral intake related to low appetite as evidenced by pt report, meal ordering hx per [employer], and physical signs of muscle/fat loss.      INTERVENTIONS  Implementation  Nutrition education for recommended modifications   Continue current supplements    Goals  Patient to consume % of nutritionally adequate meal trays TID, or the equivalent with supplements/snacks.    Monitoring/Evaluation  Progress toward goals will be monitored and evaluated per protocol.    Debbie Arndt MS, RDN, LD  TCU/OB/Ortho Clinical Dietitian  Available via phone and Vocera  Phone: [phone]  Vocera: TCU Clinical Dietitian, Ortho Clinical Dietitian, 4B OB Clinical Dietitian, Birthplace 4C OB Clinical Dietitian  Weekend/Holiday Vocera: Weekend Holiday Clinical Dietitian [Multi Site Groups]

## 2024-04-03 LAB
GLUCOSE BLDC GLUCOMTR-MCNC: 173 MG/DL (ref 70–99)
GLUCOSE BLDC GLUCOMTR-MCNC: 174 MG/DL (ref 70–99)
GLUCOSE BLDC GLUCOMTR-MCNC: 197 MG/DL (ref 70–99)
GLUCOSE BLDC GLUCOMTR-MCNC: 205 MG/DL (ref 70–99)
GLUCOSE BLDC GLUCOMTR-MCNC: 205 MG/DL (ref 70–99)

## 2024-04-03 PROCEDURE — 99310 SBSQ NF CARE HIGH MDM 45: CPT | Mod: GC | Performed by: INTERNAL MEDICINE

## 2024-04-03 PROCEDURE — 120N000009 HC R&B SNF

## 2024-04-03 PROCEDURE — 250N000013 HC RX MED GY IP 250 OP 250 PS 637: Performed by: INTERNAL MEDICINE

## 2024-04-03 PROCEDURE — 250N000011 HC RX IP 250 OP 636: Performed by: INTERNAL MEDICINE

## 2024-04-03 PROCEDURE — 250N000011 HC RX IP 250 OP 636

## 2024-04-03 PROCEDURE — F08D5YZ WOUND MANAGEMENT TREATMENT OF INTEGUMENTARY SYSTEM - HEAD AND NECK USING OTHER EQUIPMENT: ICD-10-PCS | Performed by: INTERNAL MEDICINE

## 2024-04-03 PROCEDURE — 250N000013 HC RX MED GY IP 250 OP 250 PS 637

## 2024-04-03 PROCEDURE — 97605 NEG PRS WND THER DME<=50SQCM: CPT

## 2024-04-03 RX ORDER — DOXYCYCLINE 100 MG/1
100 CAPSULE ORAL EVERY 12 HOURS SCHEDULED
Status: DISCONTINUED | OUTPATIENT
Start: 2024-04-25 | End: 2024-04-25 | Stop reason: HOSPADM

## 2024-04-03 RX ORDER — RIFAMPIN 300 MG/1
300 CAPSULE ORAL EVERY 12 HOURS SCHEDULED
Status: DISCONTINUED | OUTPATIENT
Start: 2024-04-03 | End: 2024-04-25 | Stop reason: HOSPADM

## 2024-04-03 RX ADMIN — PREDNISOLONE ACETATE 1 DROP: 10 SUSPENSION/ DROPS OPHTHALMIC at 08:53

## 2024-04-03 RX ADMIN — INSULIN ASPART 2 UNITS: 100 INJECTION, SOLUTION INTRAVENOUS; SUBCUTANEOUS at 14:55

## 2024-04-03 RX ADMIN — Medication 1 CAPSULE: at 09:44

## 2024-04-03 RX ADMIN — INSULIN ASPART 1 UNITS: 100 INJECTION, SOLUTION INTRAVENOUS; SUBCUTANEOUS at 09:32

## 2024-04-03 RX ADMIN — TIMOLOL MALEATE 1 DROP: 5 SOLUTION/ DROPS OPHTHALMIC at 08:54

## 2024-04-03 RX ADMIN — THERA TABS 1 TABLET: TAB at 12:07

## 2024-04-03 RX ADMIN — PANTOPRAZOLE SODIUM 40 MG: 40 TABLET, DELAYED RELEASE ORAL at 16:18

## 2024-04-03 RX ADMIN — CEFAZOLIN SODIUM 2 G: 2 INJECTION, SOLUTION INTRAVENOUS at 13:15

## 2024-04-03 RX ADMIN — INSULIN ASPART 2 UNITS: 100 INJECTION, SOLUTION INTRAVENOUS; SUBCUTANEOUS at 17:48

## 2024-04-03 RX ADMIN — TIMOLOL MALEATE 1 DROP: 5 SOLUTION/ DROPS OPHTHALMIC at 21:13

## 2024-04-03 RX ADMIN — ATORVASTATIN CALCIUM 40 MG: 40 TABLET, FILM COATED ORAL at 08:46

## 2024-04-03 RX ADMIN — CEFAZOLIN SODIUM 2 G: 2 INJECTION, SOLUTION INTRAVENOUS at 20:25

## 2024-04-03 RX ADMIN — METOPROLOL SUCCINATE 100 MG: 50 TABLET, EXTENDED RELEASE ORAL at 08:51

## 2024-04-03 RX ADMIN — ACETAMINOPHEN 650 MG: 325 TABLET, FILM COATED ORAL at 20:26

## 2024-04-03 RX ADMIN — ACETAMINOPHEN 650 MG: 325 TABLET, FILM COATED ORAL at 08:42

## 2024-04-03 RX ADMIN — GABAPENTIN 600 MG: 300 CAPSULE ORAL at 20:26

## 2024-04-03 RX ADMIN — RIFAMPIN 300 MG: 300 CAPSULE ORAL at 20:27

## 2024-04-03 RX ADMIN — RIFAMPIN 300 MG: 300 CAPSULE ORAL at 12:08

## 2024-04-03 RX ADMIN — LOSARTAN POTASSIUM 75 MG: 50 TABLET, FILM COATED ORAL at 08:49

## 2024-04-03 RX ADMIN — LEVOTHYROXINE SODIUM 88 MCG: 0.09 TABLET ORAL at 05:15

## 2024-04-03 RX ADMIN — APIXABAN 5 MG: 2.5 TABLET, FILM COATED ORAL at 08:44

## 2024-04-03 RX ADMIN — APIXABAN 5 MG: 2.5 TABLET, FILM COATED ORAL at 20:26

## 2024-04-03 RX ADMIN — METOPROLOL SUCCINATE 100 MG: 50 TABLET, EXTENDED RELEASE ORAL at 20:27

## 2024-04-03 RX ADMIN — ACETAMINOPHEN 650 MG: 325 TABLET, FILM COATED ORAL at 14:08

## 2024-04-03 RX ADMIN — INSULIN GLARGINE 16 UNITS: 100 INJECTION, SOLUTION SUBCUTANEOUS at 09:35

## 2024-04-03 RX ADMIN — LOPERAMIDE HYDROCHLORIDE 2 MG: 2 CAPSULE ORAL at 20:31

## 2024-04-03 RX ADMIN — Medication 3 MG: at 21:13

## 2024-04-03 RX ADMIN — LATANOPROST 1 DROP: 50 SOLUTION OPHTHALMIC at 21:14

## 2024-04-03 RX ADMIN — Medication 1 CAPSULE: at 20:26

## 2024-04-03 RX ADMIN — PANTOPRAZOLE SODIUM 40 MG: 40 TABLET, DELAYED RELEASE ORAL at 05:15

## 2024-04-03 RX ADMIN — GABAPENTIN 600 MG: 300 CAPSULE ORAL at 08:47

## 2024-04-03 RX ADMIN — CEFAZOLIN SODIUM 2 G: 2 INJECTION, SOLUTION INTRAVENOUS at 05:13

## 2024-04-03 ASSESSMENT — ACTIVITIES OF DAILY LIVING (ADL)
ADLS_ACUITY_SCORE: 36
ADLS_ACUITY_SCORE: 36
ADLS_ACUITY_SCORE: 37
ADLS_ACUITY_SCORE: 37
ADLS_ACUITY_SCORE: 36
ADLS_ACUITY_SCORE: 37
ADLS_ACUITY_SCORE: 36
ADLS_ACUITY_SCORE: 36
ADLS_ACUITY_SCORE: 37
ADLS_ACUITY_SCORE: 36
ADLS_ACUITY_SCORE: 37
ADLS_ACUITY_SCORE: 36
ADLS_ACUITY_SCORE: 37

## 2024-04-03 NOTE — PROGRESS NOTES
[facility] Nurse [facility]patient [facility]     Consulted for: Neck    Summary: Patient s/p I&D on 3/13/24 of posterior neck for deep cervical infection down to the hardware with wound VAC placement.     4/1: Message sent to Dr Sagar Cadena surgeon to request increase in wound vac settings from -50 mmHg to -75 mmHg.     Patient History (according to provider note(s):      Lj Castro is a 78 year old female admitted on 3/12/2024. 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  She came to the ER complaining that for the past 10 days he had increasing pain in the neck and gradual weakness to the arms and legs to the point that she was unable to ambulate.  She also had right arm pain  over the past 3 to 4 days along with confusion.  Her arm pain was 9 out of 10.  There have been no recent falls or shortness of breath or nausea or vomiting.  She was brought in by EMS.  CT scan of the neck showed a deep space infection with gas formation and possible cord compression.  She was given Kcentra and taken to the operating room.  She underwent I&D with wound VAC application along with IntraOp culture and IV antibiotics.  She underwent general endotracheal anesthesia.  Operative course was significant for hypotension requiring central line and pressor support and so she was admitted to the ICU.       Assessment:      Areas visualized during today's visit: Focused:    Negative pressure wound therapy applied to: Posterior neck   Last photo: 3/20/24     3/20     3/29  Wound due to: Surgical Wound   Wound history/plan of care:    Surgical date: 3/13/24   Service following: Spinal  surgery  Date Negative Pressure Wound Therapy initiated: 3/13/24   Interventions in place: repositioning  Is patient s nutritional status compromised? yes   If yes, what interventions are in place? Protein supplements  Reason for initiating vac therapy? Presence of co-morbidities, High risk of infections, and Need for accelerated granulation tissue  Which?of?the?following?co-morbidities?apply? Diabetes  If diabetic is patient on a diabetic management program? Yes   Is osteomyelitis present in wound? no   If yes what treatments are in place? N/A    Wound base: 50 % granulation tissue, 50 % pink non-granular tissue, adipose tissue and fascia.  One small area of exposed hardware, one additional area of hardware just below tissue.        Palpation of the wound bed: normal       Drainage:  large       Volume in cannister: 350 ml     Last cannister change date: 3/20/24     Description of drainage: serosanguinous      Measurements (length x width x depth, in cm) 8  x 2.5 x  2.7 cm       Tunneling N/A      Undermining up to 0.5 cm from 6-9 o'clock   Periwound skin: Intact       Color: normal and consistent with surrounding tissue       Temperature: normal    Odor: none   Pain: score 8 , sharp   Pain intervention prior to dressing change: IV Dilaudid  Treatment goal: Heal  and Increase granulation  STATUS: improving   Supplies ordered: ordered medium VAC dressing    Number of foam pieces removed from a wound (excluding foam for bridge) :  2 GranuFoam Black and 1 Oil emulsion dressing   Verified this matched the number of foam pieces applied last dressing change: Yes   Number of foam pieces packed into wound (excluding foam for bridge) :  2 GranuFoam Black and 1 Oil emulsion dressing       Treatment Plan:     Negative pressure wound therapy plan:  Wound location: Posterior neck   Change Days: Mon/Wed/Fri by WOC RN    Supplies (including all accessories) used: medium Black foam , oil emulsion over exposed hardware  Cleanse  "with Vashe prior to replacing NPWT  Suction setting: -50     Staff RN to assess integrity of dressing and ensure suction is set at appropriate level every shift.   Date canister. Chart canister output every shift. Change cannister weekly and PRN if full/occluded     Remove foam dressing and replace with BID normal saline moist gauze dressing if:   -a dressing failure which cannot be repaired within 2 hours   -patient is discharging to home without a home pump   -patient is discharging to a facility outside the local area   -if a dressing is a \"Silver Foam\", remove before Radiation Therapy or MRI     The hospital VAC pump is not to be discharged with the patient.?Ensure to disconnect patient from machine prior to discharge. Then,    - If a home KCI VAC pump has been delivered, connect home cannister to dressing tubing then connect cannister to home pump and turn on machine    - If transferring to a nearby facility with a KCI vac, can disconnect and clamp tubing then cover end with glove so can be reconnected within 2 hours        Orders: Reviewed    RECOMMEND PRIMARY TEAM ORDER: None, at this time  Education provided: plan of care  Discussed plan of care with: Patient and Nurse  WOC nurse follow-up plan: Monday/WednesdayFriday   Notify WOC if wound(s) deteriorate.  Nursing to notify the Provider(s) and re-consult the WOC Nurse if new skin concern.    DATA:     Current support surface: Standard  Low air loss (LYNNETTE pump, Isolibrium, Pulsate, skin guard, etc)  BMI: Body mass index is 35.45 kg/m .   Active diet order: Orders Placed This Encounter      Regular Diet Adult     Output: I/O last 3 completed shifts:  In: 120 [I.V.:120]  Out: 300 [Urine:300]     Labs:   Recent Labs   Lab 04/01/24  0751   ALBUMIN 2.9*   HGB 8.1*   WBC 3.0*     Pressure injury risk assessment:   Sensory Perception: 3-->slightly limited  Moisture: 4-->rarely moist  Activity: 3-->walks occasionally  Mobility: 3-->slightly limited  Nutrition: " 3-->adequate  Friction and Shear: 3-->no apparent problem  Luke Score: 19      Filomena Gamez RN BSN RUFUSOCN  [facility]  Dept. Office Number: [phone]

## 2024-04-03 NOTE — PLAN OF CARE
Goal Outcome Evaluation:         VS: BP (!) 145/70 (BP Location: Left arm)   Pulse 96   Temp 98.1  F (36.7  C) (Oral)   Resp 18   Wt 87.9 kg (193 lb 12.8 oz)   SpO2 99%   BMI 35.45 kg/m        O2: RA   Output: Able to void freely, 3x in occurrence.   Last BM: 04/03/2024   Activity: Ambulatory to toilet needs, transfer from bed to chair with 1 assist, using walker.  With good appetite.   Skin: PICC on right arm, dry, clean and intact.  Bipedal edema noted, +2 to +3 on L > R.   Pain: No complaints of pain.   CMS: AOx4.  Able to verbalize needs.   Dressing: Done by [facility] nurse on surgical site.   Diet: Regular diet.   LDA: PICC - single lumen on right arm.  Wound Vac on posterior neck area draining to serosanguinous fluids.   Equipment: Wound vac at 50mmHg.    Plan: Health education regarding hand hygiene, wound care, antibiotics compliance.   Additional Info:        Latest BP: 150/77 mmHg, No complaints of dizziness or headache.

## 2024-04-03 NOTE — PLAN OF CARE
Goal Outcome Evaluation:    Plan of Care Reviewed With: patient    Overall Patient Progress: no change    Outcome Evaluation: Pt has no c/o pain or discomfort this shift. BG at 0200= 197. Wound vac on posterior neck intact at -50 mmHg cont.- no alarm noted. SBA to BR with walker but needs help with managing wound vac. PCDs on BLE until around 0645 this am. PICC patent for IV Ancef x 1. Pt has no c/o SOB and no s/s of respiratory issue noted at RA. Appear to be sleeping/resting between cares/ meds. Continue with plan of care.    Patient's most recent vital signs are:     Vital signs:  BP: 136/78  Temp: 97.6  HR: 68  RR: 16  SpO2: 97 %     Patient does not have new respiratory symptoms.  Patient does not have new sore throat.  Patient does not have a fever greater than 99.5.

## 2024-04-03 NOTE — PLAN OF CARE
Goal Outcome Evaluation:      Plan of Care Reviewed With: patient    Overall Patient Progress: improvingOverall Patient Progress: improving    FOCUS/GOAL     Bowel management, Bladder management, Medication management, Wound care management, Medical management, Mobility, Skin integrity, and Safety management     ASSESSMENT, INTERVENTIONS AND CONTINUING PLAN FOR GOAL:     Pt is A&OX4, calm, & cooperative with care. Denied CP, SOB, & n/v. VSS & on RA. Pt transfers SBA with walker & GB. Continent for both B&B; uses the bathroom. Takes meds whole with thin liquid. R brachial SL PICC patent, flushed with NS, & IV abx infused without difficulty. Wound vac to the spine (neck) intact running at 50 mmHg. PCD machine to BLE on. Pt c/o baseline neuropathy to the BLE (feet). Pt ate dinner with good appetite & no other acute issue. Able to make needs known & call light within reach. Continue with plan of care.    Patient's most recent vital signs are:     Vital signs:  BP: 136/78  Temp: 97.6  HR: 68  RR: 16  SpO2: 97 %     Patient does not have new respiratory symptoms.  Patient does not have new sore throat.  Patient does not have a fever greater than 99.5.

## 2024-04-03 NOTE — PROGRESS NOTES
"[facility]    Medicine Progress Note - Hospitalist Service    Date of Admission:  3/20/2024    Assessment & Plan   Labonna \"Sheron\" Gloria is a 78 year old female with PMHx of HTN, HLD, hypothyroidism, T2DM, peripheral neuropathy, CKD3, atrial fibrillation on anticoagulation, chronic neck & back pain, and GERD who is s/p C3 to T2 laminectomy and spinal cord decompression, C3 to T2 posterior fusion, C3 to T2 segmental instrumentation for progressive myelopathy due to stenosis and kyphosis from C3 to T3 on 10/13/2013. She was admitted to [facility] on 3/13/2024 with neck pain, weakness, and inability to ambulate. CT neck showed a deep space infection w/ gas formation and ?cord compression. Was given Kcentra and urgently taken to OR for I&D. Operative course was c/b hypotension requiring central line and pressor support in MICU. She improved and was discharged from general medicine floor to TCU on 3/20/24 for IV ABX (for MSSA bacteremia), wound cares (wound vac), and continued therapies.    Today (4/3):  - Con't IV cefazolin (EOT 4/24/24)   > On 4/25, anticipate transition to PO doxycycline 100mg BID  - Start PO rifampin 300mg BID (indefinite)  - Spine ortho appt on 4/16  - Change wound vac M/W/F (1mg Dilaudid before dressing change)  - Weekly CBC, BMP, CRP, ESR (ordered)       # MSSA bacteremia  # Deep space infection in posterior cervical spine, 2/2 above  Found on operative & blood cultures. Has PICC line with IV cefazolin (started on 3/20 at TCU), anticipated EOT 4/24/24. ID following - had outpatient appt on 4/2/24 with Dr. Alexis - will start rifampin and anticipate transition to PO doxycycline on 4/25 after completion of IV cefazolin. Remains vitally stable, minimal pain.  - Con't IV cefazolin (EOT 4/24/24)   > On 4/25, anticipate transition to PO doxycycline 100mg BID  - Start PO rifampin 300mg BID (indefinite)  - Trend weekly CBC w/ diff, CMP, CRP & ESR     # s/p cervical spine I&D " (3/13/24)  # History of numerous C3 to T2 surgeries (10/2013)  Urgent I&D performed for deep space infection in posterior cervical spine, as visualized on CT neck. Wound vac in place.  - WOCN for supportive cares  - Wound vac, change q M/W/F (1mg Dilaudid prior to dressing changes)  - Pain control:   > Flexeril 5mg TID PRN   > Percocet 5-325mg Q4H PRN   > Gabapentin 600mg BID   > Tylenol 650mg every day PRN  - Outpatient spine appt in 2 weeks; call [phone] to arrange     # CKD   Cr 1.2, baseline ~ 1.1 - 1.5. Monitoring via BMPs.    # Acute toxic metabolic encephalopathy 2/2 sepsis, resolved  Secondary to sepsis on initial presentation from MSSA bacteremia. Now resolved.     ========================================================================  CHRONIC ISSUES  ========================================================================  # Atrial fibrillation  Rate controlled. TTE in 01/2023 showed normal EF.  - Con't apixaban 5mg BID (resumed on 3/19)  - Con't metoprolol succinate 100mg BID    # T2DM  PTA regimen includes glargine 22U. Blood glucoses are well-controlled at this time.  - Glargine 16U every day  - MDSS, carb coverage (1U per 15)  - BG checks, hypoglycemia protocols    # HTN  - Con't losartan 75mg every day  - Con't metoprolol succinate 100mg BID    # GERD: PTA pantoprazole 40mg BID  # Hypothyroidism: PTA levothyroxine 88mcg qd  # HLD: PTA atorvastatin 40mg qd  # Physical deconditioning 2/2 acute illness: con't PT/OT          Diet: Regular Diet Adult  Snacks/Supplements Adult: Glucerna; Between Meals    DVT Prophylaxis: Pneumatic Compression Devices  Winston Catheter: Not present  Lines: PRESENT      PICC 03/17/24 Single Lumen Right Brachial vein lateral antibiotics-Site Assessment: WDL      Cardiac Monitoring: None  Code Status: Full Code      Clinically Significant Risk Factors              # Hypoalbuminemia: Lowest albumin = 2.9 g/dL at 4/1/2024  7:51 AM, will monitor as appropriate            #  "Obesity: Estimated body mass index is 35.45 kg/m  as calculated from the following:    Height as of 3/12/24: 1.575 m (5' 2\").    Weight as of this encounter: 87.9 kg (193 lb 12.8 oz).   # Severe Malnutrition: based on nutrition assessment           Disposition Plan     Expected Discharge Date: 04/27/2024                  Staffed with Dr. House.    Giovana Gr DO (Internal Medicine PGY-1)  Hospitalist Service  [facility]  Securely message with [employer] (more info)  Text page via [facility] Paging/Directory   ______________________________________________________________________    Interval History   No acute events overnight. Nursing notes reviewed. Patient feels well this morning. Has been having regular BM and eating well; waiting for breakfast. Complaining of bilateral foot pain but states this is chronic for her. No fevers, chills, night sweats, CP, SOB, abd pain. No pain to neck, tolerating wound vac. Had ID outpatient appt yesterday.      Physical Exam   Vital Signs: Temp: 97.6  F (36.4  C) Temp src: Oral BP: 136/78 Pulse: 68   Resp: 16 SpO2: 97 % O2 Device: None (Room air)    Weight: 193 lbs 12.8 oz    General: Alert and oriented without distress, resting comfortably in bed  HEENT: NCAT, anicteric sclera  Respiratory: CTAB, no wheezes or crackles appreciated. No use of accessory muscles.  Cardiovascular: RRR without murmurs, rubs or gallop.   Abdomen: Bowel sounds present. Soft, non-distended without tenderness to palpation or rebound.   Skin: No edema to lower extremities. 2+ pulses palpable. Wound vac in place to posterior neck  Neuro: Alert & oriented x3. Moves all extremities spontaneously    Medical Decision Making           Data   Labs & imaging were reviewed this morning in EPIC.  "

## 2024-04-04 LAB
GLUCOSE BLDC GLUCOMTR-MCNC: 159 MG/DL (ref 70–99)
GLUCOSE BLDC GLUCOMTR-MCNC: 165 MG/DL (ref 70–99)
GLUCOSE BLDC GLUCOMTR-MCNC: 166 MG/DL (ref 70–99)
GLUCOSE BLDC GLUCOMTR-MCNC: 169 MG/DL (ref 70–99)
GLUCOSE BLDC GLUCOMTR-MCNC: 191 MG/DL (ref 70–99)

## 2024-04-04 PROCEDURE — 120N000009 HC R&B SNF

## 2024-04-04 PROCEDURE — 250N000011 HC RX IP 250 OP 636

## 2024-04-04 PROCEDURE — 250N000013 HC RX MED GY IP 250 OP 250 PS 637

## 2024-04-04 PROCEDURE — 250N000013 HC RX MED GY IP 250 OP 250 PS 637: Performed by: INTERNAL MEDICINE

## 2024-04-04 RX ADMIN — ACETAMINOPHEN 650 MG: 325 TABLET, FILM COATED ORAL at 05:44

## 2024-04-04 RX ADMIN — APIXABAN 5 MG: 2.5 TABLET, FILM COATED ORAL at 22:00

## 2024-04-04 RX ADMIN — METOPROLOL SUCCINATE 100 MG: 50 TABLET, EXTENDED RELEASE ORAL at 22:03

## 2024-04-04 RX ADMIN — LOSARTAN POTASSIUM 75 MG: 50 TABLET, FILM COATED ORAL at 08:03

## 2024-04-04 RX ADMIN — Medication 1 CAPSULE: at 22:00

## 2024-04-04 RX ADMIN — GABAPENTIN 600 MG: 300 CAPSULE ORAL at 22:00

## 2024-04-04 RX ADMIN — INSULIN GLARGINE 16 UNITS: 100 INJECTION, SOLUTION SUBCUTANEOUS at 08:06

## 2024-04-04 RX ADMIN — CYCLOBENZAPRINE 5 MG: 5 TABLET, FILM COATED ORAL at 00:37

## 2024-04-04 RX ADMIN — ACETAMINOPHEN 650 MG: 325 TABLET, FILM COATED ORAL at 00:37

## 2024-04-04 RX ADMIN — LOPERAMIDE HYDROCHLORIDE 2 MG: 2 CAPSULE ORAL at 08:04

## 2024-04-04 RX ADMIN — PANTOPRAZOLE SODIUM 40 MG: 40 TABLET, DELAYED RELEASE ORAL at 18:10

## 2024-04-04 RX ADMIN — ATORVASTATIN CALCIUM 40 MG: 40 TABLET, FILM COATED ORAL at 08:04

## 2024-04-04 RX ADMIN — TIMOLOL MALEATE 1 DROP: 5 SOLUTION/ DROPS OPHTHALMIC at 22:05

## 2024-04-04 RX ADMIN — CEFAZOLIN SODIUM 2 G: 2 INJECTION, SOLUTION INTRAVENOUS at 21:58

## 2024-04-04 RX ADMIN — LATANOPROST 1 DROP: 50 SOLUTION OPHTHALMIC at 22:05

## 2024-04-04 RX ADMIN — THERA TABS 1 TABLET: TAB at 12:40

## 2024-04-04 RX ADMIN — CEFAZOLIN SODIUM 2 G: 2 INJECTION, SOLUTION INTRAVENOUS at 05:50

## 2024-04-04 RX ADMIN — APIXABAN 5 MG: 2.5 TABLET, FILM COATED ORAL at 08:04

## 2024-04-04 RX ADMIN — METOPROLOL SUCCINATE 100 MG: 50 TABLET, EXTENDED RELEASE ORAL at 08:03

## 2024-04-04 RX ADMIN — RIFAMPIN 300 MG: 300 CAPSULE ORAL at 22:00

## 2024-04-04 RX ADMIN — LEVOTHYROXINE SODIUM 88 MCG: 0.09 TABLET ORAL at 05:44

## 2024-04-04 RX ADMIN — Medication 1 CAPSULE: at 08:04

## 2024-04-04 RX ADMIN — RIFAMPIN 300 MG: 300 CAPSULE ORAL at 08:04

## 2024-04-04 RX ADMIN — PREDNISOLONE ACETATE 1 DROP: 10 SUSPENSION/ DROPS OPHTHALMIC at 08:04

## 2024-04-04 RX ADMIN — INSULIN ASPART 1 UNITS: 100 INJECTION, SOLUTION INTRAVENOUS; SUBCUTANEOUS at 13:00

## 2024-04-04 RX ADMIN — TIMOLOL MALEATE 1 DROP: 5 SOLUTION/ DROPS OPHTHALMIC at 08:06

## 2024-04-04 RX ADMIN — GABAPENTIN 600 MG: 300 CAPSULE ORAL at 08:04

## 2024-04-04 RX ADMIN — ACETAMINOPHEN 650 MG: 325 TABLET, FILM COATED ORAL at 22:00

## 2024-04-04 RX ADMIN — ACETAMINOPHEN 650 MG: 325 TABLET, FILM COATED ORAL at 13:00

## 2024-04-04 RX ADMIN — CYCLOBENZAPRINE 5 MG: 5 TABLET, FILM COATED ORAL at 08:04

## 2024-04-04 RX ADMIN — PANTOPRAZOLE SODIUM 40 MG: 40 TABLET, DELAYED RELEASE ORAL at 05:44

## 2024-04-04 RX ADMIN — CEFAZOLIN SODIUM 2 G: 2 INJECTION, SOLUTION INTRAVENOUS at 12:42

## 2024-04-04 RX ADMIN — INSULIN ASPART 2 UNITS: 100 INJECTION, SOLUTION INTRAVENOUS; SUBCUTANEOUS at 18:10

## 2024-04-04 ASSESSMENT — ACTIVITIES OF DAILY LIVING (ADL)
ADLS_ACUITY_SCORE: 36
ADLS_ACUITY_SCORE: 37
ADLS_ACUITY_SCORE: 36
ADLS_ACUITY_SCORE: 37
ADLS_ACUITY_SCORE: 36
ADLS_ACUITY_SCORE: 37
ADLS_ACUITY_SCORE: 36
ADLS_ACUITY_SCORE: 37
ADLS_ACUITY_SCORE: 36
ADLS_ACUITY_SCORE: 37
ADLS_ACUITY_SCORE: 36
ADLS_ACUITY_SCORE: 37

## 2024-04-04 NOTE — PROGRESS NOTES
Chart check   Vitals stable     Repeat CBC pending, repeat  crp as was slightly up 4/1 , was seen by ID 4/2   Has received imodium, monitor stools , hold  imodium, check c diff if continue     Ela House MD

## 2024-04-04 NOTE — PLAN OF CARE
Goal Outcome Evaluation:      Plan of Care Reviewed With: patient    Overall Patient Progress: improvingOverall Patient Progress: improving    MDS Pain Assessment    The following is the pain interview as conducted by the TCU RN caring for the patient on April 4, 2024. This assessment is required by the [facility] for all patients in [place] SNF (Skilled Nursing Facilities).     . Pain Presence  Have you had pain or hurting at any time in the last 5 days?   1. Yes    . Pain Frequency  How much of the time have you experienced pain or hurting over the last 5 days?   2. Occasionally    . Pain Effect on Sleep  Over the past 5 days, how much of the time has pain made it hard for you to sleep at night?   2. Occasionally    . Pain Interference with Therapy Activities  Over the past 5 days, how often have you limited your participation in rehabilitation therapy sessions due to pain?   2. Occasionally    . Pain Interference with Day-to-Day Activities  Over the past 5 days, have you limited your day-to-day activities (excluding rehabilitation therapy sessions) because of pain?  2. Occasionally    . Pain intensity   Numeric Rating Scale (00-10): Please rate your worst pain over the last 5 days on a zero to ten scale, with zero being no pain and ten as the worst pain you can imagine. 07

## 2024-04-04 NOTE — PLAN OF CARE
Goal Outcome Evaluation:      Plan of Care Reviewed With: patient    Overall Patient Progress: improvingOverall Patient Progress: improving     Overall pt had no acute issue this shift. Pt is alert and oriented x 4. Able to make needs known. Denied chest pain, SOB, n/V, fever and chills. Pt is continent of both bowel and bladder. Requires SBA with transfers to and from the bathroom. Pt has a wound vac behind neck with continuous suctioning @ 50mmHg. Wound vac dressing in place clean dry with no leaks noted. IV cefazolin infused via PICC without issue. Pt complained of loose stool this shift. Imodium given x 1. No complain at this time. Will continue with POC

## 2024-04-04 NOTE — PLAN OF CARE
Goal Outcome Evaluation: Ongoing       Plan of Care Reviewed With: patient    Overall Patient Progress: improvingOverall Patient Progress: improving  VS: /59(BP Location: Left arm)   Pulse 90   Temp 98.2  F  (Oral)   Resp 16   Wt 87.9 kg (193 lb 12.8 oz)   SpO2 100%   BMI 35.45 kg/m         O2: 100 % on RA   Output: Continent of bowel and bladder    Last BM: 04/03/2024 States on the loose side.    Activity: Ambulatory to toilet needs, transfer from bed to chair with 1 assist, using walker.     Skin: PICC on right arm, dry, clean and intact.  Bipedal edema noted, +2 to +3 on L > R.   Pain: No complaints of pain.   CMS: AOx4.  Able to verbalize needs.   Dressing: Done by WOC nurse on surgical site.   Diet: Regular diet with good appetite   LDA: PICC - single lumen on right arm.  Wound Vac on posterior neck area draining to serosanguinous fluids.   Equipment: Wound vac at 50mmHg.    Plan: Health education regarding hand hygiene, wound care, antibiotics compliance.   Additional Info:  Pleasant

## 2024-04-04 NOTE — PLAN OF CARE
Pain Comprehensive Assessment    A comprehensive Pain assessment is required to assess any Pain Chronic or acute that persists while admitted to the Transitional Care Unit.    Does the patient have pain? Chronic     Precipitating factors that bring on the pain: exercise  Description of the pain: dull and aching.    Where is the pain located? (Identify body part) neck    Does the pain move or stay in one place? Mostly neck     When is the pain the worst (please describe)? at night.    When is the pain the best (please describe)? Rest     What do you do for the pain? medication.    What can staff do to help with the pain? Offer pain medications    Plan initiated: already initiated.

## 2024-04-04 NOTE — PLAN OF CARE
"Goal Outcome Evaluation:    Plan of Care Reviewed With: patient    Overall Patient Progress: no change    Outcome Evaluation: Pt having difficulty sleeping when rounded at midnight. Report some discomfort on LLE. BLE with edema L >R. Refused PCDs tonight. Tylenol 650 mg tab and Flexeril 5 mg tab given. Pt assisted and made comfortable in bed. Has been sleeping/resting well since then. BG at 0200= 169. Wound vac on posterior neck intact at -50 mmHg cont.- No alarm noted. A of 1 to BR with walker. Needs assistance managing wound vac. No BM this shift.  IV Ancef given via PICC without problem. Pt has no c/o SOB and no s/s of respiratory issue noted at RA. Pt claimed sleeping \"like a baby\" tonight. Continue with plan of care.    Patient's most recent vital signs are:     Vital signs:  BP: 142/65  Temp: 98.1  HR: 88  RR: 16  SpO2: 97 %     Patient does not have new respiratory symptoms.  Patient does not have new sore throat.  Patient does not have a fever greater than 99.5.        "

## 2024-04-05 LAB
BASOPHILS # BLD AUTO: 0 10E3/UL (ref 0–0.2)
BASOPHILS NFR BLD AUTO: 1 %
CRP SERPL-MCNC: 24.53 MG/L
EOSINOPHIL # BLD AUTO: 0.2 10E3/UL (ref 0–0.7)
EOSINOPHIL NFR BLD AUTO: 5 %
ERYTHROCYTE [DISTWIDTH] IN BLOOD BY AUTOMATED COUNT: 16 % (ref 10–15)
GLUCOSE BLDC GLUCOMTR-MCNC: 136 MG/DL (ref 70–99)
GLUCOSE BLDC GLUCOMTR-MCNC: 145 MG/DL (ref 70–99)
GLUCOSE BLDC GLUCOMTR-MCNC: 145 MG/DL (ref 70–99)
GLUCOSE BLDC GLUCOMTR-MCNC: 204 MG/DL (ref 70–99)
GLUCOSE BLDC GLUCOMTR-MCNC: 86 MG/DL (ref 70–99)
HCT VFR BLD AUTO: 26.7 % (ref 35–47)
HGB BLD-MCNC: 8.1 G/DL (ref 11.7–15.7)
IMM GRANULOCYTES # BLD: 0 10E3/UL
IMM GRANULOCYTES NFR BLD: 1 %
INR PPP: 1.5 (ref 0.85–1.15)
LYMPHOCYTES # BLD AUTO: 0.8 10E3/UL (ref 0.8–5.3)
LYMPHOCYTES NFR BLD AUTO: 24 %
MCH RBC QN AUTO: 30.5 PG (ref 26.5–33)
MCHC RBC AUTO-ENTMCNC: 30.3 G/DL (ref 31.5–36.5)
MCV RBC AUTO: 100 FL (ref 78–100)
MONOCYTES # BLD AUTO: 0.3 10E3/UL (ref 0–1.3)
MONOCYTES NFR BLD AUTO: 8 %
NEUTROPHILS # BLD AUTO: 2 10E3/UL (ref 1.6–8.3)
NEUTROPHILS NFR BLD AUTO: 61 %
NRBC # BLD AUTO: 0 10E3/UL
NRBC BLD AUTO-RTO: 0 /100
PLATELET # BLD AUTO: 198 10E3/UL (ref 150–450)
RBC # BLD AUTO: 2.66 10E6/UL (ref 3.8–5.2)
TSH SERPL DL<=0.005 MIU/L-ACNC: 1.5 UIU/ML (ref 0.3–4.2)
WBC # BLD AUTO: 3.2 10E3/UL (ref 4–11)

## 2024-04-05 PROCEDURE — 85025 COMPLETE CBC W/AUTO DIFF WBC: CPT | Performed by: INTERNAL MEDICINE

## 2024-04-05 PROCEDURE — 250N000013 HC RX MED GY IP 250 OP 250 PS 637: Performed by: INTERNAL MEDICINE

## 2024-04-05 PROCEDURE — 86140 C-REACTIVE PROTEIN: CPT | Performed by: INTERNAL MEDICINE

## 2024-04-05 PROCEDURE — 36592 COLLECT BLOOD FROM PICC: CPT | Performed by: INTERNAL MEDICINE

## 2024-04-05 PROCEDURE — F08D5YZ WOUND MANAGEMENT TREATMENT OF INTEGUMENTARY SYSTEM - HEAD AND NECK USING OTHER EQUIPMENT: ICD-10-PCS | Performed by: INTERNAL MEDICINE

## 2024-04-05 PROCEDURE — 250N000011 HC RX IP 250 OP 636

## 2024-04-05 PROCEDURE — 120N000009 HC R&B SNF

## 2024-04-05 PROCEDURE — 258N000003 HC RX IP 258 OP 636

## 2024-04-05 PROCEDURE — 84443 ASSAY THYROID STIM HORMONE: CPT | Performed by: INTERNAL MEDICINE

## 2024-04-05 PROCEDURE — 85610 PROTHROMBIN TIME: CPT | Performed by: INTERNAL MEDICINE

## 2024-04-05 PROCEDURE — 99310 SBSQ NF CARE HIGH MDM 45: CPT | Performed by: INTERNAL MEDICINE

## 2024-04-05 PROCEDURE — 97605 NEG PRS WND THER DME<=50SQCM: CPT

## 2024-04-05 PROCEDURE — 250N000013 HC RX MED GY IP 250 OP 250 PS 637

## 2024-04-05 RX ORDER — WARFARIN SODIUM 5 MG/1
5 TABLET ORAL
Status: COMPLETED | OUTPATIENT
Start: 2024-04-05 | End: 2024-04-05

## 2024-04-05 RX ORDER — SODIUM CHLORIDE 9 MG/ML
INJECTION, SOLUTION INTRAVENOUS
Status: COMPLETED
Start: 2024-04-05 | End: 2024-04-05

## 2024-04-05 RX ORDER — FUROSEMIDE 20 MG
20 TABLET ORAL DAILY
Status: DISCONTINUED | OUTPATIENT
Start: 2024-04-05 | End: 2024-04-05

## 2024-04-05 RX ORDER — HYDROCHLOROTHIAZIDE 25 MG/1
25 TABLET ORAL DAILY
Status: DISCONTINUED | OUTPATIENT
Start: 2024-04-05 | End: 2024-04-25 | Stop reason: HOSPADM

## 2024-04-05 RX ORDER — ASPIRIN 81 MG/1
81 TABLET, CHEWABLE ORAL DAILY
Status: DISCONTINUED | OUTPATIENT
Start: 2024-04-05 | End: 2024-04-25 | Stop reason: HOSPADM

## 2024-04-05 RX ADMIN — LATANOPROST 1 DROP: 50 SOLUTION OPHTHALMIC at 21:58

## 2024-04-05 RX ADMIN — INSULIN ASPART 3 UNITS: 100 INJECTION, SOLUTION INTRAVENOUS; SUBCUTANEOUS at 12:54

## 2024-04-05 RX ADMIN — THERA TABS 1 TABLET: TAB at 11:50

## 2024-04-05 RX ADMIN — CEFAZOLIN SODIUM 2 G: 2 INJECTION, SOLUTION INTRAVENOUS at 05:09

## 2024-04-05 RX ADMIN — CEFAZOLIN SODIUM 2 G: 2 INJECTION, SOLUTION INTRAVENOUS at 20:22

## 2024-04-05 RX ADMIN — TIMOLOL MALEATE 1 DROP: 5 SOLUTION/ DROPS OPHTHALMIC at 08:56

## 2024-04-05 RX ADMIN — ATORVASTATIN CALCIUM 40 MG: 40 TABLET, FILM COATED ORAL at 08:55

## 2024-04-05 RX ADMIN — Medication 1 MG: at 20:25

## 2024-04-05 RX ADMIN — ACETAMINOPHEN 650 MG: 325 TABLET, FILM COATED ORAL at 20:25

## 2024-04-05 RX ADMIN — METOPROLOL SUCCINATE 100 MG: 50 TABLET, EXTENDED RELEASE ORAL at 08:55

## 2024-04-05 RX ADMIN — CEFAZOLIN SODIUM 2 G: 2 INJECTION, SOLUTION INTRAVENOUS at 12:00

## 2024-04-05 RX ADMIN — CYCLOBENZAPRINE 5 MG: 5 TABLET, FILM COATED ORAL at 17:52

## 2024-04-05 RX ADMIN — Medication 3 MG: at 03:11

## 2024-04-05 RX ADMIN — RIFAMPIN 300 MG: 300 CAPSULE ORAL at 08:55

## 2024-04-05 RX ADMIN — HYDROCHLOROTHIAZIDE 25 MG: 25 TABLET ORAL at 10:19

## 2024-04-05 RX ADMIN — ASPIRIN 81 MG CHEWABLE TABLET 81 MG: 81 TABLET CHEWABLE at 10:19

## 2024-04-05 RX ADMIN — PANTOPRAZOLE SODIUM 40 MG: 40 TABLET, DELAYED RELEASE ORAL at 05:10

## 2024-04-05 RX ADMIN — RIFAMPIN 300 MG: 300 CAPSULE ORAL at 20:25

## 2024-04-05 RX ADMIN — TIMOLOL MALEATE 1 DROP: 5 SOLUTION/ DROPS OPHTHALMIC at 20:32

## 2024-04-05 RX ADMIN — LOSARTAN POTASSIUM 75 MG: 50 TABLET, FILM COATED ORAL at 08:54

## 2024-04-05 RX ADMIN — Medication 1 CAPSULE: at 20:25

## 2024-04-05 RX ADMIN — Medication 1 CAPSULE: at 08:56

## 2024-04-05 RX ADMIN — ACETAMINOPHEN 650 MG: 325 TABLET, FILM COATED ORAL at 17:51

## 2024-04-05 RX ADMIN — SODIUM CHLORIDE 500 ML: 9 INJECTION, SOLUTION INTRAVENOUS at 12:05

## 2024-04-05 RX ADMIN — METOPROLOL SUCCINATE 100 MG: 50 TABLET, EXTENDED RELEASE ORAL at 20:24

## 2024-04-05 RX ADMIN — LEVOTHYROXINE SODIUM 88 MCG: 0.09 TABLET ORAL at 05:09

## 2024-04-05 RX ADMIN — GABAPENTIN 600 MG: 300 CAPSULE ORAL at 20:24

## 2024-04-05 RX ADMIN — ACETAMINOPHEN 650 MG: 325 TABLET, FILM COATED ORAL at 08:55

## 2024-04-05 RX ADMIN — PANTOPRAZOLE SODIUM 40 MG: 40 TABLET, DELAYED RELEASE ORAL at 17:50

## 2024-04-05 RX ADMIN — ACETAMINOPHEN 650 MG: 325 TABLET, FILM COATED ORAL at 12:54

## 2024-04-05 RX ADMIN — INSULIN GLARGINE 16 UNITS: 100 INJECTION, SOLUTION SUBCUTANEOUS at 09:00

## 2024-04-05 RX ADMIN — SODIUM CHLORIDE 500 ML: 9 INJECTION, SOLUTION INTRAVENOUS at 20:23

## 2024-04-05 RX ADMIN — WARFARIN SODIUM 5 MG: 5 TABLET ORAL at 17:51

## 2024-04-05 RX ADMIN — PREDNISOLONE ACETATE 1 DROP: 10 SUSPENSION/ DROPS OPHTHALMIC at 08:56

## 2024-04-05 RX ADMIN — INSULIN ASPART 1 UNITS: 100 INJECTION, SOLUTION INTRAVENOUS; SUBCUTANEOUS at 09:00

## 2024-04-05 RX ADMIN — GABAPENTIN 600 MG: 300 CAPSULE ORAL at 08:56

## 2024-04-05 ASSESSMENT — ACTIVITIES OF DAILY LIVING (ADL)
ADLS_ACUITY_SCORE: 37

## 2024-04-05 NOTE — PLAN OF CARE
Goal Outcome Evaluation:      Plan of Care Reviewed With: patient    Overall Patient Progress: improving  Orientation: Alert and oriented x4. Able to make needs known to staff.   Bowel & Bladder: Continent of both bowel and bladder. Voids spontaneously without difficulty.  Medications: Takes medication whole with thin liquids.  Pain: None  Ambulation/Transfers: with stand-by assist w/ walker. Staff follows with wound vac  Diet: Regular diet with good  appetite.  Tubes/Lines/Drains: PICC to RUE is intact, dressing dry, and saline locked.  Oxygen: Room air  Skin: Wound vac to posterior neck is CDI, no alarms this shift.  Infection/Isolation: No active isolations.  Safety: No concerns noted this shift. Door opened per patient's request.  Other: Denied chest pain and SOB. Call-light within reach. Utilizes call-light appropriately. Continue with POC.    Vitals: T: 97.7,  B/P: 150/70,  P: 78,  R: 16,  SpO2: 94 %     Recent Blood Glucose   Lab 04/05/24  1251 04/05/24  0853   * 145*     Patient does not have new respiratory symptoms.  Patient does not have new sore throat.  Patient does not have a fever greater than 99.5

## 2024-04-05 NOTE — PLAN OF CARE
Goal Outcome Evaluation:    Patient is alert and oriented x4. Able to make needs known. Pt denied SOB, N/V and chest pain. Pt is blood sugar check with insulin given per order parameter. Right arm PICC line is patent and intact. Pt is continent of bowel and bladder. Stand-by assist with walker. IV medication administered per order with no notable infusion reaction. Call light within reach.      Patient's most recent vital signs are:     Vital signs:  BP: 151/71  Temp: 97.8  HR: 76  RR: 18  SpO2: 98 %     Patient does not have new respiratory symptoms.  Patient does not have new sore throat.  Patient does not have a fever greater than 99.5.

## 2024-04-05 NOTE — PHARMACY-ANTICOAGULATION SERVICE
Clinical Pharmacy - Warfarin Dosing Consult     Pharmacy has been consulted to manage this patient s warfarin therapy.  Indication: Atrial Fibrillation  Therapy Goal: INR 2-3  Warfarin Prior to Admission: No  Warfarin PTA Regimen: Not on warfarin PTA, but may have been on it back in 2020 at 2.5 mg per day  Significant drug interactions: rifampin-may decrease INR, aspirin, doxycycline  Recent documented change in oral intake/nutrition: No    INR   Date Value Ref Range Status   04/05/2024 1.50 (H) 0.85 - 1.15 Final     INR (External)   Date Value Ref Range Status   10/05/2023 1.4 <1.3 Final       Recommend warfarin 5 mg today.  Pharmacy will monitor Labonna V Moisan daily and order warfarin doses to achieve specified goal.      Please contact pharmacy as soon as possible if the warfarin needs to be held for a procedure or if the warfarin goals change.

## 2024-04-05 NOTE — PROGRESS NOTES
[facility] Nurse [facility]patient Assessment     Consulted for: Neck    Summary: Patient s/p I&D on 3/13/24 of posterior neck for deep cervical infection down to the hardware with wound VAC placement.     4/1: Message sent to Dr Sagar Cadena surgeon to request increase in wound vac settings from -50 mmHg to -75 mmHg.     Patient History (according to provider note(s):      Lj Castro is a 78 year old female admitted on 3/12/2024. 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  She came to the ER complaining that for the past 10 days he had increasing pain in the neck and gradual weakness to the arms and legs to the point that she was unable to ambulate.  She also had right arm pain  over the past 3 to 4 days along with confusion.  Her arm pain was 9 out of 10.  There have been no recent falls or shortness of breath or nausea or vomiting.  She was brought in by EMS.  CT scan of the neck showed a deep space infection with gas formation and possible cord compression.  She was given Kcentra and taken to the operating room.  She underwent I&D with wound VAC application along with IntraOp culture and IV antibiotics.  She underwent general endotracheal anesthesia.  Operative course was significant for hypotension requiring central line and pressor support and so she was admitted to the ICU.       Assessment:      Areas visualized during today's visit: Focused:    Negative pressure wound therapy applied to: Posterior neck   Last photo: 3/20/24     3/20     3/29    4/5  Wound due to: Surgical Wound   Wound history/plan of care:    Surgical date: 3/13/24   Service following:  Spinal surgery  Date Negative Pressure Wound Therapy initiated: 3/13/24   Interventions in place: repositioning  Is patient s nutritional status compromised? yes   If yes, what interventions are in place? Protein supplements  Reason for initiating vac therapy? Presence of co-morbidities, High risk of infections, and Need for accelerated granulation tissue  Which?of?the?following?co-morbidities?apply? Diabetes  If diabetic is patient on a diabetic management program? Yes   Is osteomyelitis present in wound? no   If yes what treatments are in place? N/A    Wound base: 50 % granulation tissue, 50 % pink non-granular tissue, adipose tissue and fascia.  One small area of exposed hardware, one additional area of hardware just below tissue.        Palpation of the wound bed: normal       Drainage:  large       Volume in cannister: 350 ml     Last cannister change date: 3/20/24     Description of drainage: serosanguinous      Measurements (length x width x depth, in cm) 8  x 2.5 x  2.7 cm       Tunneling N/A      Undermining up to 0.5 cm from 6-9 o'clock   Periwound skin: Intact       Color: normal and consistent with surrounding tissue       Temperature: normal    Odor: none   Pain: score 8 , sharp   Pain intervention prior to dressing change: IV Dilaudid  Treatment goal: Heal  and Increase granulation  STATUS: improving   Supplies ordered: ordered medium VAC dressing    Number of foam pieces removed from a wound (excluding foam for bridge) :  2 GranuFoam Black and 1 Oil emulsion dressing   Verified this matched the number of foam pieces applied last dressing change: Yes   Number of foam pieces packed into wound (excluding foam for bridge) :  2 GranuFoam Black and 1 Oil emulsion dressing       Treatment Plan:     Negative pressure wound therapy plan:  Wound location: Posterior neck   Change Days: Mon/Wed/Fri by WOC RN    Supplies (including all accessories) used: medium Black foam , oil emulsion over exposed  "hardware  Cleanse with Vashe prior to replacing NPWT  Suction setting: -50     Staff RN to assess integrity of dressing and ensure suction is set at appropriate level every shift.   Date canister. Chart canister output every shift. Change cannister weekly and PRN if full/occluded     Remove foam dressing and replace with BID normal saline moist gauze dressing if:   -a dressing failure which cannot be repaired within 2 hours   -patient is discharging to home without a home pump   -patient is discharging to a facility outside the local area   -if a dressing is a \"Silver Foam\", remove before Radiation Therapy or MRI     The hospital VAC pump is not to be discharged with the patient.?Ensure to disconnect patient from machine prior to discharge. Then,    - If a home KCI VAC pump has been delivered, connect home cannister to dressing tubing then connect cannister to home pump and turn on machine    - If transferring to a nearby facility with a KCI vac, can disconnect and clamp tubing then cover end with glove so can be reconnected within 2 hours        Orders: Reviewed    RECOMMEND PRIMARY TEAM ORDER: None, at this time  Education provided: plan of care  Discussed plan of care with: Patient and Nurse  WOC nurse follow-up plan: Monday/WednesdayFriday   Notify WOC if wound(s) deteriorate.  Nursing to notify the Provider(s) and re-consult the WOC Nurse if new skin concern.    DATA:     Current support surface: Standard  Low air loss (LYNNETTE pump, Isolibrium, Pulsate, skin guard, etc)  BMI: Body mass index is 34.7 kg/m .   Active diet order: Orders Placed This Encounter      Regular Diet Adult     Output: I/O last 3 completed shifts:  In: 120 [I.V.:120]  Out: -      Labs:   Recent Labs   Lab 04/01/24  0751   ALBUMIN 2.9*   HGB 8.1*   WBC 3.0*     Pressure injury risk assessment:   Sensory Perception: 3-->slightly limited  Moisture: 4-->rarely moist  Activity: 3-->walks occasionally  Mobility: 3-->slightly limited  Nutrition: " 3-->adequate  Friction and Shear: 3-->no apparent problem  Luke Score: 19      Filomena Gamez RN BSN RUFUSOCN  [facility]  Dept. Office Number: [phone]

## 2024-04-05 NOTE — PLAN OF CARE
Goal Outcome Evaluation:      Plan of Care Reviewed With: patient    Overall Patient Progress: improving    Pt alert and oriented x 4, can make needs known, continent of bowel and bladder uses the bathroom, assist of 1 with walker. Pt complained of not being able to sleep PRN melatonin given. Wound vac to the posterior of the neck, dressing CDI, running continuous at 50 mmHg. Iv antibiotic administered via Right upper arm single lumen PICC, pt tolerated well. Pt appeared comfortable in bed during safety check, call light within reach, continue with plan of care.         Patient's most recent vital signs are:     Vital signs:  BP: 151/71  Temp: 97.8  HR: 76  RR: 18  SpO2: 98 %     Patient does not have new respiratory symptoms.  Patient does not have new sore throat.  Patient does not have a fever greater than 99.5.

## 2024-04-05 NOTE — PROGRESS NOTES
"[facility]    Medicine Progress Note - Hospitalist Service    Date of Admission:  3/20/2024    Assessment & Plan     Labonna \"Sheron\" Gloria is a 78 year old female with PMHx of HTN, HLD, hypothyroidism, T2DM, peripheral neuropathy, CKD3, atrial fibrillation on anticoagulation, chronic neck & back pain, and GERD who is s/p C3 to T2 laminectomy and spinal cord decompression, C3 to T2 posterior fusion, C3 to T2 segmental instrumentation for progressive myelopathy due to stenosis and kyphosis from C3 to T3 on 10/13/2013. She was admitted to [facility] on 3/13/2024 with neck pain, weakness, and inability to ambulate. CT neck showed a deep space infection w/ gas formation and ?cord compression. Was given Kcentra and urgently taken to OR for I&D. Operative course was c/b hypotension requiring central line and pressor support in MICU. She improved and was discharged from general medicine floor to TCU on 3/20/24 for IV ABX (for MSSA bacteremia), wound cares (wound vac), and continued therapies.     Today (4/5):   - diarrhea resolved   - switching form apixaban to warfarin due to rifampin interaction . See bellow   - noted leg edema, restarted her hydrochlorothiazide 25 mg daily         # MSSA bacteremia  # Deep space infection in posterior cervical spine, 2/2 above  Found on operative & blood cultures. Has PICC line with IV cefazolin (started on 3/20 at TCU), anticipated EOT 4/24/24. ID following - had outpatient appt on 4/2/24 with Dr. Alexis - will start rifampin and anticipate transition to PO doxycycline on 4/25 after completion of IV cefazolin. Remains vitally stable, minimal pain.  - Con't IV cefazolin (EOT 4/24/24)              > On 4/25, anticipate transition to PO doxycycline 100mg BID  - Start PO rifampin 300mg BID (indefinite) , I do hav ecall out to Dr Alexis reg drug interaction  - Trend weekly CBC w/ diff, CMP, CRP & ESR     # s/p cervical spine I&D (3/13/24)  # History of numerous C3 to T2 " surgeries (10/2013)  Urgent I&D performed for deep space infection in posterior cervical spine, as visualized on CT neck. Wound vac in place.  - WOCN for supportive cares  - Wound vac, change q M/W/F (1mg Dilaudid prior to dressing changes)  - Pain control:              > Flexeril 5mg TID PRN              > Percocet 5-325mg Q4H PRN              > Gabapentin 600mg BID              > Tylenol 650mg every day PRN  - Outpatient spine appt in 2 weeks; call [phone] to arrange  -Spine ortho appt on 4/16  - Weekly CBC, BMP, CRP, ESR (ordered)    # Atrial fibrillation  Rate controlled. TTE in 01/2023 showed normal EF.  - Con't apixaban 5mg BID but now on  rifampin so contraindicated, will start on warfarin, needs close monitoring of INR, if rifampin is stop on future needs very close monitoring as can cause INR to shoot up    -warfarin dosing per pharmacist , discussed with  patient , she had been on warfarin and sq lovenox in past and did not like either, we decided on warfarin, she understands reasoning. I did discussed with  her that if she comes of rifampin her INR will need to be monitored very closely ( daily)  - Con't metoprolol succinate 100mg BID  - last echo 3/12/24 EF 55%      # valvular heart disease  - echo showed moderate -severe TR   - needs to follow up   with cardiology  , discussed with  patient      # CKD   Cr 1.2, baseline ~ 1.1 - 1.5.  -monitor .     # Acute toxic metabolic encephalopathy 2/2 sepsis, resolved  Secondary to sepsis on initial presentation from MSSA bacteremia. Now resolved.        # T2DM  PTA regimen includes glargine 22U. Blood glucoses are well-controlled at this time.  - Glargine 16U every day adjust   - MDSS, carb coverage (1U per 15)  - BG checks, hypoglycemia protocols     # HTN  - Con't losartan 75mg every day  - Con't metoprolol succinate 100mg BID     # history PAD  -history Left external iliac, left common femoral, superficial femoral, profunda femoris endarterectomy with  "Dacron patch closure by Dr. Wilson 2/8/21.   -also history moderate carotid disease  - was on aspirin so restarting     # GERD:   PTA pantoprazole 40mg BID    # Hypothyroidism:   PTA levothyroxine 88mcg every day  - TSH normal        # HLD: PTA atorvastatin 40mg every day    # Physical deconditioning 2/2 acute illness: con't PT/OT           Diet: Regular Diet Adult  Snacks/Supplements Adult: Glucerna; Between Meals    DVT Prophylaxis: Pneumatic Compression Devices  Winston Catheter: Not present  Lines: PRESENT      PICC 03/17/24 Single Lumen Right Brachial vein lateral antibiotics-Site Assessment: WDL        Diet: Regular Diet Adult  Snacks/Supplements Adult: Glucerna; Between Meals    DVT Prophylaxis: Warfarin  Winston Catheter: Not present  Lines: PRESENT      PICC 03/17/24 Single Lumen Right Brachial vein lateral antibiotics-Site Assessment: WDL      Cardiac Monitoring: None  Code Status: Full Code      Clinically Significant Risk Factors              # Hypoalbuminemia: Lowest albumin = 2.9 g/dL at 4/1/2024  7:51 AM, will monitor as appropriate            # Obesity: Estimated body mass index is 34.7 kg/m  as calculated from the following:    Height as of 3/12/24: 1.575 m (5' 2\").    Weight as of this encounter: 86 kg (189 lb 11.2 oz).   # Severe Malnutrition: based on nutrition assessment           Disposition Plan     Expected Discharge Date: 04/27/2024                    Ela House MD  Hospitalist Service  [facility]  Securely message with [employer] (more info)  Text page via [employer] Paging/Directory   ______________________________________________________________________    Interval History   Doing ok, we did discuss reasoning of stopping apixaban and staring warfarin,she is not happy but understands no chest pain  no shortness of breath , her diarrhea resolved   No chest pain  no shortness of breath  no abdominal pain     Physical Exam   Vital Signs: Temp: 97.8  F (36.6  C) Temp src: " Oral BP: (!) 151/71 Pulse: 76   Resp: 18 SpO2: 98 % O2 Device: None (Room air)    Weight: 189 lbs 11.2 oz  General appearence: awake alert   no apparent distress       NECK : wound vac   RESPIRATORY: lungs clear   CARDIOVASCULAR:S1 S2 irreg    GASTROINTESTINAL:soft, non-distended , non-tender , + bowel sounds, no masses felt   SKIN: warm and dry, no mottling noted   NEUROLOGIC; awake alert and oriented,   EXTREMITIES: no clubbing, cyanosis. + edema  , moves all extremity, good pedal pulses   MUSCULOSKELETAL: without deformity       Data     I have personally reviewed the following data over the past 24 hrs:    3.2 (L)  \   8.1 (L)   / 198     N/A N/A N/A /  145 (H)   N/A N/A N/A \       Imaging results reviewed over the past 24 hrs:   No results found for this or any previous visit (from the past 24 hour(s)).  Recent Labs   Lab 04/05/24  0738 04/05/24  0159 04/04/24  2115 04/04/24  1649 04/01/24  1219 04/01/24  0751   WBC 3.2*  --   --   --   --  3.0*   HGB 8.1*  --   --   --   --  8.1*     --   --   --   --  100     --   --   --   --  206   NA  --   --   --   --   --  139   POTASSIUM  --   --   --   --   --  4.1   CHLORIDE  --   --   --   --   --  105   CO2  --   --   --   --   --  26   BUN  --   --   --   --   --  14.1   CR  --   --   --   --   --  0.76   ANIONGAP  --   --   --   --   --  8   INDY  --   --   --   --   --  8.4*   GLC  --  145* 159* 191*   < > 136*   ALBUMIN  --   --   --   --   --  2.9*   PROTTOTAL  --   --   --   --   --  6.1*   BILITOTAL  --   --   --   --   --  0.3   ALKPHOS  --   --   --   --   --  92   ALT  --   --   --   --   --  <5   AST  --   --   --   --   --  16    < > = values in this interval not displayed.

## 2024-04-05 NOTE — PLAN OF CARE
Goal Outcome Evaluation:         VS: Temp: 97.2  F (36.2  C) Temp src: Oral BP: (!) 157/81 Pulse: 67   Resp: 16 SpO2: 97 % O2 Device: None (Room air)     O2: Stable on RA, sating 97%    Output: Voids spontaneously without difficulty   Continent of bladder    Last BM: 4/4/24  pt states having regular bowel movements   Continent of bowel and bladder    Activity: Assist x1 with GB and walker    Skin: Wound vac to posterior neck  -50mmHg  cannister with 200mL serosanguinous output   Cannister not changed this shift    Pain: To posterior neck and shoulders   PRN tylenol, flexeril given    CMS: AXO x4   Can make needs known    Dressing: To posterior neck    Diet: Regular diet   BS checks before meals and bedtime   BS 86, 136     Sliding scale and carb coverage    LDA: RUE valved single lumen PICC   Dressing DCI, blood return noted.    Equipment: Call light, IV pole and pump, glucometer, walker and GB    Plan: Continue POC    Additional Info: Pt denies CP, SOB, N/V, headache, or ABD pain        Patient does not have new respiratory symptoms.  Patient does not have new sore throat.  Patient does not have a fever greater than 99.5.

## 2024-04-05 NOTE — DISCHARGE INSTRUCTIONS
Primary Care Provider  You are scheduled to see Dr. Soliz on 5/3/2024 at 11:40 am.    Address [address]   [facility] [address]     Phone   [phone]     if your rifampin is stop on future needs very close monitoring as can cause INR to shoot up  so you need to discussed with  with your doctor     Home Health Care:   [facility]  PH: [phone]   Nurse, physical therapy, and occupational therapy.   -You will get a call [facility] to schedule the first home care visit. The home health nurse should come out within the first 24-48 hours. If you don't get a call from them within the first 48 hours, please call to follow up.    [facility] VAC therapy:   Important Resources and Contacts:   General Questions: [phone]  Order Supplies: [phone], option 2 then dial ext. [phone]  24/7 Technical Support: [phone], option 3. Help with VAC therapy unit, such as changing a full canister, charging the unit, therapy alarms, troubleshooting dressing applications or questions regarding safety. Reach out to your health care provider  managing your VAC Therapy for any questions directly related to your therapy.

## 2024-04-06 LAB
GLUCOSE BLDC GLUCOMTR-MCNC: 106 MG/DL (ref 70–99)
GLUCOSE BLDC GLUCOMTR-MCNC: 126 MG/DL (ref 70–99)
GLUCOSE BLDC GLUCOMTR-MCNC: 144 MG/DL (ref 70–99)
GLUCOSE BLDC GLUCOMTR-MCNC: 76 MG/DL (ref 70–99)
INR PPP: 1.34 (ref 0.85–1.15)

## 2024-04-06 PROCEDURE — 250N000013 HC RX MED GY IP 250 OP 250 PS 637: Performed by: INTERNAL MEDICINE

## 2024-04-06 PROCEDURE — 85610 PROTHROMBIN TIME: CPT | Performed by: INTERNAL MEDICINE

## 2024-04-06 PROCEDURE — 258N000003 HC RX IP 258 OP 636

## 2024-04-06 PROCEDURE — 120N000009 HC R&B SNF

## 2024-04-06 PROCEDURE — 250N000013 HC RX MED GY IP 250 OP 250 PS 637

## 2024-04-06 PROCEDURE — 36415 COLL VENOUS BLD VENIPUNCTURE: CPT | Performed by: INTERNAL MEDICINE

## 2024-04-06 PROCEDURE — 250N000011 HC RX IP 250 OP 636

## 2024-04-06 PROCEDURE — 99310 SBSQ NF CARE HIGH MDM 45: CPT | Performed by: INTERNAL MEDICINE

## 2024-04-06 RX ORDER — WARFARIN SODIUM 7.5 MG/1
7.5 TABLET ORAL
Status: COMPLETED | OUTPATIENT
Start: 2024-04-06 | End: 2024-04-06

## 2024-04-06 RX ORDER — CALCIUM CARBONATE 500 MG/1
500 TABLET, CHEWABLE ORAL DAILY PRN
Status: DISCONTINUED | OUTPATIENT
Start: 2024-04-06 | End: 2024-04-25 | Stop reason: HOSPADM

## 2024-04-06 RX ORDER — SODIUM CHLORIDE 9 MG/ML
INJECTION, SOLUTION INTRAVENOUS
Status: COMPLETED
Start: 2024-04-06 | End: 2024-04-06

## 2024-04-06 RX ADMIN — ASPIRIN 81 MG CHEWABLE TABLET 81 MG: 81 TABLET CHEWABLE at 08:39

## 2024-04-06 RX ADMIN — METOPROLOL SUCCINATE 100 MG: 50 TABLET, EXTENDED RELEASE ORAL at 21:13

## 2024-04-06 RX ADMIN — ACETAMINOPHEN 650 MG: 325 TABLET, FILM COATED ORAL at 21:12

## 2024-04-06 RX ADMIN — ACETAMINOPHEN 650 MG: 325 TABLET, FILM COATED ORAL at 08:38

## 2024-04-06 RX ADMIN — METOPROLOL SUCCINATE 100 MG: 50 TABLET, EXTENDED RELEASE ORAL at 08:38

## 2024-04-06 RX ADMIN — INSULIN GLARGINE 16 UNITS: 100 INJECTION, SOLUTION SUBCUTANEOUS at 10:53

## 2024-04-06 RX ADMIN — GABAPENTIN 600 MG: 300 CAPSULE ORAL at 21:12

## 2024-04-06 RX ADMIN — SODIUM CHLORIDE 500 ML: 9 INJECTION, SOLUTION INTRAVENOUS at 21:24

## 2024-04-06 RX ADMIN — RIFAMPIN 300 MG: 300 CAPSULE ORAL at 08:39

## 2024-04-06 RX ADMIN — LOSARTAN POTASSIUM 75 MG: 50 TABLET, FILM COATED ORAL at 08:39

## 2024-04-06 RX ADMIN — CALCIUM CARBONATE (ANTACID) CHEW TAB 500 MG 500 MG: 500 CHEW TAB at 13:35

## 2024-04-06 RX ADMIN — THERA TABS 1 TABLET: TAB at 12:56

## 2024-04-06 RX ADMIN — Medication 1 CAPSULE: at 21:12

## 2024-04-06 RX ADMIN — RIFAMPIN 300 MG: 300 CAPSULE ORAL at 21:12

## 2024-04-06 RX ADMIN — CEFAZOLIN SODIUM 2 G: 2 INJECTION, SOLUTION INTRAVENOUS at 05:13

## 2024-04-06 RX ADMIN — TIMOLOL MALEATE 1 DROP: 5 SOLUTION/ DROPS OPHTHALMIC at 21:15

## 2024-04-06 RX ADMIN — Medication 1 CAPSULE: at 08:39

## 2024-04-06 RX ADMIN — LATANOPROST 1 DROP: 50 SOLUTION OPHTHALMIC at 21:23

## 2024-04-06 RX ADMIN — HYDROCHLOROTHIAZIDE 25 MG: 25 TABLET ORAL at 08:39

## 2024-04-06 RX ADMIN — PREDNISOLONE ACETATE 1 DROP: 10 SUSPENSION/ DROPS OPHTHALMIC at 08:39

## 2024-04-06 RX ADMIN — WARFARIN SODIUM 7.5 MG: 7.5 TABLET ORAL at 18:38

## 2024-04-06 RX ADMIN — PANTOPRAZOLE SODIUM 40 MG: 40 TABLET, DELAYED RELEASE ORAL at 16:42

## 2024-04-06 RX ADMIN — INSULIN ASPART 1 UNITS: 100 INJECTION, SOLUTION INTRAVENOUS; SUBCUTANEOUS at 12:57

## 2024-04-06 RX ADMIN — ATORVASTATIN CALCIUM 40 MG: 40 TABLET, FILM COATED ORAL at 08:39

## 2024-04-06 RX ADMIN — LEVOTHYROXINE SODIUM 88 MCG: 0.09 TABLET ORAL at 05:15

## 2024-04-06 RX ADMIN — CEFAZOLIN SODIUM 2 G: 2 INJECTION, SOLUTION INTRAVENOUS at 21:12

## 2024-04-06 RX ADMIN — ACETAMINOPHEN 650 MG: 325 TABLET, FILM COATED ORAL at 13:00

## 2024-04-06 RX ADMIN — PANTOPRAZOLE SODIUM 40 MG: 40 TABLET, DELAYED RELEASE ORAL at 05:15

## 2024-04-06 RX ADMIN — GABAPENTIN 600 MG: 300 CAPSULE ORAL at 08:39

## 2024-04-06 RX ADMIN — CEFAZOLIN SODIUM 2 G: 2 INJECTION, SOLUTION INTRAVENOUS at 12:58

## 2024-04-06 RX ADMIN — TIMOLOL MALEATE 1 DROP: 5 SOLUTION/ DROPS OPHTHALMIC at 08:39

## 2024-04-06 ASSESSMENT — ACTIVITIES OF DAILY LIVING (ADL)
ADLS_ACUITY_SCORE: 36
ADLS_ACUITY_SCORE: 37
ADLS_ACUITY_SCORE: 36
ADLS_ACUITY_SCORE: 36
ADLS_ACUITY_SCORE: 37
ADLS_ACUITY_SCORE: 36
ADLS_ACUITY_SCORE: 37
ADLS_ACUITY_SCORE: 36
ADLS_ACUITY_SCORE: 37
ADLS_ACUITY_SCORE: 36

## 2024-04-06 NOTE — PLAN OF CARE
Goal Outcome Evaluation:      Plan of Care Reviewed With: patient    Overall Patient Progress: improving  Orientation: Alert and oriented x4. Able to make needs known to staff.   Bowel & Bladder: Continent of both bowel and bladder. Voids spontaneously without difficulty.  Medications: Takes medication whole with thin liquids.  Pain: None  Ambulation/Transfers: with one assist w/ walker.  Diet: Regular diet with good  appetite.  Tubes/Lines/Drains: PICC to RUE is intact, dressing dry, and saline locked.  Oxygen: Room air  Skin: Wound vac to posterior neck is intact and suctioning at 50mmHg. NO alarms this shift.   Infection/Isolation: No active isolations.  Safety: No concerns noted this shift. Door opened per patient's request.  Other: Denied chest pain and SOB. Call-light within reach. Utilizes call-light appropriately. Continue with POC.    Vitals: T: 97.7,  B/P: 146/60,  P: 72,  R: 16,  SpO2: 97 %     Recent Blood Sugar   Lab 04/06/24  1241 04/06/24  0837   * 76     Patient does not have new respiratory symptoms.  Patient does not have new sore throat.  Patient does not have a fever greater than 99.5

## 2024-04-06 NOTE — PROGRESS NOTES
"[facility]itional [facility]    Medicine Progress Note - Hospitalist Service    Date of Admission:  3/20/2024    Assessment & Plan     Labonna \"Sheron\" Gloria is a 78 year old female with PMHx of HTN, HLD, hypothyroidism, T2DM, peripheral neuropathy, CKD3, atrial fibrillation on anticoagulation, chronic neck & back pain, and GERD who is s/p C3 to T2 laminectomy and spinal cord decompression, C3 to T2 posterior fusion, C3 to T2 segmental instrumentation for progressive myelopathy due to stenosis and kyphosis from C3 to T3 on 10/13/2013. She was admitted to [facility] on 3/13/2024 with neck pain, weakness, and inability to ambulate. CT neck showed a deep space infection w/ gas formation and ?cord compression. Was given Kcentra and urgently taken to OR for I&D. Operative course was c/b hypotension requiring central line and pressor support in MICU. She improved and was discharged from general medicine floor to TCU on 3/20/24 for IV ABX (for MSSA bacteremia), wound cares (wound vac), and continued therapies.     Today (4/6)  - Drug interaction of rifampicin with Apixaban prompted its discontinuation 4/5 and initiation of warfarin therapy   - slept well -no complaints of pain or SOB  - no acute events over night- per RN report pt refused BG check over night.   Continent to bladder.   - diarrhea resolved , reports having constipation alternating with diarrhea due to hx of IBS              # MSSA bacteremia  # Deep space infection in posterior cervical spine, 2/2 above  Found on operative & blood cultures. Has PICC line with IV cefazolin (started on 3/20 at TCU), anticipated EOT 4/24/24. ID following - had outpatient appt on 4/2/24 with Dr. Alexis - will start rifampin and anticipate transition to PO doxycycline on 4/25 after completion of IV cefazolin. Remains vitally stable, minimal pain.  - Con't IV cefazolin (EOT 4/24/24)              > On 4/25, anticipate transition to PO doxycycline 100mg BID  - Start PO rifampin " 300mg BID (indefinite) , I do hav ecall out to Dr Reuben mo drug interaction  - Trend weekly CBC w/ diff, CMP, CRP & ESR     # s/p cervical spine I&D (3/13/24)  # History of numerous C3 to T2 surgeries (10/2013)  Urgent I&D performed for deep space infection in posterior cervical spine, as visualized on CT neck. Wound vac in place.  - WOCN for supportive cares  - Wound vac, change q M/W/F (1mg Dilaudid prior to dressing changes)  - Pain control:              > Flexeril 5mg TID PRN              > Percocet 5-325mg Q4H PRN              > Gabapentin 600mg BID              > Tylenol 650mg every day PRN  - Outpatient spine appt in 2 weeks; call [phone] to arrange  -Spine ortho appt on 4/16  - Weekly CBC, BMP, CRP, ESR (ordered)    # Atrial fibrillation  Rate controlled. TTE in 01/2023 showed normal EF.  - Con't apixaban 5mg BID but now on  rifampin so contraindicated, will start on warfarin, needs close monitoring of INR, if rifampin is stop on future needs very close monitoring as can cause INR to shoot up    -warfarin dosing per pharmacist , discussed with  patient , she had been on warfarin and sq lovenox in past and did not like either, we decided on warfarin, she understands reasoning. I did discussed with  her that if she comes of rifampin her INR will need to be monitored very closely ( daily)  - Con't metoprolol succinate 100mg BID  - last echo 3/12/24 EF 55%      # valvular heart disease  - echo showed moderate -severe TR   - needs to follow up   with cardiology  , discussed with  patient      # CKD   Cr 1.2, baseline ~ 1.1 - 1.5.  -monitor .     # Acute toxic metabolic encephalopathy 2/2 sepsis, resolved  Secondary to sepsis on initial presentation from MSSA bacteremia. Now resolved.        # T2DM  PTA regimen includes glargine 22U. Blood glucoses are well-controlled at this time.  - Glargine 16U every day adjust   - MDSS, carb coverage (1U per 15)  - BG checks, hypoglycemia protocols     # HTN  - Con't  "losartan 75mg every day  - Con't metoprolol succinate 100mg BID     # history PAD  -history Left external iliac, left common femoral, superficial femoral, profunda femoris endarterectomy with Dacron patch closure by Dr. Wilson 2/8/21.   -also history moderate carotid disease  - was on aspirin so restarting     # GERD:   PTA pantoprazole 40mg BID    # Hypothyroidism:   PTA levothyroxine 88mcg every day  - TSH normal        # HLD: PTA atorvastatin 40mg every day    # Physical deconditioning 2/2 acute illness: con't PT/OT           Diet: Regular Diet Adult  Snacks/Supplements Adult: Glucerna; Between Meals    DVT Prophylaxis: Pneumatic Compression Devices  Winston Catheter: Not present  Lines: PRESENT      PICC 03/17/24 Single Lumen Right Brachial vein lateral antibiotics-Site Assessment: WDL        Diet: Regular Diet Adult  Snacks/Supplements Adult: Glucerna; Between Meals    DVT Prophylaxis: Warfarin  Winston Catheter: Not present  Lines: PRESENT      PICC 03/17/24 Single Lumen Right Brachial vein lateral antibiotics-Site Assessment: WDL      Cardiac Monitoring: None  Code Status: Full Code      Clinically Significant Risk Factors              # Hypoalbuminemia: Lowest albumin = 2.9 g/dL at 4/1/2024  7:51 AM, will monitor as appropriate            # Obesity: Estimated body mass index is 34.7 kg/m  as calculated from the following:    Height as of 3/12/24: 1.575 m (5' 2\").    Weight as of this encounter: 86 kg (189 lb 11.2 oz).   # Severe Malnutrition: based on nutrition assessment           Disposition Plan     Expected Discharge Date: 04/27/2024                    Dale Ulloa MD  Hospitalist Service  [facility] [place]  Securely message with [facility] (more info)  Text page via [facility] Paging/Directory   ______________________________________________________________________    Interval History   She is SBA with walker to the BR  chest pain  no shortness of breath , her diarrhea resolved   No chest " pain  no shortness of breath  no abdominal pain       Current Facility-Administered Medications:     - Skin Test Reading -, , Does not apply, Q21 Days, Mandy Simms MD, Read at 03/23/24 0928    acetaminophen (TYLENOL) tablet 650 mg, 650 mg, Oral, TID, Mandy Simms MD, 650 mg at 04/06/24 0838    acetaminophen (TYLENOL) tablet 650 mg, 650 mg, Oral, Daily PRN, Mandy Simms MD, 650 mg at 04/05/24 2025    aspirin (ASA) chewable tablet 81 mg, 81 mg, Oral, Daily, Ela House MD, 81 mg at 04/06/24 0839    atorvastatin (LIPITOR) tablet 40 mg, 40 mg, Oral, Daily, Mandy Simms MD, 40 mg at 04/06/24 0839    ceFAZolin (ANCEF) 2 g in 100 mL D5W intermittent infusion, 2 g, Intravenous, Q8H, Giovana Gr, , Last Rate: 200 mL/hr at 04/06/24 0513, 2 g at 04/06/24 0513    cyclobenzaprine (FLEXERIL) tablet 5 mg, 5 mg, Oral, TID PRN, Mandy Simms MD, 5 mg at 04/05/24 1752    glucose gel 15-30 g, 15-30 g, Oral, Q15 Min PRN **OR** dextrose 50 % injection 25-50 mL, 25-50 mL, Intravenous, Q15 Min PRN **OR** glucagon injection 1 mg, 1 mg, Subcutaneous, Q15 Min PRN, Mandy Simms MD    [START ON 4/25/2024] doxycycline hyclate (VIBRAMYCIN) capsule 100 mg, 100 mg, Oral, Q12H [facility] (08/20), Giovana Gr DO    gabapentin (NEURONTIN) capsule 600 mg, 600 mg, Oral, BID, Mandy Simms MD, 600 mg at 04/06/24 0839    hydrochlorothiazide (HYDRODIURIL) tablet 25 mg, 25 mg, Oral, Daily, Ela House MD, 25 mg at 04/06/24 0839    HYDROcodone-acetaminophen (NORCO) 5-325 MG per tablet 1 tablet, 1 tablet, Oral, Q4H PRN, Mandy Simms MD, 1 tablet at 03/31/24 0502    HYDROmorphone (DILAUDID) half-tab 1 mg, 1 mg, Oral, Daily PRN, Mandy Simms MD, 1 mg at 04/05/24 2025    insulin aspart (NovoLOG) injection (RAPID ACTING), 1-7 Units, Subcutaneous, TID AC, Mandy Simms MD, 3 Units at 04/05/24 1254    insulin aspart (NovoLOG) injection (RAPID ACTING), 1-5 Units, Subcutaneous, At Bedtime, Mandy Simms MD    insulin aspart (NovoLOG)  injection (RAPID ACTING), , Subcutaneous, Daily with breakfast, Mandy Simms MD, 2 Units at 04/05/24 1021    insulin aspart (NovoLOG) injection (RAPID ACTING), , Subcutaneous, Daily with lunch, Mandy Simms MD, 3 Units at 04/01/24 1313    insulin aspart (NovoLOG) injection (RAPID ACTING), , Subcutaneous, Daily with supper, Mandy Simms MD, 2 Units at 04/05/24 2031    insulin glargine (LANTUS PEN) injection 16 Units, 16 Units, Subcutaneous, QAM AC, Mandy Simms MD, 16 Units at 04/05/24 0900    ketoconazole (NIZORAL) 2 % shampoo, , Topical, Daily PRN, Mandy Simms MD    lactobacillus rhamnosus (GG) (CULTURELL) capsule 1 capsule, 1 capsule, Oral, BID, Mandy Simms MD, 1 capsule at 04/06/24 0839    latanoprost (XALATAN) 0.005 % ophthalmic solution 1 drop, 1 drop, Both Eyes, At Bedtime, Mandy Simms MD, 1 drop at 04/05/24 2158    levothyroxine (SYNTHROID/LEVOTHROID) tablet 88 mcg, 88 mcg, Oral, SHAYM CLYDE, Mandy Simms MD, 88 mcg at 04/06/24 0515    losartan (COZAAR) tablet 75 mg, 75 mg, Oral, Daily, Mandy Simms MD, 75 mg at 04/06/24 0839    melatonin tablet 3 mg, 3 mg, Oral, At Bedtime PRN, Mandy Simms MD, 3 mg at 04/05/24 0311    metoprolol succinate ER (TOPROL XL) 24 hr tablet 100 mg, 100 mg, Oral, BID, Mandy Simms MD, 100 mg at 04/06/24 0838    multivitamin, therapeutic (THERA-VIT) tablet 1 tablet, 1 tablet, Oral, Daily, Mandy Simms MD, 1 tablet at 04/05/24 1150    naloxone (NARCAN) injection 0.2 mg, 0.2 mg, Intravenous, Q2 Min PRN **OR** naloxone (NARCAN) injection 0.4 mg, 0.4 mg, Intravenous, Q2 Min PRN **OR** naloxone (NARCAN) injection 0.2 mg, 0.2 mg, Intramuscular, Q2 Min PRN **OR** naloxone (NARCAN) injection 0.4 mg, 0.4 mg, Intramuscular, Q2 Min PRN, Mandy Simms MD    Nurse may request from Pharmacy a change of form of medication (e.g. Liquid to tablet)., , Does not apply, Continuous PRN, Mandy Simms MD    pantoprazole (PROTONIX) EC tablet 40 mg, 40 mg, Oral, BID AC, Mandy Simms MD, 40 mg at  04/06/24 0515    Patient is already receiving anticoagulation with heparin, enoxaparin (LOVENOX), warfarin (COUMADIN)  or other anticoagulant medication, , Does not apply, Continuous PRN, Mandy Simms MD    prednisoLONE acetate (PRED FORTE) 1 % ophthalmic susp 1 drop, 1 drop, Left Eye, Daily, Mandy Simms MD, 1 drop at 04/06/24 0839    rifampin (RIFADIN) capsule 300 mg, 300 mg, Oral, Q12H JOSEPHINE (08/20), Giovana Gr S, DO, 300 mg at 04/06/24 0839    sodium chloride (PF) 0.9% PF flush 10-20 mL, 10-20 mL, Intracatheter, q1 min prn, Mandy Simms MD, 10 mL at 04/05/24 1151    sodium chloride (PF) 0.9% PF flush 10-40 mL, 10-40 mL, Intracatheter, Q8H, Mandy Simms MD, 20 mL at 04/06/24 0513    timolol maleate (TIMOPTIC) 0.5 % ophthalmic solution 1 drop, 1 drop, Both Eyes, BID, Mandy Simms MD, 1 drop at 04/06/24 0839    tuberculin injection 5 Units, 5 Units, Intradermal, Q21 Days, Mandy Simms MD, 5 Units at 03/21/24 1125    Warfarin Dose Required Daily - Pharmacist Managed, 1 each, Oral, See Admin Instructions, Ela House MD      Physical Exam   Vital Signs: Temp: 97.7  F (36.5  C) Temp src: Oral BP: (!) 146/60 Pulse: 72   Resp: 16 SpO2: 97 % O2 Device: None (Room air)    Weight: 189 lbs 11.2 oz  General appearence: awake alert  and oriented x4    no apparent distress  NECK : wound vac   RESPIRATORY: lungs clear   CARDIOVASCULAR:S1 S2 irreg    GASTROINTESTINAL:soft, non-distended , non-tender , + bowel sounds, no masses felt   SKIN: warm and dry, no mottling noted   NEUROLOGIC; awake alert and oriented,   EXTREMITIES: no clubbing, cyanosis. + edema  , moves all extremity, good pedal pulses   MUSCULOSKELETAL: without deformity       Data     I have personally reviewed the following data over the past 24 hrs:    TSH: N/A T4: N/A A1C: N/A     Procal: N/A CRP: N/A Lactic Acid: N/A       INR:  1.34 (H) PTT:  N/A   D-dimer:  N/A Fibrinogen:  N/A       Imaging results reviewed over the past 24 hrs:   No results  found for this or any previous visit (from the past 24 hour(s)).  Recent Labs   Lab 04/06/24  0739 04/05/24  2104 04/05/24  1752 04/05/24  1251 04/05/24  0959 04/05/24  0853 04/05/24  0738 04/01/24  1219 04/01/24  0751   WBC  --   --   --   --   --   --  3.2*  --  3.0*   HGB  --   --   --   --   --   --  8.1*  --  8.1*   MCV  --   --   --   --   --   --  100  --  100   PLT  --   --   --   --   --   --  198  --  206   INR 1.34*  --   --   --  1.50*  --   --   --   --    NA  --   --   --   --   --   --   --   --  139   POTASSIUM  --   --   --   --   --   --   --   --  4.1   CHLORIDE  --   --   --   --   --   --   --   --  105   CO2  --   --   --   --   --   --   --   --  26   BUN  --   --   --   --   --   --   --   --  14.1   CR  --   --   --   --   --   --   --   --  0.76   ANIONGAP  --   --   --   --   --   --   --   --  8   INDY  --   --   --   --   --   --   --   --  8.4*   GLC  --  136* 86 204*  --    < >  --    < > 136*   ALBUMIN  --   --   --   --   --   --   --   --  2.9*   PROTTOTAL  --   --   --   --   --   --   --   --  6.1*   BILITOTAL  --   --   --   --   --   --   --   --  0.3   ALKPHOS  --   --   --   --   --   --   --   --  92   ALT  --   --   --   --   --   --   --   --  <5   AST  --   --   --   --   --   --   --   --  16    < > = values in this interval not displayed.

## 2024-04-06 NOTE — PLAN OF CARE
Goal Outcome Evaluation:  No acute issues overnight. Appears to be sleeping well. SBA/walker to BR, continent bladder. Posterior neck wound w/vac intact/patent @ 50mmHg continuous suction - no alarms. Continues IV abx via RUE picc. Has no c/o's pain, discomfort, CP/SOB or any other c/o's during shift. Refused 0200 BG check tonight.         Patient's most recent vital signs are:     Vital signs:  BP: 122/52  Temp: 97.2  HR: 77  RR: 16  SpO2: 97 %     Patient does not have new respiratory symptoms.  Patient does not have new sore throat.  Patient does not have a fever greater than 99.5.

## 2024-04-07 LAB
C DIFF TOX B STL QL: NEGATIVE
GLUCOSE BLDC GLUCOMTR-MCNC: 111 MG/DL (ref 70–99)
GLUCOSE BLDC GLUCOMTR-MCNC: 121 MG/DL (ref 70–99)
GLUCOSE BLDC GLUCOMTR-MCNC: 138 MG/DL (ref 70–99)
GLUCOSE BLDC GLUCOMTR-MCNC: 89 MG/DL (ref 70–99)
INR PPP: 3.46 (ref 0.85–1.15)
INR PPP: 4.65 (ref 0.85–1.15)

## 2024-04-07 PROCEDURE — 85610 PROTHROMBIN TIME: CPT | Performed by: INTERNAL MEDICINE

## 2024-04-07 PROCEDURE — 250N000011 HC RX IP 250 OP 636

## 2024-04-07 PROCEDURE — 120N000009 HC R&B SNF

## 2024-04-07 PROCEDURE — 87493 C DIFF AMPLIFIED PROBE: CPT | Performed by: STUDENT IN AN ORGANIZED HEALTH CARE EDUCATION/TRAINING PROGRAM

## 2024-04-07 PROCEDURE — 36592 COLLECT BLOOD FROM PICC: CPT | Performed by: INTERNAL MEDICINE

## 2024-04-07 PROCEDURE — 250N000013 HC RX MED GY IP 250 OP 250 PS 637: Performed by: INTERNAL MEDICINE

## 2024-04-07 PROCEDURE — 250N000013 HC RX MED GY IP 250 OP 250 PS 637

## 2024-04-07 PROCEDURE — 250N000012 HC RX MED GY IP 250 OP 636 PS 637: Performed by: INTERNAL MEDICINE

## 2024-04-07 RX ADMIN — ACETAMINOPHEN 650 MG: 325 TABLET, FILM COATED ORAL at 14:43

## 2024-04-07 RX ADMIN — ACETAMINOPHEN 650 MG: 325 TABLET, FILM COATED ORAL at 20:35

## 2024-04-07 RX ADMIN — TIMOLOL MALEATE 1 DROP: 5 SOLUTION/ DROPS OPHTHALMIC at 08:03

## 2024-04-07 RX ADMIN — METOPROLOL SUCCINATE 100 MG: 50 TABLET, EXTENDED RELEASE ORAL at 20:35

## 2024-04-07 RX ADMIN — CEFAZOLIN SODIUM 2 G: 2 INJECTION, SOLUTION INTRAVENOUS at 12:27

## 2024-04-07 RX ADMIN — Medication 1 CAPSULE: at 20:35

## 2024-04-07 RX ADMIN — Medication 1 CAPSULE: at 07:51

## 2024-04-07 RX ADMIN — LOSARTAN POTASSIUM 75 MG: 50 TABLET, FILM COATED ORAL at 08:01

## 2024-04-07 RX ADMIN — TIMOLOL MALEATE 1 DROP: 5 SOLUTION/ DROPS OPHTHALMIC at 20:37

## 2024-04-07 RX ADMIN — HYDROCHLOROTHIAZIDE 25 MG: 25 TABLET ORAL at 07:51

## 2024-04-07 RX ADMIN — RIFAMPIN 300 MG: 300 CAPSULE ORAL at 20:35

## 2024-04-07 RX ADMIN — PANTOPRAZOLE SODIUM 40 MG: 40 TABLET, DELAYED RELEASE ORAL at 16:31

## 2024-04-07 RX ADMIN — GABAPENTIN 600 MG: 300 CAPSULE ORAL at 20:35

## 2024-04-07 RX ADMIN — ACETAMINOPHEN 650 MG: 325 TABLET, FILM COATED ORAL at 07:50

## 2024-04-07 RX ADMIN — ATORVASTATIN CALCIUM 40 MG: 40 TABLET, FILM COATED ORAL at 07:51

## 2024-04-07 RX ADMIN — RIFAMPIN 300 MG: 300 CAPSULE ORAL at 07:57

## 2024-04-07 RX ADMIN — CEFAZOLIN SODIUM 2 G: 2 INJECTION, SOLUTION INTRAVENOUS at 05:38

## 2024-04-07 RX ADMIN — LEVOTHYROXINE SODIUM 88 MCG: 0.09 TABLET ORAL at 05:38

## 2024-04-07 RX ADMIN — ASPIRIN 81 MG CHEWABLE TABLET 81 MG: 81 TABLET CHEWABLE at 07:50

## 2024-04-07 RX ADMIN — THERA TABS 1 TABLET: TAB at 12:27

## 2024-04-07 RX ADMIN — GABAPENTIN 600 MG: 300 CAPSULE ORAL at 07:52

## 2024-04-07 RX ADMIN — PANTOPRAZOLE SODIUM 40 MG: 40 TABLET, DELAYED RELEASE ORAL at 05:38

## 2024-04-07 RX ADMIN — PREDNISOLONE ACETATE 1 DROP: 10 SUSPENSION/ DROPS OPHTHALMIC at 07:53

## 2024-04-07 RX ADMIN — CEFAZOLIN SODIUM 2 G: 2 INJECTION, SOLUTION INTRAVENOUS at 20:35

## 2024-04-07 RX ADMIN — METOPROLOL SUCCINATE 100 MG: 50 TABLET, EXTENDED RELEASE ORAL at 08:02

## 2024-04-07 RX ADMIN — INSULIN GLARGINE 16 UNITS: 100 INJECTION, SOLUTION SUBCUTANEOUS at 10:32

## 2024-04-07 RX ADMIN — LATANOPROST 1 DROP: 50 SOLUTION OPHTHALMIC at 20:38

## 2024-04-07 ASSESSMENT — ACTIVITIES OF DAILY LIVING (ADL)
ADLS_ACUITY_SCORE: 36

## 2024-04-07 NOTE — PLAN OF CARE
Goal Outcome Evaluation:      Plan of Care Reviewed With: patient    Overall Patient Progress: improvingOverall Patient Progress: improving    FOCUS/GOAL     Bowel management, Bladder management, Medication management, Wound care management, Medical management, Mobility, Skin integrity, and Safety management     ASSESSMENT, INTERVENTIONS AND CONTINUING PLAN FOR GOAL:     Pt is A&OX4, calm, & cooperative with care. Denied CP, SOB, & n/v. Pt transfers SBA with walker & GB. Continent for both B&B; uses the bathroom. Negative for C-diff per lab result this shift. Takes meds whole with thin liquid. R brachial SL PICC patent, flushed with NS, & IV abx infused without difficulty. Wound vac to the spine (neck) intact running at 50 mmHg. Pt slept well for most of the shift & no other acute issue. Able to make needs known & call light within reach. Continue with plan of care.

## 2024-04-07 NOTE — PLAN OF CARE
Goal Outcome Evaluation:      Plan of Care Reviewed With: patient    Overall Patient Progress: improvingOverall Patient Progress: improving    FOCUS/GOAL     Bowel management, Bladder management, Medication management, Wound care management, Medical management, Mobility, Skin integrity, and Safety management     ASSESSMENT, INTERVENTIONS AND CONTINUING PLAN FOR GOAL:     Pt is A&OX4, calm, & cooperative with care. Denied CP, SOB, & n/v. VSS & on RA. Pt transfers SBA with walker & GB. Continent for both B&B; uses the bathroom. Takes meds whole with thin liquid. R brachial SL PICC patent, flushed with NS, & IV abx infused without difficulty. Wound vac to the spine (neck) intact running at 50 mmHg. Pt c/o baseline neuropathy to the BLE (feet). Pt ate dinner with good appetite & no other acute issue. Able to make needs known & call light within reach. Continue with plan of care.    Patient's most recent vital signs are:     Vital signs:  BP: 142/64  Temp: 98.2  HR: 76  RR: 16  SpO2: 96 %     Patient does not have new respiratory symptoms.  Patient does not have new sore throat.  Patient does not have a fever greater than 99.5.

## 2024-04-07 NOTE — PROGRESS NOTES
VS: /79 (Patient Position: Semi-Mckeon's, Cuff Size: Adult Regular)   Pulse 79   Temp 97.4  F (36.3  C) (Oral)   Resp 16   Wt 86 kg (189 lb 11.2 oz)   SpO2 98%   BMI 34.70 kg/m     O2: 98% on RA   Output: Continent   Last BM: 4/7   Activity: SBA with walker and GB   Skin: Generalized bruising, neck/back surgical incision   Pain: None reported   CMS: A&O x4   Dressing: Neck vac site   Diet: Regular   LDA: Wound vac @50, R brachial SL PICC   Equipment: GB and walker   Plan: Continue POC   Additional Info:

## 2024-04-08 LAB
ALBUMIN SERPL BCG-MCNC: 2.9 G/DL (ref 3.5–5.2)
ALP SERPL-CCNC: 112 U/L (ref 40–150)
ALT SERPL W P-5'-P-CCNC: <5 U/L (ref 0–50)
ANION GAP SERPL CALCULATED.3IONS-SCNC: 9 MMOL/L (ref 7–15)
AST SERPL W P-5'-P-CCNC: 16 U/L (ref 0–45)
BASOPHILS # BLD AUTO: 0 10E3/UL (ref 0–0.2)
BASOPHILS NFR BLD AUTO: 1 %
BILIRUB SERPL-MCNC: 0.5 MG/DL
BUN SERPL-MCNC: 8.6 MG/DL (ref 8–23)
CALCIUM SERPL-MCNC: 8.3 MG/DL (ref 8.8–10.2)
CHLORIDE SERPL-SCNC: 100 MMOL/L (ref 98–107)
CREAT SERPL-MCNC: 0.69 MG/DL (ref 0.51–0.95)
CRP SERPL-MCNC: 18.41 MG/L
DEPRECATED HCO3 PLAS-SCNC: 31 MMOL/L (ref 22–29)
EGFRCR SERPLBLD CKD-EPI 2021: 88 ML/MIN/1.73M2
EOSINOPHIL # BLD AUTO: 0.2 10E3/UL (ref 0–0.7)
EOSINOPHIL NFR BLD AUTO: 6 %
ERYTHROCYTE [DISTWIDTH] IN BLOOD BY AUTOMATED COUNT: 16.5 % (ref 10–15)
ERYTHROCYTE [SEDIMENTATION RATE] IN BLOOD BY WESTERGREN METHOD: 53 MM/HR (ref 0–30)
GLUCOSE BLDC GLUCOMTR-MCNC: 113 MG/DL (ref 70–99)
GLUCOSE BLDC GLUCOMTR-MCNC: 120 MG/DL (ref 70–99)
GLUCOSE BLDC GLUCOMTR-MCNC: 124 MG/DL (ref 70–99)
GLUCOSE BLDC GLUCOMTR-MCNC: 148 MG/DL (ref 70–99)
GLUCOSE BLDC GLUCOMTR-MCNC: 181 MG/DL (ref 70–99)
GLUCOSE SERPL-MCNC: 132 MG/DL (ref 70–99)
HCT VFR BLD AUTO: 29.5 % (ref 35–47)
HGB BLD-MCNC: 8.8 G/DL (ref 11.7–15.7)
IMM GRANULOCYTES # BLD: 0 10E3/UL
IMM GRANULOCYTES NFR BLD: 1 %
INR PPP: 3.67 (ref 0.85–1.15)
LYMPHOCYTES # BLD AUTO: 0.9 10E3/UL (ref 0.8–5.3)
LYMPHOCYTES NFR BLD AUTO: 26 %
MCH RBC QN AUTO: 29.5 PG (ref 26.5–33)
MCHC RBC AUTO-ENTMCNC: 29.8 G/DL (ref 31.5–36.5)
MCV RBC AUTO: 99 FL (ref 78–100)
MONOCYTES # BLD AUTO: 0.3 10E3/UL (ref 0–1.3)
MONOCYTES NFR BLD AUTO: 9 %
NEUTROPHILS # BLD AUTO: 1.9 10E3/UL (ref 1.6–8.3)
NEUTROPHILS NFR BLD AUTO: 57 %
NRBC # BLD AUTO: 0 10E3/UL
NRBC BLD AUTO-RTO: 0 /100
PLATELET # BLD AUTO: 252 10E3/UL (ref 150–450)
POTASSIUM SERPL-SCNC: 3.5 MMOL/L (ref 3.4–5.3)
PROT SERPL-MCNC: 5.9 G/DL (ref 6.4–8.3)
RBC # BLD AUTO: 2.98 10E6/UL (ref 3.8–5.2)
SODIUM SERPL-SCNC: 140 MMOL/L (ref 135–145)
WBC # BLD AUTO: 3.4 10E3/UL (ref 4–11)

## 2024-04-08 PROCEDURE — 36592 COLLECT BLOOD FROM PICC: CPT | Performed by: INTERNAL MEDICINE

## 2024-04-08 PROCEDURE — 250N000011 HC RX IP 250 OP 636

## 2024-04-08 PROCEDURE — 82565 ASSAY OF CREATININE: CPT | Performed by: INTERNAL MEDICINE

## 2024-04-08 PROCEDURE — 97605 NEG PRS WND THER DME<=50SQCM: CPT

## 2024-04-08 PROCEDURE — 250N000009 HC RX 250: Performed by: INTERNAL MEDICINE

## 2024-04-08 PROCEDURE — 85652 RBC SED RATE AUTOMATED: CPT

## 2024-04-08 PROCEDURE — 250N000012 HC RX MED GY IP 250 OP 636 PS 637: Performed by: INTERNAL MEDICINE

## 2024-04-08 PROCEDURE — F08D5YZ WOUND MANAGEMENT TREATMENT OF INTEGUMENTARY SYSTEM - HEAD AND NECK USING OTHER EQUIPMENT: ICD-10-PCS | Performed by: INTERNAL MEDICINE

## 2024-04-08 PROCEDURE — 85610 PROTHROMBIN TIME: CPT | Performed by: INTERNAL MEDICINE

## 2024-04-08 PROCEDURE — 120N000009 HC R&B SNF

## 2024-04-08 PROCEDURE — 250N000013 HC RX MED GY IP 250 OP 250 PS 637

## 2024-04-08 PROCEDURE — 86140 C-REACTIVE PROTEIN: CPT | Performed by: INTERNAL MEDICINE

## 2024-04-08 PROCEDURE — 250N000013 HC RX MED GY IP 250 OP 250 PS 637: Performed by: INTERNAL MEDICINE

## 2024-04-08 PROCEDURE — 85025 COMPLETE CBC W/AUTO DIFF WBC: CPT

## 2024-04-08 RX ADMIN — ACETAMINOPHEN 650 MG: 325 TABLET, FILM COATED ORAL at 13:02

## 2024-04-08 RX ADMIN — PANTOPRAZOLE SODIUM 40 MG: 40 TABLET, DELAYED RELEASE ORAL at 05:48

## 2024-04-08 RX ADMIN — CEFAZOLIN SODIUM 2 G: 2 INJECTION, SOLUTION INTRAVENOUS at 20:55

## 2024-04-08 RX ADMIN — TIMOLOL MALEATE 1 DROP: 5 SOLUTION/ DROPS OPHTHALMIC at 20:51

## 2024-04-08 RX ADMIN — Medication 1 CAPSULE: at 08:36

## 2024-04-08 RX ADMIN — GABAPENTIN 600 MG: 300 CAPSULE ORAL at 08:36

## 2024-04-08 RX ADMIN — INSULIN ASPART 1 UNITS: 100 INJECTION, SOLUTION INTRAVENOUS; SUBCUTANEOUS at 17:37

## 2024-04-08 RX ADMIN — HYDROCHLOROTHIAZIDE 25 MG: 25 TABLET ORAL at 08:36

## 2024-04-08 RX ADMIN — Medication 1 CAPSULE: at 20:51

## 2024-04-08 RX ADMIN — ACETAMINOPHEN 650 MG: 325 TABLET, FILM COATED ORAL at 20:51

## 2024-04-08 RX ADMIN — METOPROLOL SUCCINATE 100 MG: 50 TABLET, EXTENDED RELEASE ORAL at 20:51

## 2024-04-08 RX ADMIN — TIMOLOL MALEATE 1 DROP: 5 SOLUTION/ DROPS OPHTHALMIC at 08:37

## 2024-04-08 RX ADMIN — THERA TABS 1 TABLET: TAB at 13:02

## 2024-04-08 RX ADMIN — RIFAMPIN 300 MG: 300 CAPSULE ORAL at 20:51

## 2024-04-08 RX ADMIN — CEFAZOLIN SODIUM 2 G: 2 INJECTION, SOLUTION INTRAVENOUS at 05:48

## 2024-04-08 RX ADMIN — CEFAZOLIN SODIUM 2 G: 2 INJECTION, SOLUTION INTRAVENOUS at 13:05

## 2024-04-08 RX ADMIN — LEVOTHYROXINE SODIUM 88 MCG: 0.09 TABLET ORAL at 05:48

## 2024-04-08 RX ADMIN — ACETAMINOPHEN 650 MG: 325 TABLET, FILM COATED ORAL at 08:36

## 2024-04-08 RX ADMIN — RIFAMPIN 300 MG: 300 CAPSULE ORAL at 08:37

## 2024-04-08 RX ADMIN — INSULIN GLARGINE 16 UNITS: 100 INJECTION, SOLUTION SUBCUTANEOUS at 08:39

## 2024-04-08 RX ADMIN — LATANOPROST 1 DROP: 50 SOLUTION OPHTHALMIC at 20:51

## 2024-04-08 RX ADMIN — PANTOPRAZOLE SODIUM 40 MG: 40 TABLET, DELAYED RELEASE ORAL at 17:37

## 2024-04-08 RX ADMIN — PREDNISOLONE ACETATE 1 DROP: 10 SUSPENSION/ DROPS OPHTHALMIC at 08:37

## 2024-04-08 RX ADMIN — ATORVASTATIN CALCIUM 40 MG: 40 TABLET, FILM COATED ORAL at 08:36

## 2024-04-08 RX ADMIN — METOPROLOL SUCCINATE 100 MG: 50 TABLET, EXTENDED RELEASE ORAL at 08:36

## 2024-04-08 RX ADMIN — GABAPENTIN 600 MG: 300 CAPSULE ORAL at 20:50

## 2024-04-08 RX ADMIN — LOSARTAN POTASSIUM 75 MG: 50 TABLET, FILM COATED ORAL at 08:36

## 2024-04-08 RX ADMIN — ASPIRIN 81 MG CHEWABLE TABLET 81 MG: 81 TABLET CHEWABLE at 08:36

## 2024-04-08 ASSESSMENT — ACTIVITIES OF DAILY LIVING (ADL)
ADLS_ACUITY_SCORE: 36
ADLS_ACUITY_SCORE: 37
ADLS_ACUITY_SCORE: 36

## 2024-04-08 NOTE — PROVIDER NOTIFICATION
Notified the provider that patient's INR increased from 3.46 to 4.65 on lab redraw result today at 1817. Provider advised to monitor for any bleeding and daytime team will likely address the INR. No warfarin dose given today.

## 2024-04-08 NOTE — PROGRESS NOTES
Brief Medicine Note  Reviewed patient's chart and labs- did not see patient. INR remains above goal, though decreased from prior. Pharmacy managing. No acute changes today.     Melatonin assessed still clinically indicated at lowest therapeutic dose for insomnia.     Donna Reeves PA-C  Hospitalist Service

## 2024-04-08 NOTE — PROGRESS NOTES
[facility] Nurse Inpatient Assessment     Consulted for: Neck    Summary: Patient s/p I&D on 3/13/24 of posterior neck for deep cervical infection down to the hardware with wound VAC placement.     4/1: Message sent to Dr Sagar Cadena surgeon to request increase in wound vac settings from -50 mmHg to -75 mmHg. (No response - will wait for return to clinic)    Patient History (according to provider note(s):      Lj Castro is a 78 year old female admitted on 3/12/2024. 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  She came to the ER complaining that for the past 10 days he had increasing pain in the neck and gradual weakness to the arms and legs to the point that she was unable to ambulate.  She also had right arm pain  over the past 3 to 4 days along with confusion.  Her arm pain was 9 out of 10.  There have been no recent falls or shortness of breath or nausea or vomiting.  She was brought in by EMS.  CT scan of the neck showed a deep space infection with gas formation and possible cord compression.  She was given Kcentra and taken to the operating room.  She underwent I&D with wound VAC application along with IntraOp culture and IV antibiotics.  She underwent general endotracheal anesthesia.  Operative course was significant for hypotension requiring central line and pressor support and so she was admitted to the ICU.       Assessment:      Areas visualized during today's visit: Focused:    Negative pressure wound therapy applied to: Posterior neck   Last photo: 3/20/24     3/20     3/29    4/5  Wound due to: Surgical Wound   Wound history/plan of care:     Surgical date: 3/13/24   Service following: Spinal surgery  Date Negative Pressure Wound Therapy initiated: 3/13/24   Interventions in place: repositioning  Is patient s nutritional status compromised? yes   If yes, what interventions are in place? Protein supplements  Reason for initiating vac therapy? Presence of co-morbidities, High risk of infections, and Need for accelerated granulation tissue  Which?of?the?following?co-morbidities?apply? Diabetes  If diabetic is patient on a diabetic management program? Yes   Is osteomyelitis present in wound? no   If yes what treatments are in place? N/A    Wound base: 50 % granulation tissue, 50 % pink non-granular tissue, adipose tissue and fascia.  One small area of exposed hardware, one additional area of hardware just below tissue.        Palpation of the wound bed: normal       Drainage:  large       Volume in cannister: 350 ml     Last cannister change date: 3/20/24     Description of drainage: serosanguinous      Measurements (length x width x depth, in cm) 6  x 1.8 x  2.5 cm       Tunneling N/A      Undermining up to 0.5 cm from 6-9 o'clock   Periwound skin: Intact       Color: normal and consistent with surrounding tissue       Temperature: normal    Odor: none   Pain: score 8 , sharp   Pain intervention prior to dressing change: IV Dilaudid  Treatment goal: Heal  and Increase granulation  STATUS: improving   Supplies ordered: ordered medium VAC dressing    Number of foam pieces removed from a wound (excluding foam for bridge) :  2 GranuFoam Black and 1 Oil emulsion dressing   Verified this matched the number of foam pieces applied last dressing change: Yes   Number of foam pieces packed into wound (excluding foam for bridge) :  2 GranuFoam Black and 1 Oil emulsion dressing       Treatment Plan:     Negative pressure wound therapy plan:  Wound location: Posterior neck   Change Days: Mon/Wed/Fri by WOC RN    Supplies (including all accessories) used: medium Black  "foam , oil emulsion over exposed hardware  Cleanse with Vashe prior to replacing NPWT  Suction setting: -50     Staff RN to assess integrity of dressing and ensure suction is set at appropriate level every shift.   Date canister. Chart canister output every shift. Change cannister weekly and PRN if full/occluded     Remove foam dressing and replace with BID normal saline moist gauze dressing if:   -a dressing failure which cannot be repaired within 2 hours   -patient is discharging to home without a home pump   -patient is discharging to a facility outside the local area   -if a dressing is a \"Silver Foam\", remove before Radiation Therapy or MRI     The hospital VAC pump is not to be discharged with the patient.?Ensure to disconnect patient from machine prior to discharge. Then,    - If a home KCI VAC pump has been delivered, connect home cannister to dressing tubing then connect cannister to home pump and turn on machine    - If transferring to a nearby facility with a KCI vac, can disconnect and clamp tubing then cover end with glove so can be reconnected within 2 hours        Orders: Reviewed    RECOMMEND PRIMARY TEAM ORDER: None, at this time  Education provided: plan of care  Discussed plan of care with: Patient and Nurse  WOC nurse follow-up plan: Monday/WednesdayFriday   Notify WOC if wound(s) deteriorate.  Nursing to notify the Provider(s) and re-consult the WOC Nurse if new skin concern.    DATA:     Current support surface: Standard  Low air loss (LYNNETTE pump, Isolibrium, Pulsate, skin guard, etc)  BMI: Body mass index is 34.7 kg/m .   Active diet order: Orders Placed This Encounter      Regular Diet Adult     Output: I/O last 3 completed shifts:  In: 100 [P.O.:100]  Out: 500 [Drains:500]     Labs:   Recent Labs   Lab 04/08/24  0655   ALBUMIN 2.9*   HGB 8.8*   INR 3.67*   WBC 3.4*     Pressure injury risk assessment:   Sensory Perception: 3-->slightly limited  Moisture: 4-->rarely moist  Activity: 3-->walks " occasionally  Mobility: 3-->slightly limited  Nutrition: 3-->adequate  Friction and Shear: 3-->no apparent problem  Luke Score: 19      Filomena Gamez RN BSN CWOCN  [facility]  Dept. Office Number: [phone]

## 2024-04-08 NOTE — PLAN OF CARE
Goal Outcome Evaluation:      Plan of Care Reviewed With: patient    Overall Patient Progress: improvingOverall Patient Progress: improving    FOCUS/GOAL     Bowel management, Bladder management, Medication management, Wound care management, Medical management, Mobility, Skin integrity, and Safety management     ASSESSMENT, INTERVENTIONS AND CONTINUING PLAN FOR GOAL:     Pt is A&OX4, calm, & cooperative with care. Denied CP, SOB, & n/v. VSS & on RA. Pt transfers SBA with walker & GB. Continent for both B&B; uses the bathroom. Takes meds whole with thin liquid. Changed dressing to the R brachial  PICC NS flushed & IV abx infused without difficulty. Pt's INR is 4.65 per 04/07's lab result, provider notified, & left sticky note. Wound vac to the spine (neck) intact running at 50 mmHg. Pt c/o baseline neuropathy to the BLE (feet). Pt ate dinner with good appetite & no other acute issue. Able to make needs known & call light within reach. Continue with plan of care.    Patient's most recent vital signs are:     Vital signs:  BP: 136/80  Temp: 97.5  HR: 78  RR: 16  SpO2: 96 %     Patient does not have new respiratory symptoms.  Patient does not have new sore throat.  Patient does not have a fever greater than 99.5.

## 2024-04-08 NOTE — PLAN OF CARE
Goal Outcome Evaluation:      Plan of Care Reviewed With: patient    Overall Patient Progress: improvingOverall Patient Progress: improving    FOCUS/GOAL     Bowel management, Bladder management, Medication management, Wound care management, Medical management, Mobility, Skin integrity, and Safety management     ASSESSMENT, INTERVENTIONS AND CONTINUING PLAN FOR GOAL:     Pt is A&OX4, calm, & cooperative with care. Denied CP, SOB, & n/v. Pt transfers SBA with walker & GB. Continent for both B&B; uses the bathroom. Takes meds whole with thin liquid. R brachial SL PICC patent, NS flushed, & IV abx infused without difficulty. Wound vac to the spine (neck) intact running at 50 mmHg. Pt slept well for most of the shift & no other acute issue. Able to make needs known & call light within reach. Continue with plan of care.

## 2024-04-09 LAB
GLUCOSE BLDC GLUCOMTR-MCNC: 127 MG/DL (ref 70–99)
GLUCOSE BLDC GLUCOMTR-MCNC: 130 MG/DL (ref 70–99)
GLUCOSE BLDC GLUCOMTR-MCNC: 140 MG/DL (ref 70–99)
GLUCOSE BLDC GLUCOMTR-MCNC: 140 MG/DL (ref 70–99)
GLUCOSE BLDC GLUCOMTR-MCNC: 147 MG/DL (ref 70–99)
INR PPP: 1.95 (ref 0.85–1.15)

## 2024-04-09 PROCEDURE — 250N000013 HC RX MED GY IP 250 OP 250 PS 637

## 2024-04-09 PROCEDURE — 250N000013 HC RX MED GY IP 250 OP 250 PS 637: Performed by: INTERNAL MEDICINE

## 2024-04-09 PROCEDURE — 250N000011 HC RX IP 250 OP 636

## 2024-04-09 PROCEDURE — 85610 PROTHROMBIN TIME: CPT | Performed by: INTERNAL MEDICINE

## 2024-04-09 PROCEDURE — 36415 COLL VENOUS BLD VENIPUNCTURE: CPT | Performed by: INTERNAL MEDICINE

## 2024-04-09 PROCEDURE — 99310 SBSQ NF CARE HIGH MDM 45: CPT | Performed by: INTERNAL MEDICINE

## 2024-04-09 PROCEDURE — 120N000009 HC R&B SNF

## 2024-04-09 RX ORDER — WARFARIN SODIUM 2 MG/1
4 TABLET ORAL
Status: COMPLETED | OUTPATIENT
Start: 2024-04-09 | End: 2024-04-09

## 2024-04-09 RX ADMIN — HYDROCHLOROTHIAZIDE 25 MG: 25 TABLET ORAL at 09:15

## 2024-04-09 RX ADMIN — PANTOPRAZOLE SODIUM 40 MG: 40 TABLET, DELAYED RELEASE ORAL at 17:30

## 2024-04-09 RX ADMIN — RIFAMPIN 300 MG: 300 CAPSULE ORAL at 09:15

## 2024-04-09 RX ADMIN — THERA TABS 1 TABLET: TAB at 13:13

## 2024-04-09 RX ADMIN — CEFAZOLIN SODIUM 2 G: 2 INJECTION, SOLUTION INTRAVENOUS at 21:29

## 2024-04-09 RX ADMIN — TIMOLOL MALEATE 1 DROP: 5 SOLUTION/ DROPS OPHTHALMIC at 09:15

## 2024-04-09 RX ADMIN — INSULIN GLARGINE 16 UNITS: 100 INJECTION, SOLUTION SUBCUTANEOUS at 09:16

## 2024-04-09 RX ADMIN — PREDNISOLONE ACETATE 1 DROP: 10 SUSPENSION/ DROPS OPHTHALMIC at 09:16

## 2024-04-09 RX ADMIN — ACETAMINOPHEN 650 MG: 325 TABLET, FILM COATED ORAL at 09:15

## 2024-04-09 RX ADMIN — ATORVASTATIN CALCIUM 40 MG: 40 TABLET, FILM COATED ORAL at 09:15

## 2024-04-09 RX ADMIN — METOPROLOL SUCCINATE 100 MG: 50 TABLET, EXTENDED RELEASE ORAL at 09:15

## 2024-04-09 RX ADMIN — INSULIN ASPART 1 UNITS: 100 INJECTION, SOLUTION INTRAVENOUS; SUBCUTANEOUS at 13:13

## 2024-04-09 RX ADMIN — INSULIN ASPART 1 UNITS: 100 INJECTION, SOLUTION INTRAVENOUS; SUBCUTANEOUS at 17:32

## 2024-04-09 RX ADMIN — LOSARTAN POTASSIUM 75 MG: 50 TABLET, FILM COATED ORAL at 09:15

## 2024-04-09 RX ADMIN — CEFAZOLIN SODIUM 2 G: 2 INJECTION, SOLUTION INTRAVENOUS at 05:21

## 2024-04-09 RX ADMIN — WARFARIN SODIUM 4 MG: 2 TABLET ORAL at 17:30

## 2024-04-09 RX ADMIN — Medication 1 CAPSULE: at 21:31

## 2024-04-09 RX ADMIN — TIMOLOL MALEATE 1 DROP: 5 SOLUTION/ DROPS OPHTHALMIC at 21:52

## 2024-04-09 RX ADMIN — METOPROLOL SUCCINATE 100 MG: 50 TABLET, EXTENDED RELEASE ORAL at 21:34

## 2024-04-09 RX ADMIN — LATANOPROST 1 DROP: 50 SOLUTION OPHTHALMIC at 21:52

## 2024-04-09 RX ADMIN — ACETAMINOPHEN 650 MG: 325 TABLET, FILM COATED ORAL at 13:13

## 2024-04-09 RX ADMIN — GABAPENTIN 600 MG: 300 CAPSULE ORAL at 21:31

## 2024-04-09 RX ADMIN — PANTOPRAZOLE SODIUM 40 MG: 40 TABLET, DELAYED RELEASE ORAL at 05:21

## 2024-04-09 RX ADMIN — ACETAMINOPHEN 650 MG: 325 TABLET, FILM COATED ORAL at 21:30

## 2024-04-09 RX ADMIN — LEVOTHYROXINE SODIUM 88 MCG: 0.09 TABLET ORAL at 05:21

## 2024-04-09 RX ADMIN — RIFAMPIN 300 MG: 300 CAPSULE ORAL at 21:33

## 2024-04-09 RX ADMIN — GABAPENTIN 600 MG: 300 CAPSULE ORAL at 09:15

## 2024-04-09 RX ADMIN — ASPIRIN 81 MG CHEWABLE TABLET 81 MG: 81 TABLET CHEWABLE at 09:15

## 2024-04-09 RX ADMIN — Medication 1 CAPSULE: at 09:15

## 2024-04-09 RX ADMIN — CEFAZOLIN SODIUM 2 G: 2 INJECTION, SOLUTION INTRAVENOUS at 13:16

## 2024-04-09 ASSESSMENT — ACTIVITIES OF DAILY LIVING (ADL)
ADLS_ACUITY_SCORE: 37

## 2024-04-09 NOTE — PLAN OF CARE
"Goal Outcome Evaluation:  No acute issues overnight. Appears sleeping well throughout shift and has no c/o's as of yet. Posterior neck wound w/vac intact @ 50mmHg cont.suction - no alarms. Continues IV abx via RUE picc. BG @ 0200 = 140.     0530 - Pt.calls appropriately for assist to<>from BR, continent B&B, med.loose stool, stated \"the diarrhea is back\". Will monitor. Imodium no longer in orders - may need to reorder.        Patient's most recent vital signs are:     Vital signs:  BP: 151/60  Temp: 98.3  HR: 72  RR: 18  SpO2: 98 %     Patient does not have new respiratory symptoms.  Patient does not have new sore throat.  Patient does not have a fever greater than 99.5.                               "

## 2024-04-09 NOTE — PLAN OF CARE
Orientation: Alert and oriented x4. Able to make needs known to staff.   Bowel & Bladder: Continent of both bowel and bladder. Voids spontaneously without difficulty.  Medications: Takes medication whole with thin liquids.  Pain: None  Ambulation/Transfers: with one assist w/ walker.  Diet: Regular diet with good  appetite.  Tubes/Lines/Drains: PICC to RUE is intact, dressing dry, and saline locked.  Oxygen: Room air  Skin: Wound vac to posterior neck is intact and suctioning at 50mmHg. NO alarms this shift.   Infection/Isolation: No active isolations.  Safety: No concerns noted this shift. Door opened per patient's request.  Other: Spouse and son at bedside earlier this shift. Denied chest pain and SOB. Call-light within reach. Utilizes call-light appropriately. Continue with POC.

## 2024-04-09 NOTE — PLAN OF CARE
Goal Outcome Evaluation:      Plan of Care Reviewed With: patient    Overall Patient Progress: improving  Orientation: Alert and oriented x4. Able to make needs known to staff.   Bowel & Bladder: Continent of both bowel and bladder. Voids spontaneously without difficulty.  Medications: Takes medication whole with thin liquids.  Pain: None  Ambulation/Transfers: with stand-by assist w/ walker. Staff follows with Wound vac  Diet: Regular diet with good  appetite.  Tubes/Lines/Drains: Single Lumen Right Brachial vein lateral antibiotics-Site Assessment: WDL    Oxygen: Room air  Skin: Wound vac to posterior neck is CDI. No alarms this shift.   Infection/Isolation: No active isolations.  Safety: No concerns noted this shift. Door opened per patient's request.  Other: Denied chest pain and SOB. Call-light within reach. Utilizes call-light appropriately. Continue with POC.    Vitals: T: 98.2,  B/P: 140/66,  P: 77,  R: 18,  SpO2: 98 %     Patient does not have new respiratory symptoms.  Patient does not have new sore throat.  Patient does not have a fever greater than 99.5

## 2024-04-09 NOTE — PROGRESS NOTES
CLINICAL NUTRITION SERVICES - REASSESSMENT NOTE     Nutrition Prescription    RECOMMENDATIONS FOR MDs/PROVIDERS TO ORDER:  Encourage intakes, use of supplements    Malnutrition Status:    Severe malnutrition in the context of acute on chronic illness    Recommendations already ordered by Registered Dietitian (RD):  Increasing Glucerna to BID    Future/Additional Recommendations:  Monitor labs, intakes, and weight trends.     EVALUATION OF THE PROGRESS TOWARD GOALS   Diet: Regular  Intake/Tol[place]ace: 100% of documented meals.   Snacks/supplements: Glucerna daily    Pt ordering (on average) 585 kcal and 23 g protein per day per [employer]. Glucerna adds 220 kcal and 10 g protein daily. With documented intakes, pt is likely meeting 40-50% minimum energy and protein needs.     NEW FINDINGS/REVIEW OF SYSTEMS    Nutrition/GI: RD met with pt at bedside. Pt states she has been cutting back on eating due to bloating and discomfort. Pt states this is not triggered by specific foods. Expressed concern for pt not getting enough protein/calories, discussed small frequent meals, increased meal intakes or increasing supplement intakes. Pt states she could drink another shake during the day.     Weights: Pt mostly weight stable over last 5 months, with fluctuations during admissions. With 8 lb (4.5%) weight gain in 6 months. Down 10 lb (5%) in 2 weeks since admission to TCU.   Wt Readings from Last Encounters:   04/08/24 82.6 kg (182 lb 3.2 oz)   04/04/24 86 kg (189 lb 11.2 oz)    04/02/24 87.9 kg (193 lb 12.8 oz)    03/26/24 87.1 kg (192 lb 1.6 oz)   03/19/24 92.9 kg (204 lb 12.9 oz)   03/18/24 91.7 kg (202 lb 3.2 oz)    03/17/24 91.4 kg (201 lb 9.6 oz)   03/16/24 92.3 kg (203 lb 7.8 oz)    11/06/23 81.7 kg (180 lb 1.6 oz)   11/02/23 81.7 kg (180 lb 1.6 oz)   10/30/23 81.2 kg (179 lb 1.6 oz)   10/13/23 81.6 kg (179 lb 14.3 oz)   10/11/23 79 kg (174 lb 3.2 oz)   10/02/23 83.8 kg (184 lb 11.9 oz)     Skin: surgical wound improving  per WOC nurse note 4/8     Labs reviewed   03/25/24 06:14 04/01/24 07:51 04/05/24 07:38 04/08/24 06:55   Sodium 142 139  140   Potassium 3.8 4.1  3.5   Urea Nitrogen 15.0 14.1  8.6   Creatinine 0.92 0.76  0.69   Albumin  2.9 (L)  2.9 (L)   CRP Inflammation 26.38 (H) 35.14 (H) 24.53 (H) 18.41 (H)   Glucose 158 (H) 136 (H)  132 (H)      Medications reviewed: insulin (novolog, lantus) culturell, levothyroxine, multivitamin,  protonix, imodium PRN   IV Fluids over last 7 days: No    MALNUTRITION  % Intake: </= 50% for >/= 5 days (severe)  % Weight Loss: >2% in 1 week (severe)   Subcutaneous Fat Loss: Facial/Jaw Region mild-moderate  Muscle Loss: Global moderate  Fluid Accumulation/Edema: Trace-moderate per flowsheets  Malnutrition Diagnosis: Severe malnutrition in the context of acute on chronic illness    Previous Goals   Patient to consume % of nutritionally adequate meal trays TID, or the equivalent with supplements/snacks.  Evaluation: Not met    Previous Nutrition Diagnosis  Inadequate oral intake related to low appetite as evidenced by pt report, meal ordering hx per [employer], and physical signs of muscle/fat loss.    Evaluation: No change    CURRENT NUTRITION DIAGNOSIS  Inadequate oral intake related to low appetite as evidenced by pt report, meal ordering hx per [employer], and physical signs of muscle/fat loss.      INTERVENTIONS  Implementation  Nutrition education for nutrition relationship to health/disease   Medical food supplement therapy - increasing to BID    Goals  Patient to consume % of nutritionally adequate meal trays TID, or the equivalent with supplements/snacks.    Monitoring/Evaluation  Progress toward goals will be monitored and evaluated per protocol.    Debbie Arndt MS, RDN, LD  TCU/OB/Ortho Clinical Dietitian  Available via phone and Vocera  Phone: [phone]  Vocera: TCU Clinical Dietitian  Weekend/Holiday Vocera: Weekend Holiday Clinical Dietitian [Multi Site Groups]

## 2024-04-09 NOTE — PROGRESS NOTES
[facility]itional [facility]    Medicine Progress Note - Hospitalist Service    Date of Admission:  3/20/2024    Assessment & Plan   A: Patient is a 79 y/o woman who has a past medical history significant for hypertension, hyperlipidemia, hypothyroidism, diabetes mellitus type II, peripheral neuropathy, chronic kidney disease stage III, atrial fibrillation, chronic neck pain, chronic back pain and GERD. 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     Patient presented to [facility] on 12[facility]Mar[facility]2024 with increasing pain in the neck and gradual weakness to the arms and legs. Patient was found to have severe sepsis due to MSSA from deep space infection in the posterior cervical spine resulting in MSSA bacteremia. Patient underwent irrigation and debridement and wound vac application to posterior cervical spine on 13-Mar-2024 for deep wound infection posterior cervical spine down to hardware. Patient was discharged to TCU on 20-Mar-2024.    P:  1.) MSSA deep space infection in the posterior cervical spine; MSSA bacteremia:  - Patient to remain on IV cefazoline until 24-Apr-2024. Patient to be transitioned to PO doxycycline 100 mg twice daily on 25-Apr-2024.  - Patient also on rifampin 300 mg twice daily indefinitely.   - CBC with differential, CMP, CRP and ESR being monitored weekly.  - Wound vac being changed every Monday, Wednesday and Friday.    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     3.) Atrial fibrillation:  - Patient on metoprolol succinate 100 mg twice daily.  - Echocardiogram on 13-Mar-2024 showed LVEF  55%.  - Patient had been on eliquis for anticoagulation in the past but this has been discontinued with initiation of rifampin. Patient now on coumadin for anticoagulation with goal INR 2-3.    4.) Moderate to severe tricuspid regurgitation:  - Monitoring for symptoms.  - Further monitoring as outpatient.    5.) Chronic kidney disease stage III, appears to be stage IIIa:  - Creatinine on 08-Apr-2024 was 0.69, lower that reported baseline creatinine of approximately 1.1 to 1.5.  - Monitoring labs.    6.) Acute toxic metabolic encephalopathy secondary to MSSA sepsis, resolved:  - Monitoring for recurrence.    7.) Diabetes mellitus type II with long-term use of insulin:  - Patient had been on lantus 18 units at bedtime and novolog 3 units with meals as outpatient.   - Patient currently on lantus 16 units daily and novlog 1 unit per 15 gm carbohydrates with meals.   - Patient being monitored on insulin sliding scale.    8.) Hypertension:  - Patient on losartan 75 mg daily and metoprolol succinate 100 mg twice daily.  - Monitoring.    9.) Peripheral arterial disease:  - Patient had undergone left external iliac, left common femoral, superficial femoral, profunda femoris endarterectomy with Dacron patch closure on 11-Feb-2021.  - Patient had previously been on aspirin which has been resumed.    10.) GERD:  - Patient on protonix 40 mg twice daily.    11.) Hypothyroidism:  - Patient on levothyroxine 88 mcg daily.  - On 05-Apr-2024, TSH 1.50.    12.) Hyperlipidemia:  - Patient on atorvastatin 40 mg daily.    13.) Physical deconditioning secondary to acute illness:  - PT/OT.    14.) Severe malnutrition in the context of acute on chronic illness:  - Supplementing as able.          Diet: Regular Diet Adult  Snacks/Supplements Adult: Glucerna; Between Meals    Winston Catheter: Not present  Lines: PRESENT      PICC 03/17/24 Single Lumen Right Brachial vein lateral antibiotics-Site Assessment: WDL      Cardiac Monitoring:  "None  Code Status: Full Code      Clinically Significant Risk Factors              # Hypoalbuminemia: Lowest albumin = 2.9 g/dL at 4/8/2024  6:55 AM, will monitor as appropriate            # Obesity: Estimated body mass index is 33.32 kg/m  as calculated from the following:    Height as of 3/12/24: 1.575 m (5' 2\").    Weight as of this encounter: 82.6 kg (182 lb 3.2 oz).   # Severe Malnutrition: based on nutrition assessment           Disposition Plan     Expected Discharge Date: 04/25/2024        Discharge Comments: IVs end 4/24?            Marvin Gutierrez MD  Hospitalist Service  [facility]  Securely message with [employer] (more info)  Text page via [employer] Paging/Directory   ______________________________________________________________________    Interval History   Patient noted feeling well. Patient noted no fever and no chills.    Patient noted that her irritable bowel syndrome varies between constipation and diarrhea. Patient noted currently having some constipation. Patient declined bowel regimen, stating that this would cause problems with diarrhea.     Physical Exam   Vital Signs: Temp: 98.2  F (36.8  C) Temp src: Oral BP: (!) 140/66 Pulse: 77   Resp: 18 SpO2: 98 % O2 Device: None (Room air)    Weight: 182 lbs 3.2 oz    General: Patient comfortable, NAD.  Heart: Irregularly irregular. S1 S2 w/o murmurs.  Lungs: Breath sounds present. No crackles/wheezes heard.  Abdomen: Soft, nontender.    INR 1.95    Medical Decision Making           "

## 2024-04-10 LAB
GLUCOSE BLDC GLUCOMTR-MCNC: 119 MG/DL (ref 70–99)
GLUCOSE BLDC GLUCOMTR-MCNC: 126 MG/DL (ref 70–99)
GLUCOSE BLDC GLUCOMTR-MCNC: 138 MG/DL (ref 70–99)
GLUCOSE BLDC GLUCOMTR-MCNC: 197 MG/DL (ref 70–99)
INR PPP: 1.81 (ref 0.85–1.15)

## 2024-04-10 PROCEDURE — 120N000009 HC R&B SNF

## 2024-04-10 PROCEDURE — F08D5YZ WOUND MANAGEMENT TREATMENT OF INTEGUMENTARY SYSTEM - HEAD AND NECK USING OTHER EQUIPMENT: ICD-10-PCS | Performed by: INTERNAL MEDICINE

## 2024-04-10 PROCEDURE — 250N000013 HC RX MED GY IP 250 OP 250 PS 637

## 2024-04-10 PROCEDURE — 85610 PROTHROMBIN TIME: CPT | Performed by: INTERNAL MEDICINE

## 2024-04-10 PROCEDURE — 250N000011 HC RX IP 250 OP 636

## 2024-04-10 PROCEDURE — 97605 NEG PRS WND THER DME<=50SQCM: CPT

## 2024-04-10 PROCEDURE — 36592 COLLECT BLOOD FROM PICC: CPT | Performed by: INTERNAL MEDICINE

## 2024-04-10 PROCEDURE — 250N000013 HC RX MED GY IP 250 OP 250 PS 637: Performed by: INTERNAL MEDICINE

## 2024-04-10 PROCEDURE — 258N000003 HC RX IP 258 OP 636

## 2024-04-10 RX ORDER — SODIUM CHLORIDE 9 MG/ML
INJECTION, SOLUTION INTRAVENOUS
Status: COMPLETED
Start: 2024-04-10 | End: 2024-04-10

## 2024-04-10 RX ORDER — WARFARIN SODIUM 2 MG/1
4 TABLET ORAL
Status: COMPLETED | OUTPATIENT
Start: 2024-04-10 | End: 2024-04-10

## 2024-04-10 RX ADMIN — ACETAMINOPHEN 650 MG: 325 TABLET, FILM COATED ORAL at 08:55

## 2024-04-10 RX ADMIN — TIMOLOL MALEATE 1 DROP: 5 SOLUTION/ DROPS OPHTHALMIC at 21:08

## 2024-04-10 RX ADMIN — TIMOLOL MALEATE 1 DROP: 5 SOLUTION/ DROPS OPHTHALMIC at 08:54

## 2024-04-10 RX ADMIN — CEFAZOLIN SODIUM 2 G: 2 INJECTION, SOLUTION INTRAVENOUS at 05:21

## 2024-04-10 RX ADMIN — GABAPENTIN 600 MG: 300 CAPSULE ORAL at 08:55

## 2024-04-10 RX ADMIN — CEFAZOLIN SODIUM 2 G: 2 INJECTION, SOLUTION INTRAVENOUS at 12:01

## 2024-04-10 RX ADMIN — LOSARTAN POTASSIUM 75 MG: 50 TABLET, FILM COATED ORAL at 08:55

## 2024-04-10 RX ADMIN — ATORVASTATIN CALCIUM 40 MG: 40 TABLET, FILM COATED ORAL at 08:56

## 2024-04-10 RX ADMIN — PANTOPRAZOLE SODIUM 40 MG: 40 TABLET, DELAYED RELEASE ORAL at 17:43

## 2024-04-10 RX ADMIN — LATANOPROST 1 DROP: 50 SOLUTION OPHTHALMIC at 21:08

## 2024-04-10 RX ADMIN — SODIUM CHLORIDE 500 ML: 9 INJECTION, SOLUTION INTRAVENOUS at 05:22

## 2024-04-10 RX ADMIN — ASPIRIN 81 MG CHEWABLE TABLET 81 MG: 81 TABLET CHEWABLE at 08:55

## 2024-04-10 RX ADMIN — INSULIN GLARGINE 16 UNITS: 100 INJECTION, SOLUTION SUBCUTANEOUS at 08:57

## 2024-04-10 RX ADMIN — Medication 1 CAPSULE: at 21:08

## 2024-04-10 RX ADMIN — RIFAMPIN 300 MG: 300 CAPSULE ORAL at 08:56

## 2024-04-10 RX ADMIN — METOPROLOL SUCCINATE 100 MG: 50 TABLET, EXTENDED RELEASE ORAL at 08:55

## 2024-04-10 RX ADMIN — INSULIN ASPART 2 UNITS: 100 INJECTION, SOLUTION INTRAVENOUS; SUBCUTANEOUS at 17:43

## 2024-04-10 RX ADMIN — THERA TABS 1 TABLET: TAB at 11:59

## 2024-04-10 RX ADMIN — ACETAMINOPHEN 650 MG: 325 TABLET, FILM COATED ORAL at 21:08

## 2024-04-10 RX ADMIN — METOPROLOL SUCCINATE 100 MG: 50 TABLET, EXTENDED RELEASE ORAL at 21:08

## 2024-04-10 RX ADMIN — WARFARIN SODIUM 4 MG: 2 TABLET ORAL at 17:43

## 2024-04-10 RX ADMIN — CEFAZOLIN SODIUM 2 G: 2 INJECTION, SOLUTION INTRAVENOUS at 21:12

## 2024-04-10 RX ADMIN — LEVOTHYROXINE SODIUM 88 MCG: 0.09 TABLET ORAL at 05:21

## 2024-04-10 RX ADMIN — ACETAMINOPHEN 650 MG: 325 TABLET, FILM COATED ORAL at 13:20

## 2024-04-10 RX ADMIN — PANTOPRAZOLE SODIUM 40 MG: 40 TABLET, DELAYED RELEASE ORAL at 05:21

## 2024-04-10 RX ADMIN — Medication 1 CAPSULE: at 08:56

## 2024-04-10 RX ADMIN — PREDNISOLONE ACETATE 1 DROP: 10 SUSPENSION/ DROPS OPHTHALMIC at 08:54

## 2024-04-10 RX ADMIN — GABAPENTIN 600 MG: 300 CAPSULE ORAL at 21:08

## 2024-04-10 RX ADMIN — RIFAMPIN 300 MG: 300 CAPSULE ORAL at 21:08

## 2024-04-10 RX ADMIN — HYDROCHLOROTHIAZIDE 25 MG: 25 TABLET ORAL at 08:57

## 2024-04-10 ASSESSMENT — ACTIVITIES OF DAILY LIVING (ADL)
ADLS_ACUITY_SCORE: 37

## 2024-04-10 NOTE — PLAN OF CARE
Goal Outcome Evaluation:      Plan of Care Reviewed With: patient    Overall Patient Progress: improving    VS: Temp: 98.3  F (36.8  C) Temp src: Oral BP: 137/60 Pulse: 82   Resp: 16 SpO2: 94 % O2 Device: None (Room air)      O2: On RA   Output: Continent of B/B   Last BM: 4/10/24   Activity: SBA WW/FELIX, participated in therapy, walked the hallway with family.   Skin: X posterior neck, cervical spine   Pain: denies   CMS:  Neuro: Intact  A/O x4, able to make needs known   Dressing: CDI, WOC nurse visited   Diet: Regular with fair appetite, had only breakfast this shift. BG checks with insulin coverage and CHO count.   LDA: R SL PICC, patent, infused antibiotics without difficulty, saline locked   Equipment: IV pole, FELIX NO, Wound vac on posterior neck, continuous @ 75 mmHg   Plan: Con't POC.    Additional Info: Pt is calm, pleasant and cooperative with cares. Able to communicate needs. Family at bedside this afternoon interacting with patient. No issues of concern this shift.      Patient does not have new respiratory symptoms.  Patient does not have new sore throat.  Patient does not have a fever greater than 99.5.

## 2024-04-10 NOTE — PROGRESS NOTES
[facility] Nurse Inpatient Assessment     Consulted for: Neck    Summary: Patient s/p I&D on 3/13/24 of posterior neck for deep cervical infection down to the hardware with wound VAC placement.     4/1: Message sent to Dr Sagar Cadena surgeon to request increase in wound vac settings from -50 mmHg to -75 mmHg. (No response - will wait for return to clinic)  4/10: received message from Dr Cadena: OK to increase suction to -75mmHg    Patient History (according to provider note(s):      Lj Castro is a 78 year old female admitted on 3/12/2024. 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  She came to the ER complaining that for the past 10 days he had increasing pain in the neck and gradual weakness to the arms and legs to the point that she was unable to ambulate.  She also had right arm pain  over the past 3 to 4 days along with confusion.  Her arm pain was 9 out of 10.  There have been no recent falls or shortness of breath or nausea or vomiting.  She was brought in by EMS.  CT scan of the neck showed a deep space infection with gas formation and possible cord compression.  She was given Kcentra and taken to the operating room.  She underwent I&D with wound VAC application along with IntraOp culture and IV antibiotics.  She underwent general endotracheal anesthesia.  Operative course was significant for hypotension requiring central line and pressor support and so she was admitted to the ICU.       Assessment:      Areas visualized during today's visit: Focused:    Negative pressure wound therapy applied to: Posterior neck   Last photo: 3/20/24     3/20      3/29    4/5  Wound due to: Surgical Wound   Wound history/plan of care:    Surgical date: 3/13/24   Service following: Spinal surgery  Date Negative Pressure Wound Therapy initiated: 3/13/24   Interventions in place: repositioning  Is patient s nutritional status compromised? yes   If yes, what interventions are in place? Protein supplements  Reason for initiating vac therapy? Presence of co-morbidities, High risk of infections, and Need for accelerated granulation tissue  Which?of?the?following?co-morbidities?apply? Diabetes  If diabetic is patient on a diabetic management program? Yes   Is osteomyelitis present in wound? no   If yes what treatments are in place? N/A    Wound base: 50 % granulation tissue, 50 % pink non-granular tissue, adipose tissue and fascia.  One small area of exposed hardware, one additional area of hardware just below tissue.        Palpation of the wound bed: normal       Drainage:  large       Volume in cannister: 650 ml     Last cannister change date: 4/5     Description of drainage: serosanguinous      Measurements (length x width x depth, in cm) 6  x 1.8 x  2.5 cm       Tunneling N/A      Undermining up to 0.5 cm from 6-9 o'clock   Periwound skin: Intact       Color: normal and consistent with surrounding tissue       Temperature: normal    Odor: none   Pain: score 8 , sharp   Pain intervention prior to dressing change: IV Dilaudid  Treatment goal: Heal  and Increase granulation  STATUS: improving   Supplies ordered: ordered medium VAC dressing    Number of foam pieces removed from a wound (excluding foam for bridge) :  2 GranuFoam Black and 1 Oil emulsion dressing   Verified this matched the number of foam pieces applied last dressing change: Yes   Number of foam pieces packed into wound (excluding foam for bridge) :  2 GranuFoam Black and 1 Oil emulsion dressing       Treatment Plan:     Negative pressure wound therapy plan:  Wound location: Posterior neck   Change Days:  "Mon/Wed/Fri by WOC RN    Supplies (including all accessories) used: medium Black foam , oil emulsion over exposed hardware  Cleanse with Vashe prior to replacing NPWT  Suction setting: -75     Staff RN to assess integrity of dressing and ensure suction is set at appropriate level every shift.   Date canister. Chart canister output every shift. Change cannister weekly and PRN if full/occluded     Remove foam dressing and replace with BID normal saline moist gauze dressing if:   -a dressing failure which cannot be repaired within 2 hours   -patient is discharging to home without a home pump   -patient is discharging to a facility outside the local area   -if a dressing is a \"Silver Foam\", remove before Radiation Therapy or MRI     The hospital VAC pump is not to be discharged with the patient.?Ensure to disconnect patient from machine prior to discharge. Then,    - If a home KCI VAC pump has been delivered, connect home cannister to dressing tubing then connect cannister to home pump and turn on machine    - If transferring to a nearby facility with a KCI vac, can disconnect and clamp tubing then cover end with glove so can be reconnected within 2 hours        Orders: Reviewed    RECOMMEND PRIMARY TEAM ORDER: None, at this time  Education provided: plan of care  Discussed plan of care with: Patient and Nurse  [facility] nurse follow-up plan: Monday/WednesdayFriday   Notify [facility] if wound(s) deteriorate.  Nursing to notify the Provider(s) and re-consult the [facility] Nurse if new skin concern.    DATA:     Current support surface: Standard  Low air loss (LYNNETTE pump, Isolibrium, Pulsate, skin guard, etc)  BMI: Body mass index is 33.32 kg/m .   Active diet order: Orders Placed This Encounter      Regular Diet Adult     Output: No intake/output data recorded.     Labs:   Recent Labs   Lab 04/10/24  0633 04/09/24  0632 04/08/24  0655   ALBUMIN  --   --  2.9*   HGB  --   --  8.8*   INR 1.81*   < > 3.67*   WBC  --   --  3.4*    < > = values " in this interval not displayed.     Pressure injury risk assessment:   Sensory Perception: 3-->slightly limited  Moisture: 4-->rarely moist  Activity: 3-->walks occasionally  Mobility: 3-->slightly limited  Nutrition: 3-->adequate  Friction and Shear: 3-->no apparent problem  Luke Score: 19      Filomena Gamez RN BSN RUFUSOCN  [facility]  Dept. Office Number: [phone]

## 2024-04-10 NOTE — PLAN OF CARE
Goal Outcome Evaluation:  No acute issues overnight. Sleeping well and has no c/o's. SBA/walker to BR, continent bladder. Continues IV Ancef via RUE picc. Posterior neck wound w/vac intact @ 50mmHg continuous suction - no alarms. BG @ 2851=671.        Patient's most recent vital signs are:     Vital signs:  BP: 154/69  Temp: 97.8  HR: 77  RR: 16  SpO2: 98 %     Patient does not have new respiratory symptoms.  Patient does not have new sore throat.  Patient does not have a fever greater than 99.5.

## 2024-04-10 NOTE — PLAN OF CARE
Goal Outcome Evaluation:      Plan of Care Reviewed With: patient    Overall Patient Progress: improving  Orientation: Alert and oriented x4. Able to make needs known to staff.   Bowel & Bladder: Continent of both bowel and bladder. Voids spontaneously without difficulty.  Medications: Takes medication whole with thin liquids.  Pain: None  Ambulation/Transfers: with stand-by assist w/ walker.  Diet: Regular diet with good  appetite.  Tubes/Lines/Drains:  PICC Single Lumen Right Brachial vein -Site Assessment: WDL   Oxygen: Room air  Skin: Wound vac to posterior neck is intact and suctioning. No alarms this shift  Infection/Isolation: No active isolations.  Safety: No concerns noted this shift. Door open per patient's request.  Other: Denied chest pain and SOB. Call-light within reach. Utilizes call-light appropriately. Continue with POC.    Vitals: T: 97.9,  B/P: 129/68,  P: 77,  R: 16,  SpO2: 98 %     Recent Blood Glucose   Lab 04/10/24  2100 04/10/24  1656 04/10/24  1251 04/10/24  0822   * 197* 138* 119*      Patient does not have new respiratory symptoms.  Patient does not have new sore throat.  Patient does not have a fever greater than 99.5

## 2024-04-10 NOTE — PLAN OF CARE
Goal Outcome Evaluation:    Patient is alert and oriented x4. Able to make needs known. Pt denied SOB, N/V and chest pain. Pt is blood sugar check with insulin given per order parameter. Right arm PICC line is patent and intact. Pt is continent of bowel and bladder. Stand-by assist with walker. IV medication administered per order with no notable infusion reaction. Wound Vac is functioning properly with intact dressing. Call light within reach.      Patient's most recent vital signs are:     Vital signs:  BP: 154/69  Temp: 97.8  HR: 77  RR: 16  SpO2: 98 %     Patient does not have new respiratory symptoms.  Patient does not have new sore throat.  Patient does not have a fever greater than 99.5.

## 2024-04-11 LAB
GLUCOSE BLDC GLUCOMTR-MCNC: 125 MG/DL (ref 70–99)
GLUCOSE BLDC GLUCOMTR-MCNC: 134 MG/DL (ref 70–99)
GLUCOSE BLDC GLUCOMTR-MCNC: 165 MG/DL (ref 70–99)
GLUCOSE BLDC GLUCOMTR-MCNC: 175 MG/DL (ref 70–99)
GLUCOSE BLDC GLUCOMTR-MCNC: 195 MG/DL (ref 70–99)
GLUCOSE BLDC GLUCOMTR-MCNC: 214 MG/DL (ref 70–99)
INR PPP: 2.22 (ref 0.85–1.15)

## 2024-04-11 PROCEDURE — 250N000013 HC RX MED GY IP 250 OP 250 PS 637: Performed by: INTERNAL MEDICINE

## 2024-04-11 PROCEDURE — 250N000011 HC RX IP 250 OP 636

## 2024-04-11 PROCEDURE — 85610 PROTHROMBIN TIME: CPT | Performed by: INTERNAL MEDICINE

## 2024-04-11 PROCEDURE — 258N000003 HC RX IP 258 OP 636

## 2024-04-11 PROCEDURE — 250N000013 HC RX MED GY IP 250 OP 250 PS 637

## 2024-04-11 PROCEDURE — 120N000009 HC R&B SNF

## 2024-04-11 PROCEDURE — 250N000009 HC RX 250: Performed by: INTERNAL MEDICINE

## 2024-04-11 PROCEDURE — 36592 COLLECT BLOOD FROM PICC: CPT | Performed by: INTERNAL MEDICINE

## 2024-04-11 RX ORDER — SODIUM CHLORIDE 9 MG/ML
INJECTION, SOLUTION INTRAVENOUS
Status: COMPLETED
Start: 2024-04-11 | End: 2024-04-11

## 2024-04-11 RX ORDER — WARFARIN SODIUM 3 MG/1
3 TABLET ORAL
Status: COMPLETED | OUTPATIENT
Start: 2024-04-11 | End: 2024-04-11

## 2024-04-11 RX ADMIN — CEFAZOLIN SODIUM 2 G: 2 INJECTION, SOLUTION INTRAVENOUS at 05:30

## 2024-04-11 RX ADMIN — GABAPENTIN 600 MG: 300 CAPSULE ORAL at 21:15

## 2024-04-11 RX ADMIN — INSULIN ASPART 2 UNITS: 100 INJECTION, SOLUTION INTRAVENOUS; SUBCUTANEOUS at 17:27

## 2024-04-11 RX ADMIN — LEVOTHYROXINE SODIUM 88 MCG: 0.09 TABLET ORAL at 05:27

## 2024-04-11 RX ADMIN — SODIUM CHLORIDE: 0.9 INJECTION, SOLUTION INTRAVENOUS at 05:45

## 2024-04-11 RX ADMIN — Medication 1 CAPSULE: at 09:06

## 2024-04-11 RX ADMIN — LOSARTAN POTASSIUM 75 MG: 50 TABLET, FILM COATED ORAL at 09:06

## 2024-04-11 RX ADMIN — LATANOPROST 1 DROP: 50 SOLUTION OPHTHALMIC at 21:24

## 2024-04-11 RX ADMIN — TIMOLOL MALEATE 1 DROP: 5 SOLUTION/ DROPS OPHTHALMIC at 09:09

## 2024-04-11 RX ADMIN — RIFAMPIN 300 MG: 300 CAPSULE ORAL at 09:06

## 2024-04-11 RX ADMIN — Medication 1 CAPSULE: at 21:15

## 2024-04-11 RX ADMIN — PANTOPRAZOLE SODIUM 40 MG: 40 TABLET, DELAYED RELEASE ORAL at 05:28

## 2024-04-11 RX ADMIN — PANTOPRAZOLE SODIUM 40 MG: 40 TABLET, DELAYED RELEASE ORAL at 16:49

## 2024-04-11 RX ADMIN — METOPROLOL SUCCINATE 100 MG: 50 TABLET, EXTENDED RELEASE ORAL at 21:15

## 2024-04-11 RX ADMIN — ACETAMINOPHEN 650 MG: 325 TABLET, FILM COATED ORAL at 13:52

## 2024-04-11 RX ADMIN — CEFAZOLIN SODIUM 2 G: 2 INJECTION, SOLUTION INTRAVENOUS at 13:52

## 2024-04-11 RX ADMIN — WARFARIN SODIUM 3 MG: 3 TABLET ORAL at 18:07

## 2024-04-11 RX ADMIN — CEFAZOLIN SODIUM 2 G: 2 INJECTION, SOLUTION INTRAVENOUS at 21:14

## 2024-04-11 RX ADMIN — ASPIRIN 81 MG CHEWABLE TABLET 81 MG: 81 TABLET CHEWABLE at 09:06

## 2024-04-11 RX ADMIN — ACETAMINOPHEN 650 MG: 325 TABLET, FILM COATED ORAL at 21:15

## 2024-04-11 RX ADMIN — THERA TABS 1 TABLET: TAB at 13:52

## 2024-04-11 RX ADMIN — RIFAMPIN 300 MG: 300 CAPSULE ORAL at 21:14

## 2024-04-11 RX ADMIN — TIMOLOL MALEATE 1 DROP: 5 SOLUTION/ DROPS OPHTHALMIC at 21:15

## 2024-04-11 RX ADMIN — INSULIN GLARGINE 16 UNITS: 100 INJECTION, SOLUTION SUBCUTANEOUS at 09:08

## 2024-04-11 RX ADMIN — PREDNISOLONE ACETATE 1 DROP: 10 SUSPENSION/ DROPS OPHTHALMIC at 09:11

## 2024-04-11 RX ADMIN — ACETAMINOPHEN 650 MG: 325 TABLET, FILM COATED ORAL at 09:06

## 2024-04-11 RX ADMIN — METOPROLOL SUCCINATE 100 MG: 50 TABLET, EXTENDED RELEASE ORAL at 09:06

## 2024-04-11 RX ADMIN — ATORVASTATIN CALCIUM 40 MG: 40 TABLET, FILM COATED ORAL at 09:06

## 2024-04-11 RX ADMIN — HYDROCHLOROTHIAZIDE 25 MG: 25 TABLET ORAL at 09:06

## 2024-04-11 RX ADMIN — GABAPENTIN 600 MG: 300 CAPSULE ORAL at 09:06

## 2024-04-11 ASSESSMENT — ACTIVITIES OF DAILY LIVING (ADL)
ADLS_ACUITY_SCORE: 37

## 2024-04-11 NOTE — PLAN OF CARE
Goal Outcome Evaluation:       Pt A&O x4, denies CP, SOB, N/V or pain at this time. IV ABX given, ate 100% of breakfast. Ate small snack for lunch of muffin and fruit. , and 214. On sliding scale and carb coverage insulin. Pt's two sons came to visit this afternoon. No acute issues at this time. Continue POC.

## 2024-04-11 NOTE — PLAN OF CARE
A/Ox4. RA. Able to make needs known. SBA w/ walker. Regular diet. Takes medications whole with thin liquids. Continent of B/B. PICC single Lumen, right arm. Wound vac on posterior neck. Denies SOB, chest pain, NV. No acute changes this shift.     Call light within reach, will continue to monitor and follow POC.    Patient's most recent vital signs are:     Vital signs:  BP: 129/68  Temp: 97.9  HR: 77  RR: 16  SpO2: 98 %     Patient does not have new respiratory symptoms.  Patient does not have new sore throat.  Patient does not have a fever greater than 99.5.

## 2024-04-11 NOTE — PROGRESS NOTES
Pt A&O x4, denies CP and SOB. 100% of dinner eaten with pre-meal insulin and carb coverage given. Moves well with SBA with walker. R SL PICC present and wound vac on posterior neck. Voiding spontaneously without difficulty.

## 2024-04-12 LAB
GLUCOSE BLDC GLUCOMTR-MCNC: 144 MG/DL (ref 70–99)
GLUCOSE BLDC GLUCOMTR-MCNC: 150 MG/DL (ref 70–99)
GLUCOSE BLDC GLUCOMTR-MCNC: 152 MG/DL (ref 70–99)
GLUCOSE BLDC GLUCOMTR-MCNC: 165 MG/DL (ref 70–99)
GLUCOSE BLDC GLUCOMTR-MCNC: 199 MG/DL (ref 70–99)
GLUCOSE BLDC GLUCOMTR-MCNC: 220 MG/DL (ref 70–99)
INR PPP: 2.18 (ref 0.85–1.15)

## 2024-04-12 PROCEDURE — G0463 HOSPITAL OUTPT CLINIC VISIT: HCPCS | Mod: 25

## 2024-04-12 PROCEDURE — 97605 NEG PRS WND THER DME<=50SQCM: CPT

## 2024-04-12 PROCEDURE — 250N000012 HC RX MED GY IP 250 OP 636 PS 637: Performed by: INTERNAL MEDICINE

## 2024-04-12 PROCEDURE — 258N000003 HC RX IP 258 OP 636

## 2024-04-12 PROCEDURE — 250N000013 HC RX MED GY IP 250 OP 250 PS 637

## 2024-04-12 PROCEDURE — 250N000013 HC RX MED GY IP 250 OP 250 PS 637: Performed by: INTERNAL MEDICINE

## 2024-04-12 PROCEDURE — 85610 PROTHROMBIN TIME: CPT | Performed by: INTERNAL MEDICINE

## 2024-04-12 PROCEDURE — 120N000009 HC R&B SNF

## 2024-04-12 PROCEDURE — 36592 COLLECT BLOOD FROM PICC: CPT | Performed by: INTERNAL MEDICINE

## 2024-04-12 PROCEDURE — 250N000011 HC RX IP 250 OP 636

## 2024-04-12 RX ORDER — SODIUM CHLORIDE 9 MG/ML
INJECTION, SOLUTION INTRAVENOUS
Status: COMPLETED
Start: 2024-04-12 | End: 2024-04-12

## 2024-04-12 RX ORDER — WARFARIN SODIUM 2 MG/1
4 TABLET ORAL
Status: COMPLETED | OUTPATIENT
Start: 2024-04-12 | End: 2024-04-12

## 2024-04-12 RX ADMIN — CEFAZOLIN SODIUM 2 G: 2 INJECTION, SOLUTION INTRAVENOUS at 05:49

## 2024-04-12 RX ADMIN — SODIUM CHLORIDE 500 ML: 9 INJECTION, SOLUTION INTRAVENOUS at 05:49

## 2024-04-12 RX ADMIN — Medication 1 CAPSULE: at 20:10

## 2024-04-12 RX ADMIN — RIFAMPIN 300 MG: 300 CAPSULE ORAL at 07:52

## 2024-04-12 RX ADMIN — CEFAZOLIN SODIUM 2 G: 2 INJECTION, SOLUTION INTRAVENOUS at 12:29

## 2024-04-12 RX ADMIN — TIMOLOL MALEATE 1 DROP: 5 SOLUTION/ DROPS OPHTHALMIC at 21:56

## 2024-04-12 RX ADMIN — INSULIN ASPART 2 UNITS: 100 INJECTION, SOLUTION INTRAVENOUS; SUBCUTANEOUS at 17:33

## 2024-04-12 RX ADMIN — INSULIN GLARGINE 16 UNITS: 100 INJECTION, SOLUTION SUBCUTANEOUS at 07:57

## 2024-04-12 RX ADMIN — LOSARTAN POTASSIUM 75 MG: 50 TABLET, FILM COATED ORAL at 07:51

## 2024-04-12 RX ADMIN — ASPIRIN 81 MG CHEWABLE TABLET 81 MG: 81 TABLET CHEWABLE at 07:50

## 2024-04-12 RX ADMIN — INSULIN ASPART 1 UNITS: 100 INJECTION, SOLUTION INTRAVENOUS; SUBCUTANEOUS at 08:00

## 2024-04-12 RX ADMIN — ACETAMINOPHEN 650 MG: 325 TABLET, FILM COATED ORAL at 20:10

## 2024-04-12 RX ADMIN — ACETAMINOPHEN 650 MG: 325 TABLET, FILM COATED ORAL at 12:48

## 2024-04-12 RX ADMIN — ACETAMINOPHEN 650 MG: 325 TABLET, FILM COATED ORAL at 07:50

## 2024-04-12 RX ADMIN — METOPROLOL SUCCINATE 100 MG: 50 TABLET, EXTENDED RELEASE ORAL at 20:10

## 2024-04-12 RX ADMIN — WARFARIN SODIUM 4 MG: 2 TABLET ORAL at 17:33

## 2024-04-12 RX ADMIN — RIFAMPIN 300 MG: 300 CAPSULE ORAL at 20:10

## 2024-04-12 RX ADMIN — INSULIN ASPART 1 UNITS: 100 INJECTION, SOLUTION INTRAVENOUS; SUBCUTANEOUS at 13:47

## 2024-04-12 RX ADMIN — Medication 1 CAPSULE: at 07:52

## 2024-04-12 RX ADMIN — METOPROLOL SUCCINATE 100 MG: 50 TABLET, EXTENDED RELEASE ORAL at 07:52

## 2024-04-12 RX ADMIN — CEFAZOLIN SODIUM 2 G: 2 INJECTION, SOLUTION INTRAVENOUS at 20:09

## 2024-04-12 RX ADMIN — TIMOLOL MALEATE 1 DROP: 5 SOLUTION/ DROPS OPHTHALMIC at 09:18

## 2024-04-12 RX ADMIN — LEVOTHYROXINE SODIUM 88 MCG: 0.09 TABLET ORAL at 05:49

## 2024-04-12 RX ADMIN — HYDROCHLOROTHIAZIDE 25 MG: 25 TABLET ORAL at 07:50

## 2024-04-12 RX ADMIN — PREDNISOLONE ACETATE 1 DROP: 10 SUSPENSION/ DROPS OPHTHALMIC at 07:55

## 2024-04-12 RX ADMIN — GABAPENTIN 600 MG: 300 CAPSULE ORAL at 07:50

## 2024-04-12 RX ADMIN — GABAPENTIN 600 MG: 300 CAPSULE ORAL at 20:10

## 2024-04-12 RX ADMIN — INSULIN ASPART 1 UNITS: 100 INJECTION, SOLUTION INTRAVENOUS; SUBCUTANEOUS at 21:53

## 2024-04-12 RX ADMIN — SODIUM CHLORIDE, PRESERVATIVE FREE: 5 INJECTION INTRAVENOUS at 20:19

## 2024-04-12 RX ADMIN — THERA TABS 1 TABLET: TAB at 12:31

## 2024-04-12 RX ADMIN — PANTOPRAZOLE SODIUM 40 MG: 40 TABLET, DELAYED RELEASE ORAL at 05:49

## 2024-04-12 RX ADMIN — ATORVASTATIN CALCIUM 40 MG: 40 TABLET, FILM COATED ORAL at 07:53

## 2024-04-12 RX ADMIN — LATANOPROST 1 DROP: 50 SOLUTION OPHTHALMIC at 21:57

## 2024-04-12 RX ADMIN — PANTOPRAZOLE SODIUM 40 MG: 40 TABLET, DELAYED RELEASE ORAL at 16:25

## 2024-04-12 ASSESSMENT — ACTIVITIES OF DAILY LIVING (ADL)
ADLS_ACUITY_SCORE: 36
ADLS_ACUITY_SCORE: 37
ADLS_ACUITY_SCORE: 36

## 2024-04-12 NOTE — PLAN OF CARE
Goal Outcome Evaluation:      Plan of Care Reviewed With: patient    Overall Patient Progress: no changeOverall Patient Progress: no change    FOCUS/GOAL     Bowel management, Bladder management, Medication management, Wound care management, Medical management, Mobility, Skin integrity, and Safety management     ASSESSMENT, INTERVENTIONS AND CONTINUING PLAN FOR GOAL:     Pt is A&OX4, calm, & cooperative with care. Denied CP, SOB, & n/v. Pt transfers SBA with walker & GB. Continent for both B&B; uses the bathroom. Takes meds whole with thin liquid. R brachial SL PICC patent, NS flushed, & IV abx infused without difficulty. Wound vac to the posterior neck intact & running at 75 mmHg. Pt slept well for most of the shift & no other acute concern. Able to make needs known & call light within reach. Continue with plan of care.

## 2024-04-12 NOTE — PLAN OF CARE
Goal Outcome Evaluation:    BP (!) 145/56 (BP Location: Left arm)   Pulse 78   Temp 98  F (36.7  C) (Oral)   Resp 17   Wt 79.6 kg (175 lb 6.4 oz)   SpO2 96%   BMI 32.08 kg/m          Plan of Care Reviewed With: patient     Overall Patient Progress: no changeOverall Patient Progress: no change     FOCUS/GOAL     Bowel management, Bladder management, Medication management, Wound care management, Medical management, Mobility, Skin integrity, and Safety management     ASSESSMENT, INTERVENTIONS AND CONTINUING PLAN FOR GOAL:     Pt is A&OX4, calm, & cooperative with care. Denied CP, SOB, & n/v. Pt transfers SBA with walker & GB. Continent for both B&B; uses the bathroom. Takes meds whole with thin liquid. R brachial PICC patent. Wound vac to the posterior neck intact & running at 75 mmHg. No other acute concern. Able to make needs known & call light within reach. Continue with plan of care.

## 2024-04-12 NOTE — PLAN OF CARE
Goal Outcome Evaluation:       VS: VSS. L eye blind  Neuro: Alert and orientedx4  Lungs: Unlabored breathing. O2 sats well on RA. No SOB  Heart: Denies chest pain, headache, N/V  Bowel/Bladder: Continent. Last BM: 4/10  Ambulation/Transfers: SBA/CGA with walker and gb.  Diet/Liquids: Regular diet. Takes glucerna and late breakfast  LDA: PICC to R armx1 lumen. On IV ancef  Skin: Wound vac to posterior neck. Skin to buttocks intact, no redness.  Pain: Denies pain, seems comfortable. Has scheduled meds  Other: Able to communicate needs. Pleasant. Declined shower  Ambulated with  and son in the hallway.  Continue plan of care.

## 2024-04-12 NOTE — PROGRESS NOTES
[facility] Nurse Inpatient Assessment     Consulted for: Neck    Summary: Patient s/p I&D on 3/13/24 of posterior neck for deep cervical infection down to the hardware with wound VAC placement.     4/1: Message sent to Dr Sagar Cadena surgeon to request increase in wound vac settings from -50 mmHg to -75 mmHg. (No response - will wait for return to clinic)  4/10: received message from Dr Cadena: OK to increase suction to -75mmHg    Patient History (according to provider note(s):      Lj Castro is a 78 year old female admitted on 3/12/2024. 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  She came to the ER complaining that for the past 10 days he had increasing pain in the neck and gradual weakness to the arms and legs to the point that she was unable to ambulate.  She also had right arm pain  over the past 3 to 4 days along with confusion.  Her arm pain was 9 out of 10.  There have been no recent falls or shortness of breath or nausea or vomiting.  She was brought in by EMS.  CT scan of the neck showed a deep space infection with gas formation and possible cord compression.  She was given Kcentra and taken to the operating room.  She underwent I&D with wound VAC application along with IntraOp culture and IV antibiotics.  She underwent general endotracheal anesthesia.  Operative course was significant for hypotension requiring central line and pressor support and so she was admitted to the ICU.       Assessment:      Areas visualized during today's visit: Focused:    Negative pressure wound therapy applied to: Posterior neck   Last photo: 3/20/24     3/20      3/29    Wound due to: Surgical Wound   Wound history/plan of care:    Surgical date: 3/13/24   Service following: Spinal surgery  Date Negative Pressure Wound Therapy initiated: 3/13/24   Interventions in place: repositioning  Is patient s nutritional status compromised? yes   If yes, what interventions are in place? Protein supplements  Reason for initiating vac therapy? Presence of co-morbidities, High risk of infections, and Need for accelerated granulation tissue  Which?of?the?following?co-morbidities?apply? Diabetes  If diabetic is patient on a diabetic management program? Yes   Is osteomyelitis present in wound? no   If yes what treatments are in place? N/A    Wound base: 50 % granulation tissue, 50 % pink non-granular tissue, adipose tissue and fascia.  One small area of exposed hardware, one additional area of hardware just below tissue.        Palpation of the wound bed: normal       Drainage:  large       Volume in cannister: 650 ml     Last cannister change date: 4/12     Description of drainage: serosanguinous      Measurements (length x width x depth, in cm) 6  x 1.8 x  2.5 cm       Tunneling N/A      Undermining up to 0.5 cm from 6-9 o'clock   Periwound skin: Intact       Color: normal and consistent with surrounding tissue       Temperature: normal    Odor: none   Pain: score 8 , sharp   Pain intervention prior to dressing change: IV Dilaudid  Treatment goal: Heal  and Increase granulation  STATUS: improving   Supplies ordered: ordered medium VAC dressing    Number of foam pieces removed from a wound (excluding foam for bridge) :  2 GranuFoam Black and 1 Oil emulsion dressing   Verified this matched the number of foam pieces applied last dressing change: Yes   Number of foam pieces packed into wound (excluding foam for bridge) :  2 GranuFoam Black and 1 Oil emulsion dressing       Treatment Plan:     Negative pressure wound therapy plan:  Wound location: Posterior neck   Change Days: Mon/Wed/Fri  "by WOC RN    Supplies (including all accessories) used: medium Black foam , oil emulsion over exposed hardware  Cleanse with Vashe prior to replacing NPWT  Suction setting: -75     Staff RN to assess integrity of dressing and ensure suction is set at appropriate level every shift.   Date canister. Chart canister output every shift. Change cannister weekly and PRN if full/occluded     Remove foam dressing and replace with BID normal saline moist gauze dressing if:   -a dressing failure which cannot be repaired within 2 hours   -patient is discharging to home without a home pump   -patient is discharging to a facility outside the local area   -if a dressing is a \"Silver Foam\", remove before Radiation Therapy or MRI     The hospital VAC pump is not to be discharged with the patient.?Ensure to disconnect patient from machine prior to discharge. Then,    - If a home KCI VAC pump has been delivered, connect home cannister to dressing tubing then connect cannister to home pump and turn on machine    - If transferring to a nearby facility with a KCI vac, can disconnect and clamp tubing then cover end with glove so can be reconnected within 2 hours        Orders: Reviewed    RECOMMEND PRIMARY TEAM ORDER: None, at this time  Education provided: plan of care  Discussed plan of care with: Patient and Nurse  WO nurse follow-up plan: Monday/WednesdayFriday   Notify WOC if wound(s) deteriorate.  Nursing to notify the Provider(s) and re-consult the WOC Nurse if new skin concern.    DATA:     Current support surface: Standard  Low air loss (LYNNETTE pump, Isolibrium, Pulsate, skin guard, etc)  BMI: Body mass index is 32.08 kg/m .   Active diet order: Orders Placed This Encounter      Regular Diet Adult     Output: I/O last 3 completed shifts:  In: 500 [P.O.:480; I.V.:20]  Out: -      Labs:   Recent Labs   Lab 04/12/24  0538 04/09/24  0632 04/08/24  0655   ALBUMIN  --   --  2.9*   HGB  --   --  8.8*   INR 2.18*   < > 3.67*   WBC  --   -- "  3.4*    < > = values in this interval not displayed.     Pressure injury risk assessment:   Sensory Perception: 3-->slightly limited  Moisture: 4-->rarely moist  Activity: 3-->walks occasionally  Mobility: 3-->slightly limited  Nutrition: 3-->adequate  Friction and Shear: 3-->no apparent problem  Luke Score: 19      Filomena Gamez RN BSN JAZMINN  [facility]  Dept. Office Number: [phone]

## 2024-04-12 NOTE — PLAN OF CARE
Patient alert and oriented. Denied pain or discomfort. Voiding adequately without concern. BG within parameters not needing insulin. Right PICC SL. Wound Vac with adequate suction. No care concerns voiced at this time. Continue with plan of care.

## 2024-04-13 LAB
GLUCOSE BLDC GLUCOMTR-MCNC: 164 MG/DL (ref 70–99)
GLUCOSE BLDC GLUCOMTR-MCNC: 200 MG/DL (ref 70–99)
GLUCOSE BLDC GLUCOMTR-MCNC: 212 MG/DL (ref 70–99)
GLUCOSE BLDC GLUCOMTR-MCNC: 244 MG/DL (ref 70–99)
GLUCOSE BLDC GLUCOMTR-MCNC: 247 MG/DL (ref 70–99)
INR PPP: 2.09 (ref 0.85–1.15)

## 2024-04-13 PROCEDURE — 250N000013 HC RX MED GY IP 250 OP 250 PS 637: Performed by: INTERNAL MEDICINE

## 2024-04-13 PROCEDURE — 36415 COLL VENOUS BLD VENIPUNCTURE: CPT | Performed by: INTERNAL MEDICINE

## 2024-04-13 PROCEDURE — 85610 PROTHROMBIN TIME: CPT | Performed by: INTERNAL MEDICINE

## 2024-04-13 PROCEDURE — 250N000013 HC RX MED GY IP 250 OP 250 PS 637

## 2024-04-13 PROCEDURE — 99310 SBSQ NF CARE HIGH MDM 45: CPT | Performed by: INTERNAL MEDICINE

## 2024-04-13 PROCEDURE — 250N000011 HC RX IP 250 OP 636

## 2024-04-13 PROCEDURE — 120N000009 HC R&B SNF

## 2024-04-13 RX ORDER — WARFARIN SODIUM 2 MG/1
4 TABLET ORAL
Status: COMPLETED | OUTPATIENT
Start: 2024-04-13 | End: 2024-04-13

## 2024-04-13 RX ADMIN — LATANOPROST 1 DROP: 50 SOLUTION OPHTHALMIC at 22:40

## 2024-04-13 RX ADMIN — PANTOPRAZOLE SODIUM 40 MG: 40 TABLET, DELAYED RELEASE ORAL at 05:55

## 2024-04-13 RX ADMIN — ATORVASTATIN CALCIUM 40 MG: 40 TABLET, FILM COATED ORAL at 08:06

## 2024-04-13 RX ADMIN — THERA TABS 1 TABLET: TAB at 13:11

## 2024-04-13 RX ADMIN — INSULIN ASPART 1 UNITS: 100 INJECTION, SOLUTION INTRAVENOUS; SUBCUTANEOUS at 13:16

## 2024-04-13 RX ADMIN — GABAPENTIN 600 MG: 300 CAPSULE ORAL at 08:06

## 2024-04-13 RX ADMIN — METOPROLOL SUCCINATE 100 MG: 50 TABLET, EXTENDED RELEASE ORAL at 08:06

## 2024-04-13 RX ADMIN — ACETAMINOPHEN 650 MG: 325 TABLET, FILM COATED ORAL at 13:10

## 2024-04-13 RX ADMIN — TIMOLOL MALEATE 1 DROP: 5 SOLUTION/ DROPS OPHTHALMIC at 21:05

## 2024-04-13 RX ADMIN — INSULIN ASPART 2 UNITS: 100 INJECTION, SOLUTION INTRAVENOUS; SUBCUTANEOUS at 08:16

## 2024-04-13 RX ADMIN — WARFARIN SODIUM 4 MG: 2 TABLET ORAL at 17:36

## 2024-04-13 RX ADMIN — INSULIN ASPART 3 UNITS: 100 INJECTION, SOLUTION INTRAVENOUS; SUBCUTANEOUS at 17:35

## 2024-04-13 RX ADMIN — GABAPENTIN 600 MG: 300 CAPSULE ORAL at 21:05

## 2024-04-13 RX ADMIN — INSULIN ASPART 1 UNITS: 100 INJECTION, SOLUTION INTRAVENOUS; SUBCUTANEOUS at 22:40

## 2024-04-13 RX ADMIN — PANTOPRAZOLE SODIUM 40 MG: 40 TABLET, DELAYED RELEASE ORAL at 16:23

## 2024-04-13 RX ADMIN — LEVOTHYROXINE SODIUM 88 MCG: 0.09 TABLET ORAL at 05:55

## 2024-04-13 RX ADMIN — METOPROLOL SUCCINATE 100 MG: 50 TABLET, EXTENDED RELEASE ORAL at 21:05

## 2024-04-13 RX ADMIN — CEFAZOLIN SODIUM 2 G: 2 INJECTION, SOLUTION INTRAVENOUS at 13:10

## 2024-04-13 RX ADMIN — RIFAMPIN 300 MG: 300 CAPSULE ORAL at 19:30

## 2024-04-13 RX ADMIN — ACETAMINOPHEN 650 MG: 325 TABLET, FILM COATED ORAL at 03:52

## 2024-04-13 RX ADMIN — RIFAMPIN 300 MG: 300 CAPSULE ORAL at 08:06

## 2024-04-13 RX ADMIN — INSULIN GLARGINE 16 UNITS: 100 INJECTION, SOLUTION SUBCUTANEOUS at 08:12

## 2024-04-13 RX ADMIN — CEFAZOLIN SODIUM 2 G: 2 INJECTION, SOLUTION INTRAVENOUS at 04:50

## 2024-04-13 RX ADMIN — TIMOLOL MALEATE 1 DROP: 5 SOLUTION/ DROPS OPHTHALMIC at 08:07

## 2024-04-13 RX ADMIN — Medication 1 CAPSULE: at 08:06

## 2024-04-13 RX ADMIN — PREDNISOLONE ACETATE 1 DROP: 10 SUSPENSION/ DROPS OPHTHALMIC at 08:07

## 2024-04-13 RX ADMIN — ACETAMINOPHEN 650 MG: 325 TABLET, FILM COATED ORAL at 08:06

## 2024-04-13 RX ADMIN — Medication 1 CAPSULE: at 21:05

## 2024-04-13 RX ADMIN — LOSARTAN POTASSIUM 75 MG: 50 TABLET, FILM COATED ORAL at 08:07

## 2024-04-13 RX ADMIN — ASPIRIN 81 MG CHEWABLE TABLET 81 MG: 81 TABLET CHEWABLE at 08:07

## 2024-04-13 RX ADMIN — CEFAZOLIN SODIUM 2 G: 2 INJECTION, SOLUTION INTRAVENOUS at 21:04

## 2024-04-13 RX ADMIN — ACETAMINOPHEN 650 MG: 325 TABLET, FILM COATED ORAL at 19:30

## 2024-04-13 RX ADMIN — HYDROCHLOROTHIAZIDE 25 MG: 25 TABLET ORAL at 08:06

## 2024-04-13 ASSESSMENT — ACTIVITIES OF DAILY LIVING (ADL)
ADLS_ACUITY_SCORE: 36

## 2024-04-13 NOTE — PLAN OF CARE
Pt is alert and oriented x4. Able to make needs known to staff. Continent of bowel and bladder. Standby assist with walker and galt belt. Posterior neck wound with wound vac. Neck pain is manage with scheduled Tylenol. On a regular diet. Take medication whole with thin liquid. BG check x 4 with sliding scale and carb count. Last check was 164 and 200. PICC line on right arm with SL. IV abx infuse with no issue. Will continued plan of care.        Patient's most recent vital signs are:     Vital signs:  BP: 129/58  Temp: 98  HR: 87  RR: 17  SpO2: 96 %     Patient does not have new respiratory symptoms.  Patient does not have new sore throat.  Patient does not have a fever greater than 99.5.

## 2024-04-13 NOTE — PLAN OF CARE
Goal Outcome Evaluation:                      A & O x4, denies pain, LS clear on RA.  HR regular, BLE edema 1+, baseline numbness in BLE.  +BS, BM today.  Up to void, SBA walker to BR.  Wound to posterior of neck, vac running @75 continuous.  BGM checks, regular diet, R PICC CDI, fall precautions, call light with in reach can make her needs known.

## 2024-04-13 NOTE — PROGRESS NOTES
[facility]    Medicine Progress Note - Hospitalist Service    Date of Admission:  3/20/2024    Assessment & Plan   A: Patient is a 77 y/o woman who has a past medical history significant for hypertension, hyperlipidemia, hypothyroidism, diabetes mellitus type II, peripheral neuropathy, chronic kidney disease stage III, atrial fibrillation, chronic neck pain, chronic back pain and GERD. 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      Patient presented to [facility] on 12-Mar-2024 with increasing pain in the neck and gradual weakness to the arms and legs. Patient was found to have severe sepsis due to MSSA from deep space infection in the posterior cervical spine resulting in MSSA bacteremia. Patient underwent irrigation and debridement and wound vac application to posterior cervical spine on 13-Mar-2024 for deep wound infection posterior cervical spine down to hardware. Patient was discharged to TCU on 20-Mar-2024.     P:  1.) MSSA deep space infection in the posterior cervical spine; MSSA bacteremia:  - Patient to remain on IV cefazoline until 24-Apr-2024. Patient to be transitioned to PO doxycycline 100 mg twice daily on 25-Apr-2024.  - Patient also on rifampin 300 mg twice daily indefinitely.   - CBC with differential, CMP, CRP and ESR being monitored weekly.  - Wound vac being changed every Monday, Wednesday and Friday.     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      3.) Atrial fibrillation:  - Patient on metoprolol succinate 100 mg twice daily.  - Echocardiogram on 13-Mar-2024 showed  LVEF 55%.  - Patient had been on eliquis for anticoagulation in the past but this has been discontinued with initiation of rifampin. Patient now on coumadin for anticoagulation with goal INR 2-3. INR 2.09 today.     4.) Moderate to severe tricuspid regurgitation:  - Monitoring for symptoms.  - Further monitoring as outpatient.     5.) Chronic kidney disease stage III, appears to be stage IIIa:  - Creatinine on 08-Apr-2024 was 0.69, lower that reported baseline creatinine of approximately 1.1 to 1.5.  - Monitoring labs.     6.) Acute toxic metabolic encephalopathy secondary to MSSA sepsis, resolved:  - Monitoring for recurrence.     7.) Diabetes mellitus type II with long-term use of insulin:  - Patient had been on lantus 18 units at bedtime and novolog 3 units with meals as outpatient.   - Patient currently on lantus 16 units daily and novlog 1 unit per 15 gm carbohydrates with meals. Patient being monitored on insulin sliding scale.  - Fasting blood glucose has risen slightly. Will monitor. If fasting blood glucose remains elevated, will increase lantus.     8.) Hypertension:  - Patient on losartan 75 mg daily and metoprolol succinate 100 mg twice daily.  - Monitoring.     9.) Peripheral arterial disease:  - Patient had undergone left external iliac, left common femoral, superficial femoral, profunda femoris endarterectomy with Dacron patch closure on 11-Feb-2021.  - Patient had previously been on aspirin which has been resumed.     10.) GERD:  - Patient on protonix 40 mg twice daily.     11.) Hypothyroidism:  - Patient on levothyroxine 88 mcg daily.  - On 05-Apr-2024, TSH 1.50.     12.) Hyperlipidemia:  - Patient on atorvastatin 40 mg daily.     13.) Physical deconditioning secondary to acute illness:  - PT/OT.     14.) Severe malnutrition in the context of acute on chronic illness:  - Supplementing as able.          Diet: Regular Diet Adult  Snacks/Supplements Adult: Glucerna; Between Meals    Winston Catheter: Not  present  Lines: PRESENT      PICC 03/17/24 Single Lumen Right Brachial vein lateral antibiotics-Site Assessment: WDL      Cardiac Monitoring: None  Code Status: Full Code      Clinically Significant Risk Factors              # Hypoalbuminemia: Lowest albumin = 2.9 g/dL at 4/8/2024  6:55 AM, will monitor as appropriate             # Severe Malnutrition: based on nutrition assessment                  Marvin Gutierrez MD  Hospitalist Service  [facility]  Securely message with [employer] (more info)  Text page via [employer] Paging/Directory   ______________________________________________________________________    Interval History   Patient noted no pain, no fever and no chills. Patient noted now having diarrhea instead of constipation and attributed this to her irritable bowel syndrome. Patient noted no abdominal pain.    Physical Exam   Vital Signs: Temp: 98  F (36.7  C) Temp src: Oral BP: 129/58 Pulse: 87   Resp: 17 SpO2: 96 % O2 Device: None (Room air)    Weight: 175 lbs 6.4 oz    General: Patient comfortable, NAD.  Heart: Irregularly irregular. S1 S2 w/o murmurs.  Lungs: Breath sounds present. No crackles/wheezes heard.  Abdomen: Soft, nontender.    INR 2.09    Medical Decision Making

## 2024-04-13 NOTE — PLAN OF CARE
Pt is A/O x4,  R thin whole. Continent of B/B, SBA, BG with meals carb count. Denies pain, SOB, CP, skin had bruises all over her body and a birth jasmine on her L thigh. Pt has a posterior neck wound that has a wound vac attached running @ 75.  R PICC, can make needs known, LBM 4/10. Pain managed with tylenol,continue with POC.

## 2024-04-14 LAB
GLUCOSE BLDC GLUCOMTR-MCNC: 162 MG/DL (ref 70–99)
GLUCOSE BLDC GLUCOMTR-MCNC: 176 MG/DL (ref 70–99)
GLUCOSE BLDC GLUCOMTR-MCNC: 181 MG/DL (ref 70–99)
GLUCOSE BLDC GLUCOMTR-MCNC: 184 MG/DL (ref 70–99)
GLUCOSE BLDC GLUCOMTR-MCNC: 218 MG/DL (ref 70–99)
GLUCOSE BLDC GLUCOMTR-MCNC: 244 MG/DL (ref 70–99)
HOLD SPECIMEN: NORMAL
HOLD SPECIMEN: NORMAL
INR PPP: 1.87 (ref 0.85–1.15)

## 2024-04-14 PROCEDURE — 36592 COLLECT BLOOD FROM PICC: CPT | Performed by: INTERNAL MEDICINE

## 2024-04-14 PROCEDURE — 85610 PROTHROMBIN TIME: CPT | Performed by: INTERNAL MEDICINE

## 2024-04-14 PROCEDURE — 120N000009 HC R&B SNF

## 2024-04-14 PROCEDURE — 250N000013 HC RX MED GY IP 250 OP 250 PS 637: Performed by: INTERNAL MEDICINE

## 2024-04-14 PROCEDURE — 250N000011 HC RX IP 250 OP 636

## 2024-04-14 PROCEDURE — 250N000013 HC RX MED GY IP 250 OP 250 PS 637

## 2024-04-14 PROCEDURE — 258N000003 HC RX IP 258 OP 636

## 2024-04-14 RX ORDER — SODIUM CHLORIDE 9 MG/ML
INJECTION, SOLUTION INTRAVENOUS
Status: COMPLETED
Start: 2024-04-14 | End: 2024-04-14

## 2024-04-14 RX ORDER — WARFARIN SODIUM 5 MG/1
5 TABLET ORAL
Status: COMPLETED | OUTPATIENT
Start: 2024-04-14 | End: 2024-04-14

## 2024-04-14 RX ADMIN — GABAPENTIN 600 MG: 300 CAPSULE ORAL at 08:30

## 2024-04-14 RX ADMIN — ACETAMINOPHEN 650 MG: 325 TABLET, FILM COATED ORAL at 08:30

## 2024-04-14 RX ADMIN — PANTOPRAZOLE SODIUM 40 MG: 40 TABLET, DELAYED RELEASE ORAL at 05:36

## 2024-04-14 RX ADMIN — LEVOTHYROXINE SODIUM 88 MCG: 0.09 TABLET ORAL at 05:36

## 2024-04-14 RX ADMIN — LOSARTAN POTASSIUM 75 MG: 50 TABLET, FILM COATED ORAL at 08:30

## 2024-04-14 RX ADMIN — PREDNISOLONE ACETATE 1 DROP: 10 SUSPENSION/ DROPS OPHTHALMIC at 08:34

## 2024-04-14 RX ADMIN — INSULIN ASPART 1 UNITS: 100 INJECTION, SOLUTION INTRAVENOUS; SUBCUTANEOUS at 13:19

## 2024-04-14 RX ADMIN — INSULIN ASPART 2 UNITS: 100 INJECTION, SOLUTION INTRAVENOUS; SUBCUTANEOUS at 17:51

## 2024-04-14 RX ADMIN — METOPROLOL SUCCINATE 100 MG: 50 TABLET, EXTENDED RELEASE ORAL at 21:37

## 2024-04-14 RX ADMIN — PANTOPRAZOLE SODIUM 40 MG: 40 TABLET, DELAYED RELEASE ORAL at 16:11

## 2024-04-14 RX ADMIN — Medication 1 CAPSULE: at 21:35

## 2024-04-14 RX ADMIN — WARFARIN SODIUM 5 MG: 5 TABLET ORAL at 17:50

## 2024-04-14 RX ADMIN — HYDROCHLOROTHIAZIDE 25 MG: 25 TABLET ORAL at 08:30

## 2024-04-14 RX ADMIN — CEFAZOLIN SODIUM 2 G: 2 INJECTION, SOLUTION INTRAVENOUS at 21:34

## 2024-04-14 RX ADMIN — SODIUM CHLORIDE 500 ML: 900 INJECTION, SOLUTION INTRAVENOUS at 04:50

## 2024-04-14 RX ADMIN — TIMOLOL MALEATE 1 DROP: 5 SOLUTION/ DROPS OPHTHALMIC at 08:34

## 2024-04-14 RX ADMIN — RIFAMPIN 300 MG: 300 CAPSULE ORAL at 21:34

## 2024-04-14 RX ADMIN — INSULIN GLARGINE 16 UNITS: 100 INJECTION, SOLUTION SUBCUTANEOUS at 08:35

## 2024-04-14 RX ADMIN — ASPIRIN 81 MG CHEWABLE TABLET 81 MG: 81 TABLET CHEWABLE at 08:28

## 2024-04-14 RX ADMIN — ATORVASTATIN CALCIUM 40 MG: 40 TABLET, FILM COATED ORAL at 08:37

## 2024-04-14 RX ADMIN — ACETAMINOPHEN 650 MG: 325 TABLET, FILM COATED ORAL at 13:20

## 2024-04-14 RX ADMIN — ACETAMINOPHEN 650 MG: 325 TABLET, FILM COATED ORAL at 21:35

## 2024-04-14 RX ADMIN — CEFAZOLIN SODIUM 2 G: 2 INJECTION, SOLUTION INTRAVENOUS at 13:21

## 2024-04-14 RX ADMIN — ACETAMINOPHEN 650 MG: 325 TABLET, FILM COATED ORAL at 13:18

## 2024-04-14 RX ADMIN — METOPROLOL SUCCINATE 100 MG: 50 TABLET, EXTENDED RELEASE ORAL at 08:28

## 2024-04-14 RX ADMIN — THERA TABS 1 TABLET: TAB at 13:18

## 2024-04-14 RX ADMIN — INSULIN ASPART 1 UNITS: 100 INJECTION, SOLUTION INTRAVENOUS; SUBCUTANEOUS at 21:38

## 2024-04-14 RX ADMIN — INSULIN ASPART 1 UNITS: 100 INJECTION, SOLUTION INTRAVENOUS; SUBCUTANEOUS at 09:48

## 2024-04-14 RX ADMIN — GABAPENTIN 600 MG: 300 CAPSULE ORAL at 21:34

## 2024-04-14 RX ADMIN — Medication 1 CAPSULE: at 08:29

## 2024-04-14 RX ADMIN — TIMOLOL MALEATE 1 DROP: 5 SOLUTION/ DROPS OPHTHALMIC at 21:35

## 2024-04-14 RX ADMIN — RIFAMPIN 300 MG: 300 CAPSULE ORAL at 08:28

## 2024-04-14 RX ADMIN — LATANOPROST 1 DROP: 50 SOLUTION OPHTHALMIC at 21:35

## 2024-04-14 RX ADMIN — CEFAZOLIN SODIUM 2 G: 2 INJECTION, SOLUTION INTRAVENOUS at 04:58

## 2024-04-14 ASSESSMENT — ACTIVITIES OF DAILY LIVING (ADL)
ADLS_ACUITY_SCORE: 36

## 2024-04-14 NOTE — PLAN OF CARE
Pt is alert and oriented x4. Able to make needs known to staff. Continent of bowel and bladder. Standby assist with walker and galt belt. Posterior neck wound with wound vac. Neck pain is manage with scheduled Tylenol. On a regular diet. Take medication whole with thin liquid. BG check x 4 with sliding scale and carb count.  PICC line on right arm with SL. PICC line dressing change. IV abx infuse with no issue. Will continued plan of care.        Patient's most recent vital signs are:     Vital signs:  BP: 131/61  Temp: 98  HR: 100  RR: 17  SpO2: 99 %     Patient does not have new respiratory symptoms.  Patient does not have new sore throat.  Patient does not have a fever greater than 99.5.

## 2024-04-14 NOTE — PLAN OF CARE
Goal Outcome Evaluation:    AOx4. Communicative, cooperative and calm. Able to make needs known. On Room air and not in respiratory distress. Takes meds whole with thin liquids without difficulty. Pt. continent with Bowel and Bladder. Patient had a bowel 3-4x during the night, meds reviewed, PRN Immodium was discontinued. Patient said she doesn't need medication for diarrhea for now because she doesn't want to be constipated. SBA with walker. With Right PICC (single lumen) Saline Lock, CDI. Vacc at posterior neck 75mmHG continuous, site CDI, machine functioning well. IV antibiotics given at due time without notable reaction. BG (x2) 247 , 184. Insulin given per order parameter. Call light within reach. Continue POC.          Patient's most recent vital signs are:     Vital signs:  BP: 140/50  Temp: 98  HR: 112  RR: 17        Patient does not have new respiratory symptoms.  Patient does not have new sore throat.  Patient does not have a fever greater than 99.5.

## 2024-04-15 LAB
ALBUMIN SERPL BCG-MCNC: 3 G/DL (ref 3.5–5.2)
ALP SERPL-CCNC: 91 U/L (ref 40–150)
ALT SERPL W P-5'-P-CCNC: <5 U/L (ref 0–50)
ANION GAP SERPL CALCULATED.3IONS-SCNC: 8 MMOL/L (ref 7–15)
AST SERPL W P-5'-P-CCNC: 16 U/L (ref 0–45)
BASOPHILS # BLD AUTO: 0 10E3/UL (ref 0–0.2)
BASOPHILS NFR BLD AUTO: 1 %
BILIRUB SERPL-MCNC: 0.3 MG/DL
BUN SERPL-MCNC: 12.1 MG/DL (ref 8–23)
CALCIUM SERPL-MCNC: 8 MG/DL (ref 8.8–10.2)
CHLORIDE SERPL-SCNC: 100 MMOL/L (ref 98–107)
CREAT SERPL-MCNC: 0.59 MG/DL (ref 0.51–0.95)
CRP SERPL-MCNC: 8.14 MG/L
DEPRECATED HCO3 PLAS-SCNC: 28 MMOL/L (ref 22–29)
EGFRCR SERPLBLD CKD-EPI 2021: >90 ML/MIN/1.73M2
EOSINOPHIL # BLD AUTO: 0.1 10E3/UL (ref 0–0.7)
EOSINOPHIL NFR BLD AUTO: 3 %
ERYTHROCYTE [DISTWIDTH] IN BLOOD BY AUTOMATED COUNT: 16.2 % (ref 10–15)
ERYTHROCYTE [SEDIMENTATION RATE] IN BLOOD BY WESTERGREN METHOD: 40 MM/HR (ref 0–30)
GLUCOSE BLDC GLUCOMTR-MCNC: 134 MG/DL (ref 70–99)
GLUCOSE BLDC GLUCOMTR-MCNC: 184 MG/DL (ref 70–99)
GLUCOSE BLDC GLUCOMTR-MCNC: 193 MG/DL (ref 70–99)
GLUCOSE BLDC GLUCOMTR-MCNC: 202 MG/DL (ref 70–99)
GLUCOSE BLDC GLUCOMTR-MCNC: 245 MG/DL (ref 70–99)
GLUCOSE SERPL-MCNC: 173 MG/DL (ref 70–99)
HCT VFR BLD AUTO: 30.5 % (ref 35–47)
HGB BLD-MCNC: 9.1 G/DL (ref 11.7–15.7)
IMM GRANULOCYTES # BLD: 0.1 10E3/UL
IMM GRANULOCYTES NFR BLD: 1 %
INR PPP: 1.76 (ref 0.85–1.15)
LYMPHOCYTES # BLD AUTO: 1.1 10E3/UL (ref 0.8–5.3)
LYMPHOCYTES NFR BLD AUTO: 27 %
MCH RBC QN AUTO: 29.4 PG (ref 26.5–33)
MCHC RBC AUTO-ENTMCNC: 29.8 G/DL (ref 31.5–36.5)
MCV RBC AUTO: 98 FL (ref 78–100)
MONOCYTES # BLD AUTO: 0.4 10E3/UL (ref 0–1.3)
MONOCYTES NFR BLD AUTO: 10 %
NEUTROPHILS # BLD AUTO: 2.4 10E3/UL (ref 1.6–8.3)
NEUTROPHILS NFR BLD AUTO: 58 %
NRBC # BLD AUTO: 0 10E3/UL
NRBC BLD AUTO-RTO: 0 /100
PLATELET # BLD AUTO: 275 10E3/UL (ref 150–450)
POTASSIUM SERPL-SCNC: 3.5 MMOL/L (ref 3.4–5.3)
PROT SERPL-MCNC: 5.8 G/DL (ref 6.4–8.3)
RBC # BLD AUTO: 3.1 10E6/UL (ref 3.8–5.2)
SODIUM SERPL-SCNC: 136 MMOL/L (ref 135–145)
WBC # BLD AUTO: 4.2 10E3/UL (ref 4–11)

## 2024-04-15 PROCEDURE — 250N000011 HC RX IP 250 OP 636

## 2024-04-15 PROCEDURE — 250N000013 HC RX MED GY IP 250 OP 250 PS 637: Performed by: INTERNAL MEDICINE

## 2024-04-15 PROCEDURE — 250N000013 HC RX MED GY IP 250 OP 250 PS 637

## 2024-04-15 PROCEDURE — 120N000009 HC R&B SNF

## 2024-04-15 PROCEDURE — 80053 COMPREHEN METABOLIC PANEL: CPT | Performed by: INTERNAL MEDICINE

## 2024-04-15 PROCEDURE — 36415 COLL VENOUS BLD VENIPUNCTURE: CPT

## 2024-04-15 PROCEDURE — 86140 C-REACTIVE PROTEIN: CPT | Performed by: INTERNAL MEDICINE

## 2024-04-15 PROCEDURE — 97605 NEG PRS WND THER DME<=50SQCM: CPT

## 2024-04-15 PROCEDURE — 85610 PROTHROMBIN TIME: CPT | Performed by: INTERNAL MEDICINE

## 2024-04-15 PROCEDURE — 85652 RBC SED RATE AUTOMATED: CPT

## 2024-04-15 PROCEDURE — 85025 COMPLETE CBC W/AUTO DIFF WBC: CPT

## 2024-04-15 PROCEDURE — 258N000003 HC RX IP 258 OP 636

## 2024-04-15 RX ORDER — SODIUM CHLORIDE 9 MG/ML
INJECTION, SOLUTION INTRAVENOUS
Status: COMPLETED
Start: 2024-04-15 | End: 2024-04-15

## 2024-04-15 RX ORDER — WARFARIN SODIUM 3 MG/1
6 TABLET ORAL
Status: COMPLETED | OUTPATIENT
Start: 2024-04-15 | End: 2024-04-15

## 2024-04-15 RX ADMIN — THERA TABS 1 TABLET: TAB at 13:38

## 2024-04-15 RX ADMIN — ACETAMINOPHEN 650 MG: 325 TABLET, FILM COATED ORAL at 09:48

## 2024-04-15 RX ADMIN — RIFAMPIN 300 MG: 300 CAPSULE ORAL at 21:04

## 2024-04-15 RX ADMIN — GABAPENTIN 600 MG: 300 CAPSULE ORAL at 21:04

## 2024-04-15 RX ADMIN — RIFAMPIN 300 MG: 300 CAPSULE ORAL at 09:48

## 2024-04-15 RX ADMIN — PANTOPRAZOLE SODIUM 40 MG: 40 TABLET, DELAYED RELEASE ORAL at 06:17

## 2024-04-15 RX ADMIN — SODIUM CHLORIDE 500 ML: 900 INJECTION, SOLUTION INTRAVENOUS at 05:03

## 2024-04-15 RX ADMIN — WARFARIN SODIUM 6 MG: 3 TABLET ORAL at 19:58

## 2024-04-15 RX ADMIN — INSULIN GLARGINE 16 UNITS: 100 INJECTION, SOLUTION SUBCUTANEOUS at 10:35

## 2024-04-15 RX ADMIN — LEVOTHYROXINE SODIUM 88 MCG: 0.09 TABLET ORAL at 06:17

## 2024-04-15 RX ADMIN — LATANOPROST 1 DROP: 50 SOLUTION OPHTHALMIC at 21:04

## 2024-04-15 RX ADMIN — PANTOPRAZOLE SODIUM 40 MG: 40 TABLET, DELAYED RELEASE ORAL at 17:42

## 2024-04-15 RX ADMIN — METOPROLOL SUCCINATE 100 MG: 50 TABLET, EXTENDED RELEASE ORAL at 09:47

## 2024-04-15 RX ADMIN — LOSARTAN POTASSIUM 75 MG: 50 TABLET, FILM COATED ORAL at 09:48

## 2024-04-15 RX ADMIN — CEFAZOLIN SODIUM 2 G: 2 INJECTION, SOLUTION INTRAVENOUS at 13:37

## 2024-04-15 RX ADMIN — HYDROCHLOROTHIAZIDE 25 MG: 25 TABLET ORAL at 09:47

## 2024-04-15 RX ADMIN — INSULIN ASPART 2 UNITS: 100 INJECTION, SOLUTION INTRAVENOUS; SUBCUTANEOUS at 17:48

## 2024-04-15 RX ADMIN — ATORVASTATIN CALCIUM 40 MG: 40 TABLET, FILM COATED ORAL at 09:47

## 2024-04-15 RX ADMIN — ASPIRIN 81 MG CHEWABLE TABLET 81 MG: 81 TABLET CHEWABLE at 09:48

## 2024-04-15 RX ADMIN — CEFAZOLIN SODIUM 2 G: 2 INJECTION, SOLUTION INTRAVENOUS at 21:00

## 2024-04-15 RX ADMIN — INSULIN ASPART 2 UNITS: 100 INJECTION, SOLUTION INTRAVENOUS; SUBCUTANEOUS at 10:29

## 2024-04-15 RX ADMIN — ACETAMINOPHEN 650 MG: 325 TABLET, FILM COATED ORAL at 13:37

## 2024-04-15 RX ADMIN — PREDNISOLONE ACETATE 1 DROP: 10 SUSPENSION/ DROPS OPHTHALMIC at 09:49

## 2024-04-15 RX ADMIN — TIMOLOL MALEATE 1 DROP: 5 SOLUTION/ DROPS OPHTHALMIC at 09:49

## 2024-04-15 RX ADMIN — CEFAZOLIN SODIUM 2 G: 2 INJECTION, SOLUTION INTRAVENOUS at 05:03

## 2024-04-15 RX ADMIN — METOPROLOL SUCCINATE 100 MG: 50 TABLET, EXTENDED RELEASE ORAL at 21:04

## 2024-04-15 RX ADMIN — Medication 1 CAPSULE: at 21:04

## 2024-04-15 RX ADMIN — TIMOLOL MALEATE 1 DROP: 5 SOLUTION/ DROPS OPHTHALMIC at 21:05

## 2024-04-15 RX ADMIN — Medication 3 MG: at 23:59

## 2024-04-15 RX ADMIN — ACETAMINOPHEN 650 MG: 325 TABLET, FILM COATED ORAL at 21:04

## 2024-04-15 RX ADMIN — Medication 1 CAPSULE: at 09:47

## 2024-04-15 RX ADMIN — GABAPENTIN 600 MG: 300 CAPSULE ORAL at 09:48

## 2024-04-15 ASSESSMENT — ACTIVITIES OF DAILY LIVING (ADL)
ADLS_ACUITY_SCORE: 37
ADLS_ACUITY_SCORE: 36
ADLS_ACUITY_SCORE: 37
ADLS_ACUITY_SCORE: 36
ADLS_ACUITY_SCORE: 37
ADLS_ACUITY_SCORE: 36

## 2024-04-15 NOTE — PROGRESS NOTES
[facility] Nurse Inpatient Assessment     Consulted for: Neck    Summary: Patient s/p I&D on 3/13/24 of posterior neck for deep cervical infection down to the hardware with wound VAC placement.     4/1: Message sent to Dr Sagar Cadena surgeon to request increase in wound vac settings from -50 mmHg to -75 mmHg. (No response - will wait for return to clinic)  4/10: received message from Dr Cadena: OK to increase suction to -75mmHg    Patient History (according to provider note(s):      Lj Castro is a 78 year old female admitted on 3/12/2024. 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  She came to the ER complaining that for the past 10 days he had increasing pain in the neck and gradual weakness to the arms and legs to the point that she was unable to ambulate.  She also had right arm pain  over the past 3 to 4 days along with confusion.  Her arm pain was 9 out of 10.  There have been no recent falls or shortness of breath or nausea or vomiting.  She was brought in by EMS.  CT scan of the neck showed a deep space infection with gas formation and possible cord compression.  She was given Kcentra and taken to the operating room.  She underwent I&D with wound VAC application along with IntraOp culture and IV antibiotics.  She underwent general endotracheal anesthesia.  Operative course was significant for hypotension requiring central line and pressor support and so she was admitted to the ICU.       Assessment:      Areas visualized during today's visit: Focused:    Negative pressure wound therapy applied to: Posterior neck   Last photo: 3/20/24     3/20      3/29    Wound due to: Surgical Wound   Wound history/plan of care:    Surgical date: 3/13/24   Service following: Spinal surgery  Date Negative Pressure Wound Therapy initiated: 3/13/24   Interventions in place: repositioning  Is patient s nutritional status compromised? yes   If yes, what interventions are in place? Protein supplements  Reason for initiating vac therapy? Presence of co-morbidities, High risk of infections, and Need for accelerated granulation tissue  Which?of?the?following?co-morbidities?apply? Diabetes  If diabetic is patient on a diabetic management program? Yes   Is osteomyelitis present in wound? no   If yes what treatments are in place? N/A    Wound base: 50 % granulation tissue, 50 % pink non-granular tissue, adipose tissue and fascia.        Palpation of the wound bed: normal       Drainage: small     Volume in cannister:      Last cannister change date: 4/12     Description of drainage: serosanguinous      Measurements (length x width x depth, in cm) 5.7  x 1.8 x  2.5 cm       Tunneling N/A      Undermining up to 0.5 cm from 6-9 o'clock   Periwound skin: Intact       Color: normal and consistent with surrounding tissue       Temperature: normal    Odor: none   Pain: score 8 , sharp   Pain intervention prior to dressing change: none  Treatment goal: Heal  and Increase granulation  STATUS: improving   Supplies ordered: supplies stored on unit    Number of foam pieces removed from a wound (excluding foam for bridge) :  2 GranuFoam Black and 1 Oil emulsion dressing   Verified this matched the number of foam pieces applied last dressing change: Yes   Number of foam pieces packed into wound (excluding foam for bridge) :  2 GranuFoam Black and 1 Oil emulsion dressing       Treatment Plan:     Negative pressure wound therapy plan:  Wound location: Posterior neck   Change Days: Mon/Wed/Fri by WOC RN    Supplies (including all accessories) used: medium Black foam , oil emulsion over exposed  "hardware  Cleanse with Vashe prior to replacing NPWT  Suction setting: -75     Staff RN to assess integrity of dressing and ensure suction is set at appropriate level every shift.   Date canister. Chart canister output every shift. Change cannister weekly and PRN if full/occluded     Remove foam dressing and replace with BID normal saline moist gauze dressing if:   -a dressing failure which cannot be repaired within 2 hours   -patient is discharging to home without a home pump   -patient is discharging to a facility outside the local area   -if a dressing is a \"Silver Foam\", remove before Radiation Therapy or MRI     The hospital VAC pump is not to be discharged with the patient.?Ensure to disconnect patient from machine prior to discharge. Then,    - If a home KCI VAC pump has been delivered, connect home cannister to dressing tubing then connect cannister to home pump and turn on machine    - If transferring to a nearby facility with a KCI vac, can disconnect and clamp tubing then cover end with glove so can be reconnected within 2 hours        Orders: Reviewed    RECOMMEND PRIMARY TEAM ORDER: None, at this time  Education provided: plan of care  Discussed plan of care with: Patient and Nurse  WOC nurse follow-up plan: Monday/WednesdayFriday   Notify WOC if wound(s) deteriorate.  Nursing to notify the Provider(s) and re-consult the WOC Nurse if new skin concern.    DATA:     Current support surface: Standard  Low air loss (LYNNETTE pump, Isolibrium, Pulsate, skin guard, etc)  BMI: Body mass index is 32.08 kg/m .   Active diet order: Orders Placed This Encounter      Regular Diet Adult     Output: No intake/output data recorded.     Labs:   Recent Labs   Lab 04/15/24  0524   ALBUMIN 3.0*   HGB 9.1*   INR 1.76*   WBC 4.2     Pressure injury risk assessment:   Sensory Perception: 3-->slightly limited  Moisture: 4-->rarely moist  Activity: 3-->walks occasionally  Mobility: 3-->slightly limited  Nutrition: " 3-->adequate  Friction and Shear: 3-->no apparent problem  Luke Score: 19      Filomena Gamez RN BSN [id number]  [facility]  Dept. Office Number: [phone]

## 2024-04-15 NOTE — PLAN OF CARE
Goal Outcome Evaluation:      Patient is alert and oriented x4. Able to make needs known. Pt denied SOB, N/V and chest pain. Pt is blood sugar check with insulin given per order parameter. Pt is continent of bowel and bladder. Stand-by assist with walker. Wound Vac is functioning properly with intact dressing. Call light within reach.    Patient's most recent vital signs are:     Vital signs:  BP: 121/56  Temp: 98.1  HR: 97  RR: 18  SpO2: 99 %     Patient does not have new respiratory symptoms.  Patient does not have new sore throat.  Patient does not have a fever greater than 99.5.          Problem: Spinal Surgery  Goal: Fluid and Electrolyte Balance  4/14/2024 1901 by Yee Magdaleno RN  Outcome: Progressing  4/14/2024 1900 by Yee Magdaleno RN  Outcome: Progressing     Problem: Spinal Surgery  Goal: Effective Bowel Elimination  4/14/2024 1901 by Yee Magdaleno RN  Outcome: Progressing  4/14/2024 1900 by Yee Magdaleno RN  Outcome: Progressing     Problem: Spinal Surgery  Goal: Absence of Infection Signs and Symptoms  4/14/2024 1901 by Yee Magdaleno RN  Outcome: Progressing  4/14/2024 1900 by Yee Magdaleno RN  Outcome: Progressing     Problem: Fall Injury Risk  Goal: Absence of Fall and Fall-Related Injury  Description: Provide a safe, barrier-free environment that encourages independent activity.    Keep care area uncluttered and well-lighted.    Determine need for increased observation or monitoring.    Avoid use of devices that minimize mobility, such as restraints or indwelling urinary catheter.    4/14/2024 1901 by Yee Magdaleno RN  Outcome: Progressing  4/14/2024 1900 by Yee Magdaleno RN  Outcome: Progressing     Problem: Skin Injury Risk Increased  Goal: Skin Health and Integrity  Description: Perform a full pressure injury risk assessment, as indicated by screening, upon admission to care unit.    Reassess skin (full inspection and injury risk, including skin  temperature, consistency and color) frequently (e.g., scheduled interval, with change in condition) to provide optimal early detection and prevention.    Maintain adequate tissue perfusion (e.g., encourage fluid balance; avoid crossing legs, constrictive clothing or devices) to promote tissue oxygenation.    Maintain head of bed at lowest degree of elevation tolerated, considering medical condition and other restrictions. Use positioning supports to prevent sliding and friction. Consider low friction textiles.    Avoid positioning onto an area that remains reddened or on bony prominences.    4/14/2024 1901 by Yee Magdaleno RN  Outcome: Progressing  4/14/2024 1900 by Yee Magdaleno RN  Outcome: Progressing     Problem: Infection  Goal: Absence of Infection Signs and Symptoms  Description: Implement transmission-based precautions and isolation, as indicated, to prevent spread of infection.    Obtain cultures prior to initiating antimicrobial therapy, when possible. Do not delay treatment for laboratory results in the presence of high suspicion or clinical indicators.    Administer ordered antimicrobial therapy promptly; reassess need regularly.    Monitor laboratory value, diagnostic test and clinical status trends for signs of infection progression.    Identify early signs of sepsis, such as increased heart rate and decreased blood pressure, as well as changes in mental state, respiratory pattern or peripheral perfusion.    Prepare for rapid sepsis management, including lactate level, intravenous access, fluid administration and oxygen therapy.    Provide fever-reduction and comfort measures.    Promote antimicrobial stewardship.      4/14/2024 1901 by Yee Magdaleno RN  Outcome: Progressing  4/14/2024 1900 by Yee Magdaleno RN  Outcome: Progressing     Problem: Wound  Goal: Optimal Wound Healing  Description: Use a standard wound assessment tool to monitor progression of wound  healing.    Perform a nutrition screening or assessment and physical exam; assess adequacy of oral intake and risk of vitamin and mineral deficiency; if suspect inadequacy or deficiency provide supplementation.    Optimize fluids, nutritional intake, sleep/rest and glycemic control to enhance healing.    Consider oxygen therapy in the presence of hypoxia to enhance tissue oxygenation.    Position to avoid tension or pressure on wound surface.  Manage edema (e.g., elevate extremities) and maintain blood pressure to optimize tissue perfusion.    Provide nonpharmacologic and pharmacologic comfort measures to manage pain and minimize vasoconstriction; premedicate for painful procedures.    Provide wound care, such as cleansing, debridement, topical therapy, appropriate dressing selection to promote wound healing and prevent infection.    Maintain sterile and occlusive dressing, if prescribed; minimize dressing changes to decrease infection risk and trauma to the wound bed.    4/14/2024 1901 by Yee Magdaleno, RN  Outcome: Progressing  4/14/2024 1900 by Yee Magdaleno, RN  Outcome: Progressing

## 2024-04-15 NOTE — PLAN OF CARE
Goal Outcome Evaluation:    VS: BP (!) 148/81 (BP Location: Left arm)   Pulse 111   Temp 97.3  F (36.3  C) (Oral)   Resp 17   Wt 79.6 kg (175 lb 6.4 oz)   SpO2 97%   BMI 32.08 kg/m       O2: 97% on RA   Output: Continent of B/B   Last BM: 4/15/24   Activity: Stand by assist   Skin: Wound vac to posterior neck   Pain: Denied    CMS: Alert and oriented x4.    Dressing: PICC dressing CDI and wound vac dressing changed by WOC nurse this shift   Diet: Regular diet and takes medication whole with thin liquid.    LDA: SL PICC on R arm   Equipment: Walker    Plan: Continue to follow POC   Additional Info: Able to need known. Denied CP, SOB, and N/V. BG check and insulin given per order. Call light within reach. No acute issue this shift. Will continue to follow POC.

## 2024-04-15 NOTE — PLAN OF CARE
Goal Outcome Evaluation:  No acute issues overnight. Uses call light appropriately. SBA/walker to BR - continent bladder. Posterior neck wound w/vac intact, patent @ 75mmHg continuous suction - no alarms / small amount serosang.drng in cannister. Continues IV abx via RUE picc. BG @ 0200 = 184.        Patient's most recent vital signs are:     Vital signs:  BP: 129/61  Temp: 98.1  HR: 76  RR: 18  SpO2: 99 %     Patient does not have new respiratory symptoms.  Patient does not have new sore throat.  Patient does not have a fever greater than 99.5.

## 2024-04-16 LAB
GLUCOSE BLDC GLUCOMTR-MCNC: 138 MG/DL (ref 70–99)
GLUCOSE BLDC GLUCOMTR-MCNC: 143 MG/DL (ref 70–99)
GLUCOSE BLDC GLUCOMTR-MCNC: 170 MG/DL (ref 70–99)
GLUCOSE BLDC GLUCOMTR-MCNC: 172 MG/DL (ref 70–99)
GLUCOSE BLDC GLUCOMTR-MCNC: 197 MG/DL (ref 70–99)
INR PPP: 1.51 (ref 0.85–1.15)

## 2024-04-16 PROCEDURE — 250N000013 HC RX MED GY IP 250 OP 250 PS 637: Performed by: INTERNAL MEDICINE

## 2024-04-16 PROCEDURE — 250N000013 HC RX MED GY IP 250 OP 250 PS 637

## 2024-04-16 PROCEDURE — 85610 PROTHROMBIN TIME: CPT | Performed by: INTERNAL MEDICINE

## 2024-04-16 PROCEDURE — 120N000009 HC R&B SNF

## 2024-04-16 PROCEDURE — 36592 COLLECT BLOOD FROM PICC: CPT | Performed by: INTERNAL MEDICINE

## 2024-04-16 PROCEDURE — 250N000011 HC RX IP 250 OP 636

## 2024-04-16 RX ORDER — LANOLIN ALCOHOL/MO/W.PET/CERES
3 CREAM (GRAM) TOPICAL AT BEDTIME
Status: DISCONTINUED | OUTPATIENT
Start: 2024-04-16 | End: 2024-04-25 | Stop reason: HOSPADM

## 2024-04-16 RX ORDER — WARFARIN SODIUM 3 MG/1
9 TABLET ORAL
Status: COMPLETED | OUTPATIENT
Start: 2024-04-16 | End: 2024-04-16

## 2024-04-16 RX ADMIN — RIFAMPIN 300 MG: 300 CAPSULE ORAL at 21:11

## 2024-04-16 RX ADMIN — GABAPENTIN 600 MG: 300 CAPSULE ORAL at 21:11

## 2024-04-16 RX ADMIN — ASPIRIN 81 MG CHEWABLE TABLET 81 MG: 81 TABLET CHEWABLE at 09:08

## 2024-04-16 RX ADMIN — GABAPENTIN 600 MG: 300 CAPSULE ORAL at 09:09

## 2024-04-16 RX ADMIN — CEFAZOLIN SODIUM 2 G: 2 INJECTION, SOLUTION INTRAVENOUS at 06:13

## 2024-04-16 RX ADMIN — PANTOPRAZOLE SODIUM 40 MG: 40 TABLET, DELAYED RELEASE ORAL at 17:46

## 2024-04-16 RX ADMIN — HYDROCHLOROTHIAZIDE 25 MG: 25 TABLET ORAL at 09:09

## 2024-04-16 RX ADMIN — PREDNISOLONE ACETATE 1 DROP: 10 SUSPENSION/ DROPS OPHTHALMIC at 10:37

## 2024-04-16 RX ADMIN — WARFARIN SODIUM 9 MG: 3 TABLET ORAL at 17:46

## 2024-04-16 RX ADMIN — Medication 1 CAPSULE: at 21:11

## 2024-04-16 RX ADMIN — Medication 1 CAPSULE: at 09:09

## 2024-04-16 RX ADMIN — CEFAZOLIN SODIUM 2 G: 2 INJECTION, SOLUTION INTRAVENOUS at 21:08

## 2024-04-16 RX ADMIN — TIMOLOL MALEATE 1 DROP: 5 SOLUTION/ DROPS OPHTHALMIC at 09:13

## 2024-04-16 RX ADMIN — ACETAMINOPHEN 650 MG: 325 TABLET, FILM COATED ORAL at 21:11

## 2024-04-16 RX ADMIN — LEVOTHYROXINE SODIUM 88 MCG: 0.09 TABLET ORAL at 06:13

## 2024-04-16 RX ADMIN — THERA TABS 1 TABLET: TAB at 13:09

## 2024-04-16 RX ADMIN — ACETAMINOPHEN 650 MG: 325 TABLET, FILM COATED ORAL at 09:09

## 2024-04-16 RX ADMIN — PANTOPRAZOLE SODIUM 40 MG: 40 TABLET, DELAYED RELEASE ORAL at 06:13

## 2024-04-16 RX ADMIN — INSULIN GLARGINE 16 UNITS: 100 INJECTION, SOLUTION SUBCUTANEOUS at 09:12

## 2024-04-16 RX ADMIN — ATORVASTATIN CALCIUM 40 MG: 40 TABLET, FILM COATED ORAL at 09:12

## 2024-04-16 RX ADMIN — METOPROLOL SUCCINATE 100 MG: 50 TABLET, EXTENDED RELEASE ORAL at 21:11

## 2024-04-16 RX ADMIN — LOSARTAN POTASSIUM 75 MG: 50 TABLET, FILM COATED ORAL at 09:09

## 2024-04-16 RX ADMIN — METOPROLOL SUCCINATE 100 MG: 50 TABLET, EXTENDED RELEASE ORAL at 09:12

## 2024-04-16 RX ADMIN — INSULIN ASPART 1 UNITS: 100 INJECTION, SOLUTION INTRAVENOUS; SUBCUTANEOUS at 13:10

## 2024-04-16 RX ADMIN — TIMOLOL MALEATE 1 DROP: 5 SOLUTION/ DROPS OPHTHALMIC at 21:11

## 2024-04-16 RX ADMIN — INSULIN ASPART 1 UNITS: 100 INJECTION, SOLUTION INTRAVENOUS; SUBCUTANEOUS at 09:11

## 2024-04-16 RX ADMIN — RIFAMPIN 300 MG: 300 CAPSULE ORAL at 09:09

## 2024-04-16 RX ADMIN — Medication 3 MG: at 21:11

## 2024-04-16 RX ADMIN — INSULIN ASPART 2 UNITS: 100 INJECTION, SOLUTION INTRAVENOUS; SUBCUTANEOUS at 17:46

## 2024-04-16 RX ADMIN — CEFAZOLIN SODIUM 2 G: 2 INJECTION, SOLUTION INTRAVENOUS at 13:10

## 2024-04-16 RX ADMIN — ACETAMINOPHEN 650 MG: 325 TABLET, FILM COATED ORAL at 13:12

## 2024-04-16 RX ADMIN — LATANOPROST 1 DROP: 50 SOLUTION OPHTHALMIC at 21:11

## 2024-04-16 ASSESSMENT — ACTIVITIES OF DAILY LIVING (ADL)
ADLS_ACUITY_SCORE: 36
ADLS_ACUITY_SCORE: 37
ADLS_ACUITY_SCORE: 36
ADLS_ACUITY_SCORE: 37
ADLS_ACUITY_SCORE: 36

## 2024-04-16 NOTE — PLAN OF CARE
Goal Outcome Evaluation:       Plan of care discussed with patient at bedside.   Modified supplement order per pt request.  Encouraged protein item at each meal.  Progressing towards goal.    Please see RD note from this date for full assessment.     Amirah MENDEZ  Clinical Dietitian  [facility] 5B/10A ICU Vocera, Teams, or desk [phone]  Weekend/Holiday Vocera: Weekend Holiday Clinical Dietitian [Multi Site Groups]        TCU/OB/Ortho Clinical Dietitian  Available via phone and Vocera  Phone: [phone]  Vocera: TCU Clinical Dietitian, Ortho Clinical Dietitian, 4B OB Clinical Dietitian, Birthplace 4C OB Clinical Dietitian  Weekend/Holiday Vocera: Weekend Holiday Clinical Dietitian [Multi Site Groups]

## 2024-04-16 NOTE — PROGRESS NOTES
CLINICAL NUTRITION SERVICES - REASSESSMENT NOTE     Nutrition Prescription    RECOMMENDATIONS FOR MDs/PROVIDERS TO ORDER:  Encourage intakes, use of supplements     Malnutrition Status:    Severe malnutrition in the context of acute on chronic illness     Recommendations already ordered by Registered Dietitian (RD):  [place] Glucerna at 1000 vanilla at 1400 snacks    Future/Additional Recommendations:  Monitor labs, weight trends, BM/GI, intakes and supplement tolerance       EVALUATION OF THE PROGRESS TOWARD GOALS   Diet: Regular  Intake: Good 75%-100% per I/Os  Pt reports BID meals and tolerating supplements as ordered    NEW FINDINGS   Met with pt at bedside. Pt requests to modify supplement flavor as she is tiring of just strawberry. Pt does not want chocolate. Pt reports BID meals (no lunch) and states that is a normal meal pattern for her. Pt feels she is getting adequate nutrition.  Per [facility] order review, pt ordering light meals, but appropriate for advanced age person. Encouraged pt to order a protein item with each meal.    9.4% loss in less than 3 weeks, clinically significant for malnutrition. Suspect 3/19/24 92.9 kg weight reading is inaccurate as it appears to be an outlier.     Weights during current admission:  Date/Time Weight Weight Method   04/12/24 1032 79.6 kg (175 lb 6.4 oz) Standing scale   04/12/24 1030 79.6 kg (175 lb 6.4 oz) Standing scale   04/08/24 1131 82.6 kg (182 lb 3.2 oz) Standing scale   04/04/24 0945 86 kg (189 lb 11.2 oz) Standing scale   04/02/24 0732 87.9 kg (193 lb 12.8 oz) Standing scale   03/26/24 0823 87.1 kg (192 lb 1.6 oz) Standing scale     Wt Readings from Last 15 Encounters:   04/12/24 79.6 kg (175 lb 6.4 oz)   03/19/24 92.9 kg (204 lb 12.9 oz)   11/06/23 81.7 kg (180 lb 1.6 oz)   11/02/23 81.7 kg (180 lb 1.6 oz)   10/30/23 81.2 kg (179 lb 1.6 oz)   10/23/23 81.2 kg (179 lb)   10/20/23 81.2 kg (179 lb)   10/18/23 81.2 kg (179 lb)   10/13/23 81.6 kg (179 lb 14.3  oz)   03/20/15 85 kg (187 lb 6.3 oz)       MEDICATIONS REVIEWED  Novolog-with meals and bedtime  Lantus  Culturell  Synthroid  Thera-Vit  Protonix  Warfarin  PRN:  Dulcolax  Calcium Carbonate (TUMS)  Imodium  Milk of Magnesia  Zofran  Compazine  Current Facility-Administered Medications   Medication Dose Route Frequency Provider Last Rate Last Admin    acetaminophen (TYLENOL) tablet 650 mg  650 mg Oral TID Mandy Simms MD   650 mg at 04/16/24 0909    acetaminophen (TYLENOL) tablet 650 mg  650 mg Oral Daily PRN Mandy Simms MD   650 mg at 04/14/24 1318    aspirin (ASA) chewable tablet 81 mg  81 mg Oral Daily Ela House MD   81 mg at 04/16/24 0908    atorvastatin (LIPITOR) tablet 40 mg  40 mg Oral Daily Mandy Simms MD   40 mg at 04/16/24 0912    calcium carbonate (TUMS) chewable tablet 500 mg  500 mg Oral Daily PRN Dale Ulloa MD   500 mg at 04/06/24 1335    ceFAZolin (ANCEF) 2 g in 100 mL D5W intermittent infusion  2 g Intravenous Q8H Giovana Gr  mL/hr at 04/16/24 0613 2 g at 04/16/24 0613    cyclobenzaprine (FLEXERIL) tablet 5 mg  5 mg Oral TID PRN Mandy Simms MD   5 mg at 04/05/24 1752    glucose gel 15-30 g  15-30 g Oral Q15 Min PRN Mandy Simms MD        Or    dextrose 50 % injection 25-50 mL  25-50 mL Intravenous Q15 Min PRN Mandy Simms MD        Or    glucagon injection 1 mg  1 mg Subcutaneous Q15 Min PRN Mandy Simms MD        [START ON 4/25/2024] doxycycline hyclate (VIBRAMYCIN) capsule 100 mg  100 mg Oral Q12H [facility] (08/20) Giovana Gr DO        gabapentin (NEURONTIN) capsule 600 mg  600 mg Oral BID Mandy Simms MD   600 mg at 04/16/24 0909    hydrochlorothiazide (HYDRODIURIL) tablet 25 mg  25 mg Oral Daily Ela House MD   25 mg at 04/16/24 0909    HYDROcodone-acetaminophen (NORCO) 5-325 MG per tablet 1 tablet  1 tablet Oral Q4H PRN Mandy Simms MD   1 tablet at 03/31/24 0502    HYDROmorphone (DILAUDID) half-tab 1 mg  1 mg Oral Daily PRN Mandy Simms MD   1  mg at 04/05/24 2025    insulin aspart (NovoLOG) injection (RAPID ACTING)  1-7 Units Subcutaneous TID AC Mandy Simms MD   1 Units at 04/16/24 0911    insulin aspart (NovoLOG) injection (RAPID ACTING)  1-5 Units Subcutaneous At Bedtime Mandy Simms MD   1 Units at 04/14/24 2138    insulin aspart (NovoLOG) injection (RAPID ACTING)   Subcutaneous Daily with breakfast Mandy Simms MD   3 Units at 04/16/24 1036    insulin aspart (NovoLOG) injection (RAPID ACTING)   Subcutaneous Daily with lunch Mandy Simms MD   3 Units at 04/01/24 1313    insulin aspart (NovoLOG) injection (RAPID ACTING)   Subcutaneous Daily with supper Mandy Simms MD   2 Units at 04/15/24 1749    insulin glargine (LANTUS PEN) injection 16 Units  16 Units Subcutaneous QAM AC Mandy Simms MD   16 Units at 04/16/24 0912    ketoconazole (NIZORAL) 2 % shampoo   Topical Daily PRN Mandy Simms MD        lactobacillus rhamnosus (GG) (CULTURELL) capsule 1 capsule  1 capsule Oral BID Mandy Simms MD   1 capsule at 04/16/24 0909    latanoprost (XALATAN) 0.005 % ophthalmic solution 1 drop  1 drop Both Eyes At Bedtime Mandy Simms MD   1 drop at 04/15/24 2104    levothyroxine (SYNTHROID/LEVOTHROID) tablet 88 mcg  88 mcg Oral QAM AC Mandy Simms MD   88 mcg at 04/16/24 0613    losartan (COZAAR) tablet 75 mg  75 mg Oral Daily Mandy Simms MD   75 mg at 04/16/24 0909    melatonin tablet 3 mg  3 mg Oral At Bedtime Mandy Simms MD        metoprolol succinate ER (TOPROL XL) 24 hr tablet 100 mg  100 mg Oral BID Mandy Simms MD   100 mg at 04/16/24 0912    multivitamin, therapeutic (THERA-VIT) tablet 1 tablet  1 tablet Oral Daily Mandy Simms MD   1 tablet at 04/15/24 1338    naloxone (NARCAN) injection 0.2 mg  0.2 mg Intravenous Q2 Min PRN Mandy Simms MD        Or    naloxone (NARCAN) injection 0.4 mg  0.4 mg Intravenous Q2 Min PRN Mandy Simms MD        Or    naloxone (NARCAN) injection 0.2 mg  0.2 mg Intramuscular Q2 Min PRN Mandy Simms MD        Or     naloxone (NARCAN) injection 0.4 mg  0.4 mg Intramuscular Q2 Min PRN Mandy Simms MD        Nurse may request from Pharmacy a change of form of medication (e.g. Liquid to tablet).   Does not apply Continuous PRN Mandy Simsm MD        pantoprazole (PROTONIX) EC tablet 40 mg  40 mg Oral BID AC Mandy Simms MD   40 mg at 04/16/24 0613    Patient is already receiving anticoagulation with heparin, enoxaparin (LOVENOX), warfarin (COUMADIN)  or other anticoagulant medication   Does not apply Continuous PRN Mandy Simms MD        prednisoLONE acetate (PRED FORTE) 1 % ophthalmic susp 1 drop  1 drop Left Eye Daily Mandy Simms MD   1 drop at 04/16/24 1037    rifampin (RIFADIN) capsule 300 mg  300 mg Oral Q12H [facility] (08/20) Giovana Gr S, DO   300 mg at 04/16/24 0909    sodium chloride (PF) 0.9% PF flush 10-20 mL  10-20 mL Intracatheter q1 min prn Mandy Simms MD   20 mL at 04/16/24 0545    sodium chloride (PF) 0.9% PF flush 10-40 mL  10-40 mL Intracatheter Q8H Mandy Simms MD   10 mL at 04/16/24 0614    timolol maleate (TIMOPTIC) 0.5 % ophthalmic solution 1 drop  1 drop Both Eyes BID Mandy Simms MD   1 drop at 04/16/24 0913    warfarin ANTICOAGULANT (COUMADIN) tablet 9 mg  9 mg Oral ONCE at 18:00 Mandy Simms MD        Warfarin Dose Required Daily - Pharmacist Managed  1 each Oral See Admin Instructions Ela House MD           Labs Reviewed  Current replacement of electrolyte- RN managed None     Noted A1c 8.4 (3/13/24)  Electrolytes  Potassium (mmol/L)   Date Value   04/15/2024 3.5   04/08/2024 3.5   04/01/2024 4.1   03/20/2015 4.1   03/19/2015 4.6   03/18/2015 4.4     Phosphorus (mg/dL)   Date Value   06/15/2011 3.1   06/11/2011 4.1   06/08/2011 4.9 (H)   06/07/2011 4.7 (H)   05/29/2011 6.7 (H)    Blood Glucose  Glucose (mg/dL)   Date Value   03/20/2015 145 (H)   03/19/2015 135 (H)   03/18/2015 158 (H)   03/18/2015 110 (H)   06/15/2011 121 (H)     GLUCOSE BY METER POCT (mg/dL)   Date Value   04/16/2024  143 (H)   04/16/2024 138 (H)   04/15/2024 134 (H)   04/15/2024 245 (H)   04/15/2024 202 (H)     Hemoglobin A1C (%)   Date Value   03/13/2024 8.4 (H)   03/19/2015 6.7 (H)    Inflammatory Markers  WBC (10e9/L)   Date Value   06/15/2011 8.8   06/14/2011 8.1   06/13/2011 7.6     WBC Count (10e3/uL)   Date Value   04/15/2024 4.2   04/08/2024 3.4 (L)   04/05/2024 3.2 (L)     Albumin (g/dL)   Date Value   04/15/2024 3.0 (L)   04/08/2024 2.9 (L)   04/01/2024 2.9 (L)   06/15/2011 3.1 (L)   06/10/2011 2.8 (L)   06/08/2011 3.0 (L)      Magnesium (mg/dL)   Date Value   03/20/2015 1.8   03/18/2015 1.7   06/15/2011 1.6     Sodium (mmol/L)   Date Value   04/15/2024 136   04/08/2024 140   04/01/2024 139   03/20/2015 142   03/19/2015 138   03/18/2015 141    Renal  Urea Nitrogen (mg/dL)   Date Value   04/15/2024 12.1   04/08/2024 8.6   04/01/2024 14.1   03/20/2015 13   03/19/2015 17   03/18/2015 25     Creatinine (mg/dL)   Date Value   04/15/2024 0.59   04/08/2024 0.69   04/01/2024 0.76   03/20/2015 0.96   03/19/2015 1.02   03/18/2015 0.93     Additional  Ketones Urine (mg/dL)   Date Value   03/12/2024 Negative   06/12/2011 Negative     Platelet Count   Date Value   04/15/2024 275 10e3/uL   06/15/2011 241 10e9/L     PTT (sec)   Date Value   06/10/2011 37     INR (no units)   Date Value   04/16/2024 1.51 (H)   06/13/2011 1.29 (H)     INR (External) (no units)   Date Value   10/05/2023 1.4        RENAL  GFR >90 and No acute concerns    GASTROINTESTINAL  Last BM: 4/15  Bowel regimen: PRN  GI concerns reported: None reported      PHYSICAL FINDINGS  See malnutrition section below.  Luke: 19, Nutrition 3    MALNUTRITION  % Intake: </= 50% for >/= 5 days (severe)  % Weight Loss: > 5% in less than one month (severe)  Subcutaneous Fat Loss: Facial region:  mild-moderate  Muscle Loss: Global moderate  Fluid Accumulation/Edema: Mild 2+  Malnutrition Diagnosis: Severe malnutrition in the context of acute on chronic illness    Previous Goals    Patient to consume % of nutritionally adequate meal trays TID, or the equivalent with supplements/snacks.   Evaluation: Met    Previous Nutrition Diagnosis  Inadequate oral intake related to low appetite as evidenced by pt report, meal ordering hx per [employer], and physical signs of muscle/fat loss.   Evaluation: Improving    CURRENT NUTRITION DIAGNOSIS  Inadequate oral intake related to decreased appetite as evidenced by continued weight loss      INTERVENTIONS  Implementation  Medical food supplement therapy    Goals  Patient to consume % of nutritionally adequate meal trays TID, or the equivalent with supplements/snacks.    Monitoring/Evaluation  Progress toward goals will be monitored and evaluated per protocol.    Amirah MENDEZ  TCU/OB/Ortho Clinical Dietitian  Available via phone and Vocera  Phone: [phone]  Vocera: TCU Clinical Dietitian, Ortho Clinical Dietitian, 4B OB Clinical Dietitian, Birthplace 4C OB Clinical Dietitian  Weekend/Holiday Vocera: Weekend Holiday Clinical Dietitian [Multi Site Groups]

## 2024-04-16 NOTE — PLAN OF CARE
Goal Outcome Evaluation:         VS: Temp: 97.7  F (36.5  C) Temp src: Oral BP: 137/66 Pulse: 69   Resp: 20 SpO2: 98 % O2 Device: None (Room air)     O2: Stable on RA    Output: Uses pure wick at night   Mixed Continence    Last BM: 4/14   Continent of bowel    Activity: SBA with walker and GB   Skin: Surgical incision to posterior neck    Pain: Posterior neck    CMS: AxO x4   Pleasant and cooperative with care    Dressing: Wound vac dressing to posterior neck, suctioning at -75mmHg   Dressing DCI    Diet: Regular diet, fair appetite. Pt did not eat lunch.     , and 170  On Sliding scale/ Lantus insulin    LDA: RUL single lumen PICC line, IV ABX infused easily    Equipment: Wound vac, walker, GB, pure wick, call light.    Plan: Continue POC    Additional Info: PRN melatonin changed to scheduled per pt request       Patient does not have new respiratory symptoms.  Patient does not have new sore throat.  Patient does not have a fever greater than 99.5.

## 2024-04-16 NOTE — PLAN OF CARE
Pt is alert and oriented x4. Able to make needs known to staff. Continent of bowel and bladder. Standby assist with walker and galt belt. Posterior neck wound with wound vac. Neck pain is manage with scheduled Tylenol. On a regular diet. Take medication whole with thin liquid. BG check x 4 with sliding scale and carb count.  PICC line on right arm with SL. IV abx infuse with no issue. Will continued plan of care.        Patient's most recent vital signs are:     Vital signs:  BP: 140/56  Temp: 97.3  HR: 75  RR: 18  SpO2: 97 %     Patient does not have new respiratory symptoms.  Patient does not have new sore throat.  Patient does not have a fever greater than 99.5.

## 2024-04-17 LAB
GLUCOSE BLDC GLUCOMTR-MCNC: 171 MG/DL (ref 70–99)
GLUCOSE BLDC GLUCOMTR-MCNC: 171 MG/DL (ref 70–99)
GLUCOSE BLDC GLUCOMTR-MCNC: 181 MG/DL (ref 70–99)
GLUCOSE BLDC GLUCOMTR-MCNC: 181 MG/DL (ref 70–99)
GLUCOSE BLDC GLUCOMTR-MCNC: 286 MG/DL (ref 70–99)
HOLD SPECIMEN: NORMAL
HOLD SPECIMEN: NORMAL
INR PPP: 2.73 (ref 0.85–1.15)

## 2024-04-17 PROCEDURE — 99310 SBSQ NF CARE HIGH MDM 45: CPT | Performed by: INTERNAL MEDICINE

## 2024-04-17 PROCEDURE — 250N000013 HC RX MED GY IP 250 OP 250 PS 637: Performed by: INTERNAL MEDICINE

## 2024-04-17 PROCEDURE — 250N000011 HC RX IP 250 OP 636

## 2024-04-17 PROCEDURE — 250N000013 HC RX MED GY IP 250 OP 250 PS 637

## 2024-04-17 PROCEDURE — 97605 NEG PRS WND THER DME<=50SQCM: CPT

## 2024-04-17 PROCEDURE — 120N000009 HC R&B SNF

## 2024-04-17 PROCEDURE — 36592 COLLECT BLOOD FROM PICC: CPT | Performed by: INTERNAL MEDICINE

## 2024-04-17 PROCEDURE — 258N000003 HC RX IP 258 OP 636

## 2024-04-17 PROCEDURE — 85610 PROTHROMBIN TIME: CPT | Performed by: INTERNAL MEDICINE

## 2024-04-17 RX ORDER — SODIUM CHLORIDE 9 MG/ML
INJECTION, SOLUTION INTRAVENOUS
Status: COMPLETED
Start: 2024-04-17 | End: 2024-04-17

## 2024-04-17 RX ORDER — WARFARIN SODIUM 5 MG/1
5 TABLET ORAL
Status: COMPLETED | OUTPATIENT
Start: 2024-04-17 | End: 2024-04-17

## 2024-04-17 RX ORDER — ENOXAPARIN SODIUM 100 MG/ML
40 INJECTION SUBCUTANEOUS EVERY 24 HOURS
Status: DISCONTINUED | OUTPATIENT
Start: 2024-04-17 | End: 2024-04-17

## 2024-04-17 RX ADMIN — PANTOPRAZOLE SODIUM 40 MG: 40 TABLET, DELAYED RELEASE ORAL at 17:02

## 2024-04-17 RX ADMIN — METOPROLOL SUCCINATE 100 MG: 50 TABLET, EXTENDED RELEASE ORAL at 21:51

## 2024-04-17 RX ADMIN — SODIUM CHLORIDE 500 ML: 900 INJECTION, SOLUTION INTRAVENOUS at 05:50

## 2024-04-17 RX ADMIN — HYDROCHLOROTHIAZIDE 25 MG: 25 TABLET ORAL at 07:55

## 2024-04-17 RX ADMIN — RIFAMPIN 300 MG: 300 CAPSULE ORAL at 21:49

## 2024-04-17 RX ADMIN — CYCLOBENZAPRINE 5 MG: 5 TABLET, FILM COATED ORAL at 21:47

## 2024-04-17 RX ADMIN — Medication 1 CAPSULE: at 07:56

## 2024-04-17 RX ADMIN — GABAPENTIN 600 MG: 300 CAPSULE ORAL at 07:55

## 2024-04-17 RX ADMIN — INSULIN GLARGINE 16 UNITS: 100 INJECTION, SOLUTION SUBCUTANEOUS at 07:56

## 2024-04-17 RX ADMIN — ASPIRIN 81 MG CHEWABLE TABLET 81 MG: 81 TABLET CHEWABLE at 07:55

## 2024-04-17 RX ADMIN — LOSARTAN POTASSIUM 75 MG: 50 TABLET, FILM COATED ORAL at 07:55

## 2024-04-17 RX ADMIN — ATORVASTATIN CALCIUM 40 MG: 40 TABLET, FILM COATED ORAL at 07:55

## 2024-04-17 RX ADMIN — CEFAZOLIN SODIUM 2 G: 2 INJECTION, SOLUTION INTRAVENOUS at 05:50

## 2024-04-17 RX ADMIN — CEFAZOLIN SODIUM 2 G: 2 INJECTION, SOLUTION INTRAVENOUS at 21:47

## 2024-04-17 RX ADMIN — LEVOTHYROXINE SODIUM 88 MCG: 0.09 TABLET ORAL at 05:50

## 2024-04-17 RX ADMIN — Medication 3 MG: at 21:47

## 2024-04-17 RX ADMIN — CEFAZOLIN SODIUM 2 G: 2 INJECTION, SOLUTION INTRAVENOUS at 12:01

## 2024-04-17 RX ADMIN — METOPROLOL SUCCINATE 100 MG: 50 TABLET, EXTENDED RELEASE ORAL at 07:55

## 2024-04-17 RX ADMIN — WARFARIN SODIUM 5 MG: 5 TABLET ORAL at 18:05

## 2024-04-17 RX ADMIN — Medication 1 CAPSULE: at 21:47

## 2024-04-17 RX ADMIN — GABAPENTIN 600 MG: 300 CAPSULE ORAL at 21:48

## 2024-04-17 RX ADMIN — INSULIN ASPART 1 UNITS: 100 INJECTION, SOLUTION INTRAVENOUS; SUBCUTANEOUS at 08:06

## 2024-04-17 RX ADMIN — INSULIN ASPART 2 UNITS: 100 INJECTION, SOLUTION INTRAVENOUS; SUBCUTANEOUS at 21:48

## 2024-04-17 RX ADMIN — ACETAMINOPHEN 650 MG: 325 TABLET, FILM COATED ORAL at 21:49

## 2024-04-17 RX ADMIN — HYDROCODONE BITARTRATE AND ACETAMINOPHEN 1 TABLET: 5; 325 TABLET ORAL at 21:47

## 2024-04-17 RX ADMIN — ACETAMINOPHEN 650 MG: 325 TABLET, FILM COATED ORAL at 07:56

## 2024-04-17 RX ADMIN — SODIUM CHLORIDE 500 ML: 900 INJECTION, SOLUTION INTRAVENOUS at 11:47

## 2024-04-17 RX ADMIN — THERA TABS 1 TABLET: TAB at 13:15

## 2024-04-17 RX ADMIN — PANTOPRAZOLE SODIUM 40 MG: 40 TABLET, DELAYED RELEASE ORAL at 05:50

## 2024-04-17 RX ADMIN — TIMOLOL MALEATE 1 DROP: 5 SOLUTION/ DROPS OPHTHALMIC at 21:48

## 2024-04-17 RX ADMIN — TIMOLOL MALEATE 1 DROP: 5 SOLUTION/ DROPS OPHTHALMIC at 08:06

## 2024-04-17 RX ADMIN — LATANOPROST 1 DROP: 50 SOLUTION OPHTHALMIC at 21:47

## 2024-04-17 RX ADMIN — PREDNISOLONE ACETATE 1 DROP: 10 SUSPENSION/ DROPS OPHTHALMIC at 08:05

## 2024-04-17 RX ADMIN — INSULIN ASPART 1 UNITS: 100 INJECTION, SOLUTION INTRAVENOUS; SUBCUTANEOUS at 17:03

## 2024-04-17 RX ADMIN — ACETAMINOPHEN 650 MG: 325 TABLET, FILM COATED ORAL at 13:10

## 2024-04-17 RX ADMIN — RIFAMPIN 300 MG: 300 CAPSULE ORAL at 07:56

## 2024-04-17 RX ADMIN — INSULIN ASPART 1 UNITS: 100 INJECTION, SOLUTION INTRAVENOUS; SUBCUTANEOUS at 13:13

## 2024-04-17 ASSESSMENT — ACTIVITIES OF DAILY LIVING (ADL)
ADLS_ACUITY_SCORE: 36

## 2024-04-17 NOTE — PROGRESS NOTES
[facility]itional [facility]    Medicine Progress Note - Hospitalist Service    Date of Admission:  3/20/2024    Assessment & Plan   Patient is a 79 y/o woman who has a past medical history significant for hypertension, hyperlipidemia, hypothyroidism, diabetes mellitus type II, peripheral neuropathy, chronic kidney disease stage III, atrial fibrillation, chronic neck pain, chronic back pain and GERD. 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      Patient presented to [facility] on 12-Mar-2024 with increasing pain in the neck and gradual weakness to the arms and legs. Patient was found to have severe sepsis due to MSSA from deep space infection in the posterior cervical spine resulting in MSSA bacteremia. Patient underwent irrigation and debridement and wound vac application to posterior cervical spine on 13-Mar-2024 for deep wound infection posterior cervical spine down to hardware. Patient was discharged to TCU on 20-Mar-2024.    4/17   INR pending   Discuss with pharmacy   Start lovenox 40 mg subcutaneous till INR therapeutic to 2.0 , then stop  ( patient agreeable )     P:  1.) MSSA deep space infection in the posterior cervical spine; MSSA bacteremia:  - Patient to remain on IV cefazoline until 24-Apr-2024. Patient to be transitioned to PO doxycycline 100 mg twice daily on 25-Apr-2024.  - Patient also on rifampin 300 mg twice daily indefinitely.   - CBC with differential, CMP, CRP and ESR being monitored weekly.  - Wound vac being changed every Monday, Wednesday and Friday.     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      3.) Atrial fibrillation:  Subtherapeutic INR ( discussed with pharmacy ) . Patient is on Rifampin .   - Patient on metoprolol succinate 100 mg twice daily.  - Echocardiogram on 13-Mar-2024 showed LVEF 55%.  - Patient had been on eliquis for anticoagulation in the past but this has been discontinued with initiation of rifampin. Patient now on coumadin for anticoagulation with goal INR 2-3.     4.) Moderate to severe tricuspid regurgitation:  - Monitoring for symptoms.  - Further monitoring as outpatient.     5.) Chronic kidney disease stage III, appears to be stage IIIa:  - Creatinine on 08-Apr-2024 was 0.69, lower that reported baseline creatinine of approximately 1.1 to 1.5.  - CTM     6.) Acute toxic metabolic encephalopathy secondary to MSSA sepsis, resolved:  -  This has resolved     7.) Diabetes mellitus type II with long-term use of insulin:  - Patient had been on lantus 18 units at bedtime and novolog 3 units with meals as outpatient.   - Patient currently on lantus 16 units daily and novlog 1 unit per 15 gm carbohydrates with meals.   - Patient being monitored on insulin sliding scale.     8.) Hypertension:  - Patient on losartan 75 mg daily and metoprolol succinate 100 mg twice daily.  - Monitoring.     9.) Peripheral arterial disease:  - Patient had undergone left external iliac, left common femoral, superficial femoral, profunda femoris endarterectomy with Dacron patch closure on 11-Feb-2021.  - Patient had previously been on aspirin which has been resumed.     10.) GERD:  - Patient on protonix 40 mg twice daily.     11.) Hypothyroidism:  - Patient on levothyroxine 88 mcg daily.  - On 05-Apr-2024, TSH 1.50.     12.) Hyperlipidemia:  - Patient on atorvastatin 40 mg daily.     13.) Physical deconditioning secondary to acute illness:  - PT/OT.     14.) Severe malnutrition in the context of acute on chronic illness:  - Supplementing as able.                Diet: Regular Diet  Adult  Snacks/Supplements Adult: Glucerna; Between Meals    DVT Prophylaxis: Warfarin  Winston Catheter: Not present  Lines: PRESENT             Cardiac Monitoring: None  Code Status: Full Code      Clinically Significant Risk Factors              # Hypoalbuminemia: Lowest albumin = 2.9 g/dL at 4/8/2024  6:55 AM, will monitor as appropriate             # Severe Malnutrition: based on nutrition assessment           Disposition Plan     Medically Ready for Discharge: Anticipated in 5+ Days             Mandy Simms MD  Hospitalist Service  [facility]  Securely message with [employer] (more info)  Text page via [employer] Paging/Directory   ______________________________________________________________________    Interval History   Hand off from Dr [email or web address]   No new symptoms reported per nursing staff .  No chest pain or Shortness of breath reported.  No Fever   No vomiting   No difficulty with voiding   Passing gas .  Tolerating diet     4 system ROS reviewed .     Physical Exam   Vital Signs: Temp: 97.8  F (36.6  C) Temp src: Oral BP: 136/58 Pulse: 87   Resp: 18 SpO2: 98 % O2 Device: None (Room air)    Weight: 175 lbs 6.4 oz    General Appearance: Alert and oriented  Respiratory: clear BL  Cardiovascular: s1 ands 2  GI: soft , NT . BS positive  Skin: no rash   Other: interactive     Medical Decision Making       41 MINUTES SPENT BY ME on the date of service doing chart review, history, exam, documentation & further activities per the note.      Data

## 2024-04-17 NOTE — PROGRESS NOTES
SW met with pt//son about discharge.  They said they just had a video or phone conference with ID  Next appt is the 30th. SRINI doesn't know now when discharge will be.  SRINI asked RNCC and Nursing Supervisor to look into when pt can go home.     COLLINS Gordon   [facility], Transitional Care Unit   Social Work   [facility] [place] [place] [address], [address] [place] [address]  (ph) [phone]

## 2024-04-17 NOTE — PLAN OF CARE
Pt is alert and oriented x4. Able to make needs known to staff. Continent of bowel and bladder. Standby assist with walker and galt belt. Posterior neck wound with wound vac. Neck pain is manage with scheduled Tylenol. On a regular diet. Take medication whole with thin liquid. BG check x 4 with sliding scale and carb count.  PICC line on right arm with SL. IV abx infuse with no issue. Will continued plan of care.         Patient's most recent vital signs are:     Vital signs:  BP: 131/64  Temp: 98.1  HR: 81  RR: 18  SpO2: 97 %     Patient does not have new respiratory symptoms.  Patient does not have new sore throat.  Patient does not have a fever greater than 99.5.

## 2024-04-17 NOTE — PLAN OF CARE
Goal Outcome Evaluation:         VS: Temp: 97.8  F (36.6  C) Temp src: Oral BP: 136/58 Pulse: 87   Resp: 18 SpO2: 98 % O2 Device: None (Room air)     O2: Stable on RA, s/s SOB    Output: Uses pure wick at night   Mixed Continence   Voiding clear yellow urine    Last BM: 4/17   Continent of bowel    Activity: SBA with walker and GB   Skin: Surgical incision to posterior neck    Pain: Posterior neck    CMS: AxO x4- Intermittent forgetfulness.   Pleasant and cooperative with care    Dressing: Wound vac dressing to posterior neck  suctioning at -75mmHg   Dressing DCI   Dressing changes by WOC nurse on Mon/Wed/Fri   Diet: Regular diet, fair appetite, ate 100% of breakfast and had small  lunch      and 181   On Sliding scale/ Carb coverage/ Lantus insulin    LDA: RUL single lumen PICC line, IV ABX infused easily   Blood return noted    Equipment: Wound vac, walker, GB, pure wick, call light.    Plan: Continue POC    Additional Info: INR in goal range, 2.73 Pt updated and written on board- explained INR goal, pt expressed understanding. Would like INR to be written on board daily   Wound vac dressing changed by WOC nurse today     Pt requesting PRN flexeril and Norco be given at bedtime. States that pain is causing her to not sleep well.       Patient does not have new respiratory symptoms.  Patient does not have new sore throat.  Patient does not have a fever greater than 99.5.

## 2024-04-17 NOTE — PROGRESS NOTES
[facility] Nurse Inpatient Assessment     Consulted for: Neck    Summary: Patient s/p I&D on 3/13/24 of posterior neck for deep cervical infection down to the hardware with wound VAC placement.     4/1: Message sent to Dr Sagar Cadena surgeon to request increase in wound vac settings from -50 mmHg to -75 mmHg. (No response - will wait for return to clinic)  4/10: received message from Dr Cadena: OK to increase suction to -75mmHg    Patient History (according to provider note(s):      Lj Castro is a 78 year old female admitted on 3/12/2024. 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  She came to the ER complaining that for the past 10 days he had increasing pain in the neck and gradual weakness to the arms and legs to the point that she was unable to ambulate.  She also had right arm pain  over the past 3 to 4 days along with confusion.  Her arm pain was 9 out of 10.  There have been no recent falls or shortness of breath or nausea or vomiting.  She was brought in by EMS.  CT scan of the neck showed a deep space infection with gas formation and possible cord compression.  She was given Kcentra and taken to the operating room.  She underwent I&D with wound VAC application along with IntraOp culture and IV antibiotics.  She underwent general endotracheal anesthesia.  Operative course was significant for hypotension requiring central line and pressor support and so she was admitted to the ICU.       Assessment:      Areas visualized during today's visit: Focused:    Negative pressure wound therapy applied to: Posterior neck   Last photo: 3/20/24     3/20      3/29    Wound due to: Surgical Wound   Wound history/plan of care:    Surgical date: 3/13/24   Service following: Spinal surgery  Date Negative Pressure Wound Therapy initiated: 3/13/24   Interventions in place: repositioning  Is patient s nutritional status compromised? yes   If yes, what interventions are in place? Protein supplements  Reason for initiating vac therapy? Presence of co-morbidities, High risk of infections, and Need for accelerated granulation tissue  Which?of?the?following?co-morbidities?apply? Diabetes  If diabetic is patient on a diabetic management program? Yes   Is osteomyelitis present in wound? no   If yes what treatments are in place? N/A    Wound base: 50 % granulation tissue, 50 % pink non-granular tissue, adipose tissue and fascia.        Palpation of the wound bed: normal       Drainage: small     Volume in cannister:      Last cannister change date: 4/12     Description of drainage: serosanguinous      Measurements (length x width x depth, in cm) 5.7  x 1.8 x  2.5 cm       Tunneling N/A      Undermining up to 0.5 cm from 6-9 o'clock   Periwound skin: Intact       Color: normal and consistent with surrounding tissue       Temperature: normal    Odor: none   Pain: score 8 , sharp   Pain intervention prior to dressing change: none  Treatment goal: Heal  and Increase granulation  STATUS: improving   Supplies ordered: supplies stored on unit    Number of foam pieces removed from a wound (excluding foam for bridge) :  2 GranuFoam Black and 1 Oil emulsion dressing   Verified this matched the number of foam pieces applied last dressing change: Yes   Number of foam pieces packed into wound (excluding foam for bridge) :  2 GranuFoam Black and 1 Oil emulsion dressing       Treatment Plan:     Negative pressure wound therapy plan:  Wound location: Posterior neck   Change Days: Mon/Wed/Fri by WOC RN    Supplies (including all accessories) used: small  Black foam , oil emulsion over exposed  "hardware  Cleanse with Vashe prior to replacing NPWT  Suction setting: -75     Staff RN to assess integrity of dressing and ensure suction is set at appropriate level every shift.   Date canister. Chart canister output every shift. Change cannister weekly and PRN if full/occluded     Remove foam dressing and replace with BID normal saline moist gauze dressing if:   -a dressing failure which cannot be repaired within 2 hours   -patient is discharging to home without a home pump   -patient is discharging to a facility outside the local area   -if a dressing is a \"Silver Foam\", remove before Radiation Therapy or MRI     The hospital VAC pump is not to be discharged with the patient.?Ensure to disconnect patient from machine prior to discharge. Then,    - If a home KCI VAC pump has been delivered, connect home cannister to dressing tubing then connect cannister to home pump and turn on machine    - If transferring to a nearby facility with a KCI vac, can disconnect and clamp tubing then cover end with glove so can be reconnected within 2 hours        Orders: Reviewed    RECOMMEND PRIMARY TEAM ORDER: None, at this time  Education provided: plan of care  Discussed plan of care with: Patient and Nurse  WOC nurse follow-up plan: Monday/WednesdayFriday   Notify WOC if wound(s) deteriorate.  Nursing to notify the Provider(s) and re-consult the WOC Nurse if new skin concern.    DATA:     Current support surface: Standard  Low air loss (LYNNETTE pump, Isolibrium, Pulsate, skin guard, etc)  BMI: Body mass index is 32.08 kg/m .   Active diet order: Orders Placed This Encounter      Regular Diet Adult     Output: No intake/output data recorded.     Labs:   Recent Labs   Lab 04/16/24  0544 04/15/24  0524   ALBUMIN  --  3.0*   HGB  --  9.1*   INR 1.51* 1.76*   WBC  --  4.2     Pressure injury risk assessment:   Sensory Perception: 3-->slightly limited  Moisture: 4-->rarely moist  Activity: 3-->walks occasionally  Mobility: 3-->slightly " limited  Nutrition: 3-->adequate  Friction and Shear: 3-->no apparent problem  Luke Score: 19      Filomena Gamez RN BSN [facility]YONATAN [facility]  Dept. Office Number: [phone]

## 2024-04-17 NOTE — PLAN OF CARE
Goal Outcome Evaluation:      Plan of Care Reviewed With: patient     Overall Patient Progress: no changeOverall Patient Progress: no change     FOCUS/GOAL     Bowel management, Bladder management, Medication management, Wound care management, Medical management, Mobility, Skin integrity, and Safety management     ASSESSMENT, INTERVENTIONS AND CONTINUING PLAN FOR GOAL:     Pt is A&OX4, calm, & cooperative with care. Denied CP, SOB, & n/v. Pt transfers SBA with walker & GB. Continent for both B&B; uses the bathroom. Takes meds whole with thin liquid. R brachial SL PICC patent, NS flushed, & IV abx infused without difficulty. Wound vac to the posterior neck intact & running at 75 mmHg. Pt slept well for most of the shift & no other acute concern. Able to make needs known & call light within reach. Continue with POC.

## 2024-04-17 NOTE — PROVIDER NOTIFICATION
[facility]   [facility]:  [facility]  Complaining of diarrhea, having 4-5 loose BM's/day per patient's report, on IV and PO antibiotic. Requesting Imodium PRN. Thanks.   Kyleigh [facility]ONG [facility]  [phone]

## 2024-04-18 LAB
GLUCOSE BLDC GLUCOMTR-MCNC: 177 MG/DL (ref 70–99)
GLUCOSE BLDC GLUCOMTR-MCNC: 180 MG/DL (ref 70–99)
GLUCOSE BLDC GLUCOMTR-MCNC: 196 MG/DL (ref 70–99)
GLUCOSE BLDC GLUCOMTR-MCNC: 219 MG/DL (ref 70–99)
GLUCOSE BLDC GLUCOMTR-MCNC: 232 MG/DL (ref 70–99)
INR PPP: 2.17 (ref 0.85–1.15)

## 2024-04-18 PROCEDURE — 250N000013 HC RX MED GY IP 250 OP 250 PS 637

## 2024-04-18 PROCEDURE — 250N000011 HC RX IP 250 OP 636

## 2024-04-18 PROCEDURE — 85610 PROTHROMBIN TIME: CPT | Performed by: INTERNAL MEDICINE

## 2024-04-18 PROCEDURE — 250N000013 HC RX MED GY IP 250 OP 250 PS 637: Performed by: INTERNAL MEDICINE

## 2024-04-18 PROCEDURE — 120N000009 HC R&B SNF

## 2024-04-18 PROCEDURE — 36415 COLL VENOUS BLD VENIPUNCTURE: CPT | Performed by: INTERNAL MEDICINE

## 2024-04-18 RX ORDER — LOPERAMIDE HCL 2 MG
2 CAPSULE ORAL 4 TIMES DAILY PRN
Status: DISPENSED | OUTPATIENT
Start: 2024-04-18 | End: 2024-04-23

## 2024-04-18 RX ORDER — WARFARIN SODIUM 3 MG/1
6 TABLET ORAL
Status: COMPLETED | OUTPATIENT
Start: 2024-04-18 | End: 2024-04-18

## 2024-04-18 RX ADMIN — INSULIN GLARGINE 16 UNITS: 100 INJECTION, SOLUTION SUBCUTANEOUS at 08:22

## 2024-04-18 RX ADMIN — ACETAMINOPHEN 650 MG: 325 TABLET, FILM COATED ORAL at 08:15

## 2024-04-18 RX ADMIN — METOPROLOL SUCCINATE 100 MG: 50 TABLET, EXTENDED RELEASE ORAL at 08:20

## 2024-04-18 RX ADMIN — Medication 3 MG: at 21:22

## 2024-04-18 RX ADMIN — ASPIRIN 81 MG CHEWABLE TABLET 81 MG: 81 TABLET CHEWABLE at 08:18

## 2024-04-18 RX ADMIN — HYDROCHLOROTHIAZIDE 25 MG: 25 TABLET ORAL at 08:22

## 2024-04-18 RX ADMIN — LOPERAMIDE HYDROCHLORIDE 2 MG: 2 CAPSULE ORAL at 16:27

## 2024-04-18 RX ADMIN — GABAPENTIN 600 MG: 300 CAPSULE ORAL at 20:38

## 2024-04-18 RX ADMIN — LOPERAMIDE HYDROCHLORIDE 2 MG: 2 CAPSULE ORAL at 20:38

## 2024-04-18 RX ADMIN — ATORVASTATIN CALCIUM 40 MG: 40 TABLET, FILM COATED ORAL at 08:20

## 2024-04-18 RX ADMIN — GABAPENTIN 600 MG: 300 CAPSULE ORAL at 08:19

## 2024-04-18 RX ADMIN — METOPROLOL SUCCINATE 100 MG: 50 TABLET, EXTENDED RELEASE ORAL at 20:38

## 2024-04-18 RX ADMIN — LOPERAMIDE HYDROCHLORIDE 2 MG: 2 CAPSULE ORAL at 10:28

## 2024-04-18 RX ADMIN — TIMOLOL MALEATE 1 DROP: 5 SOLUTION/ DROPS OPHTHALMIC at 08:33

## 2024-04-18 RX ADMIN — CEFAZOLIN SODIUM 2 G: 2 INJECTION, SOLUTION INTRAVENOUS at 05:08

## 2024-04-18 RX ADMIN — PANTOPRAZOLE SODIUM 40 MG: 40 TABLET, DELAYED RELEASE ORAL at 16:27

## 2024-04-18 RX ADMIN — RIFAMPIN 300 MG: 300 CAPSULE ORAL at 20:38

## 2024-04-18 RX ADMIN — Medication 1 CAPSULE: at 20:38

## 2024-04-18 RX ADMIN — LEVOTHYROXINE SODIUM 88 MCG: 0.09 TABLET ORAL at 05:08

## 2024-04-18 RX ADMIN — THERA TABS 1 TABLET: TAB at 12:05

## 2024-04-18 RX ADMIN — WARFARIN SODIUM 6 MG: 3 TABLET ORAL at 17:37

## 2024-04-18 RX ADMIN — RIFAMPIN 300 MG: 300 CAPSULE ORAL at 08:20

## 2024-04-18 RX ADMIN — CEFAZOLIN SODIUM 2 G: 2 INJECTION, SOLUTION INTRAVENOUS at 12:04

## 2024-04-18 RX ADMIN — PANTOPRAZOLE SODIUM 40 MG: 40 TABLET, DELAYED RELEASE ORAL at 05:08

## 2024-04-18 RX ADMIN — ACETAMINOPHEN 650 MG: 325 TABLET, FILM COATED ORAL at 13:36

## 2024-04-18 RX ADMIN — INSULIN ASPART 2 UNITS: 100 INJECTION, SOLUTION INTRAVENOUS; SUBCUTANEOUS at 17:36

## 2024-04-18 RX ADMIN — INSULIN ASPART 1 UNITS: 100 INJECTION, SOLUTION INTRAVENOUS; SUBCUTANEOUS at 11:03

## 2024-04-18 RX ADMIN — HYDROCODONE BITARTRATE AND ACETAMINOPHEN 1 TABLET: 5; 325 TABLET ORAL at 06:12

## 2024-04-18 RX ADMIN — PREDNISOLONE ACETATE 1 DROP: 10 SUSPENSION/ DROPS OPHTHALMIC at 08:25

## 2024-04-18 RX ADMIN — ACETAMINOPHEN 650 MG: 325 TABLET, FILM COATED ORAL at 20:38

## 2024-04-18 RX ADMIN — HYDROCODONE BITARTRATE AND ACETAMINOPHEN 1 TABLET: 5; 325 TABLET ORAL at 21:22

## 2024-04-18 RX ADMIN — LATANOPROST 1 DROP: 50 SOLUTION OPHTHALMIC at 20:39

## 2024-04-18 RX ADMIN — Medication 1 CAPSULE: at 10:35

## 2024-04-18 RX ADMIN — TIMOLOL MALEATE 1 DROP: 5 SOLUTION/ DROPS OPHTHALMIC at 20:38

## 2024-04-18 RX ADMIN — CEFAZOLIN SODIUM 2 G: 2 INJECTION, SOLUTION INTRAVENOUS at 20:38

## 2024-04-18 ASSESSMENT — ACTIVITIES OF DAILY LIVING (ADL)
ADLS_ACUITY_SCORE: 36

## 2024-04-18 NOTE — PLAN OF CARE
Pt is alert and oriented x4. Able to make needs known to staff. Continent of bowel and bladder. Standby assist with walker and galt belt. Posterior neck wound with wound vac. Neck pain is manage with scheduled Tylenol. On a regular diet. Take medication whole with thin liquid. BG check x 4 with sliding scale and carb count.  PICC line on right arm with SL. IV abx infuse with no issue. Will continued plan of care.           Patient's most recent vital signs are:     Vital signs:  BP: 117/50  Temp: 97.7  HR: 92  RR: 18  SpO2: 96 %     Patient does not have new respiratory symptoms.  Patient does not have new sore throat.  Patient does not have a fever greater than 99.5.

## 2024-04-18 NOTE — PLAN OF CARE
Goal Outcome Evaluation:      Plan of Care Reviewed With: patient    Overall Patient Progress: no change    VSS, denies SOB, pain managed with PRN meds: Flexeril and Norco given at bedtime per request. Wound vac to posterior neck CDI, on continuous therapy, no alarms, issues this shift. Due IV antibiotic given via right arm PICC, saline lock after.  Patient report's having  loose BM's 4-5 times/day, requesting Imodium PRN , paged provider earlier, no order added, note left to provider for tomorrow. Good appetite, BG monitored, on sliding scale insulin. Comfortable at this time, will continue POC.       Patient's most recent vital signs are:     Vital signs:  BP: 141/70  Temp: 97.8  HR: 77  RR: 16  SpO2: 99 %     Patient does not have new respiratory symptoms.  Patient does not have new sore throat.  Patient does not have a fever greater than 99.5.

## 2024-04-18 NOTE — PROGRESS NOTES
VS: BP (!) 141/70   Pulse 77   Temp 97.8  F (36.6  C) (Oral)   Resp 16   Wt 76.1 kg (167 lb 12.8 oz)   SpO2 99%   BMI 30.69 kg/m     O2: 99% on RA. Denies CP/SOB    Output: Continent B/B   Last BM: 4/17/24   Activity: SBA w/ W GB   Skin: Wound vac to posterior neck   Pain: Reports posterior neck pain, managed with PRN Norco   CMS: A/Ox4   Dressing: Posterior neck-CDI    Diet: Regular adult / meds whole with thin liquids. Sliding scale/carb coverage/Lantus insulin.   LDA: Wound vac to posterior neck, suctioning at -75 mm/Hg. Dressing CDI-dressing changed by WOC nurse Mon/Wed/Fri   Equipment: Walker, FELIX, wound vac, purewick, call light   Plan: Continue to follow POC    Additional Info: Pt able to make needs known. BG checks performed per order, no insulin given this shift. Call light within reach. No acute changes this shift.

## 2024-04-19 LAB
GLUCOSE BLDC GLUCOMTR-MCNC: 130 MG/DL (ref 70–99)
GLUCOSE BLDC GLUCOMTR-MCNC: 170 MG/DL (ref 70–99)
GLUCOSE BLDC GLUCOMTR-MCNC: 176 MG/DL (ref 70–99)
GLUCOSE BLDC GLUCOMTR-MCNC: 206 MG/DL (ref 70–99)
GLUCOSE BLDC GLUCOMTR-MCNC: 215 MG/DL (ref 70–99)
GLUCOSE BLDC GLUCOMTR-MCNC: 241 MG/DL (ref 70–99)
INR PPP: 2.09 (ref 0.85–1.15)

## 2024-04-19 PROCEDURE — 250N000013 HC RX MED GY IP 250 OP 250 PS 637

## 2024-04-19 PROCEDURE — 36415 COLL VENOUS BLD VENIPUNCTURE: CPT | Performed by: INTERNAL MEDICINE

## 2024-04-19 PROCEDURE — 97605 NEG PRS WND THER DME<=50SQCM: CPT

## 2024-04-19 PROCEDURE — 250N000013 HC RX MED GY IP 250 OP 250 PS 637: Performed by: INTERNAL MEDICINE

## 2024-04-19 PROCEDURE — 250N000011 HC RX IP 250 OP 636

## 2024-04-19 PROCEDURE — 85610 PROTHROMBIN TIME: CPT | Performed by: INTERNAL MEDICINE

## 2024-04-19 PROCEDURE — 120N000009 HC R&B SNF

## 2024-04-19 RX ADMIN — RIFAMPIN 300 MG: 300 CAPSULE ORAL at 08:22

## 2024-04-19 RX ADMIN — RIFAMPIN 300 MG: 300 CAPSULE ORAL at 21:27

## 2024-04-19 RX ADMIN — Medication 3 MG: at 21:28

## 2024-04-19 RX ADMIN — INSULIN ASPART 3 UNITS: 100 INJECTION, SOLUTION INTRAVENOUS; SUBCUTANEOUS at 18:10

## 2024-04-19 RX ADMIN — ATORVASTATIN CALCIUM 40 MG: 40 TABLET, FILM COATED ORAL at 08:25

## 2024-04-19 RX ADMIN — PANTOPRAZOLE SODIUM 40 MG: 40 TABLET, DELAYED RELEASE ORAL at 16:58

## 2024-04-19 RX ADMIN — PREDNISOLONE ACETATE 1 DROP: 10 SUSPENSION/ DROPS OPHTHALMIC at 08:25

## 2024-04-19 RX ADMIN — LOPERAMIDE HYDROCHLORIDE 2 MG: 2 CAPSULE ORAL at 08:54

## 2024-04-19 RX ADMIN — INSULIN ASPART 2 UNITS: 100 INJECTION, SOLUTION INTRAVENOUS; SUBCUTANEOUS at 12:59

## 2024-04-19 RX ADMIN — ACETAMINOPHEN 650 MG: 325 TABLET, FILM COATED ORAL at 21:27

## 2024-04-19 RX ADMIN — ASPIRIN 81 MG CHEWABLE TABLET 81 MG: 81 TABLET CHEWABLE at 08:23

## 2024-04-19 RX ADMIN — TIMOLOL MALEATE 1 DROP: 5 SOLUTION/ DROPS OPHTHALMIC at 08:26

## 2024-04-19 RX ADMIN — LOSARTAN POTASSIUM 75 MG: 50 TABLET, FILM COATED ORAL at 08:23

## 2024-04-19 RX ADMIN — METOPROLOL SUCCINATE 100 MG: 50 TABLET, EXTENDED RELEASE ORAL at 21:28

## 2024-04-19 RX ADMIN — CEFAZOLIN SODIUM 2 G: 2 INJECTION, SOLUTION INTRAVENOUS at 06:13

## 2024-04-19 RX ADMIN — LATANOPROST 1 DROP: 50 SOLUTION OPHTHALMIC at 21:27

## 2024-04-19 RX ADMIN — GABAPENTIN 600 MG: 300 CAPSULE ORAL at 08:25

## 2024-04-19 RX ADMIN — CEFAZOLIN SODIUM 2 G: 2 INJECTION, SOLUTION INTRAVENOUS at 12:01

## 2024-04-19 RX ADMIN — THERA TABS 1 TABLET: TAB at 12:22

## 2024-04-19 RX ADMIN — PANTOPRAZOLE SODIUM 40 MG: 40 TABLET, DELAYED RELEASE ORAL at 06:14

## 2024-04-19 RX ADMIN — LEVOTHYROXINE SODIUM 88 MCG: 0.09 TABLET ORAL at 06:14

## 2024-04-19 RX ADMIN — GABAPENTIN 600 MG: 300 CAPSULE ORAL at 21:27

## 2024-04-19 RX ADMIN — ACETAMINOPHEN 650 MG: 325 TABLET, FILM COATED ORAL at 13:01

## 2024-04-19 RX ADMIN — INSULIN ASPART 1 UNITS: 100 INJECTION, SOLUTION INTRAVENOUS; SUBCUTANEOUS at 09:28

## 2024-04-19 RX ADMIN — LOPERAMIDE HYDROCHLORIDE 2 MG: 2 CAPSULE ORAL at 21:28

## 2024-04-19 RX ADMIN — HYDROCHLOROTHIAZIDE 25 MG: 25 TABLET ORAL at 08:25

## 2024-04-19 RX ADMIN — Medication 1 CAPSULE: at 21:28

## 2024-04-19 RX ADMIN — TIMOLOL MALEATE 1 DROP: 5 SOLUTION/ DROPS OPHTHALMIC at 21:27

## 2024-04-19 RX ADMIN — METOPROLOL SUCCINATE 100 MG: 50 TABLET, EXTENDED RELEASE ORAL at 08:24

## 2024-04-19 RX ADMIN — ACETAMINOPHEN 650 MG: 325 TABLET, FILM COATED ORAL at 08:22

## 2024-04-19 RX ADMIN — CEFAZOLIN SODIUM 2 G: 2 INJECTION, SOLUTION INTRAVENOUS at 21:34

## 2024-04-19 RX ADMIN — INSULIN GLARGINE 16 UNITS: 100 INJECTION, SOLUTION SUBCUTANEOUS at 08:27

## 2024-04-19 RX ADMIN — Medication 1 CAPSULE: at 08:25

## 2024-04-19 RX ADMIN — LOPERAMIDE HYDROCHLORIDE 2 MG: 2 CAPSULE ORAL at 16:58

## 2024-04-19 RX ADMIN — WARFARIN SODIUM 8 MG: 5 TABLET ORAL at 18:10

## 2024-04-19 RX ADMIN — INSULIN ASPART 1 UNITS: 100 INJECTION, SOLUTION INTRAVENOUS; SUBCUTANEOUS at 21:38

## 2024-04-19 ASSESSMENT — ACTIVITIES OF DAILY LIVING (ADL)
ADLS_ACUITY_SCORE: 36
ADLS_ACUITY_SCORE: 36
ADLS_ACUITY_SCORE: 37
ADLS_ACUITY_SCORE: 37
ADLS_ACUITY_SCORE: 36
ADLS_ACUITY_SCORE: 36
ADLS_ACUITY_SCORE: 37
ADLS_ACUITY_SCORE: 36
ADLS_ACUITY_SCORE: 37
ADLS_ACUITY_SCORE: 36
ADLS_ACUITY_SCORE: 36
ADLS_ACUITY_SCORE: 37
ADLS_ACUITY_SCORE: 36
ADLS_ACUITY_SCORE: 36
ADLS_ACUITY_SCORE: 37

## 2024-04-19 NOTE — PROGRESS NOTES
[facility] Nurse Inpatient Assessment     Consulted for: Neck    Summary: Patient s/p I&D on 3/13/24 of posterior neck for deep cervical infection down to the hardware with wound VAC placement.     4/1: Message sent to Dr Sagar Cadena surgeon to request increase in wound vac settings from -50 mmHg to -75 mmHg. (No response - will wait for return to clinic)  4/10: received message from Dr Cadena: OK to increase suction to -75mmHg    Patient History (according to provider note(s):      Lj Castro is a 78 year old female admitted on 3/12/2024. 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  She came to the ER complaining that for the past 10 days he had increasing pain in the neck and gradual weakness to the arms and legs to the point that she was unable to ambulate.  She also had right arm pain  over the past 3 to 4 days along with confusion.  Her arm pain was 9 out of 10.  There have been no recent falls or shortness of breath or nausea or vomiting.  She was brought in by EMS.  CT scan of the neck showed a deep space infection with gas formation and possible cord compression.  She was given Kcentra and taken to the operating room.  She underwent I&D with wound VAC application along with IntraOp culture and IV antibiotics.  She underwent general endotracheal anesthesia.  Operative course was significant for hypotension requiring central line and pressor support and so she was admitted to the ICU.       Assessment:      Areas visualized during today's visit: Focused:    Negative pressure wound therapy applied to: Posterior neck   Last photo: 3/20/24     3/20     Wound  due to: Surgical Wound   Wound history/plan of care:    Surgical date: 3/13/24   Service following: Spinal surgery  Date Negative Pressure Wound Therapy initiated: 3/13/24   Interventions in place: repositioning  Is patient s nutritional status compromised? yes   If yes, what interventions are in place? Protein supplements  Reason for initiating vac therapy? Presence of co-morbidities, High risk of infections, and Need for accelerated granulation tissue  Which?of?the?following?co-morbidities?apply? Diabetes  If diabetic is patient on a diabetic management program? Yes   Is osteomyelitis present in wound? no   If yes what treatments are in place? N/A    Wound base: 60 % granulation tissue, 40 % pink non-granular tissue, adipose tissue and fascia.        Palpation of the wound bed: normal       Drainage: small     Volume in cannister: 150 ml     Last cannister change date: 4/19     Description of drainage: serosanguinous      Measurements (length x width x depth, in cm) 5.6  x 1.3 x  2 cm       Tunneling N/A      Undermining up to 0.3 cm from 6-9 o'clock   Periwound skin: Intact       Color: normal and consistent with surrounding tissue       Temperature: normal    Odor: none   Pain: mild, tender   Pain intervention prior to dressing change: none  Treatment goal: Heal  and Increase granulation  STATUS: improving   Supplies ordered: supplies stored on unit    Number of foam pieces removed from a wound (excluding foam for bridge) :  2 GranuFoam Black and 1 Oil emulsion dressing   Verified this matched the number of foam pieces applied last dressing change: Yes   Number of foam pieces packed into wound (excluding foam for bridge) :  1 GranuFoam Black and 1 Oil emulsion dressing       Treatment Plan:     Negative pressure wound therapy plan:  Wound location: Posterior neck   Change Days: Mon/Wed/Fri by WOC RN    Supplies (including all accessories) used: small  Black foam , oil emulsion over exposed hardware  Cleanse with  "Vashe prior to replacing NPWT  Suction setting: -75     Staff RN to assess integrity of dressing and ensure suction is set at appropriate level every shift.   Date canister. Chart canister output every shift. Change cannister weekly and PRN if full/occluded     Remove foam dressing and replace with BID normal saline moist gauze dressing if:   -a dressing failure which cannot be repaired within 2 hours   -patient is discharging to home without a home pump   -patient is discharging to a facility outside the local area   -if a dressing is a \"Silver Foam\", remove before Radiation Therapy or MRI     The hospital VAC pump is not to be discharged with the patient.?Ensure to disconnect patient from machine prior to discharge. Then,    - If a home KCI VAC pump has been delivered, connect home cannister to dressing tubing then connect cannister to home pump and turn on machine    - If transferring to a nearby facility with a KCI vac, can disconnect and clamp tubing then cover end with glove so can be reconnected within 2 hours        Orders: Reviewed    RECOMMEND PRIMARY TEAM ORDER: None, at this time  Education provided: plan of care  Discussed plan of care with: Patient and Nurse  WOC nurse follow-up plan: Monday/WednesdayFriday   Notify WOC if wound(s) deteriorate.  Nursing to notify the Provider(s) and re-consult the WOC Nurse if new skin concern.    DATA:     Current support surface: Standard  Low air loss (LYNNETTE pump, Isolibrium, Pulsate, skin guard, etc)  BMI: Body mass index is 30.69 kg/m .   Active diet order: Orders Placed This Encounter      Regular Diet Adult     Output: I/O last 3 completed shifts:  In: 1007 [P.O.:897; I.V.:110]  Out: -      Labs:   Recent Labs   Lab 04/19/24  0543 04/16/24  0544 04/15/24  0524   ALBUMIN  --   --  3.0*   HGB  --   --  9.1*   INR 2.09*   < > 1.76*   WBC  --   --  4.2    < > = values in this interval not displayed.     Pressure injury risk assessment:   Sensory Perception: " 3-->slightly limited  Moisture: 4-->rarely moist  Activity: 3-->walks occasionally  Mobility: 3-->slightly limited  Nutrition: 3-->adequate  Friction and Shear: 3-->no apparent problem  Luke Score: 19      JEAN-PAUL Pruitt [facility]  Dept. Office Number: [phone]

## 2024-04-19 NOTE — PLAN OF CARE
Care Coordination:     Writer met with patient today to discuss planning for home. Writer stated that patient is on track for discharge on 4/25/24. Per provider- can transition to orals on 4/25 and does not require any monitoring on orals before discharge.     Patient stated that she cancelled her follow up with Dr. Cadena on 4/16 but then did confirm that it was rescheduled to 4/29, and there is a follow up conference call w/ ortho and ID on 4/30.     Writer updated patient that wound vac was ordered, approval is still pending.     Reviewed home care with patient. Patient has been accepted by [facility] for PT/OT/RN.     Home Health Care:   [facility]  PH: [phone]   Nurse, physical therapy, and occupational therapy.     Christelle Valentine   Patient Care Management Coordinator  Acute Rehabilitation Unit/ Transitional Care Unit.   Ph: [phone]

## 2024-04-19 NOTE — PLAN OF CARE
Goal Outcome Evaluation:      Plan of Care Reviewed With: patient    Overall Patient Progress: no change    No acute issues this shift. With new order for Imodium PRN for diarrhea, given x 3 today , last at bedtime, had 2 loose BM's today per patient's report. On IV anibiotic, given via right arm PICC, saline locked after. Posterior neck wound vac CDI, on continous therapy, no alarms this shift. Good appetite, ate 100% of her meal for supper, BG monitored, on SSI and carb coverage. Norco PRN given at bedtime per request,comfortable at this time, will continue POC.         Patient's most recent vital signs are:     Vital signs:  BP: 122/51  Temp: 97.7  HR: 76  RR: 17  SpO2: 97 %     Patient does not have new respiratory symptoms.  Patient does not have new sore throat.  Patient does not have a fever greater than 99.5.

## 2024-04-19 NOTE — PLAN OF CARE
Goal Outcome Evaluation:  0700 - 2300         VS: Temp: 97.7  F (36.5  C) Temp src: Oral BP: (!) 150/64 Pulse: 78   Resp: 16 SpO2: 99 % O2 Device: None (Room air)      O2: On RA   Output: Continent of B/B   Last BM: 4/19/24. Had several loose stool, requested imodium with some relief   Activity: SBA WW/GB.   Skin: X posterior neck, cervical spine   Pain: Denies   CMS:  Neuro: Intact  A/O x4, able to make needs known   Dressing: CDI, WOC nurse visited   Diet: Regular with fair appetite. BG checks with insulin coverage and CHO count.   LDA: R SL PICC, patent, infused antibiotics without difficulty, saline locked   Equipment: IV pole, WW, GB, Wound vac on posterior neck, continuous @ 75 mmHg   Plan: Con't POC.    Additional Info: Pt is calm, pleasant and cooperative with cares. Able to communicate needs. No issues of concern this shift.      Patient does not have new respiratory symptoms.  Patient does not have new sore throat.  Patient does not have a fever greater than 99.5.

## 2024-04-20 LAB
GLUCOSE BLDC GLUCOMTR-MCNC: 110 MG/DL (ref 70–99)
GLUCOSE BLDC GLUCOMTR-MCNC: 149 MG/DL (ref 70–99)
GLUCOSE BLDC GLUCOMTR-MCNC: 153 MG/DL (ref 70–99)
GLUCOSE BLDC GLUCOMTR-MCNC: 229 MG/DL (ref 70–99)
GLUCOSE BLDC GLUCOMTR-MCNC: 268 MG/DL (ref 70–99)
HOLD SPECIMEN: NORMAL
INR PPP: 1.65 (ref 0.85–1.15)

## 2024-04-20 PROCEDURE — 250N000013 HC RX MED GY IP 250 OP 250 PS 637

## 2024-04-20 PROCEDURE — 250N000011 HC RX IP 250 OP 636

## 2024-04-20 PROCEDURE — 258N000003 HC RX IP 258 OP 636

## 2024-04-20 PROCEDURE — 250N000013 HC RX MED GY IP 250 OP 250 PS 637: Performed by: INTERNAL MEDICINE

## 2024-04-20 PROCEDURE — 36592 COLLECT BLOOD FROM PICC: CPT | Performed by: INTERNAL MEDICINE

## 2024-04-20 PROCEDURE — 85610 PROTHROMBIN TIME: CPT | Performed by: INTERNAL MEDICINE

## 2024-04-20 PROCEDURE — 250N000009 HC RX 250: Performed by: INTERNAL MEDICINE

## 2024-04-20 PROCEDURE — 120N000009 HC R&B SNF

## 2024-04-20 RX ORDER — WARFARIN SODIUM 5 MG/1
10 TABLET ORAL
Status: COMPLETED | OUTPATIENT
Start: 2024-04-20 | End: 2024-04-20

## 2024-04-20 RX ORDER — SODIUM CHLORIDE 9 MG/ML
INJECTION, SOLUTION INTRAVENOUS
Status: COMPLETED
Start: 2024-04-20 | End: 2024-04-20

## 2024-04-20 RX ADMIN — GABAPENTIN 600 MG: 300 CAPSULE ORAL at 21:49

## 2024-04-20 RX ADMIN — INSULIN GLARGINE 16 UNITS: 100 INJECTION, SOLUTION SUBCUTANEOUS at 09:57

## 2024-04-20 RX ADMIN — LATANOPROST 1 DROP: 50 SOLUTION OPHTHALMIC at 21:49

## 2024-04-20 RX ADMIN — CEFAZOLIN SODIUM 2 G: 2 INJECTION, SOLUTION INTRAVENOUS at 13:43

## 2024-04-20 RX ADMIN — Medication 1 CAPSULE: at 21:49

## 2024-04-20 RX ADMIN — LEVOTHYROXINE SODIUM 88 MCG: 0.09 TABLET ORAL at 05:52

## 2024-04-20 RX ADMIN — PANTOPRAZOLE SODIUM 40 MG: 40 TABLET, DELAYED RELEASE ORAL at 17:42

## 2024-04-20 RX ADMIN — CYCLOBENZAPRINE 5 MG: 5 TABLET, FILM COATED ORAL at 22:02

## 2024-04-20 RX ADMIN — INSULIN ASPART 1 UNITS: 100 INJECTION, SOLUTION INTRAVENOUS; SUBCUTANEOUS at 09:58

## 2024-04-20 RX ADMIN — TIMOLOL MALEATE 1 DROP: 5 SOLUTION/ DROPS OPHTHALMIC at 21:49

## 2024-04-20 RX ADMIN — WARFARIN SODIUM 10 MG: 5 TABLET ORAL at 17:42

## 2024-04-20 RX ADMIN — PANTOPRAZOLE SODIUM 40 MG: 40 TABLET, DELAYED RELEASE ORAL at 05:53

## 2024-04-20 RX ADMIN — ACETAMINOPHEN 650 MG: 325 TABLET, FILM COATED ORAL at 13:42

## 2024-04-20 RX ADMIN — ACETAMINOPHEN 650 MG: 325 TABLET, FILM COATED ORAL at 21:49

## 2024-04-20 RX ADMIN — LOSARTAN POTASSIUM 75 MG: 50 TABLET, FILM COATED ORAL at 09:57

## 2024-04-20 RX ADMIN — PREDNISOLONE ACETATE 1 DROP: 10 SUSPENSION/ DROPS OPHTHALMIC at 09:59

## 2024-04-20 RX ADMIN — INSULIN ASPART 3 UNITS: 100 INJECTION, SOLUTION INTRAVENOUS; SUBCUTANEOUS at 13:43

## 2024-04-20 RX ADMIN — Medication 3 MG: at 21:49

## 2024-04-20 RX ADMIN — CEFAZOLIN SODIUM 2 G: 2 INJECTION, SOLUTION INTRAVENOUS at 21:48

## 2024-04-20 RX ADMIN — GABAPENTIN 600 MG: 300 CAPSULE ORAL at 09:55

## 2024-04-20 RX ADMIN — Medication 1 CAPSULE: at 09:56

## 2024-04-20 RX ADMIN — ATORVASTATIN CALCIUM 40 MG: 40 TABLET, FILM COATED ORAL at 09:56

## 2024-04-20 RX ADMIN — CEFAZOLIN SODIUM 2 G: 2 INJECTION, SOLUTION INTRAVENOUS at 05:52

## 2024-04-20 RX ADMIN — RIFAMPIN 300 MG: 300 CAPSULE ORAL at 21:49

## 2024-04-20 RX ADMIN — METOPROLOL SUCCINATE 100 MG: 50 TABLET, EXTENDED RELEASE ORAL at 09:56

## 2024-04-20 RX ADMIN — INSULIN ASPART 2 UNITS: 100 INJECTION, SOLUTION INTRAVENOUS; SUBCUTANEOUS at 17:42

## 2024-04-20 RX ADMIN — RIFAMPIN 300 MG: 300 CAPSULE ORAL at 09:56

## 2024-04-20 RX ADMIN — ASPIRIN 81 MG CHEWABLE TABLET 81 MG: 81 TABLET CHEWABLE at 09:55

## 2024-04-20 RX ADMIN — TIMOLOL MALEATE 1 DROP: 5 SOLUTION/ DROPS OPHTHALMIC at 09:59

## 2024-04-20 RX ADMIN — HYDROCHLOROTHIAZIDE 25 MG: 25 TABLET ORAL at 09:56

## 2024-04-20 RX ADMIN — THERA TABS 1 TABLET: TAB at 13:42

## 2024-04-20 RX ADMIN — ACETAMINOPHEN 650 MG: 325 TABLET, FILM COATED ORAL at 09:55

## 2024-04-20 RX ADMIN — SODIUM CHLORIDE 100 ML: 9 INJECTION, SOLUTION INTRAVENOUS at 13:43

## 2024-04-20 ASSESSMENT — ACTIVITIES OF DAILY LIVING (ADL)
ADLS_ACUITY_SCORE: 36
ADLS_ACUITY_SCORE: 36
ADLS_ACUITY_SCORE: 37
ADLS_ACUITY_SCORE: 36
ADLS_ACUITY_SCORE: 37
ADLS_ACUITY_SCORE: 36
ADLS_ACUITY_SCORE: 37
ADLS_ACUITY_SCORE: 37
ADLS_ACUITY_SCORE: 36
ADLS_ACUITY_SCORE: 37
ADLS_ACUITY_SCORE: 36

## 2024-04-20 NOTE — PLAN OF CARE
Vital stable .  Last night notes reviewed .  No nursing concerns brought forward  Labs ; none reported thus far today.  Continue prior treatment plan.

## 2024-04-20 NOTE — PLAN OF CARE
Goal Outcome Evaluation:         VS: Temp: 97.8  F (36.6  C) Temp src: Oral BP: 101/52 Pulse: 69   Resp: 16 SpO2: 100 % O2 Device: None (Room air)      O2: On RA   Output: Continent of B/B   Last BM: 4/20/24.   Activity: SBA WW/GB.   Skin: X posterior neck, cervical spine   Pain: Denies   CMS:  Neuro: Intact  A/O x4, able to make needs known   Dressing: Wound vac, PICC dressing CDI   Diet: Regular with fair appetite. BG checks with insulin coverage and CHO count.   LDA: R SL PICC, patent, infused antibiotics without difficulty, saline locked   Equipment: IV pole, WW, GB, Wound vac on posterior neck, continuous @ 75 mmHg   Plan: Con't POC.    Additional Info: Pt is calm, pleasant and cooperative with cares. Able to communicate needs. No issues of concern this shift.     Patient does not have new respiratory symptoms.  Patient does not have new sore throat.  Patient does not have a fever greater than 99.5.

## 2024-04-21 LAB
GLUCOSE BLDC GLUCOMTR-MCNC: 118 MG/DL (ref 70–99)
GLUCOSE BLDC GLUCOMTR-MCNC: 158 MG/DL (ref 70–99)
GLUCOSE BLDC GLUCOMTR-MCNC: 162 MG/DL (ref 70–99)
GLUCOSE BLDC GLUCOMTR-MCNC: 212 MG/DL (ref 70–99)
GLUCOSE BLDC GLUCOMTR-MCNC: 80 MG/DL (ref 70–99)
INR PPP: 1.9 (ref 0.85–1.15)

## 2024-04-21 PROCEDURE — 250N000011 HC RX IP 250 OP 636

## 2024-04-21 PROCEDURE — 250N000013 HC RX MED GY IP 250 OP 250 PS 637: Performed by: INTERNAL MEDICINE

## 2024-04-21 PROCEDURE — 120N000009 HC R&B SNF

## 2024-04-21 PROCEDURE — 85610 PROTHROMBIN TIME: CPT | Performed by: INTERNAL MEDICINE

## 2024-04-21 PROCEDURE — 250N000013 HC RX MED GY IP 250 OP 250 PS 637

## 2024-04-21 PROCEDURE — 36415 COLL VENOUS BLD VENIPUNCTURE: CPT | Performed by: INTERNAL MEDICINE

## 2024-04-21 RX ORDER — WARFARIN SODIUM 3 MG/1
9 TABLET ORAL
Status: COMPLETED | OUTPATIENT
Start: 2024-04-21 | End: 2024-04-21

## 2024-04-21 RX ADMIN — PANTOPRAZOLE SODIUM 40 MG: 40 TABLET, DELAYED RELEASE ORAL at 06:01

## 2024-04-21 RX ADMIN — METOPROLOL SUCCINATE 100 MG: 50 TABLET, EXTENDED RELEASE ORAL at 20:47

## 2024-04-21 RX ADMIN — CALCIUM CARBONATE (ANTACID) CHEW TAB 500 MG 500 MG: 500 CHEW TAB at 20:57

## 2024-04-21 RX ADMIN — ACETAMINOPHEN 650 MG: 325 TABLET, FILM COATED ORAL at 13:28

## 2024-04-21 RX ADMIN — ASPIRIN 81 MG CHEWABLE TABLET 81 MG: 81 TABLET CHEWABLE at 08:56

## 2024-04-21 RX ADMIN — TIMOLOL MALEATE 1 DROP: 5 SOLUTION/ DROPS OPHTHALMIC at 20:56

## 2024-04-21 RX ADMIN — WARFARIN SODIUM 9 MG: 3 TABLET ORAL at 17:51

## 2024-04-21 RX ADMIN — CEFAZOLIN SODIUM 2 G: 2 INJECTION, SOLUTION INTRAVENOUS at 12:41

## 2024-04-21 RX ADMIN — CEFAZOLIN SODIUM 2 G: 2 INJECTION, SOLUTION INTRAVENOUS at 20:46

## 2024-04-21 RX ADMIN — PANTOPRAZOLE SODIUM 40 MG: 40 TABLET, DELAYED RELEASE ORAL at 16:47

## 2024-04-21 RX ADMIN — INSULIN ASPART 2 UNITS: 100 INJECTION, SOLUTION INTRAVENOUS; SUBCUTANEOUS at 16:53

## 2024-04-21 RX ADMIN — INSULIN GLARGINE 14 UNITS: 100 INJECTION, SOLUTION SUBCUTANEOUS at 09:24

## 2024-04-21 RX ADMIN — ATORVASTATIN CALCIUM 40 MG: 40 TABLET, FILM COATED ORAL at 08:53

## 2024-04-21 RX ADMIN — LOSARTAN POTASSIUM 75 MG: 50 TABLET, FILM COATED ORAL at 08:55

## 2024-04-21 RX ADMIN — GABAPENTIN 600 MG: 300 CAPSULE ORAL at 08:54

## 2024-04-21 RX ADMIN — TIMOLOL MALEATE 1 DROP: 5 SOLUTION/ DROPS OPHTHALMIC at 08:54

## 2024-04-21 RX ADMIN — RIFAMPIN 300 MG: 300 CAPSULE ORAL at 08:54

## 2024-04-21 RX ADMIN — Medication 3 MG: at 21:00

## 2024-04-21 RX ADMIN — THERA TABS 1 TABLET: TAB at 12:41

## 2024-04-21 RX ADMIN — HYDROCHLOROTHIAZIDE 25 MG: 25 TABLET ORAL at 08:56

## 2024-04-21 RX ADMIN — PREDNISOLONE ACETATE 1 DROP: 10 SUSPENSION/ DROPS OPHTHALMIC at 08:57

## 2024-04-21 RX ADMIN — METOPROLOL SUCCINATE 100 MG: 50 TABLET, EXTENDED RELEASE ORAL at 08:53

## 2024-04-21 RX ADMIN — RIFAMPIN 300 MG: 300 CAPSULE ORAL at 20:47

## 2024-04-21 RX ADMIN — ACETAMINOPHEN 650 MG: 325 TABLET, FILM COATED ORAL at 20:47

## 2024-04-21 RX ADMIN — LEVOTHYROXINE SODIUM 88 MCG: 0.09 TABLET ORAL at 06:01

## 2024-04-21 RX ADMIN — CEFAZOLIN SODIUM 2 G: 2 INJECTION, SOLUTION INTRAVENOUS at 06:01

## 2024-04-21 RX ADMIN — Medication 1 CAPSULE: at 08:54

## 2024-04-21 RX ADMIN — GABAPENTIN 600 MG: 300 CAPSULE ORAL at 20:47

## 2024-04-21 RX ADMIN — ACETAMINOPHEN 650 MG: 325 TABLET, FILM COATED ORAL at 08:54

## 2024-04-21 RX ADMIN — Medication 1 CAPSULE: at 20:47

## 2024-04-21 RX ADMIN — INSULIN ASPART 1 UNITS: 100 INJECTION, SOLUTION INTRAVENOUS; SUBCUTANEOUS at 12:41

## 2024-04-21 RX ADMIN — LATANOPROST 1 DROP: 50 SOLUTION OPHTHALMIC at 21:00

## 2024-04-21 NOTE — PLAN OF CARE
Vital stable .  Last night notes reviewed .  No nursing concerns brought forward  Labs ; none reported thus far today.  Reduce lantus by 2 units . Am glucose low  Continue prior treatment plan.

## 2024-04-21 NOTE — PHARMACY-MEDICATION REGIMEN REVIEW
Pharmacy Medication Regimen Review  Lj Castro is a 78 year old female who is currently in the Transitional Care Unit.    Assessment: Upon review of the medications and patient chart the following irregularities were found:   Medications that require additional monitoring include: warfarin, pharmacy assessing daily   Significant Drug Interactions:   Warfarin and rifampin: anticoagulant effect of Vitamin K Antagonists may be significantly decreased by Rifamycins.   Warfarin and doxycycline: hypoprothrombinemic effects of Anticoagulants may be increased by Tetracyclines. Bleeding may occur.   Warfarin and aspirin: risk of bleeding, particularly gastrointestinal, may be increased by coadministration of Anticoagulants with Salicylates. However, use of low-dose aspirin with Anticoagulants may provide benefit that outweighs the risk of minor bleeding.     Antibiotics will switch from Ancef to doxycycline on 4/25/24 (orders in).     Plan:   Continue current medication regimen.     Attending provider will be sent this note for review.  If there are any emergent issues noted above, pharmacist will contact provider directly by phone.      Pharmacy will periodically review the resident's medication regimen for any PRN medications not administered in > 72 hours and discontinue them. The pharmacist will discuss gradual dose reductions of psychopharmacologic medications with interdisciplinary team on a regular basis.    Please contact pharmacy if the above does not answer specific medication questions/concerns.    Background:  A pharmacist has reviewed all medications and pertinent medical history today.  Medications were reviewed for appropriate use and any irregularities found are listed with recommendations.      Current Facility-Administered Medications:     acetaminophen (TYLENOL) tablet 650 mg, 650 mg, Oral, TID, Mandy Simms MD, 650 mg at 04/21/24 0854    acetaminophen (TYLENOL) tablet 650 mg, 650 mg, Oral, Daily  PRN, Mandy Simms MD, 650 mg at 04/14/24 1318    aspirin (ASA) chewable tablet 81 mg, 81 mg, Oral, Daily, Ela House MD, 81 mg at 04/21/24 0856    atorvastatin (LIPITOR) tablet 40 mg, 40 mg, Oral, Daily, Mandy Simms MD, 40 mg at 04/21/24 0853    calcium carbonate (TUMS) chewable tablet 500 mg, 500 mg, Oral, Daily PRN, Dale Ulloa MD, 500 mg at 04/06/24 1335    ceFAZolin (ANCEF) 2 g in 100 mL D5W intermittent infusion, 2 g, Intravenous, Q8H, Giovana Gr DO, Last Rate: 200 mL/hr at 04/21/24 0601, 2 g at 04/21/24 0601    cyclobenzaprine (FLEXERIL) tablet 5 mg, 5 mg, Oral, TID PRN, Mandy Simms MD, 5 mg at 04/20/24 2202    glucose gel 15-30 g, 15-30 g, Oral, Q15 Min PRN **OR** dextrose 50 % injection 25-50 mL, 25-50 mL, Intravenous, Q15 Min PRN **OR** glucagon injection 1 mg, 1 mg, Subcutaneous, Q15 Min PRN, Mandy Simms MD    [START ON 4/25/2024] doxycycline hyclate (VIBRAMYCIN) capsule 100 mg, 100 mg, Oral, Q12H JOSEPHINE (08/20), Giovana Gr DO    gabapentin (NEURONTIN) capsule 600 mg, 600 mg, Oral, BID, Mandy Simms MD, 600 mg at 04/21/24 0854    hydrochlorothiazide (HYDRODIURIL) tablet 25 mg, 25 mg, Oral, Daily, Ela House MD, 25 mg at 04/21/24 0856    HYDROcodone-acetaminophen (NORCO) 5-325 MG per tablet 1 tablet, 1 tablet, Oral, Q4H PRN, Mandy Simms MD, 1 tablet at 04/18/24 2122    HYDROmorphone (DILAUDID) half-tab 1 mg, 1 mg, Oral, Daily PRN, Mandy Simms MD, 1 mg at 04/05/24 2025    insulin aspart (NovoLOG) injection (RAPID ACTING), 1-7 Units, Subcutaneous, TID AC, Mandy Simms MD, 2 Units at 04/20/24 1742    insulin aspart (NovoLOG) injection (RAPID ACTING), 1-5 Units, Subcutaneous, At Bedtime, Mandy Simms MD, 1 Units at 04/19/24 2138    insulin aspart (NovoLOG) injection (RAPID ACTING), , Subcutaneous, Daily with breakfast, Mandy Simms MD, 2 Units at 04/19/24 1024    insulin aspart (NovoLOG) injection (RAPID ACTING), , Subcutaneous, Daily with lunch, Mandy Simms MD,  Given at 04/17/24 1312    insulin aspart (NovoLOG) injection (RAPID ACTING), , Subcutaneous, Daily with supper, Mandy Simms MD, 3 Units at 04/20/24 1743    insulin glargine (LANTUS PEN) injection 14 Units, 14 Units, Subcutaneous, QAM AC, Mandy Simms MD, 14 Units at 04/21/24 0924    ketoconazole (NIZORAL) 2 % shampoo, , Topical, Daily PRN, Mandy Simms MD    lactobacillus rhamnosus (GG) (CULTURELL) capsule 1 capsule, 1 capsule, Oral, BID, Mandy Simms MD, 1 capsule at 04/21/24 0854    latanoprost (XALATAN) 0.005 % ophthalmic solution 1 drop, 1 drop, Both Eyes, At Bedtime, Mandy Simms MD, 1 drop at 04/20/24 2149    levothyroxine (SYNTHROID/LEVOTHROID) tablet 88 mcg, 88 mcg, Oral, QAM AC, Mandy Simms MD, 88 mcg at 04/21/24 0601    loperamide (IMODIUM) capsule 2 mg, 2 mg, Oral, 4x Daily PRN, Mandy Simms MD, 2 mg at 04/19/24 2128    losartan (COZAAR) tablet 75 mg, 75 mg, Oral, Daily, Mandy Simms MD, 75 mg at 04/21/24 0855    melatonin tablet 3 mg, 3 mg, Oral, At Bedtime, Mandy Simms MD, 3 mg at 04/20/24 2149    metoprolol succinate ER (TOPROL XL) 24 hr tablet 100 mg, 100 mg, Oral, BID, Mandy Simms MD, 100 mg at 04/21/24 0853    multivitamin, therapeutic (THERA-VIT) tablet 1 tablet, 1 tablet, Oral, Daily, Mandy Simms MD, 1 tablet at 04/20/24 1342    naloxone (NARCAN) injection 0.2 mg, 0.2 mg, Intravenous, Q2 Min PRN **OR** naloxone (NARCAN) injection 0.4 mg, 0.4 mg, Intravenous, Q2 Min PRN **OR** naloxone (NARCAN) injection 0.2 mg, 0.2 mg, Intramuscular, Q2 Min PRN **OR** naloxone (NARCAN) injection 0.4 mg, 0.4 mg, Intramuscular, Q2 Min PRN, Mandy Simms MD    Nurse may request from Pharmacy a change of form of medication (e.g. Liquid to tablet)., , Does not apply, Continuous PRN, Mandy Simms MD    pantoprazole (PROTONIX) EC tablet 40 mg, 40 mg, Oral, BID AC, Mandy Simms MD, 40 mg at 04/21/24 0601    Patient is already receiving anticoagulation with heparin, enoxaparin (LOVENOX), warfarin  (COUMADIN)  or other anticoagulant medication, , Does not apply, Continuous PRN, Mandy Simms MD    prednisoLONE acetate (PRED FORTE) 1 % ophthalmic susp 1 drop, 1 drop, Left Eye, Daily, Mandy Simms MD, 1 drop at 04/21/24 0857    rifampin (RIFADIN) capsule 300 mg, 300 mg, Oral, Q12H JOSEPHINE (08/20), Giovana Gr S, DO, 300 mg at 04/21/24 0854    sodium chloride (PF) 0.9% PF flush 10-20 mL, 10-20 mL, Intracatheter, q1 min prn, Mandy Simms MD, 20 mL at 04/20/24 0537    sodium chloride (PF) 0.9% PF flush 10-40 mL, 10-40 mL, Intracatheter, Q8H, Mandy Simms MD, 10 mL at 04/21/24 0601    timolol maleate (TIMOPTIC) 0.5 % ophthalmic solution 1 drop, 1 drop, Both Eyes, BID, Mandy Simms MD, 1 drop at 04/21/24 0854    warfarin ANTICOAGULANT (COUMADIN) tablet 9 mg, 9 mg, Oral, ONCE at 18:00, Mandy Simms MD    Warfarin Dose Required Daily - Pharmacist Managed, 1 each, Oral, See Admin Instructions, Ela House MD  No current outpatient prescriptions on file.   PMH: HTN, HLD, hypothyroid, DM2, PN, CKD3 (baseline cr about 1.15-1.35), AFib-on DOAC, chronic neck and back pain, neurogenic claudication, PAD, and GERD, HF (LVEF 55%)

## 2024-04-22 LAB
ALBUMIN SERPL BCG-MCNC: 3.8 G/DL (ref 3.5–5.2)
ALP SERPL-CCNC: 103 U/L (ref 40–150)
ALT SERPL W P-5'-P-CCNC: 6 U/L (ref 0–50)
ANION GAP SERPL CALCULATED.3IONS-SCNC: 13 MMOL/L (ref 7–15)
AST SERPL W P-5'-P-CCNC: 25 U/L (ref 0–45)
BASOPHILS # BLD AUTO: 0 10E3/UL (ref 0–0.2)
BASOPHILS NFR BLD AUTO: 1 %
BILIRUB SERPL-MCNC: 0.4 MG/DL
BUN SERPL-MCNC: 19.1 MG/DL (ref 8–23)
CALCIUM SERPL-MCNC: 9.2 MG/DL (ref 8.8–10.2)
CHLORIDE SERPL-SCNC: 97 MMOL/L (ref 98–107)
CREAT SERPL-MCNC: 0.66 MG/DL (ref 0.51–0.95)
CRP SERPL-MCNC: 14.74 MG/L
DEPRECATED HCO3 PLAS-SCNC: 24 MMOL/L (ref 22–29)
EGFRCR SERPLBLD CKD-EPI 2021: 89 ML/MIN/1.73M2
EOSINOPHIL # BLD AUTO: 0.1 10E3/UL (ref 0–0.7)
EOSINOPHIL NFR BLD AUTO: 2 %
ERYTHROCYTE [DISTWIDTH] IN BLOOD BY AUTOMATED COUNT: 15.5 % (ref 10–15)
ERYTHROCYTE [SEDIMENTATION RATE] IN BLOOD BY WESTERGREN METHOD: 46 MM/HR (ref 0–30)
GLUCOSE BLDC GLUCOMTR-MCNC: 122 MG/DL (ref 70–99)
GLUCOSE BLDC GLUCOMTR-MCNC: 143 MG/DL (ref 70–99)
GLUCOSE BLDC GLUCOMTR-MCNC: 163 MG/DL (ref 70–99)
GLUCOSE BLDC GLUCOMTR-MCNC: 168 MG/DL (ref 70–99)
GLUCOSE BLDC GLUCOMTR-MCNC: 253 MG/DL (ref 70–99)
GLUCOSE SERPL-MCNC: 181 MG/DL (ref 70–99)
HCT VFR BLD AUTO: 33.8 % (ref 35–47)
HGB BLD-MCNC: 10.5 G/DL (ref 11.7–15.7)
IMM GRANULOCYTES # BLD: 0.1 10E3/UL
IMM GRANULOCYTES NFR BLD: 1 %
INR PPP: 1.93 (ref 0.85–1.15)
LYMPHOCYTES # BLD AUTO: 1 10E3/UL (ref 0.8–5.3)
LYMPHOCYTES NFR BLD AUTO: 20 %
MCH RBC QN AUTO: 29.6 PG (ref 26.5–33)
MCHC RBC AUTO-ENTMCNC: 31.1 G/DL (ref 31.5–36.5)
MCV RBC AUTO: 95 FL (ref 78–100)
MONOCYTES # BLD AUTO: 0.3 10E3/UL (ref 0–1.3)
MONOCYTES NFR BLD AUTO: 6 %
NEUTROPHILS # BLD AUTO: 3.6 10E3/UL (ref 1.6–8.3)
NEUTROPHILS NFR BLD AUTO: 70 %
NRBC # BLD AUTO: 0 10E3/UL
NRBC BLD AUTO-RTO: 0 /100
PLATELET # BLD AUTO: 281 10E3/UL (ref 150–450)
POTASSIUM SERPL-SCNC: 4.7 MMOL/L (ref 3.4–5.3)
PROT SERPL-MCNC: 7 G/DL (ref 6.4–8.3)
RBC # BLD AUTO: 3.55 10E6/UL (ref 3.8–5.2)
SODIUM SERPL-SCNC: 134 MMOL/L (ref 135–145)
WBC # BLD AUTO: 5.1 10E3/UL (ref 4–11)

## 2024-04-22 PROCEDURE — 120N000009 HC R&B SNF

## 2024-04-22 PROCEDURE — 97605 NEG PRS WND THER DME<=50SQCM: CPT

## 2024-04-22 PROCEDURE — 36592 COLLECT BLOOD FROM PICC: CPT | Performed by: INTERNAL MEDICINE

## 2024-04-22 PROCEDURE — 99309 SBSQ NF CARE MODERATE MDM 30: CPT | Performed by: INTERNAL MEDICINE

## 2024-04-22 PROCEDURE — 85004 AUTOMATED DIFF WBC COUNT: CPT

## 2024-04-22 PROCEDURE — 86140 C-REACTIVE PROTEIN: CPT | Performed by: INTERNAL MEDICINE

## 2024-04-22 PROCEDURE — 250N000013 HC RX MED GY IP 250 OP 250 PS 637: Performed by: INTERNAL MEDICINE

## 2024-04-22 PROCEDURE — 258N000003 HC RX IP 258 OP 636

## 2024-04-22 PROCEDURE — 250N000011 HC RX IP 250 OP 636

## 2024-04-22 PROCEDURE — 80053 COMPREHEN METABOLIC PANEL: CPT | Performed by: INTERNAL MEDICINE

## 2024-04-22 PROCEDURE — 85610 PROTHROMBIN TIME: CPT | Performed by: INTERNAL MEDICINE

## 2024-04-22 PROCEDURE — 250N000013 HC RX MED GY IP 250 OP 250 PS 637

## 2024-04-22 PROCEDURE — 85652 RBC SED RATE AUTOMATED: CPT

## 2024-04-22 RX ORDER — WARFARIN SODIUM 5 MG/1
10 TABLET ORAL
Status: COMPLETED | OUTPATIENT
Start: 2024-04-22 | End: 2024-04-22

## 2024-04-22 RX ORDER — SODIUM CHLORIDE 9 MG/ML
INJECTION, SOLUTION INTRAVENOUS
Status: COMPLETED
Start: 2024-04-22 | End: 2024-04-22

## 2024-04-22 RX ADMIN — ACETAMINOPHEN 650 MG: 325 TABLET, FILM COATED ORAL at 07:59

## 2024-04-22 RX ADMIN — INSULIN ASPART 1 UNITS: 100 INJECTION, SOLUTION INTRAVENOUS; SUBCUTANEOUS at 17:29

## 2024-04-22 RX ADMIN — RIFAMPIN 300 MG: 300 CAPSULE ORAL at 08:00

## 2024-04-22 RX ADMIN — HYDROCHLOROTHIAZIDE 25 MG: 25 TABLET ORAL at 07:59

## 2024-04-22 RX ADMIN — TIMOLOL MALEATE 1 DROP: 5 SOLUTION/ DROPS OPHTHALMIC at 08:04

## 2024-04-22 RX ADMIN — GABAPENTIN 600 MG: 300 CAPSULE ORAL at 20:46

## 2024-04-22 RX ADMIN — CEFAZOLIN SODIUM 2 G: 2 INJECTION, SOLUTION INTRAVENOUS at 13:22

## 2024-04-22 RX ADMIN — LOSARTAN POTASSIUM 75 MG: 50 TABLET, FILM COATED ORAL at 07:59

## 2024-04-22 RX ADMIN — ASPIRIN 81 MG CHEWABLE TABLET 81 MG: 81 TABLET CHEWABLE at 07:58

## 2024-04-22 RX ADMIN — TIMOLOL MALEATE 1 DROP: 5 SOLUTION/ DROPS OPHTHALMIC at 20:45

## 2024-04-22 RX ADMIN — INSULIN GLARGINE 14 UNITS: 100 INJECTION, SOLUTION SUBCUTANEOUS at 09:24

## 2024-04-22 RX ADMIN — Medication 1 CAPSULE: at 20:46

## 2024-04-22 RX ADMIN — THERA TABS 1 TABLET: TAB at 12:23

## 2024-04-22 RX ADMIN — PANTOPRAZOLE SODIUM 40 MG: 40 TABLET, DELAYED RELEASE ORAL at 17:27

## 2024-04-22 RX ADMIN — METOPROLOL SUCCINATE 100 MG: 50 TABLET, EXTENDED RELEASE ORAL at 20:46

## 2024-04-22 RX ADMIN — LATANOPROST 1 DROP: 50 SOLUTION OPHTHALMIC at 20:45

## 2024-04-22 RX ADMIN — ATORVASTATIN CALCIUM 40 MG: 40 TABLET, FILM COATED ORAL at 07:58

## 2024-04-22 RX ADMIN — ACETAMINOPHEN 650 MG: 325 TABLET, FILM COATED ORAL at 13:21

## 2024-04-22 RX ADMIN — CEFAZOLIN SODIUM 2 G: 2 INJECTION, SOLUTION INTRAVENOUS at 20:48

## 2024-04-22 RX ADMIN — WARFARIN SODIUM 10 MG: 5 TABLET ORAL at 17:29

## 2024-04-22 RX ADMIN — GABAPENTIN 600 MG: 300 CAPSULE ORAL at 08:00

## 2024-04-22 RX ADMIN — LEVOTHYROXINE SODIUM 88 MCG: 0.09 TABLET ORAL at 05:25

## 2024-04-22 RX ADMIN — INSULIN ASPART 3 UNITS: 100 INJECTION, SOLUTION INTRAVENOUS; SUBCUTANEOUS at 12:23

## 2024-04-22 RX ADMIN — INSULIN ASPART 1 UNITS: 100 INJECTION, SOLUTION INTRAVENOUS; SUBCUTANEOUS at 09:23

## 2024-04-22 RX ADMIN — SODIUM CHLORIDE 500 ML: 9 INJECTION, SOLUTION INTRAVENOUS at 06:36

## 2024-04-22 RX ADMIN — PANTOPRAZOLE SODIUM 40 MG: 40 TABLET, DELAYED RELEASE ORAL at 05:24

## 2024-04-22 RX ADMIN — Medication 1 CAPSULE: at 08:00

## 2024-04-22 RX ADMIN — RIFAMPIN 300 MG: 300 CAPSULE ORAL at 20:46

## 2024-04-22 RX ADMIN — ACETAMINOPHEN 650 MG: 325 TABLET, FILM COATED ORAL at 20:45

## 2024-04-22 RX ADMIN — METOPROLOL SUCCINATE 100 MG: 50 TABLET, EXTENDED RELEASE ORAL at 08:00

## 2024-04-22 RX ADMIN — Medication 3 MG: at 20:46

## 2024-04-22 RX ADMIN — PREDNISOLONE ACETATE 1 DROP: 10 SUSPENSION/ DROPS OPHTHALMIC at 08:03

## 2024-04-22 RX ADMIN — CEFAZOLIN SODIUM 2 G: 2 INJECTION, SOLUTION INTRAVENOUS at 05:24

## 2024-04-22 ASSESSMENT — ACTIVITIES OF DAILY LIVING (ADL)
ADLS_ACUITY_SCORE: 36

## 2024-04-22 NOTE — PROGRESS NOTES
[facility]    Medicine Progress Note - Hospitalist Service    Date of Admission:  3/20/2024    Assessment & Plan      Expand All Collapse All    [facility]     Medicine Progress Note - Hospitalist Service     Date of Admission:  3/20/2024        Assessment & Plan  Patient is a 79 y/o woman who has a past medical history significant for hypertension, hyperlipidemia, hypothyroidism, diabetes mellitus type II, peripheral neuropathy, chronic kidney disease stage III, atrial fibrillation, chronic neck pain, chronic back pain and GERD. 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      Patient presented to [facility] on 12-Mar-2024 with increasing pain in the neck and gradual weakness to the arms and legs. Patient was found to have severe sepsis due to MSSA from deep space infection in the posterior cervical spine resulting in MSSA bacteremia. Patient underwent irrigation and debridement and wound vac application to posterior cervical spine on 13-Mar-2024 for deep wound infection posterior cervical spine down to hardware. Patient was discharged to TCU on 20-Mar-2024.     4/22  INR 1.93  Pharmacy adjusting warfarin dosing   Patient will be discharging home on 4/25 with wound vac. Paper work completed for it .  She has follow up with Dr Alexis in ID on 4/30 , will discharge on Doxy and Rifampin and sees ortho spine 4/29 . This is not in epic but per patient report as these providers are outside system.  Remove PICC on discharge      P:  1.) MSSA deep space infection in the posterior cervical spine; MSSA bacteremia:  - Patient to remain on IV cefazoline until 24-Apr-2024. Patient to be  transitioned to PO doxycycline 100 mg twice daily on 25-Apr-2024.  - Patient also on rifampin 300 mg twice daily indefinitely.   - CBC with differential, CMP, CRP and ESR being monitored weekly.  - Wound vac being changed every Monday, Wednesday and Friday.     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   - Patient on metoprolol succinate 100 mg twice daily.  - Echocardiogram on 13-Mar-2024 showed LVEF 55%.  - Patient had been on eliquis for anticoagulation in the past but this has been discontinued with initiation of rifampin. Patient now on coumadin for anticoagulation with goal INR 2-3.     4.) Moderate to severe tricuspid regurgitation:  - Monitoring for symptoms.  - Further monitoring as outpatient.     5.) Chronic kidney disease stage III, appears to be stage IIIa:  - Creatinine on 08-Apr-2024 was 0.69, lower that reported baseline creatinine of approximately 1.1 to 1.5.  - CTM     6.) Acute toxic metabolic encephalopathy secondary to MSSA sepsis, resolved:  -  This has resolved     7.) Diabetes mellitus type II with long-term use of insulin:  - Patient had been on lantus 18 units at bedtime and novolog 3 units with meals as outpatient.   - Patient currently on lantus 14 units daily and novlog 1 unit per 15 gm carbohydrates with meals.   - Patient being monitored on insulin sliding scale.     8.) Hypertension:  - Patient on losartan 75 mg daily and metoprolol succinate 100 mg twice daily and hctz  - Monitoring.     9.) Peripheral arterial disease:  - Patient had undergone left external iliac, left common femoral, superficial femoral, profunda femoris endarterectomy with Dacron patch closure on 11-Feb-2021.  - Patient on aspirin      10.) GERD:  - Patient on protonix 40 mg twice daily.     11.) Hypothyroidism:  - Patient on levothyroxine 88 mcg daily.  - On 05-Apr-2024, TSH  1.50.     12.) Hyperlipidemia:  - Patient on atorvastatin 40 mg daily.     13.) Physical deconditioning secondary to acute illness:  - PT/OT.     14.) Severe malnutrition in the context of acute on chronic illness:  - Supplementing as able.                    Diet: Regular Diet Adult  Snacks/Supplements Adult: Glucerna; Between Meals    DVT Prophylaxis: Warfarin  Winston Catheter: Not present  Lines: PRESENT      PICC 03/17/24 Single Lumen Right Brachial vein lateral antibiotics-Site Assessment: WDL      Cardiac Monitoring: None  Code Status: Full Code      Clinically Significant Risk Factors              # Hypoalbuminemia: Lowest albumin = 2.9 g/dL at 4/8/2024  6:55 AM, will monitor as appropriate             # Severe Malnutrition: based on nutrition assessment           Disposition Plan     Medically Ready for Discharge: Anticipated in 2-4 Days             Mandy Simms MD  Hospitalist Service  [facility]  Securely message with [employer] (more info)  Text page via [employer] Paging/Directory   ______________________________________________________________________    Interval History   No new symptoms reported per nursing staff .  No chest pain or Shortness of breath reported.  No Fever   No vomiting   No difficulty with voiding   Passing gas .  Tolerating diet     4 system ROS reviewed .     Physical Exam   Vital Signs: Temp: 97.7  F (36.5  C) Temp src: Oral BP: 134/76 Pulse: 77   Resp: 16 SpO2: 99 % O2 Device: None (Room air)    Weight: 167 lbs 12.8 oz    General Appearance: Alert and interactive  Respiratory: clear BL  Cardiovascular: s1 ands 2  GI: soft , NT  , Bs positive  Skin: woudn vac cervical spine      Medical Decision Making       35 MINUTES SPENT BY ME on the date of service doing chart review, history, exam, documentation & further activities per the note.      Data     I have personally reviewed the following data over the past 24 hrs:    5.1  \   10.5 (L)   / 281     N/A N/A N/A  /  168 (H)   N/A N/A N/A \     INR:  1.93 (H) PTT:  N/A   D-dimer:  N/A Fibrinogen:  N/A

## 2024-04-22 NOTE — PROGRESS NOTES
CLINICAL NUTRITION SERVICES - REASSESSMENT NOTE     Nutrition Prescription    RECOMMENDATIONS FOR MDs/PROVIDERS TO ORDER:  Encourage intakes, use of supplements with meals    Malnutrition Status:    Severe malnutrition in the context of acute on chronic illness    Recommendations already ordered by Registered Dietitian (RD):  Sent trial of Cleopatra Mack 1.0 Standard  Changed Glucerna to with meals    Future/Additional Recommendations:  Monitor labs, intakes, and weight trends.     EVALUATION OF THE PROGRESS TOWARD GOALS   Diet: Regular  Intake/Tolernace: 75 - 100% of documented meals  Snacks/supplements: Glucerna BID    Pt ordering (on average) 545 kcal and 23 g protein per day per [employer]. Supplements add 440 kcal and 20 g protein daily. With documented intakes, pt is likely meeting ~50% minimum energy and 60-70% protein needs.     NEW FINDINGS/REVIEW OF SYSTEMS    Nutrition/GI: RD met with pt at bedside. Pt reports she has been eating similarly to how much she normally eats this week. Explained pt likely not getting enough calories and protein. Pt states she has been drinking the Glucerna however this has been spiking her blood sugars. Discussed options including changing supplement or changing timing of supplements. Pt agreeable to either option. Discussed trying timing first and then determining if there needs to be a change in type of supplement.     Weights: Pt with limited weight history prior to admission,with 34 lb (17%) weight loss over 1 month, 12 lb (6.6%) weight loss in 6 months. Pt with 24 lb (12%) weight loss in < 1 month since admission to TCU, likely both fluid and true weight loss.   04/17/24  76.1 kg (167 lb 12.8 oz)  04/12/24  79.6 kg (175 lb 6.4 oz)  04/08/24  82.6 kg (182 lb 3.2 oz)  04/04/24  86 kg (189 lb 11.2 oz)   04/02/24  87.9 kg (193 lb 12.8 oz)  03/26/24  87.1 kg (192 lb 1.6 oz)  03/19/24 92.9 kg (204 lb 12.9 oz)   03/18/24 91.7 kg (202 lb 3.2 oz)   11/06/23 81.7 kg (180 lb 1.6 oz)    11/02/23 81.7 kg (180 lb 1.6 oz)   10/30/23 81.2 kg (179 lb 1.6 oz)   10/23/23 81.2 kg (179 lb)   10/20/23 81.2 kg (179 lb)   10/18/23 81.2 kg (179 lb)   10/13/23 81.6 kg (179 lb 14.3 oz)     Skin: Surgical wound improving per WOC nurse note 4/22    Labs reviewed   04/01/24 07:51 04/05/24 07:38 04/08/24 06:55 04/15/24 05:24 04/22/24 08:30   Sodium 139  140 136 134 (L)   Potassium 4.1  3.5 3.5 4.7   Urea Nitrogen 14.1  8.6 12.1 19.1   Creatinine 0.76  0.69 0.59 0.66   Albumin 2.9 (L)  2.9 (L) 3.0 (L) 3.8   Alkaline Phosphatase 92  112 91 103   ALT <5  <5 <5 6   AST 16  16 16 25   Bilirubin Total 0.3  0.5 0.3 0.4   CRP Inflammation 35.14 (H) 24.53 (H) 18.41 (H) 8.14 (H) 14.74 (H)   Glucose 136 (H)  132 (H) 173 (H) 181 (H)      Medications reviewed: hydrochlorothiazide, insulin (novolog, lantus), culturell, levothyroxine, multivitamin, protonix, warfarin, TUMS PRN  IV Fluids over last 7 days: No    MALNUTRITION  % Intake: </= 50% for >/= 5 days (severe)  % Weight Loss: >5% in 1 month (severe)  - likely both fluid and true weight loss  Subcutaneous Fat Loss: Global mild   Muscle Loss: Global moderate  Fluid Accumulation/Edema: Trace-moderate  Malnutrition Diagnosis: Severe malnutrition in the context of acute on chronic illness    Previous Goals   Patient to consume % of nutritionally adequate meal trays TID, or the equivalent with supplements/snacks.  Evaluation: Not met    Previous Nutrition Diagnosis  Inadequate oral intake related to decreased appetite as evidenced by continued weight loss    Evaluation: No change    CURRENT NUTRITION DIAGNOSIS  Inadequate oral intake related to decreased appetite as evidenced by continued weight loss      INTERVENTIONS  Implementation  Nutrition education for recommended modifications   Medical food supplement therapy - changed timing, sent trial of MobSmith Standard 1.0     Goals  Patient to consume % of nutritionally adequate meal trays TID, or the equivalent with  supplements/snacks.    Monitoring/Evaluation  Progress toward goals will be monitored and evaluated per protocol.    Debbie Arndt MS, RDN, LD  TCU/OB/Ortho Clinical Dietitian  Available via phone and Vocera  Phone: [phone]  Vocera: TCU Clinical Dietitian  Weekend/Holiday Vocera: Weekend Holiday Clinical Dietitian [Multi Site Groups]

## 2024-04-22 NOTE — PROGRESS NOTES
[facility] Nurse Inpatient Assessment     Consulted for: Neck    Summary: Patient s/p I&D on 3/13/24 of posterior neck for deep cervical infection down to the hardware with wound VAC placement.     4/1: Message sent to Dr Sagar Cadena surgeon to request increase in wound vac settings from -50 mmHg to -75 mmHg. (No response - will wait for return to clinic)  4/10: received message from Dr Cadena: OK to increase suction to -75mmHg    Patient History (according to provider note(s):      Lj Castro is a 78 year old female admitted on 3/12/2024. 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  She came to the ER complaining that for the past 10 days he had increasing pain in the neck and gradual weakness to the arms and legs to the point that she was unable to ambulate.  She also had right arm pain  over the past 3 to 4 days along with confusion.  Her arm pain was 9 out of 10.  There have been no recent falls or shortness of breath or nausea or vomiting.  She was brought in by EMS.  CT scan of the neck showed a deep space infection with gas formation and possible cord compression.  She was given Kcentra and taken to the operating room.  She underwent I&D with wound VAC application along with IntraOp culture and IV antibiotics.  She underwent general endotracheal anesthesia.  Operative course was significant for hypotension requiring central line and pressor support and so she was admitted to the ICU.       Assessment:      Areas visualized during today's visit: Focused:    Negative pressure wound therapy applied to: Posterior neck   Last photo: 3/20/24     3/20     Wound  due to: Surgical Wound   Wound history/plan of care:    Surgical date: 3/13/24   Service following: Spinal surgery  Date Negative Pressure Wound Therapy initiated: 3/13/24   Interventions in place: repositioning  Is patient s nutritional status compromised? yes   If yes, what interventions are in place? Protein supplements  Reason for initiating vac therapy? Presence of co-morbidities, High risk of infections, and Need for accelerated granulation tissue  Which?of?the?following?co-morbidities?apply? Diabetes  If diabetic is patient on a diabetic management program? Yes   Is osteomyelitis present in wound? no   If yes what treatments are in place? N/A    Wound base: 60 % granulation tissue, 40 % pink non-granular tissue, adipose tissue and fascia.        Palpation of the wound bed: normal       Drainage: small     Volume in cannister: 150 ml     Last cannister change date: 4/19     Description of drainage: serosanguinous      Measurements (length x width x depth, in cm) 5.6  x 1.3 x  2 cm       Tunneling N/A      Undermining up to 0.3 cm from 6-9 o'clock   Periwound skin: Intact       Color: normal and consistent with surrounding tissue       Temperature: normal    Odor: none   Pain: mild, tender   Pain intervention prior to dressing change: none  Treatment goal: Heal  and Increase granulation  STATUS: improving   Supplies ordered: supplies stored on unit    Number of foam pieces removed from a wound (excluding foam for bridge) :  1 GranuFoam Black and 1 Oil emulsion dressing   Verified this matched the number of foam pieces applied last dressing change: Yes   Number of foam pieces packed into wound (excluding foam for bridge) :  1 GranuFoam Black and 1 Oil emulsion dressing       Treatment Plan:     Negative pressure wound therapy plan:  Wound location: Posterior neck   Change Days: Mon/Wed/Fri by WOC RN    Supplies (including all accessories) used: small  Black foam , oil emulsion over exposed hardware  Cleanse with  "Vashe prior to replacing NPWT  Suction setting: -75     Staff RN to assess integrity of dressing and ensure suction is set at appropriate level every shift.   Date canister. Chart canister output every shift. Change cannister weekly and PRN if full/occluded     Remove foam dressing and replace with BID normal saline moist gauze dressing if:   -a dressing failure which cannot be repaired within 2 hours   -patient is discharging to home without a home pump   -patient is discharging to a facility outside the local area   -if a dressing is a \"Silver Foam\", remove before Radiation Therapy or MRI     The hospital VAC pump is not to be discharged with the patient.?Ensure to disconnect patient from machine prior to discharge. Then,    - If a home KCI VAC pump has been delivered, connect home cannister to dressing tubing then connect cannister to home pump and turn on machine    - If transferring to a nearby facility with a KCI vac, can disconnect and clamp tubing then cover end with glove so can be reconnected within 2 hours        Orders: Reviewed    RECOMMEND PRIMARY TEAM ORDER: None, at this time  Education provided: plan of care  Discussed plan of care with: Patient and Nurse  WOC nurse follow-up plan: Monday/WednesdayFriday   Notify WOC if wound(s) deteriorate.  Nursing to notify the Provider(s) and re-consult the WOC Nurse if new skin concern.    DATA:     Current support surface: Standard  Low air loss (LYNNETTE pump, Isolibrium, Pulsate, skin guard, etc)  BMI: Body mass index is 30.69 kg/m .   Active diet order: Orders Placed This Encounter      Regular Diet Adult     Output: No intake/output data recorded.     Labs:   Recent Labs   Lab 04/22/24  0830   HGB 10.5*   INR 1.93*   WBC 5.1     Pressure injury risk assessment:   Sensory Perception: 3-->slightly limited  Moisture: 4-->rarely moist  Activity: 3-->walks occasionally  Mobility: 3-->slightly limited  Nutrition: 3-->adequate  Friction and Shear: 3-->no apparent " problem  Luke Score: 19      Filomena Gamez RN BSN CWOCN  Vocera [facility]  Dept. Office Number: [phone]

## 2024-04-22 NOTE — PLAN OF CARE
Goal Outcome Evaluation:      Plan of Care Reviewed With: patient    Overall Patient Progress: no change  No acute issues this shift. VSS, denies SOB, no complaint of pain. On IV anibiotic, given via right arm PICC (dressing done this shift), saline locked after. Posterior neck wound vac dressing reinforced ( was loose on the edge), on continous therapy, remains CDI at this time .Good appetite, ate 100% of her meal for supper, BG monitored, on SSI and carb coverage. Comfortable at this time, will continue POC.       Patient's most recent vital signs are:     Vital signs:  BP: 128/72  Temp: 98.1  HR: 77  RR: 19  SpO2: 97 %     Patient does not have new respiratory symptoms.  Patient does not have new sore throat.  Patient does not have a fever greater than 99.5.

## 2024-04-22 NOTE — PROGRESS NOTES
VS: /76 (BP Location: Left arm)   Pulse 77   Temp 97.7  F (36.5  C) (Oral)   Resp 16   Wt 76.1 kg (167 lb 12.8 oz)   SpO2 99%   BMI 30.69 kg/m     O2: 99% on RA, no SOB or chest pain reported   Output: Continent   Last BM: 4/22   Activity: Assist x2 with GB and walker   Skin: Spinal incision   Pain: None reported   CMS: A&O x4   Dressing: Spinal dressing   Diet: Regular   LDA: Wound vac to spine @75   Equipment: Walker, wound vac   Plan: Continue POC   Additional Info:

## 2024-04-22 NOTE — PLAN OF CARE
Orientation: Alert and oriented x4. Able to make needs known to staff.   Bowel & Bladder: Continent of both bowel and bladder. Voids spontaneously without difficulty.  Medications: Takes medication whole with thin liquids.  Pain: None this shift  Ambulation/Transfers: with stand-by assist w/ walker.  Diet: Regular diet with good  appetite. Sliding scale insulin and carb coverage  Tubes/Lines/Drains: PICC to RUE is intact, dressing dry, and saline locked.  Oxygen: Room air  Skin: Wound vac to posterior neck is CDI. Suctioning. No alarms this shift.   Infection/Isolation: No active isolations.  Safety: No concerns noted this shift. Door opened per patient's request.  Other: BGs 163 and 122. Denied chest pain and SOB. Call-light within reach. Utilizes call-light appropriately. Continue with POC.    Vitals: T: 97.4,  B/P: 128/59,  P: 86,  R: 18,  SpO2: 100 %     Patient does not have new respiratory symptoms.  Patient does not have new sore throat.  Patient does not have a fever greater than 99.5

## 2024-04-22 NOTE — PROGRESS NOTES
TCU Notice of Medicare Non-Coverage Note:     Met with patient/family to Introduce self and role.     Discussed Notice of Medicare Non-Coverage and last day of coverage as required for all patients with Medicare coverage during Skilled Nursing Facility (SNF) stays.Informed patient/family that Medicare covers stay until midnight of day before discharge. TCU does not bill for discharge date.    Last coverage day is 2/24/24. Discharge date expected 2/25/24.    Opportunity provided for questions and education provided regarding potential financial impact to patient.     Patient/family verbalize(s) understanding of discussion.  asked if Wound Vac would be here before discharge.  SW said yes.   They would like meds sent to [facility] [place].     COLLINS Gordon   [facility], Transitional Care Unit   Social Work   [address] [place] [address], [address] [place] [address]  (PH) [phone]

## 2024-04-23 LAB
GLUCOSE BLDC GLUCOMTR-MCNC: 140 MG/DL (ref 70–99)
GLUCOSE BLDC GLUCOMTR-MCNC: 149 MG/DL (ref 70–99)
GLUCOSE BLDC GLUCOMTR-MCNC: 162 MG/DL (ref 70–99)
GLUCOSE BLDC GLUCOMTR-MCNC: 224 MG/DL (ref 70–99)
GLUCOSE BLDC GLUCOMTR-MCNC: 281 MG/DL (ref 70–99)
INR PPP: 2.21 (ref 0.85–1.15)

## 2024-04-23 PROCEDURE — 250N000013 HC RX MED GY IP 250 OP 250 PS 637: Performed by: INTERNAL MEDICINE

## 2024-04-23 PROCEDURE — 36592 COLLECT BLOOD FROM PICC: CPT | Performed by: INTERNAL MEDICINE

## 2024-04-23 PROCEDURE — 250N000013 HC RX MED GY IP 250 OP 250 PS 637

## 2024-04-23 PROCEDURE — 85610 PROTHROMBIN TIME: CPT | Performed by: INTERNAL MEDICINE

## 2024-04-23 PROCEDURE — 250N000011 HC RX IP 250 OP 636

## 2024-04-23 PROCEDURE — 258N000003 HC RX IP 258 OP 636

## 2024-04-23 PROCEDURE — 120N000009 HC R&B SNF

## 2024-04-23 RX ORDER — SODIUM CHLORIDE 9 MG/ML
INJECTION, SOLUTION INTRAVENOUS
Status: COMPLETED
Start: 2024-04-23 | End: 2024-04-23

## 2024-04-23 RX ORDER — WARFARIN SODIUM 3 MG/1
9 TABLET ORAL
Status: DISCONTINUED | OUTPATIENT
Start: 2024-04-23 | End: 2024-04-23

## 2024-04-23 RX ADMIN — GABAPENTIN 600 MG: 300 CAPSULE ORAL at 21:31

## 2024-04-23 RX ADMIN — TIMOLOL MALEATE 1 DROP: 5 SOLUTION/ DROPS OPHTHALMIC at 21:37

## 2024-04-23 RX ADMIN — THERA TABS 1 TABLET: TAB at 14:14

## 2024-04-23 RX ADMIN — Medication 1 CAPSULE: at 21:32

## 2024-04-23 RX ADMIN — CEFAZOLIN SODIUM 2 G: 2 INJECTION, SOLUTION INTRAVENOUS at 14:21

## 2024-04-23 RX ADMIN — RIFAMPIN 300 MG: 300 CAPSULE ORAL at 09:33

## 2024-04-23 RX ADMIN — LATANOPROST 1 DROP: 50 SOLUTION OPHTHALMIC at 21:44

## 2024-04-23 RX ADMIN — ATORVASTATIN CALCIUM 40 MG: 40 TABLET, FILM COATED ORAL at 09:33

## 2024-04-23 RX ADMIN — CEFAZOLIN SODIUM 2 G: 2 INJECTION, SOLUTION INTRAVENOUS at 21:32

## 2024-04-23 RX ADMIN — CEFAZOLIN SODIUM 2 G: 2 INJECTION, SOLUTION INTRAVENOUS at 05:04

## 2024-04-23 RX ADMIN — ASPIRIN 81 MG CHEWABLE TABLET 81 MG: 81 TABLET CHEWABLE at 09:33

## 2024-04-23 RX ADMIN — INSULIN GLARGINE 14 UNITS: 100 INJECTION, SOLUTION SUBCUTANEOUS at 09:38

## 2024-04-23 RX ADMIN — PANTOPRAZOLE SODIUM 40 MG: 40 TABLET, DELAYED RELEASE ORAL at 17:48

## 2024-04-23 RX ADMIN — ACETAMINOPHEN 650 MG: 325 TABLET, FILM COATED ORAL at 21:31

## 2024-04-23 RX ADMIN — TIMOLOL MALEATE 1 DROP: 5 SOLUTION/ DROPS OPHTHALMIC at 09:39

## 2024-04-23 RX ADMIN — PREDNISOLONE ACETATE 1 DROP: 10 SUSPENSION/ DROPS OPHTHALMIC at 09:39

## 2024-04-23 RX ADMIN — INSULIN ASPART 3 UNITS: 100 INJECTION, SOLUTION INTRAVENOUS; SUBCUTANEOUS at 14:22

## 2024-04-23 RX ADMIN — RIFAMPIN 300 MG: 300 CAPSULE ORAL at 21:31

## 2024-04-23 RX ADMIN — METOPROLOL SUCCINATE 100 MG: 50 TABLET, EXTENDED RELEASE ORAL at 09:33

## 2024-04-23 RX ADMIN — PANTOPRAZOLE SODIUM 40 MG: 40 TABLET, DELAYED RELEASE ORAL at 05:56

## 2024-04-23 RX ADMIN — INSULIN ASPART 1 UNITS: 100 INJECTION, SOLUTION INTRAVENOUS; SUBCUTANEOUS at 17:55

## 2024-04-23 RX ADMIN — HYDROCHLOROTHIAZIDE 25 MG: 25 TABLET ORAL at 09:33

## 2024-04-23 RX ADMIN — INSULIN ASPART 1 UNITS: 100 INJECTION, SOLUTION INTRAVENOUS; SUBCUTANEOUS at 09:38

## 2024-04-23 RX ADMIN — ACETAMINOPHEN 650 MG: 325 TABLET, FILM COATED ORAL at 09:33

## 2024-04-23 RX ADMIN — LOSARTAN POTASSIUM 75 MG: 50 TABLET, FILM COATED ORAL at 09:33

## 2024-04-23 RX ADMIN — ACETAMINOPHEN 650 MG: 325 TABLET, FILM COATED ORAL at 14:14

## 2024-04-23 RX ADMIN — Medication 1 CAPSULE: at 09:33

## 2024-04-23 RX ADMIN — LEVOTHYROXINE SODIUM 88 MCG: 0.09 TABLET ORAL at 05:56

## 2024-04-23 RX ADMIN — GABAPENTIN 600 MG: 300 CAPSULE ORAL at 09:33

## 2024-04-23 RX ADMIN — WARFARIN SODIUM 8 MG: 5 TABLET ORAL at 17:48

## 2024-04-23 RX ADMIN — INSULIN ASPART 1 UNITS: 100 INJECTION, SOLUTION INTRAVENOUS; SUBCUTANEOUS at 21:38

## 2024-04-23 RX ADMIN — Medication 3 MG: at 21:32

## 2024-04-23 RX ADMIN — SODIUM CHLORIDE 500 ML: 9 INJECTION, SOLUTION INTRAVENOUS at 14:22

## 2024-04-23 ASSESSMENT — ACTIVITIES OF DAILY LIVING (ADL)
ADLS_ACUITY_SCORE: 37
ADLS_ACUITY_SCORE: 36
ADLS_ACUITY_SCORE: 37
ADLS_ACUITY_SCORE: 36
ADLS_ACUITY_SCORE: 37
ADLS_ACUITY_SCORE: 36
ADLS_ACUITY_SCORE: 37
ADLS_ACUITY_SCORE: 36
ADLS_ACUITY_SCORE: 37

## 2024-04-23 NOTE — PLAN OF CARE
Goal Outcome Evaluation:  No acute issues overnight. Continues IV abx via RUE picc / abx course will be completed after 2100 dose 04/24. Posterior neck wound w/vac intact @ 75mmHg continuous suction - no alarms. Uses call light appropriately. Planning discharge home Thu.04/25.        Patient's most recent vital signs are:     Vital signs:  BP: 136/64  Temp: 97.8  HR: 88  RR: 18  SpO2: 98 %     Patient does not have new respiratory symptoms.  Patient does not have new sore throat.  Patient does not have a fever greater than 99.5.

## 2024-04-23 NOTE — CARE PLAN
Care Plan updated. Bedside RN to assess on progression and update.     Problem: Spinal Surgery  Goal: Optimal Coping with Surgery  Description: Maintain individualized postoperative restrictions, such as neutral spine positioning and avoiding bending, twisting, lifting or prolonged sitting.    Encourage gradual increase in functional activity and mobility; coordinate pain control to optimize comfort with activity.    Maintain an accessible environment to facilitate safe activity.    Implement wearing of brace or orthosis as indicated; train in donning and doffing along with wear and care.    Train in proper body mechanics for daily activities and mobility.    Assess and retrain in functional mobility, such as bed mobility using log rolling techniques, transfers and gait with neutral spine. Encourage ambulation to minimize risks associated with immobility.    Assess activity of daily living performance; train in compensatory strategies, energy conservation and adaptive equipment; consider preferred routines or habits and respect patient privacy.      Problem: Fall Injury Risk  Goal: Absence of Fall and Fall-Related Injury  Description: Provide a safe, barrier-free environment that encourages independent activity.    Keep care area uncluttered and well-lighted.    Determine need for increased observation or monitoring.    Avoid use of devices that minimize mobility, such as restraints or indwelling urinary catheter.         Problem: BADL (Basic Activities of Daily Living) Impairment  Goal: Optimal Safe BADL Performance  Description: Assess BADL (basic activity of daily living) abilities; encourage participation at maximally safe independent level.    Provide assistance and supervision needed to maintain safety; involve caregiver in BADL (basic activity of daily living) training.    Ensure effective use of equipment or devices, such as a long-handled reacher, shower seat or orthosis.    Ensure proper body mechanics and  positioning for optimal task performance.    Provide set-up of items if patient is unable to retrieve; store personal care items in accessible location.    Schedule BADL (basic activity of daily living) activities when pain and fatigue are at a minimum; pace activity to conserve energy.         Problem: Skin Injury Risk Increased  Goal: Skin Health and Integrity  Description: Perform a full pressure injury risk assessment, as indicated by screening, upon admission to care unit.    Reassess skin (full inspection and injury risk, including skin temperature, consistency and color) frequently (e.g., scheduled interval, with change in condition) to provide optimal early detection and prevention.    Maintain adequate tissue perfusion (e.g., encourage fluid balance; avoid crossing legs, constrictive clothing or devices) to promote tissue oxygenation.    Maintain head of bed at lowest degree of elevation tolerated, considering medical condition and other restrictions. Use positioning supports to prevent sliding and friction. Consider low friction textiles.    Avoid positioning onto an area that remains reddened or on bony prominences.         Problem: Infection  Goal: Absence of Infection Signs and Symptoms  Description: Implement transmission-based precautions and isolation, as indicated, to prevent spread of infection.    Obtain cultures prior to initiating antimicrobial therapy, when possible. Do not delay treatment for laboratory results in the presence of high suspicion or clinical indicators.    Administer ordered antimicrobial therapy promptly; reassess need regularly.    Monitor laboratory value, diagnostic test and clinical status trends for signs of infection progression.    Identify early signs of sepsis, such as increased heart rate and decreased blood pressure, as well as changes in mental state, respiratory pattern or peripheral perfusion.    Prepare for rapid sepsis management, including lactate level,  intravenous access, fluid administration and oxygen therapy.    Provide fever-reduction and comfort measures.    Promote antimicrobial stewardship.           Problem: Wound  Goal: Optimal Wound Healing  Description: Use a standard wound assessment tool to monitor progression of wound healing.    Perform a nutrition screening or assessment and physical exam; assess adequacy of oral intake and risk of vitamin and mineral deficiency; if suspect inadequacy or deficiency provide supplementation.    Optimize fluids, nutritional intake, sleep/rest and glycemic control to enhance healing.    Consider oxygen therapy in the presence of hypoxia to enhance tissue oxygenation.    Position to avoid tension or pressure on wound surface.  Manage edema (e.g., elevate extremities) and maintain blood pressure to optimize tissue perfusion.    Provide nonpharmacologic and pharmacologic comfort measures to manage pain and minimize vasoconstriction; premedicate for painful procedures.    Provide wound care, such as cleansing, debridement, topical therapy, appropriate dressing selection to promote wound healing and prevent infection.    Maintain sterile and occlusive dressing, if prescribed; minimize dressing changes to decrease infection risk and trauma to the wound bed.         Problem: Comorbidity Management  Goal: Blood Glucose Levels Within Targeted Range  Description: Establish target blood glucose levels based on patient-specific factors, such as illness severity and comorbidity.    Document blood glucose levels and monitor trend; advocate for treatment if not within targeted range.    Provide pharmacologic therapy to maintain blood glucose levels within targeted range.    Advocate for insulin therapy if blood glucose level remains elevated.    Check blood glucose level if there is a change in mental or cognitive status.         Problem: Comorbidity Management  Goal: Blood Pressure in Desired Range  Description: Evaluate adherence  to home antihypertensive regimen (e.g., exercise and activity, diet modification, medication).    Provide scheduled antihypertensive medication; consider administration time and effects (e.g., avoid giving diuretic prior to bedtime).    Monitor response to antihypertensive medication therapy (e.g., blood pressure, electrolyte levels, medication effects).    Minimize risk of orthostatic hypotension; encourage caution with position changes, particularly if elderly.         Problem: Oral Intake Inadequate  Goal: Improved Oral Intake

## 2024-04-24 PROBLEM — I48.0 PAROXYSMAL ATRIAL FIBRILLATION (H): Status: ACTIVE | Noted: 2024-04-24

## 2024-04-24 LAB
GLUCOSE BLDC GLUCOMTR-MCNC: 134 MG/DL (ref 70–99)
GLUCOSE BLDC GLUCOMTR-MCNC: 192 MG/DL (ref 70–99)
GLUCOSE BLDC GLUCOMTR-MCNC: 217 MG/DL (ref 70–99)
GLUCOSE BLDC GLUCOMTR-MCNC: 258 MG/DL (ref 70–99)
GLUCOSE BLDC GLUCOMTR-MCNC: 259 MG/DL (ref 70–99)
HOLD SPECIMEN: NORMAL
INR PPP: 2.01 (ref 0.85–1.15)

## 2024-04-24 PROCEDURE — 250N000013 HC RX MED GY IP 250 OP 250 PS 637

## 2024-04-24 PROCEDURE — 120N000009 HC R&B SNF

## 2024-04-24 PROCEDURE — 250N000013 HC RX MED GY IP 250 OP 250 PS 637: Performed by: INTERNAL MEDICINE

## 2024-04-24 PROCEDURE — 250N000012 HC RX MED GY IP 250 OP 636 PS 637: Performed by: INTERNAL MEDICINE

## 2024-04-24 PROCEDURE — 36592 COLLECT BLOOD FROM PICC: CPT | Performed by: INTERNAL MEDICINE

## 2024-04-24 PROCEDURE — 97605 NEG PRS WND THER DME<=50SQCM: CPT

## 2024-04-24 PROCEDURE — 85610 PROTHROMBIN TIME: CPT | Performed by: INTERNAL MEDICINE

## 2024-04-24 PROCEDURE — 99316 NF DSCHRG MGMT 30 MIN+: CPT | Performed by: INTERNAL MEDICINE

## 2024-04-24 PROCEDURE — 250N000011 HC RX IP 250 OP 636

## 2024-04-24 RX ORDER — RIFAMPIN 300 MG/1
300 CAPSULE ORAL EVERY 12 HOURS
Qty: 60 CAPSULE | Refills: 0 | Status: SHIPPED | OUTPATIENT
Start: 2024-04-24

## 2024-04-24 RX ORDER — ASPIRIN 81 MG/1
81 TABLET, CHEWABLE ORAL DAILY
Status: SHIPPED
Start: 2024-04-25

## 2024-04-24 RX ORDER — LANOLIN ALCOHOL/MO/W.PET/CERES
3 CREAM (GRAM) TOPICAL AT BEDTIME
Qty: 30 TABLET | Refills: 0 | Status: SHIPPED | OUTPATIENT
Start: 2024-04-24

## 2024-04-24 RX ORDER — ACETAMINOPHEN 325 MG/1
650 TABLET ORAL 3 TIMES DAILY
Status: SHIPPED
Start: 2024-04-24

## 2024-04-24 RX ORDER — DOXYCYCLINE 100 MG/1
100 CAPSULE ORAL EVERY 12 HOURS
Qty: 60 CAPSULE | Refills: 0 | Status: SHIPPED | OUTPATIENT
Start: 2024-04-25

## 2024-04-24 RX ORDER — LOSARTAN POTASSIUM 25 MG/1
75 TABLET ORAL DAILY
Qty: 30 TABLET | Refills: 0 | Status: SHIPPED | OUTPATIENT
Start: 2024-04-25

## 2024-04-24 RX ORDER — WARFARIN SODIUM 5 MG/1
10 TABLET ORAL
Status: COMPLETED | OUTPATIENT
Start: 2024-04-24 | End: 2024-04-24

## 2024-04-24 RX ADMIN — INSULIN ASPART 2 UNITS: 100 INJECTION, SOLUTION INTRAVENOUS; SUBCUTANEOUS at 09:13

## 2024-04-24 RX ADMIN — LOSARTAN POTASSIUM 75 MG: 50 TABLET, FILM COATED ORAL at 08:32

## 2024-04-24 RX ADMIN — THERA TABS 1 TABLET: TAB at 13:13

## 2024-04-24 RX ADMIN — TIMOLOL MALEATE 1 DROP: 5 SOLUTION/ DROPS OPHTHALMIC at 08:32

## 2024-04-24 RX ADMIN — ACETAMINOPHEN 650 MG: 325 TABLET, FILM COATED ORAL at 08:32

## 2024-04-24 RX ADMIN — ACETAMINOPHEN 650 MG: 325 TABLET, FILM COATED ORAL at 21:36

## 2024-04-24 RX ADMIN — CEFAZOLIN SODIUM 2 G: 2 INJECTION, SOLUTION INTRAVENOUS at 05:17

## 2024-04-24 RX ADMIN — LEVOTHYROXINE SODIUM 88 MCG: 0.09 TABLET ORAL at 05:18

## 2024-04-24 RX ADMIN — GABAPENTIN 600 MG: 300 CAPSULE ORAL at 21:37

## 2024-04-24 RX ADMIN — Medication 1 CAPSULE: at 08:32

## 2024-04-24 RX ADMIN — ATORVASTATIN CALCIUM 40 MG: 40 TABLET, FILM COATED ORAL at 08:32

## 2024-04-24 RX ADMIN — PANTOPRAZOLE SODIUM 40 MG: 40 TABLET, DELAYED RELEASE ORAL at 17:06

## 2024-04-24 RX ADMIN — RIFAMPIN 300 MG: 300 CAPSULE ORAL at 21:36

## 2024-04-24 RX ADMIN — CEFAZOLIN SODIUM 2 G: 2 INJECTION, SOLUTION INTRAVENOUS at 21:37

## 2024-04-24 RX ADMIN — INSULIN ASPART 3 UNITS: 100 INJECTION, SOLUTION INTRAVENOUS; SUBCUTANEOUS at 13:13

## 2024-04-24 RX ADMIN — PANTOPRAZOLE SODIUM 40 MG: 40 TABLET, DELAYED RELEASE ORAL at 05:18

## 2024-04-24 RX ADMIN — PREDNISOLONE ACETATE 1 DROP: 10 SUSPENSION/ DROPS OPHTHALMIC at 08:32

## 2024-04-24 RX ADMIN — RIFAMPIN 300 MG: 300 CAPSULE ORAL at 08:32

## 2024-04-24 RX ADMIN — TIMOLOL MALEATE 1 DROP: 5 SOLUTION/ DROPS OPHTHALMIC at 21:38

## 2024-04-24 RX ADMIN — ASPIRIN 81 MG CHEWABLE TABLET 81 MG: 81 TABLET CHEWABLE at 08:32

## 2024-04-24 RX ADMIN — METOPROLOL SUCCINATE 100 MG: 50 TABLET, EXTENDED RELEASE ORAL at 08:32

## 2024-04-24 RX ADMIN — CEFAZOLIN SODIUM 2 G: 2 INJECTION, SOLUTION INTRAVENOUS at 13:14

## 2024-04-24 RX ADMIN — ACETAMINOPHEN 650 MG: 325 TABLET, FILM COATED ORAL at 13:13

## 2024-04-24 RX ADMIN — INSULIN GLARGINE 14 UNITS: 100 INJECTION, SOLUTION SUBCUTANEOUS at 09:13

## 2024-04-24 RX ADMIN — LATANOPROST 1 DROP: 50 SOLUTION OPHTHALMIC at 21:47

## 2024-04-24 RX ADMIN — GABAPENTIN 600 MG: 300 CAPSULE ORAL at 08:32

## 2024-04-24 RX ADMIN — Medication 3 MG: at 21:37

## 2024-04-24 RX ADMIN — HYDROCHLOROTHIAZIDE 25 MG: 25 TABLET ORAL at 08:32

## 2024-04-24 RX ADMIN — METOPROLOL SUCCINATE 100 MG: 50 TABLET, EXTENDED RELEASE ORAL at 21:39

## 2024-04-24 RX ADMIN — WARFARIN SODIUM 10 MG: 5 TABLET ORAL at 17:06

## 2024-04-24 RX ADMIN — INSULIN ASPART 2 UNITS: 100 INJECTION, SOLUTION INTRAVENOUS; SUBCUTANEOUS at 21:43

## 2024-04-24 RX ADMIN — Medication 1 CAPSULE: at 21:37

## 2024-04-24 ASSESSMENT — ACTIVITIES OF DAILY LIVING (ADL)
ADLS_ACUITY_SCORE: 36

## 2024-04-24 NOTE — PROGRESS NOTES
[facility] Nurse Inpatient Assessment     Consulted for: Neck    Summary: Patient s/p I&D on 3/13/24 of posterior neck for deep cervical infection down to the hardware with wound VAC placement.     4/1: Message sent to Dr Sagar Cadena surgeon to request increase in wound vac settings from -50 mmHg to -75 mmHg. (No response - will wait for return to clinic)  4/10: received message from Dr Cadena: OK to increase suction to -75mmHg    Patient History (according to provider note(s):      Lj Castro is a 78 year old female admitted on 3/12/2024. 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  She came to the ER complaining that for the past 10 days he had increasing pain in the neck and gradual weakness to the arms and legs to the point that she was unable to ambulate.  She also had right arm pain  over the past 3 to 4 days along with confusion.  Her arm pain was 9 out of 10.  There have been no recent falls or shortness of breath or nausea or vomiting.  She was brought in by EMS.  CT scan of the neck showed a deep space infection with gas formation and possible cord compression.  She was given Kcentra and taken to the operating room.  She underwent I&D with wound VAC application along with IntraOp culture and IV antibiotics.  She underwent general endotracheal anesthesia.  Operative course was significant for hypotension requiring central line and pressor support and so she was admitted to the ICU.       Assessment:      Areas visualized during today's visit: Focused:    Negative pressure wound therapy applied to: Posterior neck   Last photo: 3/20/24     3/20     Wound  due to: Surgical Wound   Wound history/plan of care:    Surgical date: 3/13/24   Service following: Spinal surgery  Date Negative Pressure Wound Therapy initiated: 3/13/24   Interventions in place: repositioning  Is patient s nutritional status compromised? yes   If yes, what interventions are in place? Protein supplements  Reason for initiating vac therapy? Presence of co-morbidities, High risk of infections, and Need for accelerated granulation tissue  Which?of?the?following?co-morbidities?apply? Diabetes  If diabetic is patient on a diabetic management program? Yes   Is osteomyelitis present in wound? no   If yes what treatments are in place? N/A    Wound base: 60 % granulation tissue, 40 % pink non-granular tissue, adipose tissue and fascia.        Palpation of the wound bed: normal       Drainage: small     Volume in cannister: 150 ml     Last cannister change date: 4/19     Description of drainage: serosanguinous      Measurements (length x width x depth, in cm) 5.6  x 1.3 x  2 cm       Tunneling N/A      Undermining up to 0.3 cm from 6-9 o'clock   Periwound skin: Intact       Color: normal and consistent with surrounding tissue       Temperature: normal    Odor: none   Pain: mild, tender   Pain intervention prior to dressing change: none  Treatment goal: Heal  and Increase granulation  STATUS: improving   Supplies ordered: supplies stored on unit    Number of foam pieces removed from a wound (excluding foam for bridge) :  1 GranuFoam Black and 1 Oil emulsion dressing   Verified this matched the number of foam pieces applied last dressing change: Yes   Number of foam pieces packed into wound (excluding foam for bridge) :  1 GranuFoam Black and 1 Oil emulsion dressing       Treatment Plan:     Negative pressure wound therapy plan:  Wound location: Posterior neck   Change Days: Mon/Wed/Fri by WOC RN    Supplies (including all accessories) used: small  Black foam , oil emulsion over exposed hardware  Cleanse with  "Vashe prior to replacing NPWT  Suction setting: -75     Staff RN to assess integrity of dressing and ensure suction is set at appropriate level every shift.   Date canister. Chart canister output every shift. Change cannister weekly and PRN if full/occluded     Remove foam dressing and replace with BID normal saline moist gauze dressing if:   -a dressing failure which cannot be repaired within 2 hours   -patient is discharging to home without a home pump   -patient is discharging to a facility outside the local area   -if a dressing is a \"Silver Foam\", remove before Radiation Therapy or MRI     The hospital VAC pump is not to be discharged with the patient.?Ensure to disconnect patient from machine prior to discharge. Then,    - If a home KCI VAC pump has been delivered, connect home cannister to dressing tubing then connect cannister to home pump and turn on machine    - If transferring to a nearby facility with a KCI vac, can disconnect and clamp tubing then cover end with glove so can be reconnected within 2 hours        Orders: Reviewed    RECOMMEND PRIMARY TEAM ORDER: None, at this time  Education provided: plan of care  Discussed plan of care with: Patient and Nurse  WOC nurse follow-up plan: Monday/WednesdayFriday   Notify WOC if wound(s) deteriorate.  Nursing to notify the Provider(s) and re-consult the WOC Nurse if new skin concern.    DATA:     Current support surface: Standard  Low air loss (LYNNETTE pump, Isolibrium, Pulsate, skin guard, etc)  BMI: Body mass index is 30.69 kg/m .   Active diet order: Orders Placed This Encounter      Regular Diet Adult     Output: I/O last 3 completed shifts:  In: 480 [P.O.:480]  Out: -      Labs:   Recent Labs   Lab 04/23/24  0914 04/22/24  0830   ALBUMIN  --  3.8   HGB  --  10.5*   INR 2.21* 1.93*   WBC  --  5.1     Pressure injury risk assessment:   Sensory Perception: 3-->slightly limited  Moisture: 4-->rarely moist  Activity: 3-->walks occasionally  Mobility: " 3-->slightly limited  Nutrition: 3-->adequate  Friction and Shear: 3-->no apparent problem  Luke Score: 19      Filomena Gamez RN BSN CWOCN  [facility]  Dept. Office Number: [phone]

## 2024-04-24 NOTE — PROGRESS NOTES
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 She was admitted to [facility] on 3/13/2024 with neck pain, weakness, and inability to ambulate. CT neck showed a deep space infection w/ gas formation and ?cord compression. Was given Kcentra and urgently taken to OR for I&D. Operative course was c/b hypotension requiring central line and pressor support in MICU. She improved and was discharged from general medicine floor to TCU on 3/20/24 for IV ABX (for MSSA bacteremia), wound cares (wound vac), and continued therapies.        4/24   INR 2.01  Pharmacy adjusting warfarin dosing           Follow up  :  Patient will be discharging home on 4/25 with wound vac. Paper work completed for it .  She has follow up with Dr Alexis in ID on 4/30 , will discharge on Doxy and Rifampin and sees ortho spine 4/29 . This is not in [facility] but per patient report as these providers are outside system.  Remove PICC on discharge          # MSSA bacteremia  # Deep space infection in posterior cervical spine, 2/2 above  Found on operative & blood cultures. Has PICC line with IV cefazolin (started on 3/20 at TCU), anticipated EOT 4/24/24. ID following   - had outpatient appt on 4/2/24 with Dr. Alexis - started on rifampin and anticipate transition to PO doxycycline 100 mg bid on 4/25 after completion of IV cefazolin.   - was started on  PO rifampin 300mg BID (indefinite)       # s/p cervical spine I&D (3/13/24)  # History of numerous C3 to T2 surgeries " (10/2013)  Urgent I&D performed for deep space infection in posterior cervical spine, as visualized on CT neck. Wound vac in place.  -  Wound vac, change q M/W/F (1mg Dilaudid prior to dressing changes)  - Pain control:  - tylenol 650 tid  -gabapentin 600 mg bid  - has not taken  flexeril since 4/20 , no norco in a while so will not prescribe on discharge    - Outpatient spine appt in 2 weeks; call [phone] to arrange   -Spine orthopedic surgery  follow up       # Atrial fibrillation  -Rate controlled. TTE in 01/2023 showed normal EF.  - was on  apixaban but  due to interaction with  rifampin was switched to warfarin  with goal INR 2-3   - adjusting warfarin dose has been difficult, so needs close  out patient  monitring ad adjustment   - Con't metoprolol succinate 100mg BID  - last echo 3/12/24 EF 55%       # valvular heart disease  - echo showed moderate -severe TR   - needs to follow up   with cardiology  , discussed with  patient       # CKD   Cr 1.2, baseline ~ 1.1 - 1.5.       # Acute toxic metabolic encephalopathy 2/2 sepsis, resolved  Secondary to sepsis on initial presentation from MSSA bacteremia. Now resolved.        # T2DM  PTA regimen includes glargine 22U. Blood glucoses are well-controlled at this time.  - Glargine 18 U. Insulin sliding scale        # HTN  - Con't losartan 75mg every day ( up form 50 )   - Con't metoprolol succinate 100mg BID  -stared on hydrochlorothiazide 25 mg 4/5      # history PAD  -history Left external iliac, left common femoral, superficial femoral, profunda femoris endarterectomy with Dacron patch closure by Dr. Wilson 2/8/21.   -also history moderate carotid disease  - back  on aspirin  81 mg       # GERD:   PTA pantoprazole 40mg BID     # Hypothyroidism:   PTA levothyroxine 88mcg every day  - TSH normal         # HLD: PTA atorvastatin 40mg every day     # Physical deconditioning 2/2 acute illness: con't PT/Occupational therapy              Diet: Regular Diet  Adult  Snacks/Supplements Adult: Glucerna; With Meals    DVT Prophylaxis: Warfarin  Winston Catheter: Not present  Lines: PRESENT      PICC 03/17/24 Single Lumen Right Brachial vein lateral antibiotics-Site Assessment: WDL      Cardiac Monitoring: None  Code Status: Full Code      Clinically Significant Risk Factors              # Hypoalbuminemia: Lowest albumin = 2.9 g/dL at 4/8/2024  6:55 AM, will monitor as appropriate             # Severe Malnutrition: based on nutrition assessment           Disposition Plan   4/25            Ela House MD  Hospitalist Service  [facility] [place]  Securely message with [employer] (more info)  Text page via [employer] Paging/Directory   ______________________________________________________________________    Interval History   Feels good, no shortness of breath  no C{ no nausea vomiting no diarrhea, ready to go home  tomorrow     Physical Exam   Vital Signs: Temp: 97.9  F (36.6  C) Temp src: Oral BP: 127/49 Pulse: 95   Resp: 18 SpO2: 99 % O2 Device: None (Room air)    Weight: 170 lbs 12.8 oz  General appearence: awake alert   no apparent distress      NECK : supple has wound vac on   RESPIRATORY: lungs clear   CARDIOVASCULAR:S1 S2 irreg   GASTROINTESTINAL:soft, non-distended , non-tender , + bowel sounds,   SKIN: warm and dry, no mottling noted   NEUROLOGIC; awake alert and oriented,   EXTREMITIES: no clubbing, cyanosis or edema , moves all extremity,     Data     I have personally reviewed the following data over the past 24 hrs:    INR:  2.01 (H) PTT:  N/A   D-dimer:  N/A Fibrinogen:  N/A       Imaging results reviewed over the past 24 hrs:   No results found for this or any previous visit (from the past 24 hour(s)).  Recent Labs   Lab 04/24/24  1240 04/24/24  0825 04/24/24  0736 04/24/24  0135 04/23/24  1237 04/23/24  0914 04/22/24  1214 04/22/24  0830   WBC  --   --   --   --   --   --   --  5.1   HGB  --   --   --   --   --   --   --  10.5*   MCV  --   --   --    --   --   --   --  95   PLT  --   --   --   --   --   --   --  281   INR  --   --  2.01*  --   --  2.21*  --  1.93*   NA  --   --   --   --   --   --   --  134*   POTASSIUM  --   --   --   --   --   --   --  4.7   CHLORIDE  --   --   --   --   --   --   --  97*   CO2  --   --   --   --   --   --   --  24   BUN  --   --   --   --   --   --   --  19.1   CR  --   --   --   --   --   --   --  0.66   ANIONGAP  --   --   --   --   --   --   --  13   INDY  --   --   --   --   --   --   --  9.2   * 217*  --  192*   < >  --    < > 181*   ALBUMIN  --   --   --   --   --   --   --  3.8   PROTTOTAL  --   --   --   --   --   --   --  7.0   BILITOTAL  --   --   --   --   --   --   --  0.4   ALKPHOS  --   --   --   --   --   --   --  103   ALT  --   --   --   --   --   --   --  6   AST  --   --   --   --   --   --   --  25    < > = values in this interval not displayed.

## 2024-04-24 NOTE — PLAN OF CARE
Goal Outcome Evaluation:      Plan of Care Reviewed With: patient     Overall Patient Progress: no changeOverall Patient Progress: no change     FOCUS/GOAL     Bowel management, Bladder management, Medication management, Wound care management, Medical management, Mobility, Skin integrity, and Safety management     ASSESSMENT, INTERVENTIONS AND CONTINUING PLAN FOR GOAL:     Pt is A&OX4, calm, & cooperative with care. Denied CP, SOB, & n/v. VSS & on RA. Pt transfers SBA with walker & GB. Continent for both B&B; uses the bathroom. Takes meds whole with thin liquid. R brachial SL PICC patent, NS flushed, & IV abx infused without difficulty. Wound vac to the posterior neck intact & running at 75 mmHg. Pt ate dinner with good appetite & no other acute concern. Able to make needs known & call light within reach. Continue with plan of care.    Patient's most recent vital signs are:     Vital signs:  BP: 106/52  Temp: 97.7  HR: 77  RR: 16  SpO2: 97 %     Patient does not have new respiratory symptoms.  Patient does not have new sore throat.  Patient does not have a fever greater than 99.5.

## 2024-04-24 NOTE — DISCHARGE SUMMARY
"[facility] Transitional Care  Hospitalist Discharge Summary      Date of Admission:  3/20/2024  Date of Discharge:  4/25/24   Discharging Provider: Ela House MD  Discharge Service: Hospitalist Service    Discharge Diagnoses   # MSSA bacteremia  # Deep space infection in posterior cervical spine, 2/2 above  # s/p cervical spine I&D (3/13/24)  # History of numerous C3 to T2 surgeries (10/2013)  # Atrial fibrillation  # valvular heart disease  # CKD   # Acute toxic metabolic encephalopathy 2/2 sepsis, resolved  # T2DM  # HTN  # history PAD  # GERD:  # Hypothyroidism:   # HLD   # Physical deconditioning 2/2 acute illness:  Clinically Significant Risk Factors     # Severe Malnutrition: based on nutrition assessment      Follow-ups Needed After Discharge   Follow-up Appointments     Adult [facility] Follow-up and recommended labs and tests      Follow up with primary care provider, Cory Soliz, within 7 days to   evaluate medication change, to evaluate treatment change, and for hospital   follow- up.  The following labs/tests are recommended: monitor INR .    Also needs follow up  on  moderate -severe TR , referral to cardiology      Follow up  with Dr Cadena in 4/29   Follow up  with Dr Alexis, infectious disease  4/30    Appointments on [place] and/or [facility] (with [facility] or [facility]   provider or service). Call [phone] if you haven't heard regarding   these appointments within 7 days of discharge.          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 She was admitted to [facility] on 3/13/2024 with neck pain, weakness, and inability to ambulate. CT neck showed a deep space infection w/ gas formation and ?cord compression. Was given Kcentra and urgently taken to OR for I&D. Operative course was c/b hypotension requiring central line and pressor support in MICU. She improved and was discharged from general medicine floor to TCU on 3/20/24 for IV ABX (for MSSA bacteremia), wound cares (wound vac), and continued therapies.            Follow up  :  Patient will be discharging home on 4/25 with wound vac. Paper work completed for it .  She has follow up with Dr Alexis in ID on 4/30 , will discharge on Doxy and Rifampin and sees ortho spine 4/29 . This is not in epic but per patient report as these providers are outside system.  Remove PICC on discharge            # MSSA bacteremia  # Deep space infection in posterior cervical spine, 2/2 above  Found on operative & blood cultures. Has PICC line with IV cefazolin (started on 3/20 at TCU), anticipated EOT 4/24/24. ID following   - had outpatient appt on 4/2/24 with Dr. Alexis - started on rifampin and anticipate transition to PO doxycycline 100 mg bid on 4/25 after completion of IV cefazolin.   - was started on  PO rifampin 300mg BID (indefinite)        # s/p cervical spine I&D (3/13/24)  # History of numerous C3 to T2 surgeries (10/2013)  Urgent I&D performed for deep space infection in posterior cervical spine, as visualized on CT neck. Wound vac in place.  -  Wound vac, change q M/W/F (1mg Dilaudid prior to dressing changes)  - Pain control:  - tylenol 650 tid  -gabapentin 600 mg bid  - has not taken  flexeril since 4/20 , no norco in a while so will not prescribe on discharge    - Outpatient spine appt in 2 weeks; call [phone] to arrange   -Spine orthopedic surgery  follow up       # Atrial fibrillation  -Rate controlled. TTE in 01/2023 showed normal EF.  - was on  apixaban but  due to interaction with   rifampin was switched to warfarin  with goal INR 2-3   - adjusting warfarin dose has been difficult, so needs close  out patient  monitring ad adjustment   - Con't metoprolol succinate 100mg BID  - last echo 3/12/24 EF 55%    - if warfarin management continues to be an issue then consider switching to subcutaneous lovenox, patient  has not wanted it but would be open to it in future          04/19 04/20 04/21 04/22 04/23    8 hr:  00-08 08-16 16-00 00-08 08-16 16-00 00-08 08-16 16-00 00-08 08-16 16-00 00-08 08-16        Anticoagulants    Warfarin Sodium (mg)    8   10   9   10  10  Warfarin Sodium (mg)     Antiplatelets    Aspirin (mg)   81   81   81  81    81  Aspirin (mg)     Labs    INR  2.09   1.65   1.90    1.93   2.21  INR              # valvular heart disease  - echo showed moderate -severe TR   - needs to follow up   with cardiology  , discussed with  patient       # CKD   Cr 1.2, baseline ~ 1.1 - 1.5.        # Acute toxic metabolic encephalopathy 2/2 sepsis, resolved  Secondary to sepsis on initial presentation from MSSA bacteremia. Now resolved.        # T2DM  PTA regimen includes glargine 22U. Blood glucoses are well-controlled at this time.  - Glargine 18 U. Insulin sliding scale         # HTN  - Con't losartan 75mg every day ( up form 50 )   - Con't metoprolol succinate 100mg BID  -stared on hydrochlorothiazide 25 mg 4/5      # history PAD  -history Left external iliac, left common femoral, superficial femoral, profunda femoris endarterectomy with Dacron patch closure by Dr. Wilson 2/8/21.   -also history moderate carotid disease  - back  on aspirin  81 mg       # GERD:   PTA pantoprazole 40mg BID     # Hypothyroidism:   PTA levothyroxine 88mcg every day  - TSH normal         # HLD: PTA atorvastatin 40mg every day     # Physical deconditioning 2/2 acute illness: con't PT/Occupational therapy                  Consultations This Hospital Stay   WOUND OSTOMY CONTINENCE NURSE  IP CONSULT  PHYSICAL  THERAPY ADULT IP CONSULT  OCCUPATIONAL THERAPY ADULT IP CONSULT  PHARMACY TO DOSE WARFARIN    Code Status   Full Code    Time Spent on this Encounter   I, Ela House MD, personally saw the patient today and spent greater than 30 minutes discharging this patient.       Ela House MD  [facility] TRANSITIONAL CARE UNIT [address] [address]  Phone: [phone]  ______________________________________________________________________    Physical Exam   Vital Signs: Temp: 97.4  F (36.3  C) Temp src: Oral BP: 127/49 Pulse: 95   Resp: 18 SpO2: 99 % O2 Device: None (Room air)    Weight: 170 lbs 12.8 oz     General appearence: awake alert   no apparent distress        NECK : supple has wound vac on   RESPIRATORY: lungs clear   CARDIOVASCULAR:S1 S2 irreg   GASTROINTESTINAL:soft, non-distended , non-tender , + bowel sounds,   SKIN: warm and dry, no mottling noted   NEUROLOGIC; awake alert and oriented,   EXTREMITIES: no clubbing, cyanosis or edema , moves all extremity           Primary Care Physician   Cory Soliz    Discharge Orders      Anticoagulation Clinic Referral      Home Care Referral      Reason for your hospital stay    deep neck  space infection     Activity    Your activity upon discharge: activity as tolerated     Adult [facility] Follow-up and recommended labs and tests    Follow up with primary care provider, Cory Soliz, within 7 days to evaluate medication change, to evaluate treatment change, and for hospital follow- up.  The following labs/tests are recommended: monitor INR .    Also needs follow up  on  moderate -severe TR , referral to cardiology      Follow up  with Dr Cadena in 4/29   Follow up  with Dr Alexis, infectious disease  4/30    Appointments on [place] and/or [facility] (with [facility] or [facility] provider or service). Call [phone] if you haven't heard regarding these appointments within 7 days of discharge.     Diet    Follow  this diet upon discharge:     Snacks/Supplements Adult: Glucerna; With Meals      Regular Diet Adult       Significant Results and Procedures   Most Recent 3 CBC's:  Recent Labs   Lab Test 04/22/24  0830 04/15/24  0524 04/08/24  0655   WBC 5.1 4.2 3.4*   HGB 10.5* 9.1* 8.8*   MCV 95 98 99    275 252     Most Recent 3 BMP's:  Recent Labs   Lab Test 04/24/24  1240 04/24/24  0825 04/24/24  0135 04/22/24  1214 04/22/24  0830 04/15/24  1032 04/15/24  0524 04/08/24  0822 04/08/24  0655   NA  --   --   --   --  134*  --  136  --  140   POTASSIUM  --   --   --   --  4.7  --  3.5  --  3.5   CHLORIDE  --   --   --   --  97*  --  100  --  100   CO2  --   --   --   --  24  --  28  --  31*   BUN  --   --   --   --  19.1  --  12.1  --  8.6   CR  --   --   --   --  0.66  --  0.59  --  0.69   ANIONGAP  --   --   --   --  13  --  8  --  9   INDY  --   --   --   --  9.2  --  8.0*  --  8.3*   * 217* 192*   < > 181*   < > 173*   < > 132*    < > = values in this interval not displayed.     Most Recent 2 LFT's:  Recent Labs   Lab Test 04/22/24  0830 04/15/24  0524   AST 25 16   ALT 6 <5   ALKPHOS 103 91   BILITOTAL 0.4 0.3     Most Recent 3 INR's:  Recent Labs   Lab Test 04/24/24  0736 04/23/24  0914 04/22/24  0830   INR 2.01* 2.21* 1.93*     Most Recent INR's and Anticoagulation Dosing History:  Anticoagulation Dose History  More data exists         Latest Ref Rng & Units 4/18/2024 4/19/2024 4/20/2024 4/21/2024 4/22/2024 4/23/2024 4/24/2024   Recent Dosing and Labs   warfarin ANTICOAGULANT (COUMADIN) 1 MG tablet - - 8 mg, $Given - - - 8 mg, $Given -   warfarin ANTICOAGULANT (COUMADIN) 3 MG tablet - 6 mg, $Given - - 9 mg, $Given - - -   warfarin ANTICOAGULANT (COUMADIN) 5 MG tablet - - - 10 mg, $Given - 10 mg, $Given - -   INR 0.85 - 1.15 2.17  2.09  1.65  1.90  1.93  2.21  2.01    ,   Results for orders placed or performed during the hospital encounter of 03/12/24   Head CT w/o contrast    Narrative    CT OF THE HEAD  WITHOUT CONTRAST  3/12/2024 10:11 AM     COMPARISON: None.    HISTORY: Altered mental status.    TECHNIQUE: 5 mm thick axial CT images of the head were acquired  without IV contrast material.    FINDINGS:  There is moderate diffuse cerebral volume loss. There are  subtle patchy areas of decreased density in the cerebral white matter  bilaterally that are consistent with sequela of chronic small vessel  ischemic disease.     The ventricles and basal cisterns are within normal limits in  configuration given the degree of cerebral volume loss.  There is no  midline shift. There are no extra-axial fluid collections.     No intracranial hemorrhage, mass or recent infarct.    Possible acute left maxillary sinusitis. There is no mastoiditis.  There are no fractures of the visualized bones.       Impression    IMPRESSION: Possible acute left maxillary sinusitis. Diffuse cerebral  volume loss and cerebral white matter changes consistent with chronic  small vessel ischemic disease. No evidence for acute intracranial  pathology.      Radiation dose for this scan was reduced using automated exposure  control, adjustment of the mA and/or kV according to patient size, or  iterative reconstruction technique.    JAMAL COLEMAN MD         SYSTEM ID:  [id number]   CT Cervical Spine w/o Contrast    Narrative    CT OF THE CERVICAL SPINE WITHOUT CONTRAST   3/12/2024 10:12 AM     COMPARISON: None available.    HISTORY: Altered mental status, lower neck and upper thoracic spine  pain (please include down to T3). Neck pain. History spine surgery. No  suspected hardware failure. No suspected cervical disc disease.     TECHNIQUE: Axial images of the cervical spine were acquired without  intravenous contrast. Multiplanar reformations were created.      FINDINGS: There is posterior spinal fusion hardware from C3 down to  T2. Hardware appears well-positioned with no evidence for hardware  failure or loosening. There are laminectomies of C4, C5,  C6, C7 and  T1. There is mixed fluid and gas in the posterior paraspinous tissues  that may be due to recent surgery. Clinical correlation recommended.  If there is no history of recent surgery, infected fluid collection  must be considered.    There is minimal degenerative retrolisthesis of C2 upon C3. There is  minimal degenerative anterolisthesis of C7 upon T1. Alignment  otherwise normal; however, there is straightening of normal cervical  lordosis. Vertebral body heights of the cervical spine are normal.  Craniocervical alignment is normal. There is no evidence for fracture  of the cervical spine. Loss of disc space height and degenerative  endplate spurring at C4-C5, C5-C6 and C6-C7.      Impression    IMPRESSION:  1. Postoperative and degenerative change of the cervical spine as  described above.  2. Fluid and gas collection in the posterior paraspinous soft tissues  possibly due to recent surgery. If there is no history of recent  surgery, infection must be considered.  3. No fractures. No evidence for hardware failure or loosening.      Radiation dose for this scan was reduced using automated exposure  control, adjustment of the mA and/or kV according to patient size, or  iterative reconstruction technique.    JAMAL COLEMAN MD         SYSTEM ID:  [id number]   XR Chest Port 1 View    Narrative    EXAM: XR CHEST PORT 1 VIEW  LOCATION: [facility]  DATE: 3/13/2024    INDICATION: central line placement  COMPARISON: Chest radiograph 06/12/2011.      Impression    IMPRESSION: Right internal jugular venous catheter tip near the superior cavoatrial junction. Mild atelectasis/infiltrate of the lower left lung. Right lung clear. Normal heart size and pulmonary vascularity. Calcified plaque aortic arch. No   pneumothorax. Left glenohumeral arthroplasty. Cervicothoracic spinal fusion hardware.   XR Chest Port 1 View    Narrative    EXAM: XR CHEST PORTABLE 1 VIEW  LOCATION: [facility]  HOSPITAL  DATE: 2024    INDICATION: RN placed PICC, verify tip placement.  COMPARISON: Chest CT 06/10/2011 and chest film 2024.      Impression    IMPRESSION: Right PICC line with catheter tip projected over the low SVC region in satisfactory position. Calcified bilateral pleural plaques. Low lung volumes. Small amount of infiltrate versus atelectasis left lung base. Normal heart size and pulmonary   vascularity. Surgical changes left shoulder arthroplasty and lower cervical spine.     Echocardiogram Complete     Value    LVEF  55%    [facility]    [id number]  [id number]  [id number]  [id number]^TIANNA^EMMANUEL^JULIO     [facility]  Echocardiography Laboratory  [address] [place] [address]     Name: REI GRIER  MRN: [id number]  : 1945  Study Date: 2024 07:26 AM  Age: 78 yrs  Gender: Female  Patient [facility]  Reason For Study: Endocarditis  Ordering Physician: EMMANUEL WYNN  Referring Physician: Cory Soliz  Performed By: Ron Vasquez RDCS     BSA: 1.8 m2  Height: 62 in  Weight: 170 lb  HR: 77  BP: 98/62 mmHg  ______________________________________________________________________________  Procedure  Complete Echo Adult.  ______________________________________________________________________________  Interpretation Summary     1. The left ventricle is normal in structure, function and size. The visual  ejection fraction is estimated at 55%.  2. The right ventricle is normal in structure, function and size.  3. There is moderate to mod-severe (2-3+) tricuspid regurgitation. The right  ventricular systolic pressure is approximated at 38mmHg plus the right atrial  pressure.     [facility]%, 1+ TR.  ______________________________________________________________________________  Left Ventricle  The left ventricle is normal in structure, function and size. There is normal  left ventricular wall thickness. The visual ejection  fraction is estimated at  55%. Diastolic function not assessed due to atrial fibrillation. Normal left  ventricular wall motion.     Right Ventricle  The right ventricle is normal in structure, function and size.     Atria  The left atrium is mildly dilated. The right atrium is mildly dilated. There  is no atrial shunt seen.     Mitral Valve  There is mild (1+) mitral regurgitation.     Tricuspid Valve  There is moderate to mod-severe (2-3+) tricuspid regurgitation. The right  ventricular systolic pressure is approximated at 38mmHg plus the right atrial  pressure.     Aortic Valve  There is mild (1+) aortic regurgitation.     Pulmonic Valve  The pulmonic valve is normal in structure and function.     Vessels  Normal ascending, transverse (arch), and descending aorta. The inferior vena  cava was normal in size with preserved respiratory variability.     Pericardium  There is no pericardial effusion.     Rhythm  The rhythm was atrial fibrillation.  ______________________________________________________________________________  MMode/2D Measurements & Calculations  IVSd: 1.0 cm     LVIDd: 3.7 cm  LVIDs: 2.3 cm  LVPWd: 0.99 cm  IVC diam: 1.8 cm  FS: 36.6 %  LV mass(C)d: 112.4 grams  LV mass(C)dI: 63.0 grams/m2  Ao root diam: 2.8 cm  asc Aorta Diam: 3.3 cm  LVOT diam: 1.8 cm  LVOT area: 2.5 cm2  Ao root diam index Ht(cm/m): 1.8  Ao root diam index BSA (cm/m2): 1.6  Asc Ao diam index BSA (cm/m2): 1.8  Asc Ao diam index Ht(cm/m): 2.1  LA Volume (BP): 66.3 ml     LA Volume Index (BP): 37.2 ml/m2  RWT: 0.54  TAPSE: 1.5 cm     Doppler Measurements & Calculations  MV E max elyse: 102.2 cm/sec  MV max P.5 mmHg  MV mean P.5 mmHg  MV V2 VTI: 26.1 cm  AI P1/2t: 394.8 msec  PA acc time: 0.07 sec  TR max elyse: 305.7 cm/sec  TR max P.5 mmHg  E/E' av.7  Lateral E/e': 6.8  Medial E/e': 10.5     ______________________________________________________________________________  Report approved by: Tommy Cody  03/13/2024 02:51 PM             Discharge Medications   Current Discharge Medication List        START taking these medications    Details   aspirin (ASA) 81 MG chewable tablet Take 1 tablet (81 mg) by mouth daily    Associated Diagnoses: H/O Spinal surgery      doxycycline hyclate (VIBRAMYCIN) 100 MG capsule Take 1 capsule (100 mg) by mouth every 12 hours  Qty: 60 capsule, Refills: 0    Associated Diagnoses: Neck infection      !! melatonin 3 MG tablet Take 1 tablet (3 mg) by mouth at bedtime  Qty: 30 tablet, Refills: 0    Associated Diagnoses: Insomnia, unspecified type      rifampin (RIFADIN) 300 MG capsule Take 1 capsule (300 mg) by mouth every 12 hours  Qty: 60 capsule, Refills: 0    Associated Diagnoses: Neck infection      warfarin ANTICOAGULANT (COUMADIN) 5 MG tablet Take 1 tablet (5 mg) by mouth daily  Qty: 90 tablet, Refills: 0    Associated Diagnoses: Paroxysmal atrial fibrillation (H)       !! - Potential duplicate medications found. Please discuss with provider.        CONTINUE these medications which have CHANGED    Details   acetaminophen (TYLENOL) 325 MG tablet Take 2 tablets (650 mg) by mouth 3 times daily    Associated Diagnoses: H/O Spinal surgery      losartan (COZAAR) 25 MG tablet Take 3 tablets (75 mg) by mouth daily  Qty: 30 tablet, Refills: 0    Associated Diagnoses: Hypertension, unspecified type           CONTINUE these medications which have NOT CHANGED    Details   atorvastatin (LIPITOR) 40 MG tablet Take 1 tablet by mouth daily      gabapentin (NEURONTIN) 300 MG capsule Take 600 mg by mouth 2 times daily      hydrochlorothiazide (HYDRODIURIL) 25 MG tablet Take 1 tablet (25 mg) by mouth daily  Qty: 30 tablet, Refills: 0    Associated Diagnoses: Hypertension, unspecified type      !! insulin aspart (NOVOLOG PEN) 100 UNIT/ML pen For Pre-Meal  - 189 give 1 unit.   For Pre-Meal  - 239 give 2 units.   For Pre-Meal  - 289 give 3 units.   For Pre-Meal  - 339 give 4 units.    For Pre-Meal -399 give 5 units.  For Pre-Meal -449 give 6 units.  For Pre-Meal BG = or > 450 give 7 units.    Associated Diagnoses: Type 2 diabetes mellitus without complication, with long-term current use of insulin (H)      !! insulin aspart (NOVOLOG PEN) 100 UNIT/ML pen Inject 3 Units Subcutaneous daily (with breakfast)      !! insulin aspart (NOVOLOG PEN) 100 UNIT/ML pen Inject 3 Units Subcutaneous daily (with lunch)      !! insulin aspart (NOVOLOG PEN) 100 UNIT/ML pen Inject 5 Units Subcutaneous daily (with dinner)      insulin glargine (LANTUS PEN) 100 UNIT/ML pen Inject 18 Units Subcutaneous at bedtime  Qty: 15 mL, Refills: 0    Comments: If Lantus is not covered by insurance, may substitute Basaglar or Semglee or other insulin glargine product per insurance preference at same dose and frequency.    Associated Diagnoses: Type 2 diabetes mellitus with unspecified complications (H)      ketoconazole (NIZORAL) 2 % external shampoo Shampoo the hair thoroughly each as need.      latanoprost (XALATAN) 0.005 % ophthalmic solution Place 1 drop into both eyes at bedtime      levothyroxine (SYNTHROID/LEVOTHROID) 88 MCG tablet Take 88 mcg by mouth daily      !! melatonin 3 MG tablet Take 1 tablet (3 mg) by mouth nightly as needed for sleep  Qty: 30 tablet, Refills: 0    Associated Diagnoses: Insomnia, unspecified type      metoprolol succinate ER (TOPROL XL) 100 MG 24 hr tablet Take 1 tablet (100 mg) by mouth 2 times daily  Qty: 30 tablet, Refills: 0    Associated Diagnoses: Hypertension, unspecified type      Multiple Vitamins-Calcium (ONE-A-DAY WOMENS FORMULA PO) Take 1 tablet by mouth daily      omeprazole (PRILOSEC) 40 MG DR capsule Take 40 mg by mouth daily      prednisoLONE acetate (PRED FORTE) 1 % ophthalmic suspension Place 1 drop Into the left eye daily      timolol maleate (TIMOPTIC) 0.5 % ophthalmic solution Place 1 drop into both eyes 2 times daily       !! - Potential duplicate medications  found. Please discuss with provider.        STOP taking these medications       apixaban ANTICOAGULANT (ELIQUIS) 5 MG tablet Comments:   Reason for Stopping:         ceFAZolin (ANCEF) 1 GM vial Comments:   Reason for Stopping:         cyclobenzaprine (FLEXERIL) 5 MG tablet Comments:   Reason for Stopping:         HYDROcodone-acetaminophen (NORCO) 5-325 MG tablet Comments:   Reason for Stopping:             Allergies   No Known Allergies

## 2024-04-24 NOTE — PROGRESS NOTES
Discharge Plan     Discharge Date: 4/25/24  Discharge Disposition:  Home .     Discharge Services:  [facility] PT/OT/RN      ORDERS AND DISCHARGE SUMMARY WILL be pulled off Epic.      Discharge Supplies: All DME supplied by PT/OT    Discharge Transportation:        COLLINS Gordon   [facility], Transitional Care Unit   Social Work   [facility] [address] [place] [address]  () [phone]

## 2024-04-24 NOTE — PROGRESS NOTES
MDS Pain Assessment    The following is the pain interview as conducted by the TCU RN caring for the patient on April 24, 2024. This assessment is required by the [facility] for all patients in [place] SNF (Skilled Nursing Facilities).     . Pain Presence  Have you had pain or hurting at any time in the last 5 days?   1. Yes    . Pain Frequency  How much of the time have you experienced pain or hurting over the last 5 days?   2. Occasionally    . Pain Effect on Sleep  Over the past 5 days, how much of the time has pain made it hard for you to sleep at night?   1. Rarely or not at all    . Pain Interference with Therapy Activities  Over the past 5 days, how often have you limited your participation in rehabilitation therapy sessions due to pain?   0. Does not apply - I have not received rehabilitation therapy in the past 5 days    . Pain Interference with Day-to-Day Activities  Over the past 5 days, have you limited your day-to-day activities (excluding rehabilitation therapy sessions) because of pain?  1. Rarely or not at all    . Pain intensity   Numeric Rating Scale (00-10): Please rate your worst pain over the last 5 days on a zero to ten scale, with zero being no pain and ten as the worst pain you can imagine. 04

## 2024-04-24 NOTE — PLAN OF CARE
Orientation: Alert and oriented x4. Able to make needs known to staff.   Bowel & Bladder: Continent of both bowel and bladder. Voids spontaneously without difficulty.  Medications: Takes medication whole with thin liquids.  Pain: None  Ambulation/Transfers: with stand-by assist w/ walker.  Diet: Regular diet with good  appetite. Sliding scale insulin and carb coverage with nutritional supplements  Tubes/Lines/Drains: PICC Single Lumen Right Brachial vein lateral antibiotics-Site Assessment: WDL saline locked.  Oxygen: Room air  Skin: Wound vac to posterior neck is CDI. Suctioning at 75mmHg.  Infection/Isolation: No active isolations.  Safety: No concerns noted this shift. Door open per patient's request.  Other: Denied chest pain and SOB. Call-light within reach. Utilizes call-light appropriately. Continue with POC.    Vitals: T: 97.9,  B/P: 127/49,  P: 95,  R: 18,  SpO2: 99 %     Recent Blood Glucose   Lab 04/24/24  1240 04/24/24  0825   * 217*     Patient does not have new respiratory symptoms.  Patient does not have new sore throat.  Patient does not have a fever greater than 99.5

## 2024-04-24 NOTE — PLAN OF CARE
Goal Outcome Evaluation:       Plan of Care Reviewed With: patient     Overall Patient Progress: no changeOverall Patient Progress: no change          VS: /52 (BP Location: Left arm, Patient Position: Semi-Mckeon's, Cuff Size: Adult Regular)   Pulse 77   Temp 97.7  F (36.5  C) (Oral)   Resp 16   Wt 76.1 kg (167 lb 12.8 oz)   SpO2 97%   BMI 30.69 kg/m      O2: O2 stable on RA. Denies SOB   Output: Continent bowel and bladder.    Activity: SBA w/ GB and walker, needs help w/ wound vac    Skin: Posterior neck wound   Pain: Denies   CMS: A/O x4, able to make needs known.    Dressing: Posterior neck CDI   Diet: Regular diet, thin liquids, carb counting   LDA: Wound vac running at 75 mmHg, (R) SL PICC patent   Plan: Plans for potential discharge 4/25/24 to home.    Additional Info: Meds whole with thin liquids     No acute events overnight.    Patient does not have new respiratory symptoms.  Patient does not have new sore throat.  Patient does not have a fever greater than 99.5.

## 2024-04-24 NOTE — PLAN OF CARE
Goal Outcome Evaluation:      Patient is alert and oriented x4. Able to make needs known. Pt denied SOB, N/V and chest pain. Pt is blood sugar check with insulin given per order parameter. Pt is continent of bowel and bladder. Stand-by assist with walker. Wound Vac is functioning properly with intact dressing. IV med given as ordered with no notable infusion reaction. Call light within reach.    Patient's most recent vital signs are:     Vital signs:  BP: 101/57  Temp: 97.7  HR: 72  RR: 16  SpO2: 97 %     Patient does not have new respiratory symptoms.  Patient does not have new sore throat.  Patient does not have a fever greater than 99.5.            Problem: Spinal Surgery  Goal: Optimal Coping with Surgery  Description: Maintain individualized postoperative restrictions, such as neutral spine positioning and avoiding bending, twisting, lifting or prolonged sitting.    Encourage gradual increase in functional activity and mobility; coordinate pain control to optimize comfort with activity.    Maintain an accessible environment to facilitate safe activity.    Implement wearing of brace or orthosis as indicated; train in donning and doffing along with wear and care.    Train in proper body mechanics for daily activities and mobility.    Assess and retrain in functional mobility, such as bed mobility using log rolling techniques, transfers and gait with neutral spine. Encourage ambulation to minimize risks associated with immobility.    Assess activity of daily living performance; train in compensatory strategies, energy conservation and adaptive equipment; consider preferred routines or habits and respect patient privacy.    Outcome: Progressing     Problem: Fall Injury Risk  Goal: Absence of Fall and Fall-Related Injury  Description: Provide a safe, barrier-free environment that encourages independent activity.    Keep care area uncluttered and well-lighted.    Determine need for increased observation or  monitoring.    Avoid use of devices that minimize mobility, such as restraints or indwelling urinary catheter.    Outcome: Progressing     Problem: Skin Injury Risk Increased  Goal: Skin Health and Integrity  Description: Perform a full pressure injury risk assessment, as indicated by screening, upon admission to care unit.    Reassess skin (full inspection and injury risk, including skin temperature, consistency and color) frequently (e.g., scheduled interval, with change in condition) to provide optimal early detection and prevention.    Maintain adequate tissue perfusion (e.g., encourage fluid balance; avoid crossing legs, constrictive clothing or devices) to promote tissue oxygenation.    Maintain head of bed at lowest degree of elevation tolerated, considering medical condition and other restrictions. Use positioning supports to prevent sliding and friction. Consider low friction textiles.    Avoid positioning onto an area that remains reddened or on bony prominences.    Outcome: Progressing     Problem: Infection  Goal: Absence of Infection Signs and Symptoms  Description: Implement transmission-based precautions and isolation, as indicated, to prevent spread of infection.    Obtain cultures prior to initiating antimicrobial therapy, when possible. Do not delay treatment for laboratory results in the presence of high suspicion or clinical indicators.    Administer ordered antimicrobial therapy promptly; reassess need regularly.    Monitor laboratory value, diagnostic test and clinical status trends for signs of infection progression.    Identify early signs of sepsis, such as increased heart rate and decreased blood pressure, as well as changes in mental state, respiratory pattern or peripheral perfusion.    Prepare for rapid sepsis management, including lactate level, intravenous access, fluid administration and oxygen therapy.    Provide fever-reduction and comfort measures.    Promote antimicrobial  stewardship.      Outcome: Progressing

## 2024-04-25 ENCOUNTER — TELEPHONE (OUTPATIENT)
Dept: ANTICOAGULATION | Facility: CLINIC | Age: 79
End: 2024-04-25
Payer: COMMERCIAL

## 2024-04-25 ENCOUNTER — DOCUMENTATION ONLY (OUTPATIENT)
Dept: ANTICOAGULATION | Facility: CLINIC | Age: 79
End: 2024-04-25
Payer: COMMERCIAL

## 2024-04-25 VITALS
RESPIRATION RATE: 16 BRPM | TEMPERATURE: 97.4 F | SYSTOLIC BLOOD PRESSURE: 124 MMHG | OXYGEN SATURATION: 98 % | HEART RATE: 72 BPM | BODY MASS INDEX: 31.24 KG/M2 | WEIGHT: 170.8 LBS | DIASTOLIC BLOOD PRESSURE: 46 MMHG

## 2024-04-25 DIAGNOSIS — I48.0 PAROXYSMAL ATRIAL FIBRILLATION (H): Primary | ICD-10-CM

## 2024-04-25 LAB
GLUCOSE BLDC GLUCOMTR-MCNC: 165 MG/DL (ref 70–99)
GLUCOSE BLDC GLUCOMTR-MCNC: 184 MG/DL (ref 70–99)
INR PPP: 1.85 (ref 0.85–1.15)

## 2024-04-25 PROCEDURE — 250N000013 HC RX MED GY IP 250 OP 250 PS 637: Performed by: INTERNAL MEDICINE

## 2024-04-25 PROCEDURE — 85610 PROTHROMBIN TIME: CPT | Performed by: INTERNAL MEDICINE

## 2024-04-25 PROCEDURE — 250N000013 HC RX MED GY IP 250 OP 250 PS 637

## 2024-04-25 PROCEDURE — 36415 COLL VENOUS BLD VENIPUNCTURE: CPT | Performed by: INTERNAL MEDICINE

## 2024-04-25 RX ORDER — WARFARIN SODIUM 5 MG/1
10 TABLET ORAL ONCE
Qty: 2 TABLET | Refills: 0 | Status: COMPLETED | OUTPATIENT
Start: 2024-04-25 | End: 2024-04-25

## 2024-04-25 RX ORDER — WARFARIN SODIUM 5 MG/1
5 TABLET ORAL DAILY
Qty: 90 TABLET | Refills: 0 | Status: SHIPPED | OUTPATIENT
Start: 2024-04-25

## 2024-04-25 RX ADMIN — PANTOPRAZOLE SODIUM 40 MG: 40 TABLET, DELAYED RELEASE ORAL at 05:46

## 2024-04-25 RX ADMIN — ASPIRIN 81 MG CHEWABLE TABLET 81 MG: 81 TABLET CHEWABLE at 08:06

## 2024-04-25 RX ADMIN — RIFAMPIN 300 MG: 300 CAPSULE ORAL at 08:07

## 2024-04-25 RX ADMIN — METOPROLOL SUCCINATE 100 MG: 50 TABLET, EXTENDED RELEASE ORAL at 08:06

## 2024-04-25 RX ADMIN — LEVOTHYROXINE SODIUM 88 MCG: 0.09 TABLET ORAL at 05:46

## 2024-04-25 RX ADMIN — ACETAMINOPHEN 650 MG: 325 TABLET, FILM COATED ORAL at 08:06

## 2024-04-25 RX ADMIN — GABAPENTIN 600 MG: 300 CAPSULE ORAL at 08:06

## 2024-04-25 RX ADMIN — DOXYCYCLINE HYCLATE 100 MG: 100 CAPSULE ORAL at 08:06

## 2024-04-25 RX ADMIN — WARFARIN SODIUM 10 MG: 5 TABLET ORAL at 08:21

## 2024-04-25 RX ADMIN — HYDROCHLOROTHIAZIDE 25 MG: 25 TABLET ORAL at 08:06

## 2024-04-25 RX ADMIN — TIMOLOL MALEATE 1 DROP: 5 SOLUTION/ DROPS OPHTHALMIC at 08:12

## 2024-04-25 RX ADMIN — INSULIN ASPART 1 UNITS: 100 INJECTION, SOLUTION INTRAVENOUS; SUBCUTANEOUS at 08:13

## 2024-04-25 RX ADMIN — ATORVASTATIN CALCIUM 40 MG: 40 TABLET, FILM COATED ORAL at 08:06

## 2024-04-25 RX ADMIN — Medication 1 CAPSULE: at 08:07

## 2024-04-25 RX ADMIN — PREDNISOLONE ACETATE 1 DROP: 10 SUSPENSION/ DROPS OPHTHALMIC at 08:19

## 2024-04-25 RX ADMIN — INSULIN GLARGINE 14 UNITS: 100 INJECTION, SOLUTION SUBCUTANEOUS at 08:13

## 2024-04-25 RX ADMIN — LOSARTAN POTASSIUM 75 MG: 50 TABLET, FILM COATED ORAL at 08:06

## 2024-04-25 ASSESSMENT — ACTIVITIES OF DAILY LIVING (ADL)
ADLS_ACUITY_SCORE: 36

## 2024-04-25 NOTE — PROGRESS NOTES
Discharge Plan:     Date: 4/25/24    Location: Home with Spouse     Caregiver Support After discharge:      Communicated discharge plans with: Patient     Home Services or OP services (list services):   Home Health Care:   [facility]  PH: [phone]   Nurse, physical therapy, and occupational therapy.   -You will get a call [facility] to schedule the first home care visit. The home health nurse should come out within the first 24-48 hours. If you don't get a call from them within the first 48 hours, please call to follow up.    Supplies and DME:   Wound Vac Delivered to room. Informed CN of delivery. POHD sent to [facility].   [facility] therapy:   PH: [phone]    Education:   Bedside RN to show patient how to apply canisters, and trouble shoot machine.     Medications:   Med rec per provider.     Care Coordination Hand off:   Writer spoke with both the [facility] liasion about next vac change and INR draw.     Faxed Discharge summary to patient's PCP since they are an outside system ([facility]).     Follow ups:  PCP scheduled 5/3.     Patient states she has Follow-up with Dr. Cadena (Spine) 4/29, and Conference call w/ Dr. Cadena and ID 4/30.     Other notes:    Writer met with patient this AM to review discharge HC, Vac, and contacts. Patient did not have any questions. Understood that HC should start tomorrow.     Christelle Valentine   Patient Care Management Coordinator  Acute Rehabilitation Unit/ Transitional Care Unit.   Ph: [phone]

## 2024-04-25 NOTE — PLAN OF CARE
Goal Outcome Evaluation:      Plan of Care Reviewed With: patient     Overall Patient Progress: no changeOverall Patient Progress: no change     FOCUS/GOAL     Bowel management, Bladder management, Medication management, Wound care management, Medical management, Mobility, Skin integrity, and Safety management     ASSESSMENT, INTERVENTIONS AND CONTINUING PLAN FOR GOAL:     Pt is A&OX4, calm, & cooperative with care. Denied CP, SOB, & n/v. VSS & on RA. Pt transfers SBA with walker & GB. Continent for both B&B; uses the bathroom. Takes meds whole with thin liquid. R brachial SL PICC patent, NS flushed, & IV abx infused without difficulty. Unable to remove PICC; pt was sleeping after the last dose of IV abx. Incoming RN notified. Wound vac to the posterior neck intact & running at 75 mmHg. Pt is scheduled to discharge home tomorrow @ 1100. Pt ate dinner with good appetite & no other acute concern. Able to make needs known & call light within reach. Continue with POC.    Patient's most recent vital signs are:     Vital signs:  BP: 133/65  Temp: 98  HR: 77  RR: 16  SpO2: 94 %     Patient does not have new respiratory symptoms.  Patient does not have new sore throat.  Patient does not have a fever greater than 99.5.

## 2024-04-25 NOTE — PLAN OF CARE
Goal Outcome Evaluation:    Plan of Care Reviewed With: patient    Overall Patient Progress: improving    Outcome Evaluation: Pt denies any pain or discomfort this shift. Wound vac on posterior neck intact at -75 mm Hg cont.- no alarm noted. SBA to BR with walker - needs assistance managing the wound vac while ambulating. Able to do her own fitz care. Looking forward for discharge today. Home wound vac in the Nurses' Station. PICC to be removed before discharge. AM RN informed. For discharge today at 11 am. Pt has no c/o SOB and no s/s of respiratory issue noted at RA. Appear to be sleeping/resting between cares/ meds. Continue with plan of care.    Patient's most recent vital signs are:     Vital signs:  BP: 133/65  Temp: 98  HR: 77  RR: 16  SpO2: 94 %     Patient does not have new respiratory symptoms.  Patient does not have new sore throat.  Patient does not have a fever greater than 99.5.

## 2024-04-25 NOTE — TELEPHONE ENCOUNTER
Patient was referred to [facility] anticoagulation clinic, however, patient's PCP is through [facility].  Contacted [facility] anticoagulation clinic for a warm hand off.  Advised them of the last week's warfarin dosing given in the hospital, rifampin starting and home care referral. [facility] plans to follow up with patient/home care.

## 2024-04-25 NOTE — PHARMACY-ANTICOAGULATION SERVICE
Clinical Pharmacy- Warfarin Discharge Note  This patient is currently on warfarin for the treatment of Atrial fibrillation.  INR Goal= 2-3  Expected length of therapy lifetime.      Anticoagulation Dose History  More data exists         Latest Ref Rng & Units 4/19/2024 4/20/2024 4/21/2024 4/22/2024 4/23/2024 4/24/2024 4/25/2024   Recent Dosing and Labs   warfarin ANTICOAGULANT (COUMADIN) 1 MG tablet - 8 mg, $Given - - - 8 mg, $Given - -   warfarin ANTICOAGULANT (COUMADIN) 3 MG tablet - - - 9 mg, $Given - - - -   warfarin ANTICOAGULANT (COUMADIN) 5 MG tablet - - 10 mg, $Given - 10 mg, $Given - 10 mg, $Given -   INR 0.85 - 1.15 2.09  1.65  1.90  1.93  2.21  2.01  1.85        Vitamin K doses administered during the last 7 days: None  FFP administered during the last 7 days: None  Recommend discharging the patient on a warfarin regimen of 10 mg with a prescription for warfarin 5mg tablets.    The patient should have an INR checked in 1-2 days.    Mohit Mackenzie, PharmD  Clinical Pharmacist

## 2024-04-25 NOTE — PLAN OF CARE
Reviewed discharge orders and After Visit Summary (AVS) with the patient. Medication orders were reviewed and instructions given as to the frequency to take each med, along with when last medication was given. Patient belongings sent with patient. Patient instructed to follow up with primary physican with any further questions they may have. Patient states they understand discharge orders as they are written and has no questions. Patient was discharged at [address].

## 2024-04-25 NOTE — PROGRESS NOTES
ANTICOAGULATION  MANAGEMENT    Lj Castro is being discharged from the [facility] Anticoagulation Management Program ([facility]).    Reason for discharge: care has been transferred to [facility]    Anticoagulation episode resolved      Isaura Amaro RN

## 2024-04-25 NOTE — PROGRESS NOTES
Patient's discharge wound vac connected before discharge. Set at 75mmHg. Tested. Vacuum is intact.

## 2024-06-07 ENCOUNTER — LAB REQUISITION (OUTPATIENT)
Dept: LAB | Facility: CLINIC | Age: 79
End: 2024-06-07
Payer: COMMERCIAL

## 2024-06-07 DIAGNOSIS — A41.01 SEPSIS DUE TO METHICILLIN SUSCEPTIBLE STAPHYLOCOCCUS AUREUS (H): ICD-10-CM

## 2024-06-07 LAB
AST SERPL W P-5'-P-CCNC: 20 U/L (ref 0–45)
BASOPHILS # BLD AUTO: 0 10E3/UL (ref 0–0.2)
BASOPHILS NFR BLD AUTO: 1 %
CRP SERPL-MCNC: 3.86 MG/L
EOSINOPHIL # BLD AUTO: 0.1 10E3/UL (ref 0–0.7)
EOSINOPHIL NFR BLD AUTO: 3 %
ERYTHROCYTE [DISTWIDTH] IN BLOOD BY AUTOMATED COUNT: 13.8 % (ref 10–15)
ERYTHROCYTE [SEDIMENTATION RATE] IN BLOOD BY WESTERGREN METHOD: 18 MM/HR (ref 0–30)
HCT VFR BLD AUTO: 35.7 % (ref 35–47)
HGB BLD-MCNC: 11.6 G/DL (ref 11.7–15.7)
IMM GRANULOCYTES # BLD: 0 10E3/UL
IMM GRANULOCYTES NFR BLD: 0 %
LYMPHOCYTES # BLD AUTO: 1.1 10E3/UL (ref 0.8–5.3)
LYMPHOCYTES NFR BLD AUTO: 26 %
MCH RBC QN AUTO: 30.1 PG (ref 26.5–33)
MCHC RBC AUTO-ENTMCNC: 32.5 G/DL (ref 31.5–36.5)
MCV RBC AUTO: 93 FL (ref 78–100)
MONOCYTES # BLD AUTO: 0.3 10E3/UL (ref 0–1.3)
MONOCYTES NFR BLD AUTO: 6 %
NEUTROPHILS # BLD AUTO: 2.7 10E3/UL (ref 1.6–8.3)
NEUTROPHILS NFR BLD AUTO: 64 %
NRBC # BLD AUTO: 0 10E3/UL
NRBC BLD AUTO-RTO: 0 /100
PLATELET # BLD AUTO: 209 10E3/UL (ref 150–450)
RBC # BLD AUTO: 3.85 10E6/UL (ref 3.8–5.2)
WBC # BLD AUTO: 4.2 10E3/UL (ref 4–11)

## 2024-06-07 PROCEDURE — 86140 C-REACTIVE PROTEIN: CPT | Mod: ORL | Performed by: INTERNAL MEDICINE

## 2024-06-07 PROCEDURE — 85025 COMPLETE CBC W/AUTO DIFF WBC: CPT | Mod: ORL | Performed by: INTERNAL MEDICINE

## 2024-06-07 PROCEDURE — 84450 TRANSFERASE (AST) (SGOT): CPT | Mod: ORL | Performed by: INTERNAL MEDICINE

## 2024-06-07 PROCEDURE — 85652 RBC SED RATE AUTOMATED: CPT | Mod: ORL | Performed by: INTERNAL MEDICINE

## 2024-07-01 ENCOUNTER — LAB REQUISITION (OUTPATIENT)
Dept: LAB | Facility: CLINIC | Age: 79
End: 2024-07-01
Payer: COMMERCIAL

## 2024-07-01 DIAGNOSIS — A41.01 SEPSIS DUE TO METHICILLIN SUSCEPTIBLE STAPHYLOCOCCUS AUREUS (H): ICD-10-CM

## 2024-07-01 LAB
AST SERPL W P-5'-P-CCNC: 19 U/L (ref 0–45)
BASOPHILS # BLD AUTO: 0 10E3/UL (ref 0–0.2)
BASOPHILS NFR BLD AUTO: 1 %
CRP SERPL-MCNC: <3 MG/L
EOSINOPHIL # BLD AUTO: 0.1 10E3/UL (ref 0–0.7)
EOSINOPHIL NFR BLD AUTO: 2 %
ERYTHROCYTE [DISTWIDTH] IN BLOOD BY AUTOMATED COUNT: 13.8 % (ref 10–15)
ERYTHROCYTE [SEDIMENTATION RATE] IN BLOOD BY WESTERGREN METHOD: 11 MM/HR (ref 0–30)
HCT VFR BLD AUTO: 35.9 % (ref 35–47)
HGB BLD-MCNC: 11.9 G/DL (ref 11.7–15.7)
HOLD SPECIMEN: NORMAL
IMM GRANULOCYTES # BLD: 0 10E3/UL
IMM GRANULOCYTES NFR BLD: 1 %
LYMPHOCYTES # BLD AUTO: 1 10E3/UL (ref 0.8–5.3)
LYMPHOCYTES NFR BLD AUTO: 31 %
MCH RBC QN AUTO: 30.2 PG (ref 26.5–33)
MCHC RBC AUTO-ENTMCNC: 33.1 G/DL (ref 31.5–36.5)
MCV RBC AUTO: 91 FL (ref 78–100)
MONOCYTES # BLD AUTO: 0.2 10E3/UL (ref 0–1.3)
MONOCYTES NFR BLD AUTO: 6 %
NEUTROPHILS # BLD AUTO: 1.9 10E3/UL (ref 1.6–8.3)
NEUTROPHILS NFR BLD AUTO: 59 %
NRBC # BLD AUTO: 0 10E3/UL
NRBC BLD AUTO-RTO: 0 /100
PLATELET # BLD AUTO: 197 10E3/UL (ref 150–450)
RBC # BLD AUTO: 3.94 10E6/UL (ref 3.8–5.2)
WBC # BLD AUTO: 3.3 10E3/UL (ref 4–11)

## 2024-07-01 PROCEDURE — 85025 COMPLETE CBC W/AUTO DIFF WBC: CPT | Mod: ORL | Performed by: INTERNAL MEDICINE

## 2024-07-01 PROCEDURE — 85652 RBC SED RATE AUTOMATED: CPT | Mod: ORL | Performed by: INTERNAL MEDICINE

## 2024-07-01 PROCEDURE — 84450 TRANSFERASE (AST) (SGOT): CPT | Mod: ORL | Performed by: INTERNAL MEDICINE

## 2024-07-01 PROCEDURE — 86140 C-REACTIVE PROTEIN: CPT | Mod: ORL | Performed by: INTERNAL MEDICINE

## 2024-07-22 ENCOUNTER — ANTICOAGULATION THERAPY VISIT (OUTPATIENT)
Dept: ANTICOAGULATION | Facility: CLINIC | Age: 79
End: 2024-07-22
Payer: COMMERCIAL

## 2024-07-22 NOTE — PROGRESS NOTES
Updated. Per CareEverywhere, INR result from today reviewed and addressed by [facility].  No further action needed.    Lizy Carlson RN  [facility].

## 2024-07-22 NOTE — PROGRESS NOTES
Abbie from [facility] call and left voicemail to report INR of 2.4 today. Current dosing 12.5 mg on M,W,F;  10 mg all other days. No changes.  Per chart review, was transferred to [facility] back in April 2024    Called and left message for Abbie to call back to further discuss who is currently managing INR.     Lizy Carlson RN  [facility].

## 2024-08-01 ENCOUNTER — LAB REQUISITION (OUTPATIENT)
Dept: LAB | Facility: CLINIC | Age: 79
End: 2024-08-01
Payer: COMMERCIAL

## 2024-08-01 DIAGNOSIS — A41.01 SEPSIS DUE TO METHICILLIN SUSCEPTIBLE STAPHYLOCOCCUS AUREUS (H): ICD-10-CM

## 2024-08-01 LAB
BASOPHILS # BLD AUTO: 0 10E3/UL (ref 0–0.2)
BASOPHILS NFR BLD AUTO: 1 %
CRP SERPL-MCNC: <3 MG/L
EOSINOPHIL # BLD AUTO: 0.1 10E3/UL (ref 0–0.7)
EOSINOPHIL NFR BLD AUTO: 3 %
ERYTHROCYTE [DISTWIDTH] IN BLOOD BY AUTOMATED COUNT: 13.9 % (ref 10–15)
ERYTHROCYTE [SEDIMENTATION RATE] IN BLOOD BY WESTERGREN METHOD: 14 MM/HR (ref 0–30)
HCT VFR BLD AUTO: 34.3 % (ref 35–47)
HGB BLD-MCNC: 11.1 G/DL (ref 11.7–15.7)
IMM GRANULOCYTES # BLD: 0 10E3/UL
IMM GRANULOCYTES NFR BLD: 0 %
LYMPHOCYTES # BLD AUTO: 1.2 10E3/UL (ref 0.8–5.3)
LYMPHOCYTES NFR BLD AUTO: 31 %
MCH RBC QN AUTO: 29.6 PG (ref 26.5–33)
MCHC RBC AUTO-ENTMCNC: 32.4 G/DL (ref 31.5–36.5)
MCV RBC AUTO: 92 FL (ref 78–100)
MONOCYTES # BLD AUTO: 0.3 10E3/UL (ref 0–1.3)
MONOCYTES NFR BLD AUTO: 7 %
NEUTROPHILS # BLD AUTO: 2.2 10E3/UL (ref 1.6–8.3)
NEUTROPHILS NFR BLD AUTO: 58 %
NRBC # BLD AUTO: 0 10E3/UL
NRBC BLD AUTO-RTO: 0 /100
PLATELET # BLD AUTO: 186 10E3/UL (ref 150–450)
RBC # BLD AUTO: 3.75 10E6/UL (ref 3.8–5.2)
WBC # BLD AUTO: 3.8 10E3/UL (ref 4–11)

## 2024-08-01 PROCEDURE — 86140 C-REACTIVE PROTEIN: CPT | Mod: ORL | Performed by: INTERNAL MEDICINE

## 2024-08-01 PROCEDURE — 85025 COMPLETE CBC W/AUTO DIFF WBC: CPT | Mod: ORL | Performed by: INTERNAL MEDICINE

## 2024-08-01 PROCEDURE — 84450 TRANSFERASE (AST) (SGOT): CPT | Mod: ORL | Performed by: INTERNAL MEDICINE

## 2024-08-01 PROCEDURE — 85652 RBC SED RATE AUTOMATED: CPT | Mod: ORL | Performed by: INTERNAL MEDICINE

## 2024-08-02 LAB — AST SERPL W P-5'-P-CCNC: 25 U/L (ref 0–45)

## 2024-08-26 ENCOUNTER — LAB REQUISITION (OUTPATIENT)
Dept: LAB | Facility: CLINIC | Age: 79
End: 2024-08-26
Payer: COMMERCIAL

## 2024-08-26 DIAGNOSIS — I48.0 PAROXYSMAL ATRIAL FIBRILLATION (H): ICD-10-CM

## 2024-08-26 LAB — INR PPP: 6.98 (ref 0.85–1.15)

## 2024-08-26 PROCEDURE — 85610 PROTHROMBIN TIME: CPT | Mod: ORL | Performed by: FAMILY MEDICINE

## 2024-09-03 ENCOUNTER — LAB REQUISITION (OUTPATIENT)
Dept: LAB | Facility: CLINIC | Age: 79
End: 2024-09-03
Payer: COMMERCIAL

## 2024-09-03 DIAGNOSIS — E03.9 HYPOTHYROIDISM, UNSPECIFIED: ICD-10-CM

## 2024-09-03 DIAGNOSIS — I48.11 LONGSTANDING PERSISTENT ATRIAL FIBRILLATION (H): ICD-10-CM

## 2024-09-03 LAB
ALBUMIN UR-MCNC: NEGATIVE MG/DL
ANION GAP SERPL CALCULATED.3IONS-SCNC: 13 MMOL/L (ref 7–15)
APPEARANCE UR: CLEAR
BACTERIA #/AREA URNS HPF: ABNORMAL /HPF
BILIRUB UR QL STRIP: NEGATIVE
BUN SERPL-MCNC: 20.4 MG/DL (ref 8–23)
CALCIUM SERPL-MCNC: 9.2 MG/DL (ref 8.8–10.4)
CHLORIDE SERPL-SCNC: 100 MMOL/L (ref 98–107)
COLOR UR AUTO: YELLOW
CREAT SERPL-MCNC: 0.94 MG/DL (ref 0.51–0.95)
EGFRCR SERPLBLD CKD-EPI 2021: 61 ML/MIN/1.73M2
GLUCOSE SERPL-MCNC: 219 MG/DL (ref 70–99)
GLUCOSE UR STRIP-MCNC: NEGATIVE MG/DL
HBA1C MFR BLD: 7.7 % (ref 0–5.6)
HCO3 SERPL-SCNC: 26 MMOL/L (ref 22–29)
HGB UR QL STRIP: NEGATIVE
INR PPP: 5.53 (ref 0.85–1.15)
KETONES UR STRIP-MCNC: NEGATIVE MG/DL
LDLC SERPL DIRECT ASSAY-MCNC: 78 MG/DL
LEUKOCYTE ESTERASE UR QL STRIP: NEGATIVE
NITRATE UR QL: NEGATIVE
PH UR STRIP: 6 [PH] (ref 5–7)
POTASSIUM SERPL-SCNC: 4.3 MMOL/L (ref 3.4–5.3)
RBC #/AREA URNS AUTO: ABNORMAL /HPF
SODIUM SERPL-SCNC: 139 MMOL/L (ref 135–145)
SP GR UR STRIP: 1.01 (ref 1–1.03)
SQUAMOUS #/AREA URNS AUTO: ABNORMAL /LPF
T3 SERPL-MCNC: 84 NG/DL (ref 85–202)
T4 FREE SERPL-MCNC: 1.08 NG/DL (ref 0.9–1.7)
TSH SERPL DL<=0.005 MIU/L-ACNC: 5.19 UIU/ML (ref 0.3–4.2)
UROBILINOGEN UR STRIP-ACNC: 0.2 E.U./DL
WBC #/AREA URNS AUTO: ABNORMAL /HPF

## 2024-09-03 PROCEDURE — 83721 ASSAY OF BLOOD LIPOPROTEIN: CPT | Mod: ORL | Performed by: INTERNAL MEDICINE

## 2024-09-03 PROCEDURE — 80048 BASIC METABOLIC PNL TOTAL CA: CPT | Mod: ORL | Performed by: INTERNAL MEDICINE

## 2024-09-03 PROCEDURE — 84443 ASSAY THYROID STIM HORMONE: CPT | Mod: ORL | Performed by: INTERNAL MEDICINE

## 2024-09-03 PROCEDURE — 84480 ASSAY TRIIODOTHYRONINE (T3): CPT | Mod: ORL | Performed by: INTERNAL MEDICINE

## 2024-09-03 PROCEDURE — 84439 ASSAY OF FREE THYROXINE: CPT | Mod: ORL | Performed by: INTERNAL MEDICINE

## 2024-09-10 ENCOUNTER — HOSPITAL ENCOUNTER (OUTPATIENT)
Facility: CLINIC | Age: 79
Discharge: HOME | End: 2024-09-10
Payer: COMMERCIAL

## 2024-09-10 ENCOUNTER — LAB REQUISITION (OUTPATIENT)
Dept: LAB | Facility: CLINIC | Age: 79
End: 2024-09-10
Payer: COMMERCIAL

## 2024-09-10 DIAGNOSIS — I48.0 PAROXYSMAL ATRIAL FIBRILLATION (H): ICD-10-CM

## 2024-09-10 LAB — INR PPP: 3.93 (ref 0.85–1.15)

## 2024-09-18 ENCOUNTER — HOSPITAL ENCOUNTER (OUTPATIENT)
Facility: CLINIC | Age: 79
Discharge: HOME | End: 2024-09-18
Payer: COMMERCIAL

## 2024-09-18 ENCOUNTER — LAB REQUISITION (OUTPATIENT)
Dept: LAB | Facility: CLINIC | Age: 79
End: 2024-09-18
Payer: COMMERCIAL

## 2024-09-18 DIAGNOSIS — I48.0 PAROXYSMAL ATRIAL FIBRILLATION (H): ICD-10-CM

## 2024-09-18 LAB — INR PPP: 6.79 (ref 0.85–1.15)

## 2024-09-23 ENCOUNTER — LAB REQUISITION (OUTPATIENT)
Dept: LAB | Facility: CLINIC | Age: 79
End: 2024-09-23
Payer: COMMERCIAL

## 2024-09-23 DIAGNOSIS — I48.0 PAROXYSMAL ATRIAL FIBRILLATION (H): ICD-10-CM

## 2024-09-23 LAB — INR PPP: 1.26 (ref 0.85–1.15)

## 2024-09-23 PROCEDURE — 85610 PROTHROMBIN TIME: CPT | Mod: ORL | Performed by: FAMILY MEDICINE

## 2025-02-13 RX ORDER — LOSARTAN POTASSIUM 50 MG/1
1 TABLET ORAL DAILY
COMMUNITY
Start: 2024-10-25

## 2025-02-13 RX ORDER — BRIMONIDINE TARTRATE 2 MG/ML
1 SOLUTION/ DROPS OPHTHALMIC 2 TIMES DAILY
COMMUNITY

## 2025-02-13 RX ORDER — BLOOD SUGAR DIAGNOSTIC
STRIP MISCELLANEOUS
COMMUNITY
Start: 2024-07-25

## 2025-02-13 RX ORDER — CEPHALEXIN 500 MG/1
CAPSULE ORAL
COMMUNITY
Start: 2024-08-12 | End: 2025-02-13

## 2025-03-05 RX ORDER — CEPHALEXIN 500 MG/1
500 CAPSULE ORAL 2 TIMES DAILY
COMMUNITY
End: 2025-03-05

## 2025-03-05 NOTE — PHARMACY-ADMISSION MEDICATION HISTORY
Pre-Admission Medication History  Medication history and patient interview completed by pre-admitting RN or pre-op/PACU RN. Reviewed by pharmacist, including [facility] dispense records, [facility] Care Everywhere, and chart review.     Called patient to go over list as marked in-progress. Added timolol, changed Lantus dosing.       Hoang Mc, Juwan.D., [facility]        PTA Med List   Medication Sig Last Dose/Taking    acetaminophen (TYLENOL) 325 MG tablet Take 2 tablets (650 mg) by mouth 3 times daily Taking    apixaban ANTICOAGULANT (ELIQUIS) 5 MG tablet Take 5 mg by mouth 2 times daily. Taking    atorvastatin (LIPITOR) 40 MG tablet Take 1 tablet by mouth daily Taking    blood glucose (NO BRAND SPECIFIED) test strip USE TO CHECK GLUCOSE FIVE TIMES DAILY Taking    brimonidine (ALPHAGAN) 0.2 % ophthalmic solution Place 1 drop Into the left eye 2 times daily. Taking    gabapentin (NEURONTIN) 300 MG capsule Take 600 mg by mouth 2 times daily Taking    hydrochlorothiazide (HYDRODIURIL) 25 MG tablet Take 1 tablet (25 mg) by mouth daily Taking    insulin aspart (NOVOLOG PEN) 100 UNIT/ML pen Inject 3 Units Subcutaneous daily (with breakfast) Taking    insulin aspart (NOVOLOG PEN) 100 UNIT/ML pen Inject 3 Units Subcutaneous daily (with lunch) Taking    insulin aspart (NOVOLOG PEN) 100 UNIT/ML pen Inject 5 Units Subcutaneous daily (with dinner) Taking    insulin glargine (LANTUS PEN) 100 UNIT/ML pen Inject 17 Units subcutaneously at bedtime. Taking    ketoconazole (NIZORAL) 2 % external shampoo Shampoo the hair thoroughly each as need. Taking    latanoprost (XALATAN) 0.005 % ophthalmic solution Place 1 drop into both eyes at bedtime Taking    levothyroxine (SYNTHROID/LEVOTHROID) 88 MCG tablet Take 88 mcg by mouth daily Taking    losartan (COZAAR) 50 MG tablet Take 1 tablet by mouth daily. Taking    melatonin 5 MG tablet Take 5 mg by mouth nightly as needed for sleep. Taking As Needed    metoprolol succinate ER (TOPROL XL) 100  MG 24 hr tablet Take 1 tablet (100 mg) by mouth 2 times daily Taking    Multiple Vitamins-Calcium (ONE-A-DAY WOMENS FORMULA PO) Take 1 tablet by mouth daily Taking    omeprazole (PRILOSEC) 40 MG DR capsule Take 40 mg by mouth daily Taking    ONETOUCH ULTRA TEST test strip USE TO CHECK GLUCOSE FIVE TIMES DAILY Taking    prednisoLONE acetate (PRED FORTE) 1 % ophthalmic suspension Place 1 drop Into the left eye daily Taking    timolol maleate (TIMOPTIC) 0.5 % ophthalmic solution Place 1 drop into both eyes every morning. Taking

## 2025-03-07 ENCOUNTER — APPOINTMENT (OUTPATIENT)
Dept: GENERAL RADIOLOGY | Facility: CLINIC | Age: 80
DRG: 457 | End: 2025-03-07
Attending: ORTHOPAEDIC SURGERY
Payer: COMMERCIAL

## 2025-03-07 ENCOUNTER — HOSPITAL ENCOUNTER (INPATIENT)
Facility: CLINIC | Age: 80
LOS: 4 days | Discharge: NURSING, REHAB OR LONG-TERM CARE | DRG: 457 | End: 2025-03-11
Attending: ORTHOPAEDIC SURGERY | Admitting: ORTHOPAEDIC SURGERY
Payer: COMMERCIAL

## 2025-03-07 DIAGNOSIS — Z98.890 H/O SPINAL SURGERY: Primary | ICD-10-CM

## 2025-03-07 LAB
ABO + RH BLD: NORMAL
BLD GP AB SCN SERPL QL: NEGATIVE
GLUCOSE BLDC GLUCOMTR-MCNC: 149 MG/DL (ref 70–99)
GLUCOSE BLDC GLUCOMTR-MCNC: 152 MG/DL (ref 70–99)
GLUCOSE BLDC GLUCOMTR-MCNC: 187 MG/DL (ref 70–99)
GLUCOSE BLDC GLUCOMTR-MCNC: 208 MG/DL (ref 70–99)
GLUCOSE BLDC GLUCOMTR-MCNC: 80 MG/DL (ref 70–99)
HGB BLD-MCNC: 11.8 G/DL (ref 11.7–15.7)
SPECIMEN EXP DATE BLD: NORMAL

## 2025-03-07 PROCEDURE — 710N000009 HC RECOVERY PHASE 1, LEVEL 1, PER MIN: Performed by: ORTHOPAEDIC SURGERY

## 2025-03-07 PROCEDURE — 258N000003 HC RX IP 258 OP 636: Performed by: ANESTHESIOLOGY

## 2025-03-07 PROCEDURE — 8E0WXBF COMPUTER ASSISTED PROCEDURE OF TRUNK REGION, WITH FLUOROSCOPY: ICD-10-PCS | Performed by: ORTHOPAEDIC SURGERY

## 2025-03-07 PROCEDURE — 272N000002 HC OR SUPPLY OTHER OPNP: Performed by: ORTHOPAEDIC SURGERY

## 2025-03-07 PROCEDURE — 250N000011 HC RX IP 250 OP 636: Mod: JZ | Performed by: ANESTHESIOLOGY

## 2025-03-07 PROCEDURE — C1713 ANCHOR/SCREW BN/BN,TIS/BN: HCPCS | Performed by: ORTHOPAEDIC SURGERY

## 2025-03-07 PROCEDURE — 250N000025 HC SEVOFLURANE, PER MIN: Performed by: ORTHOPAEDIC SURGERY

## 2025-03-07 PROCEDURE — 86900 BLOOD TYPING SEROLOGIC ABO: CPT | Performed by: ORTHOPAEDIC SURGERY

## 2025-03-07 PROCEDURE — 272N000001 HC OR GENERAL SUPPLY STERILE: Performed by: ORTHOPAEDIC SURGERY

## 2025-03-07 PROCEDURE — 0ST20ZZ RESECTION OF LUMBAR VERTEBRAL DISC, OPEN APPROACH: ICD-10-PCS | Performed by: ORTHOPAEDIC SURGERY

## 2025-03-07 PROCEDURE — 120N000001 HC R&B MED SURG/OB

## 2025-03-07 PROCEDURE — 360N000086 HC SURGERY LEVEL 6 W/ FLUORO, PER MIN: Performed by: ORTHOPAEDIC SURGERY

## 2025-03-07 PROCEDURE — 250N000013 HC RX MED GY IP 250 OP 250 PS 637: Performed by: NURSE PRACTITIONER

## 2025-03-07 PROCEDURE — 00NY0ZZ RELEASE LUMBAR SPINAL CORD, OPEN APPROACH: ICD-10-PCS | Performed by: ORTHOPAEDIC SURGERY

## 2025-03-07 PROCEDURE — 86850 RBC ANTIBODY SCREEN: CPT | Performed by: ORTHOPAEDIC SURGERY

## 2025-03-07 PROCEDURE — 4A11X4G MONITORING OF PERIPHERAL NERVOUS ELECTRICAL ACTIVITY, INTRAOPERATIVE, EXTERNAL APPROACH: ICD-10-PCS | Performed by: ORTHOPAEDIC SURGERY

## 2025-03-07 PROCEDURE — 0SG10A0 FUSION OF 2 OR MORE LUMBAR VERTEBRAL JOINTS WITH INTERBODY FUSION DEVICE, ANTERIOR APPROACH, ANTERIOR COLUMN, OPEN APPROACH: ICD-10-PCS | Performed by: ORTHOPAEDIC SURGERY

## 2025-03-07 PROCEDURE — 01NB0ZZ RELEASE LUMBAR NERVE, OPEN APPROACH: ICD-10-PCS | Performed by: ORTHOPAEDIC SURGERY

## 2025-03-07 PROCEDURE — 258N000003 HC RX IP 258 OP 636

## 2025-03-07 PROCEDURE — 079T3ZZ DRAINAGE OF BONE MARROW, PERCUTANEOUS APPROACH: ICD-10-PCS | Performed by: ORTHOPAEDIC SURGERY

## 2025-03-07 PROCEDURE — 999N000141 HC STATISTIC PRE-PROCEDURE NURSING ASSESSMENT: Performed by: ORTHOPAEDIC SURGERY

## 2025-03-07 PROCEDURE — 85018 HEMOGLOBIN: CPT | Performed by: ORTHOPAEDIC SURGERY

## 2025-03-07 PROCEDURE — 250N000011 HC RX IP 250 OP 636: Performed by: ORTHOPAEDIC SURGERY

## 2025-03-07 PROCEDURE — 250N000013 HC RX MED GY IP 250 OP 250 PS 637: Performed by: ANESTHESIOLOGY

## 2025-03-07 PROCEDURE — 258N000001 HC RX 258: Performed by: ANESTHESIOLOGY

## 2025-03-07 PROCEDURE — 370N000017 HC ANESTHESIA TECHNICAL FEE, PER MIN: Performed by: ORTHOPAEDIC SURGERY

## 2025-03-07 PROCEDURE — 999N000179 XR SURGERY CARM FLUORO LESS THAN 5 MIN W STILLS

## 2025-03-07 PROCEDURE — 250N000013 HC RX MED GY IP 250 OP 250 PS 637

## 2025-03-07 PROCEDURE — 36415 COLL VENOUS BLD VENIPUNCTURE: CPT | Performed by: ORTHOPAEDIC SURGERY

## 2025-03-07 DEVICE — IMP WIRE KIRSCHNER AMENDIA 1.4MMX18" THRD 9080-18T: Type: IMPLANTABLE DEVICE | Site: SPINE LUMBAR | Status: FUNCTIONAL

## 2025-03-07 DEVICE — IMPLANTABLE DEVICE: Type: IMPLANTABLE DEVICE | Site: SPINE LUMBAR | Status: FUNCTIONAL

## 2025-03-07 DEVICE — SCREW SET SPNE STAR OPN LNK PATHLOC-L STRL LF: Type: IMPLANTABLE DEVICE | Site: SPINE LUMBAR | Status: FUNCTIONAL

## 2025-03-07 RX ORDER — BUPIVACAINE HYDROCHLORIDE 2.5 MG/ML
INJECTION, SOLUTION EPIDURAL; INFILTRATION; INTRACAUDAL; PERINEURAL PRN
Status: DISCONTINUED | OUTPATIENT
Start: 2025-03-07 | End: 2025-03-07 | Stop reason: HOSPADM

## 2025-03-07 RX ORDER — NALOXONE HYDROCHLORIDE 0.4 MG/ML
0.4 INJECTION, SOLUTION INTRAMUSCULAR; INTRAVENOUS; SUBCUTANEOUS
Status: DISCONTINUED | OUTPATIENT
Start: 2025-03-07 | End: 2025-03-11 | Stop reason: HOSPADM

## 2025-03-07 RX ORDER — TRANEXAMIC ACID 10 MG/ML
1000 INJECTION, SOLUTION INTRAVENOUS ONCE
Status: DISCONTINUED | OUTPATIENT
Start: 2025-03-07 | End: 2025-03-07 | Stop reason: HOSPADM

## 2025-03-07 RX ORDER — ONDANSETRON 4 MG/1
4 TABLET, ORALLY DISINTEGRATING ORAL EVERY 6 HOURS PRN
Status: DISCONTINUED | OUTPATIENT
Start: 2025-03-07 | End: 2025-03-11 | Stop reason: HOSPADM

## 2025-03-07 RX ORDER — NICOTINE POLACRILEX 4 MG
15-30 LOZENGE BUCCAL
Status: DISCONTINUED | OUTPATIENT
Start: 2025-03-07 | End: 2025-03-08

## 2025-03-07 RX ORDER — ONDANSETRON 2 MG/ML
4 INJECTION INTRAMUSCULAR; INTRAVENOUS EVERY 6 HOURS PRN
Status: DISCONTINUED | OUTPATIENT
Start: 2025-03-07 | End: 2025-03-11 | Stop reason: HOSPADM

## 2025-03-07 RX ORDER — POLYETHYLENE GLYCOL 3350 17 G/17G
17 POWDER, FOR SOLUTION ORAL DAILY
Status: DISCONTINUED | OUTPATIENT
Start: 2025-03-08 | End: 2025-03-11 | Stop reason: HOSPADM

## 2025-03-07 RX ORDER — OXYCODONE HYDROCHLORIDE 5 MG/1
5 TABLET ORAL
Status: COMPLETED | OUTPATIENT
Start: 2025-03-07 | End: 2025-03-07

## 2025-03-07 RX ORDER — SODIUM CHLORIDE, SODIUM LACTATE, POTASSIUM CHLORIDE, CALCIUM CHLORIDE 600; 310; 30; 20 MG/100ML; MG/100ML; MG/100ML; MG/100ML
INJECTION, SOLUTION INTRAVENOUS CONTINUOUS
Status: DISCONTINUED | OUTPATIENT
Start: 2025-03-07 | End: 2025-03-07 | Stop reason: HOSPADM

## 2025-03-07 RX ORDER — NALOXONE HYDROCHLORIDE 0.4 MG/ML
0.2 INJECTION, SOLUTION INTRAMUSCULAR; INTRAVENOUS; SUBCUTANEOUS
Status: DISCONTINUED | OUTPATIENT
Start: 2025-03-07 | End: 2025-03-11 | Stop reason: HOSPADM

## 2025-03-07 RX ORDER — NALOXONE HYDROCHLORIDE 0.4 MG/ML
0.1 INJECTION, SOLUTION INTRAMUSCULAR; INTRAVENOUS; SUBCUTANEOUS
Status: DISCONTINUED | OUTPATIENT
Start: 2025-03-07 | End: 2025-03-07 | Stop reason: HOSPADM

## 2025-03-07 RX ORDER — LIDOCAINE 40 MG/G
CREAM TOPICAL
Status: DISCONTINUED | OUTPATIENT
Start: 2025-03-07 | End: 2025-03-07 | Stop reason: HOSPADM

## 2025-03-07 RX ORDER — OXYCODONE HYDROCHLORIDE 5 MG/1
5 TABLET ORAL EVERY 4 HOURS PRN
Status: DISCONTINUED | OUTPATIENT
Start: 2025-03-07 | End: 2025-03-10

## 2025-03-07 RX ORDER — LATANOPROST 50 UG/ML
1 SOLUTION/ DROPS OPHTHALMIC AT BEDTIME
Status: DISCONTINUED | OUTPATIENT
Start: 2025-03-08 | End: 2025-03-11 | Stop reason: HOSPADM

## 2025-03-07 RX ORDER — HYDROMORPHONE HCL IN WATER/PF 6 MG/30 ML
0.2 PATIENT CONTROLLED ANALGESIA SYRINGE INTRAVENOUS EVERY 5 MIN PRN
Status: DISCONTINUED | OUTPATIENT
Start: 2025-03-07 | End: 2025-03-07 | Stop reason: HOSPADM

## 2025-03-07 RX ORDER — ONDANSETRON 2 MG/ML
4 INJECTION INTRAMUSCULAR; INTRAVENOUS EVERY 30 MIN PRN
Status: DISCONTINUED | OUTPATIENT
Start: 2025-03-07 | End: 2025-03-07 | Stop reason: HOSPADM

## 2025-03-07 RX ORDER — ALBUTEROL SULFATE 0.83 MG/ML
2.5 SOLUTION RESPIRATORY (INHALATION) EVERY 4 HOURS PRN
Status: DISCONTINUED | OUTPATIENT
Start: 2025-03-07 | End: 2025-03-07 | Stop reason: HOSPADM

## 2025-03-07 RX ORDER — HYDROMORPHONE HCL IN WATER/PF 6 MG/30 ML
0.2 PATIENT CONTROLLED ANALGESIA SYRINGE INTRAVENOUS EVERY 4 HOURS PRN
Status: DISCONTINUED | OUTPATIENT
Start: 2025-03-07 | End: 2025-03-10

## 2025-03-07 RX ORDER — BRIMONIDINE TARTRATE 2 MG/ML
1 SOLUTION/ DROPS OPHTHALMIC 2 TIMES DAILY
Status: DISCONTINUED | OUTPATIENT
Start: 2025-03-07 | End: 2025-03-11 | Stop reason: HOSPADM

## 2025-03-07 RX ORDER — OXYCODONE HYDROCHLORIDE 5 MG/1
10 TABLET ORAL
Status: DISCONTINUED | OUTPATIENT
Start: 2025-03-07 | End: 2025-03-07 | Stop reason: HOSPADM

## 2025-03-07 RX ORDER — FENTANYL CITRATE 50 UG/ML
25 INJECTION, SOLUTION INTRAMUSCULAR; INTRAVENOUS
Status: DISCONTINUED | OUTPATIENT
Start: 2025-03-07 | End: 2025-03-07 | Stop reason: HOSPADM

## 2025-03-07 RX ORDER — BISACODYL 10 MG
10 SUPPOSITORY, RECTAL RECTAL DAILY PRN
Status: DISCONTINUED | OUTPATIENT
Start: 2025-03-10 | End: 2025-03-11 | Stop reason: HOSPADM

## 2025-03-07 RX ORDER — AMOXICILLIN 250 MG
1 CAPSULE ORAL 2 TIMES DAILY
Status: DISCONTINUED | OUTPATIENT
Start: 2025-03-07 | End: 2025-03-11 | Stop reason: HOSPADM

## 2025-03-07 RX ORDER — FENTANYL CITRATE 50 UG/ML
25 INJECTION, SOLUTION INTRAMUSCULAR; INTRAVENOUS EVERY 5 MIN PRN
Status: DISCONTINUED | OUTPATIENT
Start: 2025-03-07 | End: 2025-03-07 | Stop reason: HOSPADM

## 2025-03-07 RX ORDER — HYDROMORPHONE HCL IN WATER/PF 6 MG/30 ML
0.1 PATIENT CONTROLLED ANALGESIA SYRINGE INTRAVENOUS EVERY 4 HOURS PRN
Status: DISCONTINUED | OUTPATIENT
Start: 2025-03-07 | End: 2025-03-10

## 2025-03-07 RX ORDER — LABETALOL HYDROCHLORIDE 5 MG/ML
10 INJECTION, SOLUTION INTRAVENOUS
Status: DISCONTINUED | OUTPATIENT
Start: 2025-03-07 | End: 2025-03-07 | Stop reason: HOSPADM

## 2025-03-07 RX ORDER — FENTANYL CITRATE 50 UG/ML
50 INJECTION, SOLUTION INTRAMUSCULAR; INTRAVENOUS EVERY 5 MIN PRN
Status: DISCONTINUED | OUTPATIENT
Start: 2025-03-07 | End: 2025-03-07 | Stop reason: HOSPADM

## 2025-03-07 RX ORDER — DEXAMETHASONE SODIUM PHOSPHATE 4 MG/ML
4 INJECTION, SOLUTION INTRA-ARTICULAR; INTRALESIONAL; INTRAMUSCULAR; INTRAVENOUS; SOFT TISSUE
Status: DISCONTINUED | OUTPATIENT
Start: 2025-03-07 | End: 2025-03-07 | Stop reason: HOSPADM

## 2025-03-07 RX ORDER — ACETAMINOPHEN 325 MG/1
975 TABLET ORAL EVERY 8 HOURS
Status: DISCONTINUED | OUTPATIENT
Start: 2025-03-07 | End: 2025-03-11 | Stop reason: HOSPADM

## 2025-03-07 RX ORDER — ONDANSETRON 4 MG/1
4 TABLET, ORALLY DISINTEGRATING ORAL EVERY 30 MIN PRN
Status: DISCONTINUED | OUTPATIENT
Start: 2025-03-07 | End: 2025-03-07 | Stop reason: HOSPADM

## 2025-03-07 RX ORDER — ONDANSETRON 2 MG/ML
4 INJECTION INTRAMUSCULAR; INTRAVENOUS EVERY 30 MIN PRN
Status: DISCONTINUED | OUTPATIENT
Start: 2025-03-07 | End: 2025-03-07

## 2025-03-07 RX ORDER — TIMOLOL MALEATE 5 MG/ML
1 SOLUTION/ DROPS OPHTHALMIC EVERY MORNING
Status: DISCONTINUED | OUTPATIENT
Start: 2025-03-08 | End: 2025-03-11 | Stop reason: HOSPADM

## 2025-03-07 RX ORDER — SODIUM CHLORIDE 9 MG/ML
INJECTION, SOLUTION INTRAVENOUS CONTINUOUS
Status: DISCONTINUED | OUTPATIENT
Start: 2025-03-07 | End: 2025-03-10

## 2025-03-07 RX ORDER — CEFAZOLIN SODIUM/WATER 2 G/20 ML
2 SYRINGE (ML) INTRAVENOUS SEE ADMIN INSTRUCTIONS
Status: DISCONTINUED | OUTPATIENT
Start: 2025-03-07 | End: 2025-03-07 | Stop reason: HOSPADM

## 2025-03-07 RX ORDER — PREDNISOLONE ACETATE 10 MG/ML
1 SUSPENSION/ DROPS OPHTHALMIC DAILY
Status: DISCONTINUED | OUTPATIENT
Start: 2025-03-08 | End: 2025-03-11 | Stop reason: HOSPADM

## 2025-03-07 RX ORDER — LEVOTHYROXINE SODIUM 88 UG/1
88 TABLET ORAL DAILY
Status: DISCONTINUED | OUTPATIENT
Start: 2025-03-07 | End: 2025-03-11 | Stop reason: HOSPADM

## 2025-03-07 RX ORDER — ONDANSETRON 4 MG/1
4 TABLET, ORALLY DISINTEGRATING ORAL EVERY 30 MIN PRN
Status: DISCONTINUED | OUTPATIENT
Start: 2025-03-07 | End: 2025-03-07

## 2025-03-07 RX ORDER — HYDROMORPHONE HCL IN WATER/PF 6 MG/30 ML
0.4 PATIENT CONTROLLED ANALGESIA SYRINGE INTRAVENOUS EVERY 5 MIN PRN
Status: DISCONTINUED | OUTPATIENT
Start: 2025-03-07 | End: 2025-03-07 | Stop reason: HOSPADM

## 2025-03-07 RX ORDER — CEFAZOLIN SODIUM/WATER 2 G/20 ML
2 SYRINGE (ML) INTRAVENOUS
Status: COMPLETED | OUTPATIENT
Start: 2025-03-07 | End: 2025-03-07

## 2025-03-07 RX ORDER — MEPERIDINE HYDROCHLORIDE 25 MG/ML
12.5 INJECTION INTRAMUSCULAR; INTRAVENOUS; SUBCUTANEOUS EVERY 5 MIN PRN
Status: DISCONTINUED | OUTPATIENT
Start: 2025-03-07 | End: 2025-03-07 | Stop reason: HOSPADM

## 2025-03-07 RX ORDER — PROCHLORPERAZINE MALEATE 5 MG/1
5 TABLET ORAL EVERY 6 HOURS PRN
Status: DISCONTINUED | OUTPATIENT
Start: 2025-03-07 | End: 2025-03-11 | Stop reason: HOSPADM

## 2025-03-07 RX ORDER — HYDRALAZINE HYDROCHLORIDE 20 MG/ML
2.5-5 INJECTION INTRAMUSCULAR; INTRAVENOUS EVERY 10 MIN PRN
Status: DISCONTINUED | OUTPATIENT
Start: 2025-03-07 | End: 2025-03-07 | Stop reason: HOSPADM

## 2025-03-07 RX ORDER — DEXTROSE MONOHYDRATE 25 G/50ML
25 INJECTION, SOLUTION INTRAVENOUS ONCE
Status: COMPLETED | OUTPATIENT
Start: 2025-03-07 | End: 2025-03-07

## 2025-03-07 RX ORDER — LIDOCAINE 40 MG/G
CREAM TOPICAL
Status: DISCONTINUED | OUTPATIENT
Start: 2025-03-07 | End: 2025-03-11 | Stop reason: HOSPADM

## 2025-03-07 RX ORDER — HYDROXYZINE HYDROCHLORIDE 10 MG/1
10 TABLET, FILM COATED ORAL EVERY 6 HOURS PRN
Status: DISCONTINUED | OUTPATIENT
Start: 2025-03-07 | End: 2025-03-11 | Stop reason: HOSPADM

## 2025-03-07 RX ORDER — TRANEXAMIC ACID 10 MG/ML
5 INJECTION, SOLUTION INTRAVENOUS CONTINUOUS
Status: DISCONTINUED | OUTPATIENT
Start: 2025-03-07 | End: 2025-03-07 | Stop reason: HOSPADM

## 2025-03-07 RX ORDER — DEXTROSE MONOHYDRATE 25 G/50ML
25-50 INJECTION, SOLUTION INTRAVENOUS
Status: DISCONTINUED | OUTPATIENT
Start: 2025-03-07 | End: 2025-03-08

## 2025-03-07 RX ADMIN — OXYCODONE HYDROCHLORIDE 5 MG: 5 TABLET ORAL at 23:42

## 2025-03-07 RX ADMIN — HYDROMORPHONE HYDROCHLORIDE 0.2 MG: 0.2 INJECTION, SOLUTION INTRAMUSCULAR; INTRAVENOUS; SUBCUTANEOUS at 11:54

## 2025-03-07 RX ADMIN — SODIUM CHLORIDE, POTASSIUM CHLORIDE, SODIUM LACTATE AND CALCIUM CHLORIDE: 600; 310; 30; 20 INJECTION, SOLUTION INTRAVENOUS at 06:51

## 2025-03-07 RX ADMIN — HYDROMORPHONE HYDROCHLORIDE 0.4 MG: 0.2 INJECTION, SOLUTION INTRAMUSCULAR; INTRAVENOUS; SUBCUTANEOUS at 12:06

## 2025-03-07 RX ADMIN — FENTANYL CITRATE 25 MCG: 50 INJECTION, SOLUTION INTRAMUSCULAR; INTRAVENOUS at 11:18

## 2025-03-07 RX ADMIN — ACETAMINOPHEN 975 MG: 325 TABLET, FILM COATED ORAL at 23:42

## 2025-03-07 RX ADMIN — HYDROXYZINE HYDROCHLORIDE 10 MG: 10 TABLET, FILM COATED ORAL at 13:43

## 2025-03-07 RX ADMIN — FENTANYL CITRATE 50 MCG: 50 INJECTION, SOLUTION INTRAMUSCULAR; INTRAVENOUS at 11:26

## 2025-03-07 RX ADMIN — BRIMONIDINE TARTRATE 1 DROP: 2 SOLUTION/ DROPS OPHTHALMIC at 20:04

## 2025-03-07 RX ADMIN — HYDROMORPHONE HYDROCHLORIDE 0.4 MG: 0.2 INJECTION, SOLUTION INTRAMUSCULAR; INTRAVENOUS; SUBCUTANEOUS at 14:02

## 2025-03-07 RX ADMIN — SODIUM CHLORIDE, POTASSIUM CHLORIDE, SODIUM LACTATE AND CALCIUM CHLORIDE: 600; 310; 30; 20 INJECTION, SOLUTION INTRAVENOUS at 11:20

## 2025-03-07 RX ADMIN — HYDROMORPHONE HYDROCHLORIDE 0.4 MG: 0.2 INJECTION, SOLUTION INTRAMUSCULAR; INTRAVENOUS; SUBCUTANEOUS at 11:39

## 2025-03-07 RX ADMIN — LEVOTHYROXINE SODIUM 88 MCG: 0.09 TABLET ORAL at 20:02

## 2025-03-07 RX ADMIN — HYDROMORPHONE HYDROCHLORIDE 0.4 MG: 0.2 INJECTION, SOLUTION INTRAMUSCULAR; INTRAVENOUS; SUBCUTANEOUS at 12:14

## 2025-03-07 RX ADMIN — DEXTROSE MONOHYDRATE 25 ML: 25 INJECTION, SOLUTION INTRAVENOUS at 06:53

## 2025-03-07 RX ADMIN — OXYCODONE HYDROCHLORIDE 5 MG: 5 TABLET ORAL at 13:43

## 2025-03-07 RX ADMIN — SODIUM CHLORIDE: 0.9 INJECTION, SOLUTION INTRAVENOUS at 15:24

## 2025-03-07 RX ADMIN — OXYCODONE HYDROCHLORIDE 5 MG: 5 TABLET ORAL at 17:55

## 2025-03-07 RX ADMIN — ACETAMINOPHEN 975 MG: 325 TABLET, FILM COATED ORAL at 16:33

## 2025-03-07 ASSESSMENT — ACTIVITIES OF DAILY LIVING (ADL)
ADLS_ACUITY_SCORE: 35
ADLS_ACUITY_SCORE: 33
ADLS_ACUITY_SCORE: 33
ADLS_ACUITY_SCORE: 35
ADLS_ACUITY_SCORE: 33
ADLS_ACUITY_SCORE: 35
ADLS_ACUITY_SCORE: 35
ADLS_ACUITY_SCORE: 33
ADLS_ACUITY_SCORE: 35
ADLS_ACUITY_SCORE: 33
ADLS_ACUITY_SCORE: 35
ADLS_ACUITY_SCORE: 37
ADLS_ACUITY_SCORE: 35
ADLS_ACUITY_SCORE: 37
ADLS_ACUITY_SCORE: 33
ADLS_ACUITY_SCORE: 37
ADLS_ACUITY_SCORE: 35
ADLS_ACUITY_SCORE: 35

## 2025-03-07 NOTE — BRIEF OP NOTE
[facility] Brief Operative Note    Pre-operative diagnosis: Spinal stenosis, lumbar region, with neurogenic claudication [M48.062]  Spondylolisthesis of lumbar region [M43.16]  Other idiopathic scoliosis, lumbar region [M41.26]   Post-operative diagnosis L2 to L5 degenerative scoliosis and stenosis with neurogenic claudication       Procedure: Procedure(s):  LUMBAR TWO TO LUMBAR FIVE OBLIQUE LATERAL LUMBAR INTERBODY FUSION   Surgeon(s): Surgeons and Role:     * Sagar Cadena MD - Primary     * Camelia Stern PA-C - Assisting   Estimated blood loss: * No values recorded between 3/7/2025  8:27 AM and 3/7/2025 10:41 AM *    Specimens: * No specimens in log *   Findings: See op note

## 2025-03-07 NOTE — OP NOTE
PRE-PROCEDURE DIAGNOSIS:  1)Degenerative scoliosis and stenosis with neurogenic claudication L2 to L5        POST-PROCEDURE DIAGNOSIS:  1) Same as above  PROCEDURE PERFORMED:  1)L2  L5 Oblique Lateral Lumbar Interbody fusion with discectomy, preparation of the endplate and placement of a titanium bullet cage packed with tri-calcium phosphate soaked in bone marrow anterior to the transverse process in modified prone position, with intraoperative biplanar fluoroscopic imaging and electrophysiological monitoring.  2)  L2 L5 nstrumentation with LNK Perc Screws  3) Transpedicular Bone marrow aspiration through separate incision  4)  scoliosis making the surgery more difficult and time consuming with additional 30% time required compared to similar patient         Surgeon: Prakash Cadena MD  ASSISTANT:  BRADY Parker  This is complex surgery on the lumbar spine and an assistant is needed for safety of the surgery for set up of instrumentation, retraction and closing.     EBL: 50cc      HISTORY:   Please refer to my clinic note for full details, but in short, the patient is a 79 year old female with  severe neurogenic claudication with low back and leg pains due to stenosis and scoliosis.  Conservative management has failed.       PROCEDURE:  The patient was taken to surgery. After general anesthesia was applied, SCDs and Winston placed and preoperative antibiotic given. The patient was positioned on the Jovan table and 4 posten  frame in a modified prone position for ease of access from the left side.         AP and lateral fluoroscopic images are positioned. The patient has been prepped and draped in sterile fashion. Landmarks, including spinal process, transverse process, disk space, endplates and pedicles are identified and marked.   A Jamshidi needle is placed in the upper right pedicle inside of the vertebral body and bone marrow has been aspirated through separate incision to introduce stem cells and mix into the  biologics.  Following steps are then taken for each specified level:     L4 L5   Cage size 11 mm high and 27mm long Titanium  L3 L4  Cage size 12mm high and 27mm long Titanium  L2 L3 Cage size 10 mm high and 27 mm long Titanium           The patient was turned using the rotation of the surgical table so a near direct anterior-lateral approach to the lumbar spine could be achieved. A small incision was then made superior to the mid iliac crest and then using biplanar fluoroscopic visualization, under electrophysiological monitoring and stimulation, we introduced an electrophysiological probe through the retroperitoneal space into the desired discs anterior to the transverse processes and then passed it into the disc space after finding a silent window. The sleeve is retained and the probe is removed, then a K wire is passed sequentially into the disk space. A dilating tube was then passed along this same route. Following this, a working channel was then passed sequentially into the disc spaces. The working channel was manually held in position while a series of disc cleaning tools was passed through the channel to remove the affected discs under clear and direct biplanar fluoroscopic visualization.  We decorticate the vertebral endplates at those segments.           Arthrodesis of the intervertebral spaces via an anterior retroperitoneal exposure was achieved through Kambin's Dawson and lateral extraforaminal space. Morselized tri-calcium phosphate soaked in bone marrow was added into the anterior disc space. The working channel was then removed. A TITANIUM interbody tightly packed with morselized tri-calcium phosphate soaked in bone marrow was then inserted into the mid portion of the intervertebral disc space over a K-Wire under biplanar fluoroscopic visualization and intraoperative neuromonitoring. The Interpedicular and intradiscal space was significantly enlarged and disc height was restored towards normal  anatomy therefore releasing pressure on the nerve roots. Physiologically the spinal canal and lateral recess as well as foramen were bilaterally decompressed, and all bones were confined to the borders of the disc space   Following steps are then taken for each specified level:    L2 6.5mm Screw size Left 60 Right 60   L3 6.5mm Screw size Left 60  Right  60  L4  7.5mm Screw size Left 60 Right 60  L5 7.5mm Screw size Left 60 Right 60       Good fixation of the spine was achieved.       . Screws are all silent up to 25 MA of stimulation. After screws are all placed, we put the marivel in place and under fluoroscopic imaging, we lock the marivel in place and remove the screw tops and then each incision has been closed with 2-0 Vicryl suture and then Steri-Strips were applied.       Fascial incisions closed with running 2 0 vicryl sutures in addition.  Final ap and lateral C arm images showed good positioning of the pedicle fixation system and the cages.       SSEP monitoring used and the signals remained stable throughout the case.  No abnormal EMG signals or neurotonics.      Sagar Cadena MD

## 2025-03-07 NOTE — PROGRESS NOTES
[facility] ACUTE INPATIENT PAIN SERVICE    [facility], [facility], [facility], [facility], [place]   Chart Check      Patient is opioid naive at baseline. Would continue home gabapentin from med rec. Just returned from surgery. Current plan looks appropriate to try initially. Will be seen by pain team 3/10/25 if still in patient    ASSESSMENT/ PLAN:  Acute pain secondary to fusion surgery, POD 0, in the setting of chronic pain.     Multimodal Medication Therapy:    Adjuvants: APAP 975mg q8h, Atarax 10mg q6hprn  Opioids: oxycodone 2.5-5mg q4hprn, Dilaudid 0.1-0.2mg q4hprn  Non-medication interventions- Ice, PT  Constipation Prophylaxis- senna, miralax  Follow up /Discharge Recommendations - We recommend prescribing the following at the time of discharge:  Spine Surgery (Lumbar Fusion)- 240MME- 48 tabs of norco, or 32 tabs of oxycodone, or 30mg of dilaudid     Sherri Kay PharmD  Acute Care Pain Management  Team  Hours of pain coverage Mon-Fri 8-1600  After hours contact the primary team  [facility] (MARYBEL, Prasanth, [place], RH)   Page via [email or web address] web console -Click for ClariFI

## 2025-03-08 ENCOUNTER — APPOINTMENT (OUTPATIENT)
Dept: PHYSICAL THERAPY | Facility: CLINIC | Age: 80
DRG: 457 | End: 2025-03-08
Payer: COMMERCIAL

## 2025-03-08 LAB
ANION GAP SERPL CALCULATED.3IONS-SCNC: 12 MMOL/L (ref 7–15)
BASOPHILS # BLD AUTO: 0 10E3/UL (ref 0–0.2)
BASOPHILS NFR BLD AUTO: 0 %
BUN SERPL-MCNC: 28.4 MG/DL (ref 8–23)
CALCIUM SERPL-MCNC: 8.4 MG/DL (ref 8.8–10.4)
CHLORIDE SERPL-SCNC: 105 MMOL/L (ref 98–107)
CREAT SERPL-MCNC: 1.11 MG/DL (ref 0.51–0.95)
EGFRCR SERPLBLD CKD-EPI 2021: 50 ML/MIN/1.73M2
EOSINOPHIL # BLD AUTO: 0 10E3/UL (ref 0–0.7)
EOSINOPHIL NFR BLD AUTO: 0 %
ERYTHROCYTE [DISTWIDTH] IN BLOOD BY AUTOMATED COUNT: 13.2 % (ref 10–15)
GLUCOSE BLDC GLUCOMTR-MCNC: 188 MG/DL (ref 70–99)
GLUCOSE BLDC GLUCOMTR-MCNC: 191 MG/DL (ref 70–99)
GLUCOSE BLDC GLUCOMTR-MCNC: 191 MG/DL (ref 70–99)
GLUCOSE BLDC GLUCOMTR-MCNC: 207 MG/DL (ref 70–99)
GLUCOSE BLDC GLUCOMTR-MCNC: 215 MG/DL (ref 70–99)
GLUCOSE SERPL-MCNC: 202 MG/DL (ref 70–99)
HCO3 SERPL-SCNC: 20 MMOL/L (ref 22–29)
HCT VFR BLD AUTO: 30.8 % (ref 35–47)
HGB BLD-MCNC: 9.7 G/DL (ref 11.7–15.7)
IMM GRANULOCYTES # BLD: 0.1 10E3/UL
IMM GRANULOCYTES NFR BLD: 1 %
LYMPHOCYTES # BLD AUTO: 0.7 10E3/UL (ref 0.8–5.3)
LYMPHOCYTES NFR BLD AUTO: 9 %
MCH RBC QN AUTO: 30.2 PG (ref 26.5–33)
MCHC RBC AUTO-ENTMCNC: 31.5 G/DL (ref 31.5–36.5)
MCV RBC AUTO: 96 FL (ref 78–100)
MONOCYTES # BLD AUTO: 0.4 10E3/UL (ref 0–1.3)
MONOCYTES NFR BLD AUTO: 6 %
NEUTROPHILS # BLD AUTO: 6.8 10E3/UL (ref 1.6–8.3)
NEUTROPHILS NFR BLD AUTO: 85 %
NRBC # BLD AUTO: 0 10E3/UL
NRBC BLD AUTO-RTO: 0 /100
PLATELET # BLD AUTO: 156 10E3/UL (ref 150–450)
POTASSIUM SERPL-SCNC: 4.8 MMOL/L (ref 3.4–5.3)
RBC # BLD AUTO: 3.21 10E6/UL (ref 3.8–5.2)
SODIUM SERPL-SCNC: 137 MMOL/L (ref 135–145)
WBC # BLD AUTO: 8 10E3/UL (ref 4–11)

## 2025-03-08 PROCEDURE — 99231 SBSQ HOSP IP/OBS SF/LOW 25: CPT | Performed by: NURSE PRACTITIONER

## 2025-03-08 PROCEDURE — 250N000013 HC RX MED GY IP 250 OP 250 PS 637: Performed by: NURSE PRACTITIONER

## 2025-03-08 PROCEDURE — 250N000011 HC RX IP 250 OP 636

## 2025-03-08 PROCEDURE — 97530 THERAPEUTIC ACTIVITIES: CPT | Mod: GP

## 2025-03-08 PROCEDURE — 85004 AUTOMATED DIFF WBC COUNT: CPT

## 2025-03-08 PROCEDURE — 82435 ASSAY OF BLOOD CHLORIDE: CPT

## 2025-03-08 PROCEDURE — 85018 HEMOGLOBIN: CPT

## 2025-03-08 PROCEDURE — 97161 PT EVAL LOW COMPLEX 20 MIN: CPT | Mod: GP

## 2025-03-08 PROCEDURE — 120N000001 HC R&B MED SURG/OB

## 2025-03-08 PROCEDURE — 80048 BASIC METABOLIC PNL TOTAL CA: CPT

## 2025-03-08 PROCEDURE — 250N000013 HC RX MED GY IP 250 OP 250 PS 637

## 2025-03-08 PROCEDURE — 36415 COLL VENOUS BLD VENIPUNCTURE: CPT

## 2025-03-08 PROCEDURE — 250N000012 HC RX MED GY IP 250 OP 636 PS 637: Performed by: NURSE PRACTITIONER

## 2025-03-08 RX ORDER — NICOTINE POLACRILEX 4 MG
15-30 LOZENGE BUCCAL
Status: DISCONTINUED | OUTPATIENT
Start: 2025-03-08 | End: 2025-03-11 | Stop reason: HOSPADM

## 2025-03-08 RX ORDER — GABAPENTIN 300 MG/1
600 CAPSULE ORAL 2 TIMES DAILY
Status: DISCONTINUED | OUTPATIENT
Start: 2025-03-08 | End: 2025-03-10

## 2025-03-08 RX ORDER — LOSARTAN POTASSIUM 50 MG/1
50 TABLET ORAL DAILY
Status: DISCONTINUED | OUTPATIENT
Start: 2025-03-08 | End: 2025-03-11 | Stop reason: HOSPADM

## 2025-03-08 RX ORDER — ATORVASTATIN CALCIUM 40 MG/1
40 TABLET, FILM COATED ORAL DAILY
Status: DISCONTINUED | OUTPATIENT
Start: 2025-03-08 | End: 2025-03-11 | Stop reason: HOSPADM

## 2025-03-08 RX ORDER — HYDROCHLOROTHIAZIDE 25 MG/1
25 TABLET ORAL DAILY
Status: DISCONTINUED | OUTPATIENT
Start: 2025-03-08 | End: 2025-03-11 | Stop reason: HOSPADM

## 2025-03-08 RX ORDER — PANTOPRAZOLE SODIUM 40 MG/1
40 TABLET, DELAYED RELEASE ORAL 2 TIMES DAILY
Status: DISCONTINUED | OUTPATIENT
Start: 2025-03-08 | End: 2025-03-11 | Stop reason: HOSPADM

## 2025-03-08 RX ORDER — METOPROLOL SUCCINATE 100 MG/1
100 TABLET, EXTENDED RELEASE ORAL 2 TIMES DAILY
Status: DISCONTINUED | OUTPATIENT
Start: 2025-03-08 | End: 2025-03-11 | Stop reason: HOSPADM

## 2025-03-08 RX ORDER — DEXTROSE MONOHYDRATE 25 G/50ML
25-50 INJECTION, SOLUTION INTRAVENOUS
Status: DISCONTINUED | OUTPATIENT
Start: 2025-03-08 | End: 2025-03-11 | Stop reason: HOSPADM

## 2025-03-08 RX ADMIN — ONDANSETRON 4 MG: 4 TABLET, ORALLY DISINTEGRATING ORAL at 17:22

## 2025-03-08 RX ADMIN — GABAPENTIN 600 MG: 300 CAPSULE ORAL at 10:17

## 2025-03-08 RX ADMIN — INSULIN GLARGINE 17 UNITS: 100 INJECTION, SOLUTION SUBCUTANEOUS at 22:19

## 2025-03-08 RX ADMIN — TIMOLOL MALEATE 1 DROP: 5 SOLUTION/ DROPS OPHTHALMIC at 10:39

## 2025-03-08 RX ADMIN — METOPROLOL SUCCINATE 100 MG: 100 TABLET, EXTENDED RELEASE ORAL at 10:30

## 2025-03-08 RX ADMIN — OXYCODONE HYDROCHLORIDE 5 MG: 5 TABLET ORAL at 16:06

## 2025-03-08 RX ADMIN — OXYCODONE HYDROCHLORIDE 5 MG: 5 TABLET ORAL at 10:18

## 2025-03-08 RX ADMIN — LATANOPROST 1 DROP: 50 SOLUTION/ DROPS OPHTHALMIC at 21:35

## 2025-03-08 RX ADMIN — LEVOTHYROXINE SODIUM 88 MCG: 0.09 TABLET ORAL at 10:18

## 2025-03-08 RX ADMIN — ACETAMINOPHEN 975 MG: 325 TABLET, FILM COATED ORAL at 18:34

## 2025-03-08 RX ADMIN — GABAPENTIN 600 MG: 300 CAPSULE ORAL at 20:47

## 2025-03-08 RX ADMIN — PANTOPRAZOLE SODIUM 40 MG: 40 TABLET, DELAYED RELEASE ORAL at 10:18

## 2025-03-08 RX ADMIN — METOPROLOL SUCCINATE 100 MG: 100 TABLET, EXTENDED RELEASE ORAL at 20:47

## 2025-03-08 RX ADMIN — BRIMONIDINE TARTRATE 1 DROP: 2 SOLUTION/ DROPS OPHTHALMIC at 20:47

## 2025-03-08 RX ADMIN — PREDNISOLONE ACETATE 1 DROP: 10 SUSPENSION/ DROPS OPHTHALMIC at 10:20

## 2025-03-08 RX ADMIN — ATORVASTATIN CALCIUM 40 MG: 40 TABLET, FILM COATED ORAL at 10:22

## 2025-03-08 RX ADMIN — PANTOPRAZOLE SODIUM 40 MG: 40 TABLET, DELAYED RELEASE ORAL at 20:47

## 2025-03-08 RX ADMIN — HYDROCHLOROTHIAZIDE 25 MG: 25 TABLET ORAL at 10:30

## 2025-03-08 RX ADMIN — ACETAMINOPHEN 975 MG: 325 TABLET, FILM COATED ORAL at 10:17

## 2025-03-08 RX ADMIN — BRIMONIDINE TARTRATE 1 DROP: 2 SOLUTION/ DROPS OPHTHALMIC at 10:40

## 2025-03-08 ASSESSMENT — ACTIVITIES OF DAILY LIVING (ADL)
ADLS_ACUITY_SCORE: 38
ADLS_ACUITY_SCORE: 35
ADLS_ACUITY_SCORE: 36
ADLS_ACUITY_SCORE: 36
ADLS_ACUITY_SCORE: 38
ADLS_ACUITY_SCORE: 36
ADLS_ACUITY_SCORE: 38
ADLS_ACUITY_SCORE: 36
ADLS_ACUITY_SCORE: 35
ADLS_ACUITY_SCORE: 36
ADLS_ACUITY_SCORE: 38
ADLS_ACUITY_SCORE: 35
ADLS_ACUITY_SCORE: 38
ADLS_ACUITY_SCORE: 37
ADLS_ACUITY_SCORE: 35
ADLS_ACUITY_SCORE: 36
ADLS_ACUITY_SCORE: 38
ADLS_ACUITY_SCORE: 37
ADLS_ACUITY_SCORE: 38
ADLS_ACUITY_SCORE: 36
ADLS_ACUITY_SCORE: 37
ADLS_ACUITY_SCORE: 38
ADLS_ACUITY_SCORE: 37
DEPENDENT_IADLS:: INDEPENDENT;LAUNDRY

## 2025-03-08 NOTE — PROGRESS NOTES
03/08/25 1216   Appointment Info   Signing Clinician's Name / Credentials (PT) Ce Nichols DPT   Living Environment   People in Home spouse   Current Living Arrangements house   Home Accessibility stairs to enter home   Number of Stairs, Main Entrance 1   Stair Railings, Main Entrance none   Self-Care   Usual Activity Tolerance moderate   Current Activity Tolerance poor   Equipment Currently Used at Home cane, straight;walker, rolling;shower chair   Fall history within last six months no   Activity/Exercise/Self-Care Comment Patient reports use of 4WW or 2WW with all mobility prior to surgery.  Reports being limited by pain prior to surgery.   General Information   Onset of Illness/Injury or Date of Surgery 03/07/25   Referring Physician Camelia Stern PA-C   Patient/Family Therapy Goals Statement (PT) decrease pain, agreeable to TCU   Pertinent History of Current Problem (include personal factors and/or comorbidities that impact the POC) Per medical chart: Lj Castro is a 79 year old female with PMH significant for Lumbar stenosis with neurogenic claudication, hypertension, hypothryroidism, hyperlipidemia, PAD, DM2 on insulin, CKD,Atrial fibrillation on anticoagulation, and GERD admitted on 3/7/2025 for post operative management of L2-5 OLLIF.   Existing Precautions/Restrictions fall;spinal   Cognition   Orientation Status (Cognition) oriented x 4   Pain Assessment   Patient Currently in Pain Yes, see Vital Sign flowsheet  (increased with all mobility)   Integumentary/Edema   Integumentary/Edema Comments incision not visualized during session   Posture    Posture Protracted shoulders;Forward head position   Range of Motion (ROM)   Range of Motion ROM deficits secondary to pain;ROM deficits secondary to surgical procedure   Strength (Manual Muscle Testing)   Strength (Manual Muscle Testing) Deficits observed during functional mobility   Bed Mobility   Comment, (Bed Mobility) Log roll with mod A x1  with verbal cueing and environmental set up   Transfers   Transfers sit-stand transfer   Sit-Stand Transfer   Sit-Stand [place] (Transfers) verbal cues;minimum assist (75% patient effort);2 person assist   Assistive Device (Sit-Stand Transfers) walker, front-wheeled   Gait/Stairs (Locomotion)   [place] Level (Gait) contact guard   Assistive Device (Gait) walker, front-wheeled   Distance in Feet (Gait) 2   Balance   Balance Comments Patient currently requires walker with ambulation secondary to pain   Sensory Examination   Sensory Perception Comments Reports no change in sensation   Coordination   Coordination no deficits were identified   Clinical Impression   Criteria for Skilled Therapeutic Intervention Yes, treatment indicated   PT Diagnosis (PT) Impaired functional mobility   Influenced by the following impairments pain, decreased strength, activity tolerance, balance   Functional limitations due to impairments difficulties with transfers and ambulation   Clinical Presentation (PT Evaluation Complexity) stable   Clinical Presentation Rationale clinical judgement   Clinical Decision Making (Complexity) low complexity   Planned Therapy Interventions (PT) balance training;bed mobility training;gait training;patient/family education;strengthening;transfer training;progressive activity/exercise;ROM (range of motion);stair training;home program guidelines   Risk & Benefits of therapy have been explained evaluation/treatment results reviewed;care plan/treatment goals reviewed;risks/benefits reviewed;current/potential barriers reviewed;participants included;patient;participants voiced agreement with care plan   PT Total Evaluation Time   PT Eval, Low Complexity Minutes (65901) 10   Physical Therapy Goals   PT Frequency Daily   PT Goals Transfers;Gait;Aerobic Activity;Stairs;Bed Mobility   PT: Bed Mobility Supervision/stand-by assist;Supine to/from sit;Within precautions   PT: Transfers Sit to/from stand;Bed  to/from chair;Assistive device;Supervision/stand-by assist;Within precautions   PT: Gait Assistive device;Supervision/stand-by assist;150 feet;Within precautions   PT: Stairs Supervision/stand-by assist;Assistive device;Within precautions;4 stairs;Rail on right   PT: Perform aerobic activity with stable cardiovascular response 10 minutes;ambulation;intermittent activity   Interventions   Interventions Quick Adds Therapeutic Activity;Gait Training   Therapeutic Activity   Therapeutic Activities: dynamic activities to improve functional performance Minutes (47905) 24   Symptoms Noted During/After Treatment Fatigue;Increased pain   Treatment Detail/Skilled Intervention Patient supine upon arrival.  Patient educated in spinal precautions.  Facilitated bed mobility including transfers between supine and sitting through log roll with verbal cueing for technique.  Patient attempted to don back brace with max A however brace would not fit around patient and patient not tolerating sitting at EOB while therapist attempting to change sizing.  Facilitated transfers between sitting and standing with 2WW with raised bed height, min A x2 and verbal cueing. Cued for upright posture.  Patient declined ambulation away from bed secondary to pain and fatigue.  Facilitated side stepping at bedside with CGA x2, assist for walker management and verbal cueing.  Returned to supine through log roll with mod A, verbal cueing.  Repositioned in bed with max A x2.  In bed with all needs within reach at end of session, bed alarm on.  Increased waist length of back brace for mobility.   PT Discharge Planning   PT Plan progress amb distance, stairs, bed mobility   PT Discharge Recommendation (DC Rec) Transitional Care Facility   PT Rationale for DC Rec Patient presents below baseline for functional mobility.  Patient unable to demonstrate safe, independent household mobility.  Currently requiring Ax1-2 for stand pivot transfers, side stepping less  than 2 feet at EOB.  Requires assist with all ADLs at this time. Recommend discharge to TCU in order to increase strength, activity tolerance, balance and independence with mobility and ADLs.   PT Brief overview of current status Ax1-2 with 2WW, side stepping at EOB only   PT Total Distance Amb During Session (feet) 2   Physical Therapy Time and Intention   Timed Code Treatment Minutes 24   Total Session Time (sum of timed and untimed services) 34

## 2025-03-08 NOTE — PROGRESS NOTES
Occupational Therapy: Orders received. Chart reviewed and discussed with care team.? Occupational Therapy not indicated due to pt having no IP OT needs.  Plan is for pt to discharge to TCU, recommend evaluation at TCU.? Defer discharge recommendations to treatment team.? Will complete orders.

## 2025-03-08 NOTE — PROGRESS NOTES
Vitals stable.  Doing well.  Preop leg pain cleared up.  Neuro intact.  Hgb 9.4   Postop pain under good control with oxycodone.  PT and OT today and most likely home tomorrow.

## 2025-03-08 NOTE — PLAN OF CARE
"VSS, on 2L NC, A/Ox4, in 9/10 pain, provided PRN pain medication. Arrived to floor around 1600. ACHS BG checks. Calls appropriately. Winston in place until tomorrow. Eating and drinking well.     Goal Outcome Evaluation:      Plan of Care Reviewed With: patient    Overall Patient Progress: no changeOverall Patient Progress: no change    Outcome Evaluation: Pt transferred to floor from surgery, 9/10 pain.    Problem: Delirium  Goal: Optimal Coping  Outcome: Progressing  Goal: Improved Behavioral Control  Outcome: Progressing  Intervention: Minimize Safety Risk  Recent Flowsheet Documentation  Taken 3/7/2025 1500 by Hansa Cole RN  Enhanced Safety Measures: assistive devices when indicated  Goal: Improved Attention and Thought Clarity  Outcome: Progressing  Goal: Improved Sleep  Outcome: Progressing     Problem: Adult Inpatient Plan of Care  Goal: Plan of Care Review  Description: The Plan of Care Review/Shift note should be completed every shift.  The Outcome Evaluation is a brief statement about your assessment that the patient is improving, declining, or no change.  This information will be displayed automatically on your shift  note.  Outcome: Progressing  Flowsheets (Taken 3/7/2025 1820)  Outcome Evaluation: Pt transferred to floor from surgery, 9/10 pain.  Plan of Care Reviewed With: patient  Overall Patient Progress: no change  Goal: Patient-Specific Goal (Individualized)  Description: You can add care plan individualizations to a care plan. Examples of Individualization might be:  \"Parent requests to be called daily at 9am for status\", \"I have a hard time hearing out of my right ear\", or \"Do not touch me to wake me up as it startles  me\".  Outcome: Progressing  Goal: Absence of Hospital-Acquired Illness or Injury  Outcome: Progressing  Intervention: Identify and Manage Fall Risk  Recent Flowsheet Documentation  Taken 3/7/2025 1500 by Hansa Cole RN  Safety Promotion/Fall Prevention:   activity " supervised   assistive device/personal items within reach  Intervention: Prevent Infection  Recent Flowsheet Documentation  Taken 3/7/2025 1500 by Hansa Cole RN  Infection Prevention:   equipment surfaces disinfected   rest/sleep promoted  Goal: Optimal Comfort and Wellbeing  Outcome: Progressing  Goal: Readiness for Transition of Care  Outcome: Progressing  Flowsheets (Taken 3/7/2025 1700)  Transportation Anticipated: family or friend will provide  Intervention: Mutually Develop Transition Plan  Recent Flowsheet Documentation  Taken 3/7/2025 1700 by Hansa Cole RN  Transportation Anticipated: family or friend will provide  Patient/Family Anticipated Services at Transition: none  Patient/Family Anticipates Transition to: home with family  Equipment Currently Used at Home: shower chair     Problem: Pain Acute  Goal: Optimal Pain Control and Function  Outcome: Progressing     Problem: Spinal Surgery  Goal: Optimal Coping with Surgery  Outcome: Progressing  Goal: Absence of Bleeding  Outcome: Progressing  Goal: Effective Bowel Elimination  Outcome: Progressing  Goal: Fluid and Electrolyte Balance  Outcome: Progressing  Goal: Optimal Functional Ability  Outcome: Progressing  Goal: Absence of Infection Signs and Symptoms  Outcome: Progressing  Intervention: Prevent or Manage Infection  Recent Flowsheet Documentation  Taken 3/7/2025 1500 by Hansa Cole RN  Infection Prevention:   equipment surfaces disinfected   rest/sleep promoted  Goal: Optimal Neurologic Function  Outcome: Progressing  Goal: Anesthesia/Sedation Recovery  Outcome: Progressing  Intervention: Optimize Anesthesia Recovery  Recent Flowsheet Documentation  Taken 3/7/2025 1500 by Hansa Cole RN  Safety Promotion/Fall Prevention:   activity supervised   assistive device/personal items within reach  Goal: Optimal Pain Control and Function  Outcome: Progressing  Goal: Nausea and Vomiting Relief  Outcome: Progressing  Goal: Effective  Urinary Elimination  Outcome: Progressing  Goal: Effective Oxygenation and Ventilation  Outcome: Progressing

## 2025-03-08 NOTE — CONSULTS
[facility]  Consult Note - Hospitalist Service  Date of Admission:  3/7/2025  Consult Requested by: Sagar Cadena MD  Reason for Consult: Chronic disease management    Assessment & Plan   Lj Castro is a 79 year old female with PMH significant for Lumbar stenosis with neurogenic claudication, hypertension, hypothryroidism, hyperlipidemia, PAD, DM2 on insulin, CKD,Atrial fibrillation on anticoagulation, and GERD admitted on 3/7/2025 for post operative management of L2-5 OLLIF.    #Lumbar stenosis with neurogenic claudication  POD 1 L2-5 OLLIF  -will defer pain control to surgical team  -resumed PTA gabapentin    #Hypertension  Well controlled  -Continue PTA Losartan and hydrochlorothiazide     #Hyperlipidemia  -Continue PTA atorvastatin    #Diabetes type 2  -Continue PTA scheduled Lantus  -Sliding scale insulin ordered    #CKD stage 3  -continue PTA losartan  -Avoid nephrotoxins    #Hypothyroidism  -Continue PTA levothyroxine    #Atrial Fibrillation  -Resume apixaban on POD 4 per surgery  -Continue PTA metoprolol    #PAD  -Continue statin    #GERD  -Continue PTA pantoprazole    All chronic diseases are well controlled.  Hospitalist service will sign off, please re-consult if acute issues arise.

## 2025-03-08 NOTE — CONSULTS
Care Management Initial Consult    General Information  Assessment completed with: Patient, Spouse or significant other,    Type of CM/SW Visit: Initial Assessment    Primary Care Provider verified and updated as needed:     Readmission within the last 30 days: no previous admission in last 30 days      Reason for Consult: discharge planning  Advance Care Planning:            Communication Assessment  Patient's communication style: spoken language (English or Bilingual)    Hearing Difficulty or Deaf: no   Wear Glasses or Blind: no    Cognitive  Cognitive/Neuro/Behavioral: WDL  Level of Consciousness: intermittent confusion  Arousal Level: opens eyes spontaneously  Orientation: oriented x 4             Living Environment:   People in home: spouse     Current living Arrangements: house      Able to return to prior arrangements: other (see comments)  Living Arrangement Comments: Needs TCU prior to return home    Family/Social Support:  Care provided by: spouse/significant other  Provides care for: no one  Marital Status:   Support system: Children          Description of Support System: Supportive, Involved    Support Assessment: Adequate family and caregiver support    Current Resources:   Patient receiving home care services: No        Community Resources: None  Equipment currently used at home: cane, straight, walker, rolling, shower chair  Supplies currently used at home:      Employment/Financial:  Employment Status:          Financial Concerns:             Does the patient's insurance plan have a 3 day qualifying hospital stay waiver?  Yes     Which insurance plan 3 day waiver is available? Alternative insurance waiver    Will the waiver be used for post-acute placement? Yes    Lifestyle & Psychosocial Needs:  Social Drivers of Health     Food Insecurity: Low Risk  (3/7/2025)    Food Insecurity     Within the past 12 months, did you worry that your food would run out before you got money to buy more?: No      Within the past 12 months, did the food you bought just not last and you didn t have money to get more?: No   Depression: Not at risk (10/25/2024)    [facility]    PHQ-2     PHQ-2 TOTAL SCORE: 0   Housing Stability: Low Risk  (3/7/2025)    Housing Stability     Do you have housing? : Yes     Are you worried about losing your housing?: No   Tobacco Use: Low Risk  (3/7/2025)    Patient History     Smoking Tobacco Use: Never     Smokeless Tobacco Use: Never     Passive Exposure: Not on file   Financial Resource Strain: Low Risk  (3/7/2025)    Financial Resource Strain     Within the past 12 months, have you or your family members you live with been unable to get utilities (heat, electricity) when it was really needed?: No   Alcohol Use: Not on file   Transportation Needs: Low Risk  (3/7/2025)    Transportation Needs     Within the past 12 months, has lack of transportation kept you from medical appointments, getting your medicines, non-medical meetings or appointments, work, or from getting things that you need?: No   Physical Activity: Not on file   Interpersonal Safety: Low Risk  (3/7/2025)    Interpersonal Safety     Do you feel physically and emotionally safe where you currently live?: Yes     Within the past 12 months, have you been hit, slapped, kicked or otherwise physically hurt by someone?: No     Within the past 12 months, have you been humiliated or emotionally abused in other ways by your partner or ex-partner?: No   Stress: Not on file   Social Connections: Socially Integrated (8/24/2023)    Received from [facility], [facility]    Social Connections     Frequency of Communication with Friends and Family: 0   Health Literacy: Not on file       Functional Status:  Prior to admission patient needed assistance:   Dependent ADLs:: Independent, Ambulation-walker  Dependent IADLs:: Independent,  Laundry  Assesssment of Functional Status: Not at baseline with ADL Functioning    Mental Health Status:          Chemical Dependency Status:                Values/Beliefs:  Spiritual, Cultural Beliefs, Sabianist Practices, Values that affect care:                 Discussed  Partnership in Safe Discharge Planning  document with patient/family: No    Additional Information:    SW met with pt/family at the bedside to discuss discharge planning. Pt lives at home with her spouse and uses a walker at baseline. Discussed the recommendation for TCU in which pt is agreeable. [facility] that [facility]. She shares that they would not go to [facility]. Pt prefers a private room and is agreeable to cost. She is ok with a shared if that is all that is available. She plans to have her spouse transport her and feels comfortable getting in and out of the car. Referral sent.     Next Steps: follow for discharge to TCU.     Cristine Esquivel/BENITA Yi, LGSW  Inpatient Care Coordination  Emergency Room /Float  [phone]    Cristine Esquivel, LGSW

## 2025-03-08 NOTE — PLAN OF CARE
"Goal Outcome Evaluation:      Plan of Care Reviewed With: patient    Overall Patient Progress: no changeOverall Patient Progress: no change         [id number]    Stated has baseline 9/10 pain, expressed frustration on how pain is not any better than baseline. Offered prn medications overnight pt refused stating she just wanted to sleep.  Has been refusing repositioning. Was able to get pt to agree to stand overnight, refused walking. Did Stand and Pivot A2 GB to bedside commode overnight.   Continues to rate pain high. PRN oxy 5 given at 2342   BG monitored overnight was 191  RA.   Forgetful overnight. Frustrated and Rude at times  Winston still in place seek to remove in the AM  Declined to order breakfast this AM before Physical therapy, main complaint is being tired.   Plan tbd, possible tcu      Problem: Delirium  Goal: Optimal Coping  Outcome: Progressing  Goal: Improved Behavioral Control  Outcome: Progressing  Intervention: Minimize Safety Risk  Recent Flowsheet Documentation  Taken 3/7/2025 2016 by Rasheed Soliz RN  Enhanced Safety Measures: assistive devices when indicated  Goal: Improved Attention and Thought Clarity  Outcome: Progressing  Goal: Improved Sleep  Outcome: Progressing     Problem: Adult Inpatient Plan of Care  Goal: Plan of Care Review  Description: The Plan of Care Review/Shift note should be completed every shift.  The Outcome Evaluation is a brief statement about your assessment that the patient is improving, declining, or no change.  This information will be displayed automatically on your shift  note.  Outcome: Progressing  Flowsheets (Taken 3/8/2025 0456)  Plan of Care Reviewed With: patient  Overall Patient Progress: no change  Goal: Patient-Specific Goal (Individualized)  Description: You can add care plan individualizations to a care plan. Examples of Individualization might be:  \"Parent requests to be called daily at 9am for status\", \"I have a hard time hearing out of my right ear\", " "or \"Do not touch me to wake me up as it startles  me\".  Outcome: Progressing  Goal: Absence of Hospital-Acquired Illness or Injury  Outcome: Progressing  Intervention: Identify and Manage Fall Risk  Recent Flowsheet Documentation  Taken 3/7/2025 2016 by Rasheed Soliz RN  Safety Promotion/Fall Prevention:   activity supervised   assistive device/personal items within reach   nonskid shoes/slippers when out of bed   room near nurse's station  Intervention: Prevent Skin Injury  Recent Flowsheet Documentation  Taken 3/7/2025 2016 by Rasheed Soliz RN  Body Position: turned  Intervention: Prevent and Manage VTE (Venous Thromboembolism) Risk  Recent Flowsheet Documentation  Taken 3/7/2025 2016 by Rasheed Soliz RN  VTE Prevention/Management: SCDs on (sequential compression devices)  Intervention: Prevent Infection  Recent Flowsheet Documentation  Taken 3/7/2025 2016 by Rasheed Soliz, RN  Infection Prevention:   equipment surfaces disinfected   rest/sleep promoted  Goal: Optimal Comfort and Wellbeing  Outcome: Progressing  Intervention: Monitor Pain and Promote Comfort  Recent Flowsheet Documentation  Taken 3/7/2025 2016 by Rasheed Soliz, RN  Pain Management Interventions: (baseline pain per pt)   cold applied   repositioned   other (see comments)  Goal: Readiness for Transition of Care  Outcome: Progressing     "

## 2025-03-08 NOTE — PLAN OF CARE
"Pt A&O x4. PO oxycodone and scheduled tylenol given for pain. 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 Dressing dry w/ some shadowing. Up w/ Ax1, using gait belt, and walker to bedside commode/chair. Brace on when OOB. Needs encouragement to get OOB. Winston removed around 1200-DTV. Bladder scanned @ 1700 for 128 ml. Encouraging fluids. Tolerating regular diet but minimal appetite. PO zofran given for nausea. Plan is TCU @ discharge.     Problem: Delirium  Goal: Optimal Coping  Outcome: Progressing  Goal: Improved Behavioral Control  Outcome: Progressing  Intervention: Minimize Safety Risk  Recent Flowsheet Documentation  Taken 3/8/2025 1025 by Yen Oneal RN  Enhanced Safety Measures: pain management  Goal: Improved Attention and Thought Clarity  Outcome: Progressing  Goal: Improved Sleep  Outcome: Progressing     Problem: Adult Inpatient Plan of Care  Goal: Plan of Care Review  Description: The Plan of Care Review/Shift note should be completed every shift.  The Outcome Evaluation is a brief statement about your assessment that the patient is improving, declining, or no change.  This information will be displayed automatically on your shift  note.  Outcome: Progressing  Flowsheets (Taken 3/8/2025 1730)  Plan of Care Reviewed With:   patient   spouse  Overall Patient Progress: improving  Goal: Patient-Specific Goal (Individualized)  Description: You can add care plan individualizations to a care plan. Examples of Individualization might be:  \"Parent requests to be called daily at 9am for status\", \"I have a hard time hearing out of my right ear\", or \"Do not touch me to wake me up as it startles  me\".  Outcome: Progressing  Goal: Absence of Hospital-Acquired Illness or Injury  Outcome: Progressing  Intervention: Identify and Manage Fall Risk  Recent Flowsheet Documentation  Taken 3/8/2025 1025 by Yen Oneal, RN  Safety Promotion/Fall Prevention:   activity supervised   assistive device/personal items within " reach   nonskid shoes/slippers when out of bed   safety round/check completed  Intervention: Prevent and Manage VTE (Venous Thromboembolism) Risk  Recent Flowsheet Documentation  Taken 3/8/2025 1025 by Yen Oneal RN  VTE Prevention/Management: SCDs on (sequential compression devices)  Intervention: Prevent Infection  Recent Flowsheet Documentation  Taken 3/8/2025 1025 by Yen Oneal RN  Infection Prevention: rest/sleep promoted  Goal: Optimal Comfort and Wellbeing  Outcome: Progressing  Intervention: Monitor Pain and Promote Comfort  Recent Flowsheet Documentation  Taken 3/8/2025 1606 by Yen Oneal RN  Pain Management Interventions: medication (see MAR)  Taken 3/8/2025 1018 by Yen Oneal RN  Pain Management Interventions: medication (see MAR)  Goal: Readiness for Transition of Care  Outcome: Progressing     Problem: Pain Acute  Goal: Optimal Pain Control and Function  Outcome: Progressing  Intervention: Develop Pain Management Plan  Recent Flowsheet Documentation  Taken 3/8/2025 1606 by Yen Oneal RN  Pain Management Interventions: medication (see MAR)  Taken 3/8/2025 1018 by Yen Oneal RN  Pain Management Interventions: medication (see MAR)  Intervention: Prevent or Manage Pain  Recent Flowsheet Documentation  Taken 3/8/2025 1025 by Yen Oneal RN  Bowel Elimination Promotion:   adequate fluid intake promoted   ambulation promoted  Medication Review/Management: medications reviewed     Problem: Spinal Surgery  Goal: Optimal Coping with Surgery  Outcome: Progressing  Goal: Absence of Bleeding  Outcome: Progressing  Intervention: Monitor and Manage Bleeding  Recent Flowsheet Documentation  Taken 3/8/2025 1025 by Yen Oneal RN  Bleeding Management: dressing monitored  Goal: Effective Bowel Elimination  Outcome: Progressing  Intervention: Enhance Bowel Motility and Elimination  Recent Flowsheet Documentation  Taken 3/8/2025 1025 by Yen Oneal RN  Bowel  Motility Enhancement:   ambulation promoted   fluid intake encouraged  Goal: Fluid and Electrolyte Balance  Outcome: Progressing  Intervention: Monitor and Manage Fluid and Electrolyte Balance  Recent Flowsheet Documentation  Taken 3/8/2025 1025 by Yen Oneal RN  Fluid/Electrolyte Management: fluids provided  Goal: Optimal Functional Ability  Outcome: Progressing  Intervention: Optimize Functional Status  Recent Flowsheet Documentation  Taken 3/8/2025 1704 by Yen Oneal RN  Activity Management: up to bedside commode  Goal: Absence of Infection Signs and Symptoms  Outcome: Progressing  Intervention: Prevent or Manage Infection  Recent Flowsheet Documentation  Taken 3/8/2025 1025 by Yen Oneal RN  Infection Prevention: rest/sleep promoted  Infection Management: aseptic technique maintained  Goal: Optimal Neurologic Function  Outcome: Progressing  Goal: Anesthesia/Sedation Recovery  Outcome: Progressing  Intervention: Optimize Anesthesia Recovery  Recent Flowsheet Documentation  Taken 3/8/2025 1025 by Yen Oneal RN  Safety Promotion/Fall Prevention:   activity supervised   assistive device/personal items within reach   nonskid shoes/slippers when out of bed   safety round/check completed  Administration (IS):   instruction provided, follow-up   self-administered  Goal: Optimal Pain Control and Function  Outcome: Progressing  Intervention: Prevent or Manage Pain  Recent Flowsheet Documentation  Taken 3/8/2025 1606 by Yen Oneal RN  Pain Management Interventions: medication (see MAR)  Taken 3/8/2025 1018 by Yen Oneal RN  Pain Management Interventions: medication (see MAR)  Goal: Nausea and Vomiting Relief  Outcome: Progressing  Goal: Effective Urinary Elimination  Outcome: Progressing  Intervention: Monitor and Manage Urinary Retention  Recent Flowsheet Documentation  Taken 3/8/2025 1025 by Yen Oneal RN  Urinary Elimination Promotion: toileting offered  Goal: Effective  Oxygenation and Ventilation  Outcome: Progressing   Goal Outcome Evaluation:      Plan of Care Reviewed With: patient, spouse    Overall Patient Progress: improvingOverall Patient Progress: improving

## 2025-03-09 ENCOUNTER — APPOINTMENT (OUTPATIENT)
Dept: PHYSICAL THERAPY | Facility: CLINIC | Age: 80
DRG: 457 | End: 2025-03-09
Attending: ORTHOPAEDIC SURGERY
Payer: COMMERCIAL

## 2025-03-09 LAB
ANION GAP SERPL CALCULATED.3IONS-SCNC: 8 MMOL/L (ref 7–15)
BASOPHILS # BLD AUTO: 0 10E3/UL (ref 0–0.2)
BASOPHILS NFR BLD AUTO: 0 %
BUN SERPL-MCNC: 28.6 MG/DL (ref 8–23)
CALCIUM SERPL-MCNC: 8.7 MG/DL (ref 8.8–10.4)
CHLORIDE SERPL-SCNC: 104 MMOL/L (ref 98–107)
CREAT SERPL-MCNC: 1.04 MG/DL (ref 0.51–0.95)
EGFRCR SERPLBLD CKD-EPI 2021: 54 ML/MIN/1.73M2
EOSINOPHIL # BLD AUTO: 0 10E3/UL (ref 0–0.7)
EOSINOPHIL NFR BLD AUTO: 0 %
ERYTHROCYTE [DISTWIDTH] IN BLOOD BY AUTOMATED COUNT: 13.2 % (ref 10–15)
GLUCOSE BLDC GLUCOMTR-MCNC: 158 MG/DL (ref 70–99)
GLUCOSE BLDC GLUCOMTR-MCNC: 169 MG/DL (ref 70–99)
GLUCOSE BLDC GLUCOMTR-MCNC: 172 MG/DL (ref 70–99)
GLUCOSE BLDC GLUCOMTR-MCNC: 223 MG/DL (ref 70–99)
GLUCOSE SERPL-MCNC: 160 MG/DL (ref 70–99)
HCO3 SERPL-SCNC: 24 MMOL/L (ref 22–29)
HCT VFR BLD AUTO: 28.5 % (ref 35–47)
HGB BLD-MCNC: 9 G/DL (ref 11.7–15.7)
IMM GRANULOCYTES # BLD: 0 10E3/UL
IMM GRANULOCYTES NFR BLD: 1 %
LYMPHOCYTES # BLD AUTO: 0.8 10E3/UL (ref 0.8–5.3)
LYMPHOCYTES NFR BLD AUTO: 12 %
MCH RBC QN AUTO: 30.2 PG (ref 26.5–33)
MCHC RBC AUTO-ENTMCNC: 31.6 G/DL (ref 31.5–36.5)
MCV RBC AUTO: 96 FL (ref 78–100)
MONOCYTES # BLD AUTO: 0.6 10E3/UL (ref 0–1.3)
MONOCYTES NFR BLD AUTO: 9 %
NEUTROPHILS # BLD AUTO: 5.3 10E3/UL (ref 1.6–8.3)
NEUTROPHILS NFR BLD AUTO: 78 %
NRBC # BLD AUTO: 0 10E3/UL
NRBC BLD AUTO-RTO: 0 /100
PLATELET # BLD AUTO: 140 10E3/UL (ref 150–450)
POTASSIUM SERPL-SCNC: 3.9 MMOL/L (ref 3.4–5.3)
RBC # BLD AUTO: 2.98 10E6/UL (ref 3.8–5.2)
SODIUM SERPL-SCNC: 136 MMOL/L (ref 135–145)
WBC # BLD AUTO: 6.8 10E3/UL (ref 4–11)

## 2025-03-09 PROCEDURE — 82310 ASSAY OF CALCIUM: CPT

## 2025-03-09 PROCEDURE — 120N000001 HC R&B MED SURG/OB

## 2025-03-09 PROCEDURE — 36415 COLL VENOUS BLD VENIPUNCTURE: CPT

## 2025-03-09 PROCEDURE — 97116 GAIT TRAINING THERAPY: CPT | Mod: GP

## 2025-03-09 PROCEDURE — 250N000013 HC RX MED GY IP 250 OP 250 PS 637

## 2025-03-09 PROCEDURE — 80048 BASIC METABOLIC PNL TOTAL CA: CPT

## 2025-03-09 PROCEDURE — 85004 AUTOMATED DIFF WBC COUNT: CPT

## 2025-03-09 PROCEDURE — 97530 THERAPEUTIC ACTIVITIES: CPT | Mod: GP

## 2025-03-09 PROCEDURE — 250N000013 HC RX MED GY IP 250 OP 250 PS 637: Performed by: NURSE PRACTITIONER

## 2025-03-09 PROCEDURE — 85018 HEMOGLOBIN: CPT

## 2025-03-09 RX ORDER — METHOCARBAMOL 500 MG/1
500 TABLET, FILM COATED ORAL 3 TIMES DAILY PRN
Qty: 90 TABLET | Refills: 0 | Status: SHIPPED | OUTPATIENT
Start: 2025-03-09 | End: 2025-04-08

## 2025-03-09 RX ORDER — HYDROXYZINE HYDROCHLORIDE 10 MG/1
10 TABLET, FILM COATED ORAL 3 TIMES DAILY PRN
Qty: 90 TABLET | Refills: 0 | Status: SHIPPED | OUTPATIENT
Start: 2025-03-09 | End: 2025-04-08

## 2025-03-09 RX ORDER — OXYCODONE HYDROCHLORIDE 5 MG/1
5 TABLET ORAL EVERY 4 HOURS PRN
Qty: 42 TABLET | Refills: 0 | Status: SHIPPED | OUTPATIENT
Start: 2025-03-09 | End: 2025-03-16

## 2025-03-09 RX ADMIN — BRIMONIDINE TARTRATE 1 DROP: 2 SOLUTION/ DROPS OPHTHALMIC at 10:11

## 2025-03-09 RX ADMIN — LEVOTHYROXINE SODIUM 88 MCG: 0.09 TABLET ORAL at 10:02

## 2025-03-09 RX ADMIN — ACETAMINOPHEN 975 MG: 325 TABLET, FILM COATED ORAL at 10:02

## 2025-03-09 RX ADMIN — GABAPENTIN 600 MG: 300 CAPSULE ORAL at 21:15

## 2025-03-09 RX ADMIN — OXYCODONE HYDROCHLORIDE 2.5 MG: 5 TABLET ORAL at 10:02

## 2025-03-09 RX ADMIN — HYDROCHLOROTHIAZIDE 25 MG: 25 TABLET ORAL at 10:03

## 2025-03-09 RX ADMIN — PANTOPRAZOLE SODIUM 40 MG: 40 TABLET, DELAYED RELEASE ORAL at 21:14

## 2025-03-09 RX ADMIN — ACETAMINOPHEN 975 MG: 325 TABLET, FILM COATED ORAL at 18:23

## 2025-03-09 RX ADMIN — METOPROLOL SUCCINATE 100 MG: 100 TABLET, EXTENDED RELEASE ORAL at 10:03

## 2025-03-09 RX ADMIN — PANTOPRAZOLE SODIUM 40 MG: 40 TABLET, DELAYED RELEASE ORAL at 10:02

## 2025-03-09 RX ADMIN — ACETAMINOPHEN 975 MG: 325 TABLET, FILM COATED ORAL at 04:03

## 2025-03-09 RX ADMIN — OXYCODONE HYDROCHLORIDE 2.5 MG: 5 TABLET ORAL at 21:14

## 2025-03-09 RX ADMIN — METOPROLOL SUCCINATE 100 MG: 100 TABLET, EXTENDED RELEASE ORAL at 21:15

## 2025-03-09 RX ADMIN — LATANOPROST 1 DROP: 50 SOLUTION/ DROPS OPHTHALMIC at 22:41

## 2025-03-09 RX ADMIN — BRIMONIDINE TARTRATE 1 DROP: 2 SOLUTION/ DROPS OPHTHALMIC at 21:18

## 2025-03-09 RX ADMIN — PREDNISOLONE ACETATE 1 DROP: 10 SUSPENSION/ DROPS OPHTHALMIC at 10:03

## 2025-03-09 RX ADMIN — GABAPENTIN 600 MG: 300 CAPSULE ORAL at 10:03

## 2025-03-09 RX ADMIN — TIMOLOL MALEATE 1 DROP: 5 SOLUTION/ DROPS OPHTHALMIC at 10:11

## 2025-03-09 RX ADMIN — ATORVASTATIN CALCIUM 40 MG: 40 TABLET, FILM COATED ORAL at 10:03

## 2025-03-09 RX ADMIN — INSULIN GLARGINE 17 UNITS: 100 INJECTION, SOLUTION SUBCUTANEOUS at 22:41

## 2025-03-09 RX ADMIN — SENNOSIDES AND DOCUSATE SODIUM 1 TABLET: 50; 8.6 TABLET ORAL at 21:14

## 2025-03-09 ASSESSMENT — ACTIVITIES OF DAILY LIVING (ADL)
ADLS_ACUITY_SCORE: 35
ADLS_ACUITY_SCORE: 36
ADLS_ACUITY_SCORE: 36
ADLS_ACUITY_SCORE: 35
ADLS_ACUITY_SCORE: 35
ADLS_ACUITY_SCORE: 36
ADLS_ACUITY_SCORE: 36
ADLS_ACUITY_SCORE: 35
ADLS_ACUITY_SCORE: 36
ADLS_ACUITY_SCORE: 36
ADLS_ACUITY_SCORE: 35
ADLS_ACUITY_SCORE: 36
ADLS_ACUITY_SCORE: 35
ADLS_ACUITY_SCORE: 36
ADLS_ACUITY_SCORE: 35
ADLS_ACUITY_SCORE: 35
ADLS_ACUITY_SCORE: 36

## 2025-03-09 NOTE — PLAN OF CARE
"Goal Outcome Evaluation:      Plan of Care Reviewed With: patient    Overall Patient Progress: no changeOverall Patient Progress: no change         [id number]  Pt slept well most of the night. Pain controlled. Due to void, multiple bladder scans overnight, up to commode multiple times unable to void. Straight cathed at 0400 for 550ml.     Tcu pending placement         Problem: Delirium  Goal: Optimal Coping  Outcome: Progressing  Goal: Improved Behavioral Control  Outcome: Progressing  Intervention: Minimize Safety Risk  Recent Flowsheet Documentation  Taken 3/8/2025 2129 by Rasheed Soliz RN  Enhanced Safety Measures: pain management  Goal: Improved Attention and Thought Clarity  Outcome: Progressing  Goal: Improved Sleep  Outcome: Progressing  Problem: Delirium  Goal: Improved Behavioral Control  Outcome: Progressing  Intervention: Minimize Safety Risk  Recent Flowsheet Documentation  Taken 3/8/2025 2129 by Rasheed Sloiz RN  Enhanced Safety Measures: pain management     Problem: Adult Inpatient Plan of Care  Goal: Plan of Care Review  Description: The Plan of Care Review/Shift note should be completed every shift.  The Outcome Evaluation is a brief statement about your assessment that the patient is improving, declining, or no change.  This information will be displayed automatically on your shift  note.  Outcome: Progressing  Flowsheets (Taken 3/9/2025 0500)  Plan of Care Reviewed With: patient  Overall Patient Progress: no change  Goal: Patient-Specific Goal (Individualized)  Description: You can add care plan individualizations to a care plan. Examples of Individualization might be:  \"Parent requests to be called daily at 9am for status\", \"I have a hard time hearing out of my right ear\", or \"Do not touch me to wake me up as it startles  me\".  Outcome: Progressing  Goal: Absence of Hospital-Acquired Illness or Injury  Outcome: Progressing  Intervention: Identify and Manage Fall Risk  Recent Flowsheet " Documentation  Taken 3/8/2025 2129 by Rasheed Soliz, RN  Safety Promotion/Fall Prevention:   activity supervised   assistive device/personal items within reach   clutter free environment maintained   nonskid shoes/slippers when out of bed   safety round/check completed  Intervention: Prevent Skin Injury  Recent Flowsheet Documentation  Taken 3/8/2025 2129 by Rasheed Soliz, RN  Body Position: turned  Intervention: Prevent Infection  Recent Flowsheet Documentation  Taken 3/8/2025 2129 by Rasheed Soliz, RN  Infection Prevention: single patient room provided  Goal: Optimal Comfort and Wellbeing  Outcome: Progressing  Goal: Readiness for Transition of Care  Outcome: Progressing

## 2025-03-09 NOTE — PROGRESS NOTES
DAILY PROGRESS NOTE    Lj Castro is a 79 year old old female admitted on 3/7/2025  5:31 AM.    Subjective  Sheron is a 79 year old female presenting POD2 from undergoing a L2-L5 OLLIF done by Dr. Cadena on 3/7/25.    Today, Sheron notes that she is doing well, all things considered. She states that her pain is being managed for the most part, with her pain at a 5/10 currently. She states that she has not been up and out of bed much, but has ambulated, and she denies any concerns to her lower extremities.     Her current challenge is her urinary changes. Sheron had her shin removed and displayed from urinary retention. She, however, states that is is normal for her after undergoing a surgery.    Sheron has an extensive past medical history with us and spinal surgery. He last surgery resulted in a repeat surgery and wound vac d/t infection. Sheron had some valid concerns in regards to post-op antibiotics.     I did explain the difference in invasiveness from her posterior open fusion to her current OLLIF. She states an understanding of why we are not proceeding with post-op antibiotics, but will continue to keep an eye on her vitals, labs and incisions.    Plan is to discharge to a TCU in which social work has already started the process.     Objective    AAOx3, Sensation, 5/5 RLE 4/5 LLE Ambulating,     Hemoglobin   Date Value Ref Range Status   03/08/2025 9.7 (L) 11.7 - 15.7 g/dL Final   03/20/2015 9.8 (L) 11.7 - 15.7 g/dL Final   ]      Impression / Plan  Discharge TCU pending    Plan for today:    Patient doing well  Ambulate with help  PT OT  Full diet  Today  Transition to PO meds today     Camelia Haberer PAC

## 2025-03-09 NOTE — PLAN OF CARE
"Pt A&O x4. Lethargic but arouses to voice. PO oxycodone given x1 this shift for pain. 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 Dressing w/ large amt of drainage-changed per orders. Up w/ Ax1, using gait belt, and walker. Brace on when OOB. Pt still unable to void-straight cathed for 500 ml @ 1630. Tolerating regular diet. Plan is TCU @ discharge.     Problem: Spinal Surgery  Goal: Effective Urinary Elimination  Outcome: Not Progressing  Intervention: Monitor and Manage Urinary Retention  Recent Flowsheet Documentation  Taken 3/9/2025 1000 by Yen Oneal RN  Urinary Elimination Promotion: toileting offered     Problem: Delirium  Goal: Optimal Coping  Outcome: Progressing  Goal: Improved Behavioral Control  Outcome: Progressing  Intervention: Minimize Safety Risk  Recent Flowsheet Documentation  Taken 3/9/2025 1000 by Yen Oneal RN  Enhanced Safety Measures: pain management  Goal: Improved Attention and Thought Clarity  Outcome: Progressing  Goal: Improved Sleep  Outcome: Progressing     Problem: Adult Inpatient Plan of Care  Goal: Plan of Care Review  Description: The Plan of Care Review/Shift note should be completed every shift.  The Outcome Evaluation is a brief statement about your assessment that the patient is improving, declining, or no change.  This information will be displayed automatically on your shift  note.  Outcome: Progressing  Flowsheets (Taken 3/9/2025 1755)  Plan of Care Reviewed With:   patient   family  Overall Patient Progress: improving  Goal: Patient-Specific Goal (Individualized)  Description: You can add care plan individualizations to a care plan. Examples of Individualization might be:  \"Parent requests to be called daily at 9am for status\", \"I have a hard time hearing out of my right ear\", or \"Do not touch me to wake me up as it startles  me\".  Outcome: Progressing  Goal: Absence of Hospital-Acquired Illness or Injury  Outcome: Progressing  Intervention: Identify and Manage Fall " Risk  Recent Flowsheet Documentation  Taken 3/9/2025 1000 by Yen Oneal RN  Safety Promotion/Fall Prevention:   activity supervised   assistive device/personal items within reach   nonskid shoes/slippers when out of bed   safety round/check completed  Intervention: Prevent and Manage VTE (Venous Thromboembolism) Risk  Recent Flowsheet Documentation  Taken 3/9/2025 1000 by Yen Oneal RN  VTE Prevention/Management: SCDs off (sequential compression devices)  Intervention: Prevent Infection  Recent Flowsheet Documentation  Taken 3/9/2025 1000 by Yen Oneal RN  Infection Prevention: rest/sleep promoted  Goal: Optimal Comfort and Wellbeing  Outcome: Progressing  Intervention: Monitor Pain and Promote Comfort  Recent Flowsheet Documentation  Taken 3/9/2025 1002 by Yen Oneal RN  Pain Management Interventions: medication (see MAR)  Goal: Readiness for Transition of Care  Outcome: Progressing     Problem: Pain Acute  Goal: Optimal Pain Control and Function  Outcome: Progressing  Intervention: Develop Pain Management Plan  Recent Flowsheet Documentation  Taken 3/9/2025 1002 by Yen Oneal RN  Pain Management Interventions: medication (see MAR)  Intervention: Prevent or Manage Pain  Recent Flowsheet Documentation  Taken 3/9/2025 1000 by Yen Oneal RN  Bowel Elimination Promotion:   adequate fluid intake promoted   ambulation promoted  Medication Review/Management: medications reviewed     Problem: Spinal Surgery  Goal: Optimal Coping with Surgery  Outcome: Progressing  Goal: Absence of Bleeding  Outcome: Progressing  Intervention: Monitor and Manage Bleeding  Recent Flowsheet Documentation  Taken 3/9/2025 1000 by Yen Oneal RN  Bleeding Management: dressing monitored  Goal: Effective Bowel Elimination  Outcome: Progressing  Goal: Fluid and Electrolyte Balance  Outcome: Progressing  Intervention: Monitor and Manage Fluid and Electrolyte Balance  Recent Flowsheet  Documentation  Taken 3/9/2025 1000 by Yen Oneal RN  Fluid/Electrolyte Management: fluids provided  Goal: Optimal Functional Ability  Outcome: Progressing  Intervention: Optimize Functional Status  Recent Flowsheet Documentation  Taken 3/9/2025 1510 by Yen Oneal RN  Activity Management: ambulated to bathroom  Goal: Absence of Infection Signs and Symptoms  Outcome: Progressing  Intervention: Prevent or Manage Infection  Recent Flowsheet Documentation  Taken 3/9/2025 1000 by Yen Oneal RN  Infection Prevention: rest/sleep promoted  Infection Management: aseptic technique maintained  Goal: Optimal Neurologic Function  Outcome: Progressing  Goal: Anesthesia/Sedation Recovery  Outcome: Progressing  Intervention: Optimize Anesthesia Recovery  Recent Flowsheet Documentation  Taken 3/9/2025 1000 by Yen Oneal RN  Safety Promotion/Fall Prevention:   activity supervised   assistive device/personal items within reach   nonskid shoes/slippers when out of bed   safety round/check completed  Administration (IS):   proper technique demonstrated   instruction provided, follow-up  Level Incentive Spirometer (mL): 1500  Number of Repetitions (IS): 5  Patient Tolerance (IS): fair  Goal: Optimal Pain Control and Function  Outcome: Progressing  Intervention: Prevent or Manage Pain  Recent Flowsheet Documentation  Taken 3/9/2025 1002 by Yen Oneal RN  Pain Management Interventions: medication (see MAR)  Goal: Nausea and Vomiting Relief  Outcome: Progressing  Goal: Effective Oxygenation and Ventilation  Outcome: Progressing   Goal Outcome Evaluation:      Plan of Care Reviewed With: patient, family    Overall Patient Progress: improvingOverall Patient Progress: improving

## 2025-03-09 NOTE — DISCHARGE INSTRUCTIONS
Description of Procedure:  The oblique lateral lumbar interbody fusion (OLLIF) is a minimally invasive surgery that involves multiple small incisions. With x-ray guidance, the surgeon is able to place screws, rods, and cages. The screws are placed in the vertebrae along with rods that go on either side of the spine to stabilize it. A cage is placed between the vertebrae in the disc space after the disc is removed. Bone growth is encouraged by a specialized material in the cage. Bone will grow to connect the vertebrae, fusing them together. It will take several months for the bone growth to occur, so following the post-op restrictions are very important during this time.    The following guidelines are recommended after surgery to ensure a good recovery. You may be given additional instructions by your surgeon when discharged. For questions or concerns, please contact Dr. Cadena at [phone]    After your surgery:   You may resume your regular diet as tolerated; avoiding spicy or greasy foods at first.   Resume all previous medications. Pain medication and antibiotics may cause constipation. Increase your water and fiber intake. Also, over the counter stool softeners may be helpful (Senna, Colace, Milk of Magnesia).   NO ALCOHOL for the next 24 hours or while taking pain medication(s).  It is normal to feel sleepy, dizzy and/or slightly nauseous for 24 hours after anesthesia.  NO driving or operating heavy machinery or make any important or legal decisions or sign legal documents while taking narcotics.    Restrictions:  Following surgery, you will be asked to wear your brace with activity and in the car for the first 3 months. During this period, remember the acronym  BLT.  That is, you should not bend, lift over 8 pounds, or twist. You should try to keep your back (hips to shoulders) as straight as possible. In addition, follow these lifting restrictions:  1 month: 8 lbs  2 months: 16 lbs  4 months: 25  [email or web address]  Further guidance will be provided at your follow up appointment.     Incision Care:  You may have staples, sutures, steri-strips, or likely a combination of these to help keep your incision closed post-operatively. Steri-strips are small stickers placed over the wound; please allow these to remain in place until they fall off on their own, typically about 7 days. Staples should be taken out within 10-14 days after surgery by your primary care provider. Often a bandage is placed over the incision site. Please keep this bandage on for 48 hours after surgery, as it is a sterile dressing. After 48 hours, the dressing should be changed daily until there is no longer drainage around 5-7 days post-op. After this, gauze or bandages are usually not necessary. We ask that you leave the incision site open to air. Please remember that dark and moist areas promote bacterial growth.     Signs of Infection:  You or a significant other should monitor your incision for signs of infection. Signs of infection include: redness, swelling, increased warmth to touch, discharge from the wound, and a body temperature over 100.5 F. If any signs of infection do appear, please contact our office.     Can I bathe?  It is safe for the incision to get wet while taking a shower, but avoid any direct pressure from the shower head and do not scrub the area. Lightly pat the site dry with a clean towel when finished. Avoid submerging your wound by soaking in the bathtub, as this will increase your risk of infection at the incision site. Please wait at least 3 weeks for the incision wound to heal before submerging the area while taking baths.     When can I go back to work?  Most patients go back to work with restrictions 4-8 weeks after surgery. If your job is more physically demanding, this may need to be extended. In general, your capability to return to work will be discussed at your one-month visit.    When can I drive?  Once you are off all  narcotic pain medications, and you have regained full control of your extremities you should be able to safely operate your vehicle.     Appointments:  Please see your primary care provider 10-14 days after surgery to have the incision examined. We would like to see you at [facility] one month after surgery. This is the first of a series of post-op appointments. You will have a CT scan prior to your appointment to confirm the position of the hardware. You can reach [facility] to set up an appointment, if it is not yet scheduled.     Things to Remember:  Fluctuation in the amount of pain you may experience is normal. This is especially common 1-2 weeks after surgery related to increased swelling at the surgical site.     Certain things can delay your healing, hinder your surgery from being successful, and/or increase your risk of requiring another surgery. These include smoking and/or tobacco use, diabetes, poor general health, advanced age, and/or obesity. Although some risks cannot be avoided, it is important to control what you can for the best possible outcome.    To reach [facility] providers after-hours with URGENT Post-Operative concerns: [phone] (this number is not monitored during normal business hours).    In Emergency Situations, Call 911:  Sudden onset of leg weakness and spasms  Loss of bladder control and/or bowel functions  Signs or symptoms of a pulmonary embolism (PE) - sudden coughing, sharp chest pain, rapid breathing, or shortness of breath, and/or severe lightheadedness.   Signs or symptoms of a deep vein thrombosis (DVT) - Swelling in the leg(s), pain and/or tenderness in the leg(s), red or discolored skin on the leg(s), and/or warm skin to the touch.

## 2025-03-10 LAB
ANION GAP SERPL CALCULATED.3IONS-SCNC: 12 MMOL/L (ref 7–15)
BASOPHILS # BLD AUTO: 0 10E3/UL (ref 0–0.2)
BASOPHILS NFR BLD AUTO: 0 %
BUN SERPL-MCNC: 22.1 MG/DL (ref 8–23)
CALCIUM SERPL-MCNC: 9.1 MG/DL (ref 8.8–10.4)
CHLORIDE SERPL-SCNC: 104 MMOL/L (ref 98–107)
CREAT SERPL-MCNC: 0.96 MG/DL (ref 0.51–0.95)
EGFRCR SERPLBLD CKD-EPI 2021: 60 ML/MIN/1.73M2
EOSINOPHIL # BLD AUTO: 0.1 10E3/UL (ref 0–0.7)
EOSINOPHIL NFR BLD AUTO: 1 %
ERYTHROCYTE [DISTWIDTH] IN BLOOD BY AUTOMATED COUNT: 13.1 % (ref 10–15)
GLUCOSE BLDC GLUCOMTR-MCNC: 187 MG/DL (ref 70–99)
GLUCOSE BLDC GLUCOMTR-MCNC: 192 MG/DL (ref 70–99)
GLUCOSE BLDC GLUCOMTR-MCNC: 194 MG/DL (ref 70–99)
GLUCOSE BLDC GLUCOMTR-MCNC: 233 MG/DL (ref 70–99)
GLUCOSE BLDC GLUCOMTR-MCNC: 241 MG/DL (ref 70–99)
GLUCOSE SERPL-MCNC: 196 MG/DL (ref 70–99)
HCO3 SERPL-SCNC: 23 MMOL/L (ref 22–29)
HCT VFR BLD AUTO: 30.3 % (ref 35–47)
HGB BLD-MCNC: 9.7 G/DL (ref 11.7–15.7)
IMM GRANULOCYTES # BLD: 0.1 10E3/UL
IMM GRANULOCYTES NFR BLD: 1 %
LYMPHOCYTES # BLD AUTO: 0.9 10E3/UL (ref 0.8–5.3)
LYMPHOCYTES NFR BLD AUTO: 12 %
MCH RBC QN AUTO: 30.5 PG (ref 26.5–33)
MCHC RBC AUTO-ENTMCNC: 32 G/DL (ref 31.5–36.5)
MCV RBC AUTO: 95 FL (ref 78–100)
MONOCYTES # BLD AUTO: 0.5 10E3/UL (ref 0–1.3)
MONOCYTES NFR BLD AUTO: 7 %
NEUTROPHILS # BLD AUTO: 5.7 10E3/UL (ref 1.6–8.3)
NEUTROPHILS NFR BLD AUTO: 78 %
NRBC # BLD AUTO: 0 10E3/UL
NRBC BLD AUTO-RTO: 0 /100
PLATELET # BLD AUTO: 143 10E3/UL (ref 150–450)
POTASSIUM SERPL-SCNC: 4.1 MMOL/L (ref 3.4–5.3)
RBC # BLD AUTO: 3.18 10E6/UL (ref 3.8–5.2)
SODIUM SERPL-SCNC: 139 MMOL/L (ref 135–145)
WBC # BLD AUTO: 7.2 10E3/UL (ref 4–11)

## 2025-03-10 PROCEDURE — 250N000013 HC RX MED GY IP 250 OP 250 PS 637: Performed by: NURSE PRACTITIONER

## 2025-03-10 PROCEDURE — 85004 AUTOMATED DIFF WBC COUNT: CPT

## 2025-03-10 PROCEDURE — 82310 ASSAY OF CALCIUM: CPT

## 2025-03-10 PROCEDURE — 36415 COLL VENOUS BLD VENIPUNCTURE: CPT

## 2025-03-10 PROCEDURE — 97116 GAIT TRAINING THERAPY: CPT | Mod: GP | Performed by: PHYSICAL THERAPIST

## 2025-03-10 PROCEDURE — 82565 ASSAY OF CREATININE: CPT

## 2025-03-10 PROCEDURE — 99222 1ST HOSP IP/OBS MODERATE 55: CPT | Performed by: PHYSICIAN ASSISTANT

## 2025-03-10 PROCEDURE — 120N000001 HC R&B MED SURG/OB

## 2025-03-10 PROCEDURE — 97530 THERAPEUTIC ACTIVITIES: CPT | Mod: GP | Performed by: PHYSICAL THERAPIST

## 2025-03-10 PROCEDURE — 250N000013 HC RX MED GY IP 250 OP 250 PS 637: Performed by: PHYSICIAN ASSISTANT

## 2025-03-10 PROCEDURE — 250N000013 HC RX MED GY IP 250 OP 250 PS 637

## 2025-03-10 RX ORDER — OXYCODONE HYDROCHLORIDE 5 MG/1
5 TABLET ORAL
Status: DISCONTINUED | OUTPATIENT
Start: 2025-03-10 | End: 2025-03-11 | Stop reason: HOSPADM

## 2025-03-10 RX ORDER — GABAPENTIN 400 MG/1
800 CAPSULE ORAL 2 TIMES DAILY
Qty: 30 CAPSULE | Refills: 0 | Status: SHIPPED | OUTPATIENT
Start: 2025-03-10

## 2025-03-10 RX ORDER — GABAPENTIN 400 MG/1
800 CAPSULE ORAL 2 TIMES DAILY
Status: DISCONTINUED | OUTPATIENT
Start: 2025-03-10 | End: 2025-03-11 | Stop reason: HOSPADM

## 2025-03-10 RX ADMIN — BRIMONIDINE TARTRATE 1 DROP: 2 SOLUTION/ DROPS OPHTHALMIC at 21:58

## 2025-03-10 RX ADMIN — LOSARTAN POTASSIUM 50 MG: 50 TABLET, FILM COATED ORAL at 09:04

## 2025-03-10 RX ADMIN — LATANOPROST 1 DROP: 50 SOLUTION/ DROPS OPHTHALMIC at 21:58

## 2025-03-10 RX ADMIN — ATORVASTATIN CALCIUM 40 MG: 40 TABLET, FILM COATED ORAL at 09:04

## 2025-03-10 RX ADMIN — LEVOTHYROXINE SODIUM 88 MCG: 0.09 TABLET ORAL at 09:04

## 2025-03-10 RX ADMIN — ACETAMINOPHEN 975 MG: 325 TABLET, FILM COATED ORAL at 09:58

## 2025-03-10 RX ADMIN — HYDROCHLOROTHIAZIDE 25 MG: 25 TABLET ORAL at 09:05

## 2025-03-10 RX ADMIN — ACETAMINOPHEN 975 MG: 325 TABLET, FILM COATED ORAL at 18:30

## 2025-03-10 RX ADMIN — METOPROLOL SUCCINATE 100 MG: 100 TABLET, EXTENDED RELEASE ORAL at 21:54

## 2025-03-10 RX ADMIN — BRIMONIDINE TARTRATE 1 DROP: 2 SOLUTION/ DROPS OPHTHALMIC at 09:10

## 2025-03-10 RX ADMIN — GABAPENTIN 800 MG: 400 CAPSULE ORAL at 21:54

## 2025-03-10 RX ADMIN — OXYCODONE HYDROCHLORIDE 5 MG: 5 TABLET ORAL at 22:04

## 2025-03-10 RX ADMIN — OXYCODONE HYDROCHLORIDE 2.5 MG: 5 TABLET ORAL at 09:04

## 2025-03-10 RX ADMIN — PANTOPRAZOLE SODIUM 40 MG: 40 TABLET, DELAYED RELEASE ORAL at 09:04

## 2025-03-10 RX ADMIN — ACETAMINOPHEN 975 MG: 325 TABLET, FILM COATED ORAL at 02:37

## 2025-03-10 RX ADMIN — OXYCODONE HYDROCHLORIDE 5 MG: 5 TABLET ORAL at 18:55

## 2025-03-10 RX ADMIN — INSULIN GLARGINE 17 UNITS: 100 INJECTION, SOLUTION SUBCUTANEOUS at 21:54

## 2025-03-10 RX ADMIN — PREDNISOLONE ACETATE 1 DROP: 10 SUSPENSION/ DROPS OPHTHALMIC at 09:19

## 2025-03-10 RX ADMIN — TIMOLOL MALEATE 1 DROP: 5 SOLUTION/ DROPS OPHTHALMIC at 09:11

## 2025-03-10 RX ADMIN — PANTOPRAZOLE SODIUM 40 MG: 40 TABLET, DELAYED RELEASE ORAL at 21:53

## 2025-03-10 RX ADMIN — METOPROLOL SUCCINATE 100 MG: 100 TABLET, EXTENDED RELEASE ORAL at 09:05

## 2025-03-10 ASSESSMENT — ACTIVITIES OF DAILY LIVING (ADL)
ADLS_ACUITY_SCORE: 37
ADLS_ACUITY_SCORE: 35
ADLS_ACUITY_SCORE: 37
ADLS_ACUITY_SCORE: 35
ADLS_ACUITY_SCORE: 35
ADLS_ACUITY_SCORE: 39
ADLS_ACUITY_SCORE: 37
ADLS_ACUITY_SCORE: 35
ADLS_ACUITY_SCORE: 37
ADLS_ACUITY_SCORE: 35
ADLS_ACUITY_SCORE: 37
ADLS_ACUITY_SCORE: 37
ADLS_ACUITY_SCORE: 35
ADLS_ACUITY_SCORE: 35
ADLS_ACUITY_SCORE: 37
ADLS_ACUITY_SCORE: 35
ADLS_ACUITY_SCORE: 35
ADLS_ACUITY_SCORE: 39
ADLS_ACUITY_SCORE: 35
ADLS_ACUITY_SCORE: 37
ADLS_ACUITY_SCORE: 37
ADLS_ACUITY_SCORE: 39
ADLS_ACUITY_SCORE: 39

## 2025-03-10 NOTE — CONSULTS
[facility] Urology Consult     Name: Lj Castro    MRN: [id number]   YOB: 1945         We were asked to see Lj Castro in consultation from CJ Stern PA-C, for evaluation and treatment of urinary retention.          Chief Complaint:   Urinary retention  History is obtained from the patient and EMR.          History of Present Illness:   Lj Castro is a 79 year old female with urinary retention, admitted for L2-L5 OLLIF with Dr. Cadena on 03/07/25.  This was done due to lumbar stenosis with neurogenic claudication.  Patient initially had Winston catheter after surgery removed, but she was having difficulties with urination.  She has now required straight catheterization 3 times for 550 mL, 500 mL, and 600 mL.  Winston catheter was now placed and is currently draining clear yellow urine.    Patient notes that she has been having constipation.  She will occasionally get diarrhea.  She does not always feel like she can empty her bladder completely and this has been noted on previous evaluation.  She denies any urinary incontinence or nocturia at baseline.  Patient denies any hematuria or dysuria.    She previously underwent sacrocolpopexy and cystoscopy with  in March 2015 for pelvic organ prolapse.  Patient does have neuropathy as well as diabetes mellitus.  In discussion with the patient, she has previously had urinary retention after surgical intervention.  Plan is for her to discharge to TCU tomorrow.  She has has not been ambulating particularly well since her surgery.  She denies any hematuria or dysuria.  Denies any fevers, chills, nausea, or vomiting.   and son are at bedside.         Past Medical History:     Past Medical History:   Diagnosis Date    Arrhythmia     Bursitis, olecranon     Diabetes (H)     Displacement of cervical intervertebral disc without myelopathy     Gastro-oesophageal reflux disease     Glaucoma     Hyperlipemia     Hypertension     Hypothyroid      Renal failure, acute 6-7-11 to 6-17-11    hospitalized     Varicose veins             Past Surgical History:     Past Surgical History:   Procedure Laterality Date    DAVINCI HYSTERECTOMY SUPRACERVICAL N/A 3/18/2015    Procedure: DAVINCI HYSTERECTOMY SUPRACERVICAL;  Surgeon: Randall Melgar MD;  Location:  OR    DAVINCI SACROCOLPOPEXY, MIDURETHRAL SLING, CYSTOSCOPY N/A 3/18/2015    Procedure: DAVINCI SACROCOLPOPEXY, MIDURETHRAL SLING, CYSTOSCOPY;  Surgeon: Thalia Esquivel MD;  Location:  OR    DAVINCI SALPINGO-OOPHORECTOMY INCLUDING BILATERAL Bilateral 3/18/2015    Procedure: DAVINCI SALPINGO-OOPHORECTOMY INCLUDING BILATERAL;  Surgeon: Randall Melgar MD;  Location:  OR    EYE SURGERY Left 2009    HI revision eye sunt with graft left eye     FUSION SPINE POSTERIOR MINIMALLY INVASIVE THREE + LEVELS N/A 3/7/2025    Procedure: 1)L2  L5 Oblique Lateral Lumbar Interbody fusion with discectomy, preparation of the endplate and placement of a titanium bullet cage packed with tri-calcium phosphate soaked in bone marrow 2)  L2 L5 nstrumentation with LNK Perc Screws 3) Transpedicular Bone marrow aspiration through separate incision 4)  scoliosis making the surgery more difficult and time consuming with additional 30%     IR LOWER EXTREMITY ANGIOGRAM LEFT  7/8/2020    OPTICAL TRACKING SYSTEM FUSION POSTERIOR CERVICAL THREE + LEVELS N/A 10/13/2023    Procedure: C3 to T2 cervical laminectomy and spinal cord decompression C3 to T2 posterior fusion C3 to T2 segemental instrumetation Application and detachment of Isaac dyer Navigation using Stealth System;  Surgeon: Sagar Cadena MD;  Location:  OR            Social History:     Social History     Tobacco Use    Smoking status: Never    Smokeless tobacco: Never   Substance Use Topics    Alcohol use: Yes     Comment: occ            Family History:     History reviewed. No pertinent family history.         Allergies:     No Known Allergies          Medications:     Current Facility-Administered Medications   Medication Dose Route Frequency Provider Last Rate Last Admin    acetaminophen (TYLENOL) tablet 975 mg  975 mg Oral Q8H Camelia Stern PA-C   975 mg at 03/10/25 0958    [Held by provider] apixaban ANTICOAGULANT (ELIQUIS) tablet 5 mg  5 mg Oral BID Xena Morton DNP        atorvastatin (LIPITOR) tablet 40 mg  40 mg Oral Daily Xena Morton DNP   40 mg at 03/10/25 0904    bisacodyl (DULCOLAX) suppository 10 mg  10 mg Rectal Daily PRN Camelia Stern PA-C        brimonidine (ALPHAGAN) 0.2 % ophthalmic solution 1 drop  1 drop Left Eye BID Yvonne Whitmoreah, APRN CNP   1 drop at 03/10/25 0910    glucose gel 15-30 g  15-30 g Oral Q15 Min PRN Xena Morton DNP        Or    dextrose 50 % injection 25-50 mL  25-50 mL Intravenous Q15 Min PRN Xena Morton DNP        Or    glucagon injection 1 mg  1 mg Subcutaneous Q15 Min PRN Xena Morton DNP        gabapentin (NEURONTIN) capsule 800 mg  800 mg Oral BID Celestino Godwin PA-C        hydrochlorothiazide (HYDRODIURIL) tablet 25 mg  25 mg Oral Daily Xena Morton DNP   25 mg at 03/10/25 0905    hydrOXYzine HCl (ATARAX) tablet 10 mg  10 mg Oral Q6H PRN Camelia Stern PA-C   10 mg at 03/07/25 1343    insulin aspart (NovoLOG) injection (RAPID ACTING)  1-7 Units Subcutaneous TID AC Xena Morton DNP   3 Units at 03/10/25 1352    insulin aspart (NovoLOG) injection (RAPID ACTING)  1-5 Units Subcutaneous At Bedtime Xena Morton DNP        insulin aspart (NovoLOG) injection (RAPID ACTING)   Subcutaneous TID w/meals Sagar Cadena MD   1 Units at 03/10/25 1352    insulin glargine (LANTUS PEN) injection 17 Units  17 Units Subcutaneous At Bedtime Xena Morton DNP   17 Units at 03/09/25 5961    latanoprost (XALATAN) 0.005 % ophthalmic solution 1 drop  1 drop Both Eyes At Bedtime Yvonne Whitmore, APRN CNP   1 drop at 03/09/25 1346    levothyroxine (SYNTHROID/LEVOTHROID) tablet 88 mcg  88  mcg Oral Daily Mercedes-Yvonne Pedraza Junior, APRN CNP   88 mcg at 03/10/25 0904    lidocaine (LMX4) cream   Topical Q1H PRN Camelia Setrn PA-C        lidocaine 1 % 0.1-1 mL  0.1-1 mL Other Q1H PRN Camelia Stern PA-C        losartan (COZAAR) tablet 50 mg  50 mg Oral Daily Xena Morton, DNP   50 mg at 03/10/25 0904    magnesium hydroxide (MILK OF MAGNESIA) suspension 30 mL  30 mL Oral Daily PRN Camelia Stern PA-C        metoprolol succinate ER (TOPROL XL) 24 hr tablet 100 mg  100 mg Oral BID Xena Morton DNP   100 mg at 03/10/25 0905    naloxone (NARCAN) injection 0.2 mg  0.2 mg Intravenous Q2 Min PRN Sagar Cadena MD        Or    naloxone (NARCAN) injection 0.4 mg  0.4 mg Intravenous Q2 Min PRN Sagar Cadena MD        Or    naloxone (NARCAN) injection 0.2 mg  0.2 mg Intramuscular Q2 Min PRN Sagar Cadena MD        Or    naloxone (NARCAN) injection 0.4 mg  0.4 mg Intramuscular Q2 Min PRN Sagar Cadena MD        ondansetron (ZOFRAN ODT) ODT tab 4 mg  4 mg Oral Q6H PRN Camelia Stern PA-C   4 mg at 03/08/25 1722    Or    ondansetron (ZOFRAN) injection 4 mg  4 mg Intravenous Q6H PRN Caemlia Stern PA-C        oxyCODONE IR (ROXICODONE) half-tab 2.5 mg  2.5 mg Oral Q3H PRN Celestino Godwin PA-C        Or    oxyCODONE (ROXICODONE) tablet 5 mg  5 mg Oral Q3H PRN Celestino Godwin PA-C        pantoprazole (PROTONIX) EC tablet 40 mg  40 mg Oral BID Xena Morton DNP   40 mg at 03/10/25 0904    polyethylene glycol (MIRALAX) Packet 17 g  17 g Oral Daily Camelia Stern PA-C        prednisoLONE acetate (PRED FORTE) 1 % ophthalmic susp 1 drop  1 drop Left Eye Daily Yvonne Whitmore, APRN CNP   1 drop at 03/10/25 0919    prochlorperazine (COMPAZINE) injection 5 mg  5 mg Intravenous Q6H PRN Camelia Stern PA-C        Or    prochlorperazine (COMPAZINE) tablet 5 mg  5 mg Oral Q6H PRN Camelia Stern PA-C        senna-docusate (SENOKOT-S/PERICOLACE) 8.6-50 MG per tablet 1 tablet  1 tablet Oral BID  Camelia Stern PA-C   1 tablet at 03/09/25 2114    sodium chloride (PF) 0.9% PF flush 3 mL  3 mL Intracatheter q1 min prn Camelia Stern PA-C        timolol maleate (TIMOPTIC) 0.5 % ophthalmic solution 1 drop  1 drop Both Eyes QAM Haim, Yvonne Pacheco, APRN CNP   1 drop at 03/10/25 0911             Review of Systems:      ROS: 10 point ROS neg other than the symptoms noted above in the HPI.          Physical Exam:     VS:  T: 97.9    HR: 82    BP: 144/74    RR: 16   GEN:  AOx3.  NAD.  Pleasant.  GEN: NAD, lying in bed  EYES: EOMI  MOUTH: MMM  NECK: Supple  RESP: Unlabored breathing  CARDIAC: Regular radial pulse-does not appear to be in atrial fibrillation.  SKIN: Warm  ABD: soft, nontender  NEURO: AAO          Data:   All laboratory data reviewed:    Recent Labs   Lab 03/10/25  0656 03/09/25  0834 03/08/25  0610 03/07/25  0647   WBC 7.2 6.8 8.0  --    HGB 9.7* 9.0* 9.7* 11.8   * 140* 156  --        Recent Labs   Lab 03/10/25  1313 03/10/25  0916 03/10/25  0656 03/10/25  0241 03/09/25  1142 03/09/25  0834 03/08/25  0745 03/08/25  0610   NA  --   --  139  --   --  136  --  137   POTASSIUM  --   --  4.1  --   --  3.9  --  4.8   CHLORIDE  --   --  104  --   --  104  --  105   CO2  --   --  23  --   --  24  --  20*   BUN  --   --  22.1  --   --  28.6*  --  28.4*   CR  --   --  0.96*  --   --  1.04*  --  1.11*   * 192* 196* 187*   < > 160*   < > 202*   INDY  --   --  9.1  --   --  8.7*  --  8.4*    < > = values in this interval not displayed.            Impression and Plan:   Impression:   Lj Castro is a 79 year old female with acute urinary retention s/p L2-L5 OLLIF (POD 3)  Constipation  Hypertension  Dyslipidemia  Diabetes mellitus type 2  CKD stage III  Atrial fibrillation      Plan:   -manage urinary retention with indwelling catheter.  Should discharge to TCU with this.  Suspect patient will not be able to perform clean intermittent catheterization given recent surgery.  -Continue  with bowel regimen.    -Discussed with the patient and her family that her urinary retention is likely multifactorial given recent surgery, poor mobility, anesthesia, constipation, and history of urinary retention.  I suspect that she is moving better and having more regular bowel movements that she will likely be able to pass a trial of void that she has done previously.  -Patient can have TOV in 7 days either at TCU or in urology clinic.  If she fails trial of void, would recommend replacement of Winston catheter and repeat trial of void in approximately 10 days.  -If continued difficulties, would consider urodynamic study.     Tosin De La Cruz PA-C  [facility] Urology  [phone]

## 2025-03-10 NOTE — PROGRESS NOTES
Care Management Follow Up    Length of Stay (days): 3    Expected Discharge Date: 03/11/2025     Concerns to be Addressed: all concerns addressed in this encounter     Patient plan of care discussed at interdisciplinary rounds: Yes    Anticipated Discharge Disposition: TCU                Anticipated Discharge Services:    Anticipated Discharge DME:      Patient/family educated on Medicare website which has current facility and service quality ratings: yes   Education Provided on the Discharge Plan:    Patient/Family in Agreement with the Plan: yes    Referrals Placed by CM/SW:    Private pay costs discussed: private room/amenity fees, transportation costs    Discussed  Partnership in Safe Discharge Planning  document with patient/family: No     Handoff Completed: No, handoff not indicated or clinically appropriate    Additional Information:  Patient was accepted by  [facility] for a private room. CM informed patient and .  They have decided to do agency transport. CM called and transport is not available until 2130 today. Transport set for tomorrow at [phone] via [facility] w/c transport.. Patient and family updated as well as TCU. It is a private room per patient request. CM asked TCU private room costs and it is $45/ night.  informed as patient is sleeping.  CM reviewed Private room fees of approximately $45/ night and transportation fees of approximately  base of $90 and $6/ mile. They are in agreement to these fees.      Next Steps: Complete PAS and verify will be ready for discharge tomorrow with surgeon.     Addendum 1537  CM paged Neuro PA to inform need orders signed for day of discharge. PAS completed.    Scarlett Dewey, RN, BSN, CM  Inpatient Care Coordination  [facility]  [phone]

## 2025-03-10 NOTE — PLAN OF CARE
"Goal Outcome Evaluation:      Plan of Care Reviewed With: guardian, patient    Overall Patient Progress: improvingOverall Patient Progress: improving     Pt alert and oriented. VSS on room air. Up with assist of 1 gait belt and walker. Back brace oob. Dressing to back reinforced, small amount of drainage. Pain controlled with oxycodone, tylenol, gabapentin. Retaining urine, shin placed per orders. Dr. Tammi fu, urology consulted. TCU tomorrow w/c transport 0801-0414.         Problem: Delirium  Goal: Optimal Coping  Outcome: Progressing  Goal: Improved Behavioral Control  Outcome: Progressing  Intervention: Minimize Safety Risk  Recent Flowsheet Documentation  Taken 3/10/2025 1000 by Jena Peterson RN  Enhanced Safety Measures: pain management  Goal: Improved Attention and Thought Clarity  Outcome: Progressing  Goal: Improved Sleep  Outcome: Progressing     Problem: Adult Inpatient Plan of Care  Goal: Plan of Care Review  Description: The Plan of Care Review/Shift note should be completed every shift.  The Outcome Evaluation is a brief statement about your assessment that the patient is improving, declining, or no change.  This information will be displayed automatically on your shift  note.  Outcome: Progressing  Flowsheets (Taken 3/10/2025 1408)  Plan of Care Reviewed With:   guardian   patient  Overall Patient Progress: improving  Goal: Patient-Specific Goal (Individualized)  Description: You can add care plan individualizations to a care plan. Examples of Individualization might be:  \"Parent requests to be called daily at 9am for status\", \"I have a hard time hearing out of my right ear\", or \"Do not touch me to wake me up as it startles  me\".  Outcome: Progressing  Goal: Absence of Hospital-Acquired Illness or Injury  Outcome: Progressing  Intervention: Identify and Manage Fall Risk  Recent Flowsheet Documentation  Taken 3/10/2025 1000 by Jena Peterson RN  Safety Promotion/Fall Prevention:   activity " supervised   assistive device/personal items within reach   nonskid shoes/slippers when out of bed   safety round/check completed  Intervention: Prevent Skin Injury  Recent Flowsheet Documentation  Taken 3/10/2025 1030 by Jena Peterson RN  Body Position: supine, head elevated  Intervention: Prevent and Manage VTE (Venous Thromboembolism) Risk  Recent Flowsheet Documentation  Taken 3/10/2025 1000 by Jena Peterson RN  VTE Prevention/Management: SCDs off (sequential compression devices)  Intervention: Prevent Infection  Recent Flowsheet Documentation  Taken 3/10/2025 1000 by Jena Peterson RN  Infection Prevention:   single patient room provided   rest/sleep promoted  Goal: Optimal Comfort and Wellbeing  Outcome: Progressing  Intervention: Monitor Pain and Promote Comfort  Recent Flowsheet Documentation  Taken 3/10/2025 0958 by Jena Peterson RN  Pain Management Interventions: medication (see MAR)  Taken 3/10/2025 0904 by Jena Peterson RN  Pain Management Interventions: medication (see MAR)  Goal: Readiness for Transition of Care  Outcome: Progressing     Problem: Pain Acute  Goal: Optimal Pain Control and Function  Outcome: Progressing  Intervention: Develop Pain Management Plan  Recent Flowsheet Documentation  Taken 3/10/2025 0958 by Jena Peterson RN  Pain Management Interventions: medication (see MAR)  Taken 3/10/2025 0904 by Jena Peterson RN  Pain Management Interventions: medication (see MAR)  Intervention: Prevent or Manage Pain  Recent Flowsheet Documentation  Taken 3/10/2025 1000 by Jena Peterson RN  Medication Review/Management: medications reviewed     Problem: Spinal Surgery  Goal: Optimal Coping with Surgery  Outcome: Progressing  Goal: Absence of Bleeding  Outcome: Progressing  Intervention: Monitor and Manage Bleeding  Recent Flowsheet Documentation  Taken 3/10/2025 1000 by Jena Peterson RN  Bleeding Management: dressing monitored  Goal: Effective Bowel  Elimination  Outcome: Progressing  Goal: Fluid and Electrolyte Balance  Outcome: Progressing  Goal: Optimal Functional Ability  Outcome: Progressing  Intervention: Optimize Functional Status  Recent Flowsheet Documentation  Taken 3/10/2025 1030 by Jena Peterson RN  Positioning/Transfer Devices:   pillows   in use  Taken 3/10/2025 1000 by Jena Peterson RN  Activity Management:   up in chair   back to bed  Positioning/Transfer Devices:   pillows   in use  Goal: Absence of Infection Signs and Symptoms  Outcome: Progressing  Intervention: Prevent or Manage Infection  Recent Flowsheet Documentation  Taken 3/10/2025 1000 by Jena Peterson RN  Infection Prevention:   single patient room provided   rest/sleep promoted  Goal: Optimal Neurologic Function  Outcome: Progressing  Intervention: Optimize Neurologic Function  Recent Flowsheet Documentation  Taken 3/10/2025 1030 by Jena Peterson RN  Body Position: supine, head elevated  Goal: Anesthesia/Sedation Recovery  Outcome: Progressing  Intervention: Optimize Anesthesia Recovery  Recent Flowsheet Documentation  Taken 3/10/2025 1000 by Jena Peterson RN  Safety Promotion/Fall Prevention:   activity supervised   assistive device/personal items within reach   nonskid shoes/slippers when out of bed   safety round/check completed  Administration (IS):   proper technique demonstrated   instruction provided, follow-up  Incentive Spirometer Predicted Level (mL): 1000  Number of Repetitions (IS): 7  Patient Tolerance (IS): good  Goal: Optimal Pain Control and Function  Outcome: Progressing  Intervention: Prevent or Manage Pain  Recent Flowsheet Documentation  Taken 3/10/2025 0958 by Jena Peterson RN  Pain Management Interventions: medication (see MAR)  Taken 3/10/2025 0904 by Jena Peterson RN  Pain Management Interventions: medication (see MAR)  Goal: Nausea and Vomiting Relief  Outcome: Progressing  Goal: Effective Urinary Elimination  Outcome:  Progressing  Goal: Effective Oxygenation and Ventilation  Outcome: Progressing  Intervention: Optimize Oxygenation and Ventilation  Recent Flowsheet Documentation  Taken 3/10/2025 1030 by Jena Peterson, RN  Head of Bed (HOB) Positioning: HOB at 30 degrees

## 2025-03-10 NOTE — PLAN OF CARE
"Goal Outcome Evaluation:      Plan of Care Reviewed With: patient    Overall Patient Progress: improvingOverall Patient Progress: improving    Outcome Evaluation: Pt is alert and oriented x4, assist of 1 with GB, Walker and Brace.  Due to void pt tried to void unable bladder scan for 515 , straight cath for 600 at 0330. Neuropathy to BLE , cms intact. Dressing has some dry drainage from previous shift. Pulled IV out provider aware, TCU at discharge        Problem: Delirium  Goal: Optimal Coping  Outcome: Progressing  Goal: Improved Behavioral Control  Outcome: Progressing  Intervention: Minimize Safety Risk  Recent Flowsheet Documentation  Taken 3/9/2025 2230 by Edwina Chairez RN  Enhanced Safety Measures: pain management  Goal: Improved Attention and Thought Clarity  Outcome: Progressing  Goal: Improved Sleep  Outcome: Progressing     Problem: Adult Inpatient Plan of Care  Goal: Plan of Care Review  Description: The Plan of Care Review/Shift note should be completed every shift.  The Outcome Evaluation is a brief statement about your assessment that the patient is improving, declining, or no change.  This information will be displayed automatically on your shift  note.  Outcome: Progressing  Flowsheets (Taken 3/10/2025 0352)  Outcome Evaluation: Pt is alert and oriented x4, assist of 1 with GB, Walker and Brace.  Due to void pt tried to void unable bladder scan for 515 , straight cath for 600 at 0330. Neuropathy to BLE , cms intact. Dressing has some dry drainage from previous shift. Pulled IV out provider aware, TCU at discharge  Plan of Care Reviewed With: patient  Overall Patient Progress: improving  Goal: Patient-Specific Goal (Individualized)  Description: You can add care plan individualizations to a care plan. Examples of Individualization might be:  \"Parent requests to be called daily at 9am for status\", \"I have a hard time hearing out of my right ear\", or \"Do not touch me to wake me up as it " "startles  me\".  Outcome: Progressing  Goal: Absence of Hospital-Acquired Illness or Injury  Outcome: Progressing  Intervention: Identify and Manage Fall Risk  Recent Flowsheet Documentation  Taken 3/9/2025 2230 by Edwina Chairez RN  Safety Promotion/Fall Prevention:   activity supervised   assistive device/personal items within reach   nonskid shoes/slippers when out of bed   safety round/check completed  Intervention: Prevent Skin Injury  Recent Flowsheet Documentation  Taken 3/9/2025 2230 by Edwina Chairez RN  Body Position: turned  Intervention: Prevent and Manage VTE (Venous Thromboembolism) Risk  Recent Flowsheet Documentation  Taken 3/9/2025 2230 by Edwina Chairez RN  VTE Prevention/Management: SCDs off (sequential compression devices)  Intervention: Prevent Infection  Recent Flowsheet Documentation  Taken 3/9/2025 2230 by Edwina hCairez RN  Infection Prevention: rest/sleep promoted  Goal: Optimal Comfort and Wellbeing  Outcome: Progressing  Goal: Readiness for Transition of Care  Outcome: Progressing     Problem: Pain Acute  Goal: Optimal Pain Control and Function  Outcome: Progressing  Intervention: Prevent or Manage Pain  Recent Flowsheet Documentation  Taken 3/9/2025 2230 by Edwina Chairez RN  Bowel Elimination Promotion:   adequate fluid intake promoted   ambulation promoted  Medication Review/Management: medications reviewed     Problem: Spinal Surgery  Goal: Optimal Coping with Surgery  Outcome: Progressing  Goal: Absence of Bleeding  Outcome: Progressing  Intervention: Monitor and Manage Bleeding  Recent Flowsheet Documentation  Taken 3/9/2025 2230 by Edwina Chairez RN  Bleeding Management: dressing monitored  Goal: Effective Bowel Elimination  Outcome: Progressing  Goal: Fluid and Electrolyte Balance  Outcome: Progressing  Intervention: Monitor and Manage Fluid and Electrolyte Balance  Recent Flowsheet Documentation  Taken 3/9/2025 2230 by Drew" Edwina Chávez RN  Fluid/Electrolyte Management: fluids provided  Goal: Optimal Functional Ability  Outcome: Progressing  Intervention: Optimize Functional Status  Recent Flowsheet Documentation  Taken 3/9/2025 2230 by Edwina Chairez RN  Activity Management: activity adjusted per tolerance  Positioning/Transfer Devices:   pillows   in use  Goal: Absence of Infection Signs and Symptoms  Outcome: Progressing  Intervention: Prevent or Manage Infection  Recent Flowsheet Documentation  Taken 3/9/2025 2230 by Edwina Chairez RN  Infection Prevention: rest/sleep promoted  Infection Management: aseptic technique maintained  Goal: Optimal Neurologic Function  Outcome: Progressing  Intervention: Optimize Neurologic Function  Recent Flowsheet Documentation  Taken 3/9/2025 2230 by Edwina Chairez RN  Body Position: turned  Goal: Anesthesia/Sedation Recovery  Outcome: Progressing  Intervention: Optimize Anesthesia Recovery  Recent Flowsheet Documentation  Taken 3/9/2025 2230 by Edwina Chairez RN  Safety Promotion/Fall Prevention:   activity supervised   assistive device/personal items within reach   nonskid shoes/slippers when out of bed   safety round/check completed  Administration (IS):   proper technique demonstrated   instruction provided, follow-up  Level Incentive Spirometer (mL): 1200  Incentive Spirometer Predicted Level (mL): 1000  Number of Repetitions (IS): 5  Patient Tolerance (IS): fair  Goal: Optimal Pain Control and Function  Outcome: Progressing  Goal: Nausea and Vomiting Relief  Outcome: Progressing  Goal: Effective Urinary Elimination  Outcome: Progressing  Intervention: Monitor and Manage Urinary Retention  Recent Flowsheet Documentation  Taken 3/9/2025 2230 by Edwina Chairez RN  Urinary Elimination Promotion: toileting offered  Goal: Effective Oxygenation and Ventilation  Outcome: Progressing  Intervention: Optimize Oxygenation and Ventilation  Recent Flowsheet  Documentation  Taken 3/9/2025 2230 by Edwina Chairez, RN  Head of Bed (HOB) Positioning: HOB at 20-30 degrees

## 2025-03-10 NOTE — PROGRESS NOTES
DAILY PROGRESS NOTE    Lj Castro is a 79 year old old female admitted on 3/7/2025  5:31 AM.    Harpreet Holbrook is a 79 year old female presenting POD3 from undergoing a L2-L5 OLLIF done by Dr. Cadena on 3/7/25.     She reports feeling better compared to the previous day, with a noted improvement in her pain levels and strength. She has been able to ambulate with the aid of a walker for bathroom trips in her room.     Her physical exam is reassuring, with strength and sensation remaining intact. Sheron is aware that we are awaiting a transfer to a Transitional Care Unit (TCU) but has not received any updates on the placement.    The plan for Sheron includes continued work with physical therapy, pain management, and maintaining an optimistic outlook while we await TCU placement. She has been able to eat without issue. However, she is currently experiencing difficulties with urination and continues to require straight catheterization, which is well-documented. I reassured her that her urinary output is being closely monitored by the hospital and her care team, and there is consideration for continued straight catheterization into the TCU.     All of Sheron's questions were addressed during the visit, and I informed her that I would visit again tomorrow if she remains in the hospital.    Objective    AAOx3, Sensation, 5/5 RLE 4/5 LLE Ambulating    Hemoglobin   Date Value Ref Range Status   03/10/2025 9.7 (L) 11.7 - 15.7 g/dL Final   03/20/2015 9.8 (L) 11.7 - 15.7 g/dL Final   ]      Impression / Plan  Discharge TCU pending     Plan for today:     Patient doing well  Ambulate with help  PT OT  Full diet  Today  Transition to PO meds today      Camelia Haberer PAC

## 2025-03-10 NOTE — CONSULTS
"[facility] ACUTE INPATIENT PAIN SERVICE    [facility], [facility], [facility], formerly [facility], [facility], [place]   PAIN CONSULT          ASSESSMENT/ PLAN:  Acute pain secondary to fusion surgery, POD 3, in the setting of chronic pain.       Opioids Received in the past 24h:  *(2) Oxycodone 2.5mg tablets  TOTAL MME = 15    Multimodal Medication Therapy:    Adjuvants: APAP 975mg q8h, Atarax 10mg q6hprn, Gabapentin 600mg increased to 800mg bid  Opioids: oxycodone 2.5-5mg q4hprn changed to q3h prn. Discontinue IV hydromorphone 0.2-0.4mg q2h prn  Non-medication interventions- Ice, PT  Constipation Prophylaxis- senna, miralax  Follow up /Discharge Recommendations - We recommend prescribing the following at the time of discharge:  Spine Surgery (Lumbar Fusion)- 240MME- 48 tabs of norco, or 32 tabs of oxycodone, or 30mg of dilaudid       Subjective:  Sheron is seen today in her bed alert and oriented in no acute distress. Reports her pain is currently a 8/10 located in her lower back. She has pain traveling down both lower limbs that is improved since her surgery. She is nervous about pain control with discharge to the TCU later today. Does not like to take opioids and states \"her  is not a fan of pain medications.\" Agreeable to increase her Gabapentin today to help with her increased post-op pain. Will also give an additional dose of her Oxycodone to improve her pain score prior to discharge.      Denies nausea, vomiting, constipation.      Reviewed continuing with current plan, all questions answered. No other concerns.    HPI:  Lj Castro is a 79 year old female who was admitted on 3/7/2025.  I was asked by Camelia Stern PAC to see the patient for management of her acute post-operative pain. POD#3 L2-5 OLLIF. History of lumbar stenosis with neurogenic claudication, hypertension, hypothryroidism, hyperlipidemia, PAD, DM2 on insulin, CKD,Atrial fibrillation on anticoagulation, and GERD.     PDMP RESULTS: This indicates " "refills of Gabapentin 300mg caps    History   Drug Use No         Tobacco Use      Smoking status: Never      Smokeless tobacco: Never        Objective:  Vital signs in last 24 hours:  B/P: 126/56, T: 98.3, P: 109, R: 16   Blood pressure 126/56, pulse 109, temperature 98.3  F (36.8  C), temperature source Temporal, resp. rate 16, height 1.605 m (5' 3.19\"), weight 81.1 kg (178 lb 12.8 oz), SpO2 94%.        Review of Systems:   As per subjective, all others negative.    Physical Exam    General: in no apparent distress and non-toxic   HEENT: Head normocephalic atraumatic, oral mucosa moist. Sclerae anicteric  CV: Regular rhythm, normal rate, no murmurs  Resp: No wheezes, no rales or rhonchi, no focal consolidations  GI: Non-tender; +Bs  Skin: Incision site CDI  Extremities: No peripheral edema  Psych: Normal affect, mood euthymic  Neuro: Grossly normal          Imaging:  Personally Reviewed.    No results found for this visit on 03/07/25.     Lab Results:  Personally Reviewed.   Last Comprehensive Metabolic Panel:  Sodium   Date Value Ref Range Status   03/09/2025 136 135 - 145 mmol/L Final   03/20/2015 142 133 - 144 mmol/L Final     Potassium   Date Value Ref Range Status   03/09/2025 3.9 3.4 - 5.3 mmol/L Final   03/20/2015 4.1 3.4 - 5.3 mmol/L Final     Chloride   Date Value Ref Range Status   03/09/2025 104 98 - 107 mmol/L Final   03/20/2015 110 (H) 94 - 109 mmol/L Final     Carbon Dioxide   Date Value Ref Range Status   03/20/2015 27 20 - 32 mmol/L Final     Carbon Dioxide (CO2)   Date Value Ref Range Status   03/09/2025 24 22 - 29 mmol/L Final     Anion Gap   Date Value Ref Range Status   03/09/2025 8 7 - 15 mmol/L Final   03/20/2015 5 3 - 14 mmol/L Final     Glucose   Date Value Ref Range Status   03/20/2015 145 (H) 70 - 99 mg/dL Final     Comment:     Effective 7/30/2014, the reference range for this assay has changed to reflect   new instrumentation/methodology.       GLUCOSE BY METER POCT   Date Value Ref " "Range Status   03/10/2025 187 (H) 70 - 99 mg/dL Final     Urea Nitrogen   Date Value Ref Range Status   03/09/2025 28.6 (H) 8.0 - 23.0 mg/dL Final   03/20/2015 13 7 - 30 mg/dL Final     Comment:     Effective 7/30/2014, the reference range for this assay has changed to reflect   new instrumentation/methodology.       Creatinine   Date Value Ref Range Status   03/09/2025 1.04 (H) 0.51 - 0.95 mg/dL Final   03/20/2015 0.96 0.52 - 1.04 mg/dL Final     GFR Estimate   Date Value Ref Range Status   03/09/2025 54 (L) >60 mL/min/1.73m2 Final     Comment:     eGFR calculated using 2021 CKD-EPI equation.   03/20/2015 57 (L) >60 mL/min/1.7m2 Final     Comment:     Non  GFR Calc     Calcium   Date Value Ref Range Status   03/09/2025 8.7 (L) 8.8 - 10.4 mg/dL Final   03/20/2015 8.2 (L) 8.5 - 10.1 mg/dL Final     Comment:     Effective 7/30/2014, the reference range for this assay has changed to reflect   new instrumentation/methodology.          UA: No results found for: \"UAMP\", \"UBARB\", \"BENZODIAZEUR\", \"UCANN\", \"UCOC\", \"OPIT\", \"UPCP\"           Please see A&P for additional details of medical decision making.      James Godwin PA-C  Acute Care Pain Management  Team  Hours of pain coverage Mon-Fri 8-1600, afterhours please call the primary team   [facility] (MARYBEL, Prasanth, SD, RH)   Page via [employer] web console -Click for Vocera            "

## 2025-03-11 VITALS
HEIGHT: 63 IN | DIASTOLIC BLOOD PRESSURE: 73 MMHG | WEIGHT: 178.8 LBS | RESPIRATION RATE: 18 BRPM | BODY MASS INDEX: 31.68 KG/M2 | SYSTOLIC BLOOD PRESSURE: 137 MMHG | TEMPERATURE: 97.6 F | OXYGEN SATURATION: 99 % | HEART RATE: 88 BPM

## 2025-03-11 LAB
GLUCOSE BLDC GLUCOMTR-MCNC: 109 MG/DL (ref 70–99)
GLUCOSE BLDC GLUCOMTR-MCNC: 190 MG/DL (ref 70–99)

## 2025-03-11 PROCEDURE — 250N000013 HC RX MED GY IP 250 OP 250 PS 637

## 2025-03-11 PROCEDURE — 250N000013 HC RX MED GY IP 250 OP 250 PS 637: Performed by: PHYSICIAN ASSISTANT

## 2025-03-11 PROCEDURE — 250N000013 HC RX MED GY IP 250 OP 250 PS 637: Performed by: NURSE PRACTITIONER

## 2025-03-11 PROCEDURE — 99232 SBSQ HOSP IP/OBS MODERATE 35: CPT | Performed by: PHYSICIAN ASSISTANT

## 2025-03-11 RX ADMIN — OXYCODONE HYDROCHLORIDE 2.5 MG: 5 TABLET ORAL at 08:53

## 2025-03-11 RX ADMIN — ACETAMINOPHEN 975 MG: 325 TABLET, FILM COATED ORAL at 02:06

## 2025-03-11 RX ADMIN — TIMOLOL MALEATE 1 DROP: 5 SOLUTION/ DROPS OPHTHALMIC at 09:00

## 2025-03-11 RX ADMIN — METOPROLOL SUCCINATE 100 MG: 100 TABLET, EXTENDED RELEASE ORAL at 08:54

## 2025-03-11 RX ADMIN — HYDROCHLOROTHIAZIDE 25 MG: 25 TABLET ORAL at 08:54

## 2025-03-11 RX ADMIN — LEVOTHYROXINE SODIUM 88 MCG: 0.09 TABLET ORAL at 08:54

## 2025-03-11 RX ADMIN — ACETAMINOPHEN 975 MG: 325 TABLET, FILM COATED ORAL at 10:13

## 2025-03-11 RX ADMIN — GABAPENTIN 800 MG: 400 CAPSULE ORAL at 08:54

## 2025-03-11 RX ADMIN — BRIMONIDINE TARTRATE 1 DROP: 2 SOLUTION/ DROPS OPHTHALMIC at 08:59

## 2025-03-11 RX ADMIN — PREDNISOLONE ACETATE 1 DROP: 10 SUSPENSION/ DROPS OPHTHALMIC at 08:58

## 2025-03-11 RX ADMIN — ATORVASTATIN CALCIUM 40 MG: 40 TABLET, FILM COATED ORAL at 08:54

## 2025-03-11 RX ADMIN — PANTOPRAZOLE SODIUM 40 MG: 40 TABLET, DELAYED RELEASE ORAL at 08:54

## 2025-03-11 RX ADMIN — LOSARTAN POTASSIUM 50 MG: 50 TABLET, FILM COATED ORAL at 08:54

## 2025-03-11 ASSESSMENT — ACTIVITIES OF DAILY LIVING (ADL)
ADLS_ACUITY_SCORE: 39

## 2025-03-11 NOTE — PLAN OF CARE
Physical Therapy Discharge Summary    Reason for therapy discharge:    Discharged to transitional care facility.    Progress towards therapy goal(s). See goals on Care Plan in [facility] electronic health record for goal details.  Goals not met.  Barriers to achieving goals:   discharge from facility.    Therapy recommendation(s):    Continued therapy is recommended.  Rationale/Recommendations:  Patient presents below baseline for functional mobility. Patient unable to demonstrate safe, independent household mobility. Currently requiring Ax1 for short distance ambulation Requires assist with all ADLs at this time. Recommend discharge to TCU in order to increase strength, activity tolerance, balance and independence with mobility and ADLs.

## 2025-03-11 NOTE — PROGRESS NOTES
Care Management Discharge Note    Discharge Date: 03/11/2025       Discharge Disposition:  TCU    Discharge Services:      Discharge DME:      Discharge Transportation: agency    Private pay costs discussed:  discussed yesterday -see note    Does the patient's insurance plan have a 3 day qualifying hospital stay waiver?  No    PAS Confirmation Code: [id number]  Patient/family educated on Medicare website which has current facility and service quality ratings:      Education Provided on the Discharge Plan:    Persons Notified of Discharge Plans: Patient, family, TCU, NST  Patient/Family in Agreement with the Plan: yes    Handoff Referral Completed: No, handoff not indicated or clinically appropriate    Additional Information:  Patient discharged to [facility] today via  w/c with a transport window of [phone]. Orders were faxed x 2 for pharmacy corrections. See above for who was informed.    Scarlett Dewey, RN, BSN, CM  Inpatient Care Coordination  [facility]  [phone]

## 2025-03-11 NOTE — PLAN OF CARE
"Goal Outcome Evaluation:      Plan of Care Reviewed With: patient    Overall Patient Progress: improvingOverall Patient Progress: improving    Outcome Evaluation: Discharge to TCU today.    A&Ox4. VSS on RA. Assistx1 with walker/gb and back brace when out of bed. Winston in place due to urinary retention, pt. Is discharging with this in place. Pt. Reported pain and scheduled tylenol was given. CMS intact.     Problem: Delirium  Goal: Optimal Coping  Outcome: Progressing  Goal: Improved Behavioral Control  Outcome: Progressing  Intervention: Minimize Safety Risk  Recent Flowsheet Documentation  Taken 3/11/2025 0206 by Adia Blank, RN  Enhanced Safety Measures: pain management  Goal: Improved Attention and Thought Clarity  Outcome: Progressing  Goal: Improved Sleep  Outcome: Progressing     Problem: Adult Inpatient Plan of Care  Goal: Plan of Care Review  Description: The Plan of Care Review/Shift note should be completed every shift.  The Outcome Evaluation is a brief statement about your assessment that the patient is improving, declining, or no change.  This information will be displayed automatically on your shift  note.  Outcome: Progressing  Flowsheets (Taken 3/11/2025 0402)  Outcome Evaluation: Discharge to TCU today.  Plan of Care Reviewed With: patient  Overall Patient Progress: improving  Goal: Patient-Specific Goal (Individualized)  Description: You can add care plan individualizations to a care plan. Examples of Individualization might be:  \"Parent requests to be called daily at 9am for status\", \"I have a hard time hearing out of my right ear\", or \"Do not touch me to wake me up as it startles  me\".  Outcome: Progressing  Goal: Absence of Hospital-Acquired Illness or Injury  Outcome: Progressing  Intervention: Identify and Manage Fall Risk  Recent Flowsheet Documentation  Taken 3/11/2025 0206 by Adia Blank, RN  Safety Promotion/Fall Prevention:   activity supervised   assistive device/personal items within " reach   safety round/check completed   nonskid shoes/slippers when out of bed   lighting adjusted   clutter free environment maintained  Intervention: Prevent Skin Injury  Recent Flowsheet Documentation  Taken 3/11/2025 0206 by Adia Blank RN  Body Position: supine  Intervention: Prevent and Manage VTE (Venous Thromboembolism) Risk  Recent Flowsheet Documentation  Taken 3/11/2025 0206 by Adia Blank RN  VTE Prevention/Management: SCDs off (sequential compression devices)  Goal: Optimal Comfort and Wellbeing  Outcome: Progressing  Intervention: Monitor Pain and Promote Comfort  Recent Flowsheet Documentation  Taken 3/11/2025 0206 by Adia Blank RN  Pain Management Interventions: medication (see MAR)  Goal: Readiness for Transition of Care  Outcome: Progressing     Problem: Pain Acute  Goal: Optimal Pain Control and Function  Outcome: Progressing  Intervention: Develop Pain Management Plan  Recent Flowsheet Documentation  Taken 3/11/2025 0206 by Adia Blank RN  Pain Management Interventions: medication (see MAR)  Intervention: Prevent or Manage Pain  Recent Flowsheet Documentation  Taken 3/11/2025 0206 by Adia Blank RN  Medication Review/Management: medications reviewed     Problem: Spinal Surgery  Goal: Optimal Coping with Surgery  Outcome: Progressing  Goal: Absence of Bleeding  Outcome: Progressing  Goal: Effective Bowel Elimination  Outcome: Progressing  Goal: Fluid and Electrolyte Balance  Outcome: Progressing  Goal: Optimal Functional Ability  Outcome: Progressing  Intervention: Optimize Functional Status  Recent Flowsheet Documentation  Taken 3/11/2025 0206 by Adia Blank RN  Activity Management:   activity adjusted per tolerance   dorsiflexion/plantar flexion performed  Positioning/Transfer Devices:   pillows   in use  Goal: Absence of Infection Signs and Symptoms  Outcome: Progressing  Goal: Optimal Neurologic Function  Outcome: Progressing  Intervention: Optimize Neurologic Function  Recent  Flowsheet Documentation  Taken 3/11/2025 0206 by Adia Blank RN  Body Position: supine  Goal: Anesthesia/Sedation Recovery  Outcome: Progressing  Intervention: Optimize Anesthesia Recovery  Recent Flowsheet Documentation  Taken 3/11/2025 0206 by Adia Blank RN  Safety Promotion/Fall Prevention:   activity supervised   assistive device/personal items within reach   safety round/check completed   nonskid shoes/slippers when out of bed   lighting adjusted   clutter free environment maintained  Goal: Optimal Pain Control and Function  Outcome: Progressing  Intervention: Prevent or Manage Pain  Recent Flowsheet Documentation  Taken 3/11/2025 0206 by Adia Blank RN  Pain Management Interventions: medication (see MAR)  Goal: Nausea and Vomiting Relief  Outcome: Progressing  Goal: Effective Urinary Elimination  Outcome: Progressing  Goal: Effective Oxygenation and Ventilation  Outcome: Progressing  Intervention: Optimize Oxygenation and Ventilation  Recent Flowsheet Documentation  Taken 3/11/2025 0206 by Adia Blank RN  Head of Bed (HOB) Positioning: HOB at 15 degrees

## 2025-03-11 NOTE — DISCHARGE SUMMARY
Physician Discharge Summary     Patient ID:  Lj Castro  [id number]  79 year old  1945    Admit date: 3/7/2025    Discharge date and time: 3/11/2025     Admitting Physician: Sagar Cadena MD    Discharge Physician: Camelia Stern PAC    Admission Diagnoses: Spinal stenosis, lumbar region, with neurogenic claudication [M48.062]  Spondylolisthesis of lumbar region [M43.16]  Other idiopathic scoliosis, lumbar region [M41.26]  H/O Spinal surgery [Z98.890]    Discharge Diagnoses: S/p L2-L5 OLLIF    Admission Condition: stable    Discharged Condition: good    Indication for Admission: S/p L2-L5 OLLIF    Hospital Course: Unremarkable with some post-op urinary retention    Consults: OT, PT, Hospitalist, Urology, Social Work    Significant Diagnostic Studies: labs    Treatments: IV hydration and analgesia    Discharge Exam:  GENERAL APPEARANCE: healthy, alert and no distress  EYES: Eyes grossly normal to inspection, PERRL and conjunctivae and sclerae normal  HENT: ear canals and TM's normal, nose and mouth without ulcers or lesions, oropharynx clear and oral mucous membranes moist  NECK: no adenopathy, no asymmetry, masses, or scars and thyroid normal to palpation  RESP: lungs clear to auscultation - no rales, rhonchi or wheezes  BREAST: normal without masses, tenderness or nipple discharge and no palpable axillary masses or adenopathy  CV: regular rates and rhythm, normal S1 S2, no S3 or S4, no murmur, click or rub, no peripheral edema and peripheral pulses strong  ABDOMEN: soft, nontender, no hepatosplenomegaly, no masses and bowel sounds normal   (female): normal female external genitalia, vaginal mucosa pink, moist, well rugated and normal cervix, adnexae, and uterus without masses. Normal vaginal discharge  MS: no musculoskeletal defects are noted and gait is age appropriate without ataxia  SKIN: no suspicious lesions or rashes  NEURO: Normal strength and tone, sensory exam grossly normal, mentation intact  and speech normal  PSYCH: mentation appears normal and affect normal/bright    Disposition: TCU    Patient Instructions:   Current Discharge Medication List        START taking these medications    Details   hydrOXYzine HCl (ATARAX) 10 MG tablet Take 1 tablet (10 mg) by mouth 3 times daily as needed for itching.  Qty: 90 tablet, Refills: 0    Associated Diagnoses: H/O Spinal surgery      methocarbamol (ROBAXIN) 500 MG tablet Take 1 tablet (500 mg) by mouth 3 times daily as needed for muscle spasms.  Qty: 90 tablet, Refills: 0    Associated Diagnoses: H/O Spinal surgery      oxyCODONE (ROXICODONE) 5 MG tablet Take 1 tablet (5 mg) by mouth every 4 hours as needed for pain or moderate pain.  Qty: 42 tablet, Refills: 0    Associated Diagnoses: H/O Spinal surgery           CONTINUE these medications which have CHANGED    Details   !! gabapentin (NEURONTIN) 400 MG capsule Take 2 capsules (800 mg) by mouth 2 times daily.  Qty: 30 capsule, Refills: 0    Associated Diagnoses: H/O Spinal surgery       !! - Potential duplicate medications found. Please discuss with provider.        CONTINUE these medications which have NOT CHANGED    Details   acetaminophen (TYLENOL) 325 MG tablet Take 2 tablets (650 mg) by mouth 3 times daily    Associated Diagnoses: H/O Spinal surgery      apixaban ANTICOAGULANT (ELIQUIS) 5 MG tablet Take 5 mg by mouth 2 times daily.      atorvastatin (LIPITOR) 40 MG tablet Take 1 tablet by mouth daily      !! blood glucose (NO BRAND SPECIFIED) test strip USE TO CHECK GLUCOSE FIVE TIMES DAILY      brimonidine (ALPHAGAN) 0.2 % ophthalmic solution Place 1 drop Into the left eye 2 times daily.      !! gabapentin (NEURONTIN) 300 MG capsule Take 600 mg by mouth 2 times daily      hydrochlorothiazide (HYDRODIURIL) 25 MG tablet Take 1 tablet (25 mg) by mouth daily  Qty: 30 tablet, Refills: 0    Associated Diagnoses: Hypertension, unspecified type      !! insulin aspart (NOVOLOG PEN) 100 UNIT/ML pen Inject 3 Units  Subcutaneous daily (with breakfast)      !! insulin aspart (NOVOLOG PEN) 100 UNIT/ML pen Inject 3 Units Subcutaneous daily (with lunch)      !! insulin aspart (NOVOLOG PEN) 100 UNIT/ML pen Inject 5 Units Subcutaneous daily (with dinner)      insulin glargine (LANTUS PEN) 100 UNIT/ML pen Inject 17 Units subcutaneously at bedtime.      ketoconazole (NIZORAL) 2 % external shampoo Shampoo the hair thoroughly each as need.      latanoprost (XALATAN) 0.005 % ophthalmic solution Place 1 drop into both eyes at bedtime      levothyroxine (SYNTHROID/LEVOTHROID) 88 MCG tablet Take 88 mcg by mouth daily      losartan (COZAAR) 50 MG tablet Take 1 tablet by mouth daily.      melatonin 5 MG tablet Take 5 mg by mouth nightly as needed for sleep.      metoprolol succinate ER (TOPROL XL) 100 MG 24 hr tablet Take 1 tablet (100 mg) by mouth 2 times daily  Qty: 30 tablet, Refills: 0    Associated Diagnoses: Hypertension, unspecified type      Multiple Vitamins-Calcium (ONE-A-DAY WOMENS FORMULA PO) Take 1 tablet by mouth daily      omeprazole (PRILOSEC) 40 MG DR capsule Take 40 mg by mouth daily      !! ONETOUCH ULTRA TEST test strip USE TO CHECK GLUCOSE FIVE TIMES DAILY      prednisoLONE acetate (PRED FORTE) 1 % ophthalmic suspension Place 1 drop Into the left eye daily      timolol maleate (TIMOPTIC) 0.5 % ophthalmic solution Place 1 drop into both eyes every morning.       !! - Potential duplicate medications found. Please discuss with provider.        Activity: activity as tolerated and no driving while on analgesics  Diet: regular diet  Wound Care: reinforce dressing PRN and ice to area for comfort    Follow-up with [facility] on 4/8/25.    Signed:  Camelia Stern PA-C  3/11/2025  8:03 AM

## 2025-03-11 NOTE — PROGRESS NOTES
DAILY PROGRESS NOTE    Lj Castro is a 79 year old old female admitted on 3/7/2025  5:31 AM.    Subjective  Sheron is a 79 year old female presenting POD4 from undergoing a L2-L5 OLLIF done by Dr. Cadena on 3/7/25.      Sheron is a patient who presents today having shown notable improvement from a physical standpoint. She reports being able to ambulate with her walker much better, demonstrating increased confidence and strength. On examination, it is confirmed that her left leg strength has matched her right leg. However, she notes that the majority of her symptoms are localized in her left hip, although she denies experiencing any numbness, tingling, or radiation down into her left lower extremity.     The onset of her hip pain is not clearly stated, but it is suggested that it could be a result of prolonged bed rest. The intensity of her pain was significant yesterday, rated at an 8 out of 10, which may have been associated with increased movement and activity. Today, she rates her pain at a more manageable 4 out of 10.    Sheron had a urology consult yesterday, and her urinary care plan will be managed by her care team as she transitions to a Transitional Care Unit (TCU) today.     We discussed her expectations and plan of care for the upcoming weeks, including a follow-up appointment 4/8/25, where a bone growth stimulator will be set up and a lumbar CT will be conducted to assess hardware location and fusion progression. Sheron has been encouraged to reach out with any concerns while at the TCU, and discharge orders will be added to her records.    Objective    AAOx3, Sensation, 5/5 RLE 4+/5 LLE Ambulating     Hemoglobin   Date Value Ref Range Status   03/10/2025 9.7 (L) 11.7 - 15.7 g/dL Final   03/20/2015 9.8 (L) 11.7 - 15.7 g/dL Final   ]      Impression / Plan  TCU Discharge today     Plan for today:    Patient doing well  Ambulate with help  PT OT or Discharge prior  Full diet  Today  PO meds today    Discharge to TCU    Camelia Haberer PAC

## 2025-03-11 NOTE — PROGRESS NOTES
[facility] ACUTE INPATIENT PAIN SERVICE    [facility], [facility], [facility], [facility], [place]   PAIN PROGRESS          ASSESSMENT/ PLAN:  Acute pain secondary to fusion surgery, POD 4, in the setting of chronic pain.        Opioids Received in the past 24h:  *(2) Oxycodone 5mg tablets  *(1) Oxycodone 2.5mg tablet  TOTAL MME = 18.75     Multimodal Medication Therapy:    Adjuvants: APAP 975mg q8h, Atarax 10mg q6hprn, Gabapentin 800mg bid  Opioids: oxycodone 2.5-5mg q4hprn changed to q3h prn.   Non-medication interventions- Ice, PT  Constipation Prophylaxis- senna, miralax  Follow up /Discharge Recommendations - We recommend prescribing the following at the time of discharge:  Spine Surgery (Lumbar Fusion)- 240MME- 48 tabs of norco, or 32 tabs of oxycodone, or 30mg of dilaudid         Subjective:  Sheron is seen today in her bed alert and oriented in no acute distress. Reports her pain is currently a 7/10 located in her lower back. She has pain traveling down both lower limbs that is improved since her surgery. Emphasizes today that she does not like to take opiates as she does not like the way it makes her feel. Increase in her gabapentin has been helpful and she is tolerating it well. Plans to discharge later today. No other pain concerns.     Denies nausea, vomiting, constipation.      Reviewed continuing with current plan, all questions answered. No other concerns.     HPI:  Lj Castro is a 79 year old female who was admitted on 3/7/2025.  I was asked by Camelia Stern PAC to see the patient for management of her acute post-operative pain. POD#4 L2-5 OLLIF. History of lumbar stenosis with neurogenic claudication, hypertension, hypothryroidism, hyperlipidemia, PAD, DM2 on insulin, CKD,Atrial fibrillation on anticoagulation, and GERD.     PDMP RESULTS: This indicates refills of Gabapentin 300mg caps    History   Drug Use No         Tobacco Use      Smoking status: Never      Smokeless tobacco:  "Never        Objective:  Vital signs in last 24 hours:  B/P: 137/73, T: 97.6, P: 88, R: 18   Blood pressure 137/73, pulse 88, temperature 97.6  F (36.4  C), temperature source Temporal, resp. rate 18, height 1.605 m (5' 3.19\"), weight 81.1 kg (178 lb 12.8 oz), SpO2 99%.        Review of Systems:   As per subjective, all others negative.    Physical Exam    General: in no apparent distress and non-toxic   HEENT: Head normocephalic atraumatic, oral mucosa moist. Sclerae anicteric  CV: Regular rhythm, normal rate, no murmurs  Resp: No wheezes, no rales or rhonchi, no focal consolidations  GI: Non-tender; +Bs  Skin: Incision site CDI  Extremities: No peripheral edema  Psych: Normal affect, mood euthymic  Neuro: Grossly normal          Imaging:  Personally Reviewed.    No results found for this visit on 03/07/25.     Lab Results:  Personally Reviewed.   Last Comprehensive Metabolic Panel:  Sodium   Date Value Ref Range Status   03/10/2025 139 135 - 145 mmol/L Final   03/20/2015 142 133 - 144 mmol/L Final     Potassium   Date Value Ref Range Status   03/10/2025 4.1 3.4 - 5.3 mmol/L Final   03/20/2015 4.1 3.4 - 5.3 mmol/L Final     Chloride   Date Value Ref Range Status   03/10/2025 104 98 - 107 mmol/L Final   03/20/2015 110 (H) 94 - 109 mmol/L Final     Carbon Dioxide   Date Value Ref Range Status   03/20/2015 27 20 - 32 mmol/L Final     Carbon Dioxide (CO2)   Date Value Ref Range Status   03/10/2025 23 22 - 29 mmol/L Final     Anion Gap   Date Value Ref Range Status   03/10/2025 12 7 - 15 mmol/L Final   03/20/2015 5 3 - 14 mmol/L Final     Glucose   Date Value Ref Range Status   03/20/2015 145 (H) 70 - 99 mg/dL Final     Comment:     Effective 7/30/2014, the reference range for this assay has changed to reflect   new instrumentation/methodology.       GLUCOSE BY METER POCT   Date Value Ref Range Status   03/11/2025 109 (H) 70 - 99 mg/dL Final     Urea Nitrogen   Date Value Ref Range Status   03/10/2025 22.1 8.0 - 23.0 " "mg/dL Final   03/20/2015 13 7 - 30 mg/dL Final     Comment:     Effective 7/30/2014, the reference range for this assay has changed to reflect   new instrumentation/methodology.       Creatinine   Date Value Ref Range Status   03/10/2025 0.96 (H) 0.51 - 0.95 mg/dL Final   03/20/2015 0.96 0.52 - 1.04 mg/dL Final     GFR Estimate   Date Value Ref Range Status   03/10/2025 60 (L) >60 mL/min/1.73m2 Final     Comment:     eGFR calculated using 2021 CKD-EPI equation.   03/20/2015 57 (L) >60 mL/min/1.7m2 Final     Comment:     Non  GFR Calc     Calcium   Date Value Ref Range Status   03/10/2025 9.1 8.8 - 10.4 mg/dL Final   03/20/2015 8.2 (L) 8.5 - 10.1 mg/dL Final     Comment:     Effective 7/30/2014, the reference range for this assay has changed to reflect   new instrumentation/methodology.          UA: No results found for: \"UAMP\", \"UBARB\", \"BENZODIAZEUR\", \"UCANN\", \"UCOC\", \"OPIT\", \"UPCP\"           Please see A&P for additional details of medical decision making.      James Godwin PA-C  Acute Care Pain Management  Team  Hours of pain coverage Mon-Fri 8-1600, afterhours please call the primary team   [facility] (MARYBEL, Prasanth, [place], [place])   Page via [employer] web console -Click for Vocera           "

## 2025-03-11 NOTE — PLAN OF CARE
"Goal Outcome Evaluation:      Plan of Care Reviewed With: patient    Overall Patient Progress: improving     Outcome Evaluation: Pt hypertensive. Scheduled Metoprolol given. Continues to report high pain score 7-8/10 despite pain regimen (Tylenol, Gabapentin, oxycodone).    A&O x4. Afebrile. No BM. Abdomen soft, non-tender with audible bowel sounds in all quadrants. Refused senna, states \"I have IBS.\" Ambulated with x1 assist, GB, Back brace, walker. New dressing applied to lower back.      Problem: Delirium  Goal: Optimal Coping  Outcome: Progressing  Goal: Improved Behavioral Control  Outcome: Progressing  Intervention: Minimize Safety Risk  Recent Flowsheet Documentation  Taken 3/10/2025 1843 by Sherley Mitchell RN  Enhanced Safety Measures: pain management  Trust Relationship/Rapport:   care explained   questions answered  Goal: Improved Attention and Thought Clarity  Outcome: Progressing  Goal: Improved Sleep  Outcome: Progressing     Problem: Adult Inpatient Plan of Care  Goal: Plan of Care Review  Description: The Plan of Care Review/Shift note should be completed every shift.  The Outcome Evaluation is a brief statement about your assessment that the patient is improving, declining, or no change.  This information will be displayed automatically on your shift  note.  Outcome: Progressing  Flowsheets (Taken 3/10/2025 2207)  Outcome Evaluation: Pt hypertensive. Scheduled Metoprolol given. Continues to report high pain score 7-8/10 despite pain regimen (Tylenol, Gabapentin, oxycodone).  Plan of Care Reviewed With: patient  Overall Patient Progress: improving  Goal: Patient-Specific Goal (Individualized)  Description: You can add care plan individualizations to a care plan. Examples of Individualization might be:  \"Parent requests to be called daily at 9am for status\", \"I have a hard time hearing out of my right ear\", or \"Do not touch me to wake me up as it startles  me\".  Outcome: Progressing  Goal: Absence of " Hospital-Acquired Illness or Injury  Outcome: Progressing  Intervention: Identify and Manage Fall Risk  Recent Flowsheet Documentation  Taken 3/10/2025 1843 by Sherley Mitchell RN  Safety Promotion/Fall Prevention:   activity supervised   assistive device/personal items within reach   nonskid shoes/slippers when out of bed   safety round/check completed  Intervention: Prevent Skin Injury  Recent Flowsheet Documentation  Taken 3/10/2025 1843 by Sherley Mitchell RN  Body Position: supine, head elevated  Intervention: Prevent and Manage VTE (Venous Thromboembolism) Risk  Recent Flowsheet Documentation  Taken 3/10/2025 1843 by Sherley Mitchell RN  VTE Prevention/Management: SCDs off (sequential compression devices)  Intervention: Prevent Infection  Recent Flowsheet Documentation  Taken 3/10/2025 1843 by Sherley Mitchell RN  Infection Prevention:   single patient room provided   rest/sleep promoted  Goal: Optimal Comfort and Wellbeing  Outcome: Progressing  Intervention: Monitor Pain and Promote Comfort  Recent Flowsheet Documentation  Taken 3/10/2025 1830 by Sherley Mitchell RN  Pain Management Interventions: medication (see MAR)  Intervention: Provide Person-Centered Care  Recent Flowsheet Documentation  Taken 3/10/2025 1843 by Sherley Mitchell RN  Trust Relationship/Rapport:   care explained   questions answered  Goal: Readiness for Transition of Care  Outcome: Progressing     Problem: Spinal Surgery  Goal: Optimal Coping with Surgery  Outcome: Progressing  Goal: Absence of Bleeding  Outcome: Progressing  Intervention: Monitor and Manage Bleeding  Recent Flowsheet Documentation  Taken 3/10/2025 1843 by Sherley Mitchell RN  Bleeding Management: dressing monitored  Goal: Effective Bowel Elimination  Outcome: Not Progressing  Intervention: Enhance Bowel Motility and Elimination  Recent Flowsheet Documentation  Taken 3/10/2025 1843 by Sherley Mitchell RN  Bowel Motility Enhancement: fluid intake encouraged  Goal: Fluid and  Electrolyte Balance  Outcome: Progressing  Intervention: Monitor and Manage Fluid and Electrolyte Balance  Recent Flowsheet Documentation  Taken 3/10/2025 1843 by Sherley Mitchell RN  Fluid/Electrolyte Management: fluids provided  Goal: Optimal Functional Ability  Outcome: Progressing  Intervention: Optimize Functional Status  Recent Flowsheet Documentation  Taken 3/10/2025 1843 by Sherley Mitchell RN  Activity Management: activity adjusted per tolerance  Positioning/Transfer Devices:   pillows   in use  Goal: Absence of Infection Signs and Symptoms  Outcome: Progressing  Intervention: Prevent or Manage Infection  Recent Flowsheet Documentation  Taken 3/10/2025 1843 by Sherley Mitchell RN  Infection Prevention:   single patient room provided   rest/sleep promoted  Infection Management: aseptic technique maintained  Goal: Optimal Neurologic Function  Outcome: Progressing  Intervention: Optimize Neurologic Function  Recent Flowsheet Documentation  Taken 3/10/2025 1843 by Sherley Mitchell RN  Body Position: supine, head elevated  Goal: Anesthesia/Sedation Recovery  Outcome: Progressing  Intervention: Optimize Anesthesia Recovery  Recent Flowsheet Documentation  Taken 3/10/2025 1843 by Sherley Mitchell RN  Safety Promotion/Fall Prevention:   activity supervised   assistive device/personal items within reach   nonskid shoes/slippers when out of bed   safety round/check completed  Administration (IS): proper technique demonstrated  Level Incentive Spirometer (mL): 1000  Incentive Spirometer Predicted Level (mL): 1500  Number of Repetitions (IS): 5  Patient Tolerance (IS): good  Goal: Optimal Pain Control and Function  Outcome: Not Progressing  Intervention: Prevent or Manage Pain  Recent Flowsheet Documentation  Taken 3/10/2025 1830 by Sherley Mitchell RN  Pain Management Interventions: medication (see MAR)  Goal: Nausea and Vomiting Relief  Outcome: Progressing  Goal: Effective Urinary Elimination  Outcome:  Progressing  Intervention: Monitor and Manage Urinary Retention  Recent Flowsheet Documentation  Taken 3/10/2025 1843 by Sherley Mitchell, RN  Urinary Elimination Promotion: toileting offered  Goal: Effective Oxygenation and Ventilation  Outcome: Progressing  Intervention: Optimize Oxygenation and Ventilation  Recent Flowsheet Documentation  Taken 3/10/2025 1843 by Sherley Mitchell, RN  Head of Bed (HOB) Positioning: HOB at 30 degrees

## 2025-03-12 NOTE — PROGRESS NOTES
Is This A New Presentation, Or A Follow-Up?: Skin Lesion Vitals stable.  Doing well.  Right arm stronger. Now has a picc line.  Awaiting TCU transfer.  Wound vac change tomorrow.  May resume Elloquis tomorrow.      How Severe Is Your Skin Lesion?: mild

## (undated) DEVICE — STPL SKIN 35W 6.9MM  PXW35

## (undated) DEVICE — DRSG TEGADERM 2 3/8X2 3/4" 1624W

## (undated) DEVICE — GLOVE BIOGEL PI MICRO SZ 7.5 48575

## (undated) DEVICE — DECANTER BAG 2002S

## (undated) DEVICE — LINEN HALF SHEET 5512

## (undated) DEVICE — DRAPE O ARM TUBE 9732722

## (undated) DEVICE — CATH IV ANGIO INTRO 12GA 382277

## (undated) DEVICE — PACK SMALL SPINE RIDGES

## (undated) DEVICE — Device

## (undated) DEVICE — ESU GROUND PAD ADULT W/CORD E7507

## (undated) DEVICE — SYR BULB IRRIG DOVER 60 ML LATEX FREE 67000

## (undated) DEVICE — LINEN ORTHO ACL PACK 5447

## (undated) DEVICE — SPONGE KITTNER 30-101

## (undated) DEVICE — LINEN FULL SHEET 5511

## (undated) DEVICE — DRSG TEGADERM 4X4 3/4" 1626W

## (undated) DEVICE — SOL WATER IRRIG 1000ML BOTTLE 2F7114

## (undated) DEVICE — GLOVE BIOGEL PI MICRO SZ 8.0 48580

## (undated) DEVICE — CUSHION INSERT LG PRONE VIEW JACKSON TABLE

## (undated) DEVICE — GLOVE BIOGEL PI MICRO INDICATOR UNDERGLOVE SZ 7.0 48970

## (undated) DEVICE — DRAPE IOBAN ISOLATION VERTICAL 6619

## (undated) DEVICE — SUCTION MANIFOLD NEPTUNE 2 SYS 4 PORT 0702-020-000

## (undated) DEVICE — DRAPE C-ARM 60X42" 1013

## (undated) DEVICE — CATH TRAY FOLEY SURESTEP 16FR DRAIN BAG STATOCK A899916

## (undated) DEVICE — LINEN DRAPE 54X72" 5467

## (undated) DEVICE — GLOVE BIOGEL PI MICRO INDICATOR UNDERGLOVE SZ 8.0 48980

## (undated) DEVICE — PAD PROAXIS TABLE KIT SPK10182

## (undated) DEVICE — GLOVE BIOGEL PI MICRO SZ 6.5 48565

## (undated) DEVICE — SPONGE COTTONOID 1/2X1/2" 80-1400

## (undated) DEVICE — ESU ELEC BLADE 2.75" COATED/INSULATED E1455

## (undated) DEVICE — SOL NACL 0.9% IRRIG 1000ML BOTTLE 2F7124

## (undated) DEVICE — BAG CLEAR TRASH 1.3M 39X33" P4040C

## (undated) DEVICE — TUBING SUCTION 12"X1/4" N612

## (undated) DEVICE — RX SURGIFLO HEMOSTATIC MATRIX 8ML 2991

## (undated) DEVICE — GLOVE BIOGEL PI SZ 8.0 40880

## (undated) DEVICE — SUTURE VICRYL+ 2-0 CT-2 27" UND VCP269H

## (undated) DEVICE — BLADE KNIFE SURG 11 371111

## (undated) DEVICE — DRSG ADAPTIC 3X3" 6112

## (undated) DEVICE — 10G BONE ACCESS TOOLS/KIT

## (undated) DEVICE — TOOL DISSECT MIDAS MR8 14CM MATCH HEAD 3MM MR8-14MH30

## (undated) DEVICE — IOM FLAT FEE

## (undated) DEVICE — LINEN POUCH DBL 5427

## (undated) DEVICE — DRSG AQUACEL AG HYDROFIBER  3.5X10" 422605

## (undated) DEVICE — MARKER SPHERES PASSIVE MEDT PACK 5 8801075

## (undated) DEVICE — NDL ANGIOCATH 14GA 1.25" 3068

## (undated) DEVICE — CATH TRAY FOLEY SURESTEP 16FR W/URINE MTR STATLK LF A303416A

## (undated) DEVICE — DRSG ABDOMINAL 07 1/2X8" 7197D

## (undated) DEVICE — CANISTER WOUND VAC W/GEL 500ML M8275063/5

## (undated) DEVICE — CLIP APPLIER 09 3/8" SM LIGACLIP MCS20

## (undated) DEVICE — SYR 10ML LL W/O NDL 302995

## (undated) DEVICE — SU VICRYL 2-0 CT-2 27" UND J269H

## (undated) DEVICE — DRSG STERI STRIP 1/2X4" R1547

## (undated) DEVICE — PREP DURAPREP 26ML APL 8630

## (undated) DEVICE — DRSG GAUZE 4X4" TRAY

## (undated) DEVICE — KIT PATIENT CARE JACKSON SPINE PACK 5808PV

## (undated) DEVICE — SYR 10ML LL W/O NDL

## (undated) DEVICE — SOL NACL 0.9% 20ML VIAL

## (undated) DEVICE — SU VICRYL 1 MO-4 18" J702D

## (undated) DEVICE — SPONGE RAY-TEC 4X8" 7318

## (undated) DEVICE — ESU CLEANER TIP 31142717

## (undated) DEVICE — DRAPE MAYO STAND 23X54 8337

## (undated) RX ORDER — FENTANYL CITRATE 50 UG/ML
INJECTION, SOLUTION INTRAMUSCULAR; INTRAVENOUS
Status: DISPENSED
Start: 2024-03-12

## (undated) RX ORDER — PHENYLEPHRINE HYDROCHLORIDE 10 MG/ML
INJECTION INTRAVENOUS
Status: DISPENSED
Start: 2023-10-13

## (undated) RX ORDER — CEFAZOLIN SODIUM 1 G/3ML
INJECTION, POWDER, FOR SOLUTION INTRAMUSCULAR; INTRAVENOUS
Status: DISPENSED
Start: 2023-10-13

## (undated) RX ORDER — ONDANSETRON 2 MG/ML
INJECTION INTRAMUSCULAR; INTRAVENOUS
Status: DISPENSED
Start: 2024-03-13

## (undated) RX ORDER — FENTANYL CITRATE 50 UG/ML
INJECTION, SOLUTION INTRAMUSCULAR; INTRAVENOUS
Status: DISPENSED
Start: 2023-10-13

## (undated) RX ORDER — VANCOMYCIN HYDROCHLORIDE 1 G/20ML
INJECTION, POWDER, LYOPHILIZED, FOR SOLUTION INTRAVENOUS
Status: DISPENSED
Start: 2023-10-13

## (undated) RX ORDER — DEXAMETHASONE SODIUM PHOSPHATE 4 MG/ML
INJECTION, SOLUTION INTRA-ARTICULAR; INTRALESIONAL; INTRAMUSCULAR; INTRAVENOUS; SOFT TISSUE
Status: DISPENSED
Start: 2024-03-12

## (undated) RX ORDER — KETOROLAC TROMETHAMINE 30 MG/ML
INJECTION, SOLUTION INTRAMUSCULAR; INTRAVENOUS
Status: DISPENSED
Start: 2025-03-07

## (undated) RX ORDER — FENTANYL CITRATE 50 UG/ML
INJECTION, SOLUTION INTRAMUSCULAR; INTRAVENOUS
Status: DISPENSED
Start: 2025-03-07

## (undated) RX ORDER — GLYCOPYRROLATE 0.2 MG/ML
INJECTION, SOLUTION INTRAMUSCULAR; INTRAVENOUS
Status: DISPENSED
Start: 2025-03-07

## (undated) RX ORDER — HYDROMORPHONE HCL IN WATER/PF 6 MG/30 ML
PATIENT CONTROLLED ANALGESIA SYRINGE INTRAVENOUS
Status: DISPENSED
Start: 2025-03-07

## (undated) RX ORDER — GINSENG 100 MG
CAPSULE ORAL
Status: DISPENSED
Start: 2023-10-13

## (undated) RX ORDER — PROPOFOL 10 MG/ML
INJECTION, EMULSION INTRAVENOUS
Status: DISPENSED
Start: 2025-03-07

## (undated) RX ORDER — TRANEXAMIC ACID 10 MG/ML
INJECTION, SOLUTION INTRAVENOUS
Status: DISPENSED
Start: 2023-10-13

## (undated) RX ORDER — LIDOCAINE HYDROCHLORIDE 10 MG/ML
INJECTION, SOLUTION EPIDURAL; INFILTRATION; INTRACAUDAL; PERINEURAL
Status: DISPENSED
Start: 2025-03-07

## (undated) RX ORDER — LIDOCAINE HYDROCHLORIDE 10 MG/ML
INJECTION, SOLUTION EPIDURAL; INFILTRATION; INTRACAUDAL; PERINEURAL
Status: DISPENSED
Start: 2024-03-12

## (undated) RX ORDER — DEXAMETHASONE SODIUM PHOSPHATE 4 MG/ML
INJECTION, SOLUTION INTRA-ARTICULAR; INTRALESIONAL; INTRAMUSCULAR; INTRAVENOUS; SOFT TISSUE
Status: DISPENSED
Start: 2025-03-07

## (undated) RX ORDER — BUPIVACAINE HYDROCHLORIDE AND EPINEPHRINE 2.5; 5 MG/ML; UG/ML
INJECTION, SOLUTION EPIDURAL; INFILTRATION; INTRACAUDAL; PERINEURAL
Status: DISPENSED
Start: 2023-10-13

## (undated) RX ORDER — ONDANSETRON 2 MG/ML
INJECTION INTRAMUSCULAR; INTRAVENOUS
Status: DISPENSED
Start: 2025-03-07

## (undated) RX ORDER — FENTANYL CITRATE-0.9 % NACL/PF 10 MCG/ML
PLASTIC BAG, INJECTION (ML) INTRAVENOUS
Status: DISPENSED
Start: 2024-03-12

## (undated) RX ORDER — HYDROXYZINE HYDROCHLORIDE 10 MG/1
TABLET, FILM COATED ORAL
Status: DISPENSED
Start: 2025-03-07

## (undated) RX ORDER — CEFAZOLIN SODIUM/WATER 2 G/20 ML
SYRINGE (ML) INTRAVENOUS
Status: DISPENSED
Start: 2023-10-13

## (undated) RX ORDER — DEXMEDETOMIDINE HYDROCHLORIDE 4 UG/ML
INJECTION, SOLUTION INTRAVENOUS
Status: DISPENSED
Start: 2025-03-07

## (undated) RX ORDER — METHOCARBAMOL 750 MG/1
TABLET, FILM COATED ORAL
Status: DISPENSED
Start: 2023-10-13

## (undated) RX ORDER — CEFAZOLIN SODIUM/WATER 2 G/20 ML
SYRINGE (ML) INTRAVENOUS
Status: DISPENSED
Start: 2025-03-07

## (undated) RX ORDER — OXYCODONE HYDROCHLORIDE 5 MG/1
TABLET ORAL
Status: DISPENSED
Start: 2025-03-07

## (undated) RX ORDER — TRANEXAMIC ACID 10 MG/ML
INJECTION, SOLUTION INTRAVENOUS
Status: DISPENSED
Start: 2025-03-07

## (undated) RX ORDER — PHENYLEPHRINE HYDROCHLORIDE 10 MG/ML
INJECTION INTRAVENOUS
Status: DISPENSED
Start: 2025-03-07

## (undated) RX ORDER — BUPIVACAINE HYDROCHLORIDE 2.5 MG/ML
INJECTION, SOLUTION EPIDURAL; INFILTRATION; INTRACAUDAL; PERINEURAL
Status: DISPENSED
Start: 2025-03-07

## (undated) RX ORDER — PROPOFOL 10 MG/ML
INJECTION, EMULSION INTRAVENOUS
Status: DISPENSED
Start: 2024-03-12

## (undated) RX ORDER — DEXTROSE MONOHYDRATE 25 G/50ML
INJECTION, SOLUTION INTRAVENOUS
Status: DISPENSED
Start: 2025-03-07